# Patient Record
Sex: MALE | Race: WHITE | Employment: FULL TIME | URBAN - METROPOLITAN AREA
[De-identification: names, ages, dates, MRNs, and addresses within clinical notes are randomized per-mention and may not be internally consistent; named-entity substitution may affect disease eponyms.]

---

## 2018-04-24 ENCOUNTER — TELEPHONE (OUTPATIENT)
Dept: ADMINISTRATIVE | Age: 48
End: 2018-04-24

## 2018-05-06 ENCOUNTER — HOSPITAL ENCOUNTER (EMERGENCY)
Age: 48
Discharge: HOME | End: 2018-05-06
Payer: COMMERCIAL

## 2018-05-06 VITALS
WEIGHT: 175 LBS | HEART RATE: 63 BPM | HEIGHT: 73 IN | SYSTOLIC BLOOD PRESSURE: 103 MMHG | DIASTOLIC BLOOD PRESSURE: 61 MMHG | BODY MASS INDEX: 23.19 KG/M2 | OXYGEN SATURATION: 95 % | RESPIRATION RATE: 16 BRPM | TEMPERATURE: 99 F

## 2018-05-06 DIAGNOSIS — H61.23 BILATERAL IMPACTED CERUMEN: Primary | ICD-10-CM

## 2018-05-06 PROCEDURE — 99282 EMERGENCY DEPT VISIT SF MDM: CPT

## 2018-05-06 PROCEDURE — 69210 REMOVE IMPACTED EAR WAX UNI: CPT

## 2018-05-25 ENCOUNTER — HOSPITAL ENCOUNTER (OUTPATIENT)
Dept: ULTRASOUND IMAGING | Age: 48
Discharge: HOME | End: 2018-05-27
Payer: COMMERCIAL

## 2018-05-25 DIAGNOSIS — R10.10 PAIN OF UPPER ABDOMEN: ICD-10-CM

## 2018-05-25 DIAGNOSIS — R63.4 WEIGHT LOSS: ICD-10-CM

## 2018-05-25 PROCEDURE — 76705 ECHO EXAM OF ABDOMEN: CPT

## 2018-05-31 ENCOUNTER — OFFICE VISIT (OUTPATIENT)
Dept: FAMILY MEDICINE CLINIC | Age: 48
End: 2018-05-31
Payer: COMMERCIAL

## 2018-05-31 VITALS
RESPIRATION RATE: 18 BRPM | DIASTOLIC BLOOD PRESSURE: 61 MMHG | HEART RATE: 52 BPM | OXYGEN SATURATION: 96 % | HEIGHT: 73 IN | BODY MASS INDEX: 21.47 KG/M2 | SYSTOLIC BLOOD PRESSURE: 90 MMHG | WEIGHT: 162 LBS

## 2018-05-31 DIAGNOSIS — E78.5 HYPERLIPIDEMIA, UNSPECIFIED HYPERLIPIDEMIA TYPE: ICD-10-CM

## 2018-05-31 DIAGNOSIS — F41.9 ANXIETY: ICD-10-CM

## 2018-05-31 DIAGNOSIS — F33.9 EPISODE OF RECURRENT MAJOR DEPRESSIVE DISORDER, UNSPECIFIED DEPRESSION EPISODE SEVERITY (HCC): ICD-10-CM

## 2018-05-31 DIAGNOSIS — R56.9 SEIZURES (HCC): ICD-10-CM

## 2018-05-31 DIAGNOSIS — I10 ESSENTIAL HYPERTENSION: Primary | ICD-10-CM

## 2018-05-31 PROCEDURE — G8427 DOCREV CUR MEDS BY ELIG CLIN: HCPCS | Performed by: FAMILY MEDICINE

## 2018-05-31 PROCEDURE — G8598 ASA/ANTIPLAT THER USED: HCPCS | Performed by: FAMILY MEDICINE

## 2018-05-31 PROCEDURE — 99214 OFFICE O/P EST MOD 30 MIN: CPT | Performed by: FAMILY MEDICINE

## 2018-05-31 PROCEDURE — G8420 CALC BMI NORM PARAMETERS: HCPCS | Performed by: FAMILY MEDICINE

## 2018-05-31 PROCEDURE — 4004F PT TOBACCO SCREEN RCVD TLK: CPT | Performed by: FAMILY MEDICINE

## 2018-05-31 RX ORDER — FAMOTIDINE 20 MG/1
TABLET, FILM COATED ORAL
Qty: 60 TABLET | Refills: 3 | Status: SHIPPED | OUTPATIENT
Start: 2018-05-31 | End: 2019-01-14 | Stop reason: SDUPTHER

## 2018-05-31 RX ORDER — ALLOPURINOL 100 MG/1
TABLET ORAL
Qty: 30 TABLET | Refills: 5 | Status: SHIPPED | OUTPATIENT
Start: 2018-05-31 | End: 2019-03-28

## 2018-05-31 RX ORDER — ISOSORBIDE MONONITRATE 30 MG/1
TABLET, EXTENDED RELEASE ORAL
Qty: 30 TABLET | Refills: 5 | Status: SHIPPED | OUTPATIENT
Start: 2018-05-31 | End: 2019-03-13 | Stop reason: SDUPTHER

## 2018-05-31 RX ORDER — CLOPIDOGREL BISULFATE 75 MG/1
TABLET ORAL
Qty: 30 TABLET | Refills: 5 | Status: SHIPPED | OUTPATIENT
Start: 2018-05-31 | End: 2019-04-18 | Stop reason: SDUPTHER

## 2018-05-31 RX ORDER — PANTOPRAZOLE SODIUM 20 MG/1
20 TABLET, DELAYED RELEASE ORAL DAILY
Qty: 30 TABLET | Refills: 3 | Status: SHIPPED | OUTPATIENT
Start: 2018-05-31 | End: 2019-01-14 | Stop reason: SDUPTHER

## 2018-05-31 RX ORDER — ATORVASTATIN CALCIUM 40 MG/1
40 TABLET, FILM COATED ORAL DAILY
Qty: 90 TABLET | Refills: 1 | Status: SHIPPED | OUTPATIENT
Start: 2018-05-31 | End: 2019-04-30 | Stop reason: CLARIF

## 2018-05-31 RX ORDER — ESCITALOPRAM OXALATE 20 MG/1
40 TABLET ORAL DAILY
Qty: 60 TABLET | Refills: 0 | Status: SHIPPED | OUTPATIENT
Start: 2018-05-31 | End: 2018-07-31 | Stop reason: SDUPTHER

## 2018-05-31 RX ORDER — LEVETIRACETAM 500 MG/1
1000 TABLET ORAL 2 TIMES DAILY
Qty: 180 TABLET | Refills: 1 | Status: SHIPPED | OUTPATIENT
Start: 2018-05-31 | End: 2018-10-22 | Stop reason: SDUPTHER

## 2018-05-31 RX ORDER — TRAZODONE HYDROCHLORIDE 100 MG/1
TABLET ORAL
Qty: 30 TABLET | Refills: 2 | Status: SHIPPED | OUTPATIENT
Start: 2018-05-31 | End: 2018-09-24 | Stop reason: SDUPTHER

## 2018-05-31 ASSESSMENT — PATIENT HEALTH QUESTIONNAIRE - PHQ9
SUM OF ALL RESPONSES TO PHQ QUESTIONS 1-9: 0
2. FEELING DOWN, DEPRESSED OR HOPELESS: 0
SUM OF ALL RESPONSES TO PHQ9 QUESTIONS 1 & 2: 0
1. LITTLE INTEREST OR PLEASURE IN DOING THINGS: 0

## 2018-07-08 ENCOUNTER — APPOINTMENT (OUTPATIENT)
Dept: CT IMAGING | Age: 48
End: 2018-07-08
Payer: COMMERCIAL

## 2018-07-08 ENCOUNTER — HOSPITAL ENCOUNTER (EMERGENCY)
Age: 48
Discharge: HOME | End: 2018-07-08
Attending: EMERGENCY MEDICINE
Payer: COMMERCIAL

## 2018-07-08 ENCOUNTER — APPOINTMENT (OUTPATIENT)
Dept: GENERAL RADIOLOGY | Age: 48
End: 2018-07-08
Payer: COMMERCIAL

## 2018-07-08 VITALS
HEART RATE: 51 BPM | SYSTOLIC BLOOD PRESSURE: 107 MMHG | HEIGHT: 73 IN | RESPIRATION RATE: 16 BRPM | OXYGEN SATURATION: 97 % | WEIGHT: 168 LBS | BODY MASS INDEX: 22.26 KG/M2 | DIASTOLIC BLOOD PRESSURE: 73 MMHG | TEMPERATURE: 98 F

## 2018-07-08 DIAGNOSIS — R00.1 BRADYCARDIA: ICD-10-CM

## 2018-07-08 DIAGNOSIS — R55 SYNCOPE AND COLLAPSE: Primary | ICD-10-CM

## 2018-07-08 LAB
ALBUMIN SERPL-MCNC: 3.7 G/DL (ref 3.5–5.2)
ALP BLD-CCNC: 44 U/L (ref 40–129)
ALT SERPL-CCNC: 18 U/L (ref 0–40)
ANION GAP SERPL CALCULATED.3IONS-SCNC: 10 MMOL/L (ref 7–16)
AST SERPL-CCNC: 19 U/L (ref 0–39)
BASOPHILS ABSOLUTE: 0.11 E9/L (ref 0–0.2)
BASOPHILS RELATIVE PERCENT: 1.8 % (ref 0–2)
BILIRUB SERPL-MCNC: 0.7 MG/DL (ref 0–1.2)
BILIRUBIN URINE: NEGATIVE
BLOOD, URINE: NEGATIVE
BUN BLDV-MCNC: 16 MG/DL (ref 6–20)
CALCIUM SERPL-MCNC: 8.6 MG/DL (ref 8.6–10.2)
CHLORIDE BLD-SCNC: 103 MMOL/L (ref 98–107)
CLARITY: CLEAR
CO2: 26 MMOL/L (ref 22–29)
COLOR: YELLOW
CREAT SERPL-MCNC: 0.9 MG/DL (ref 0.7–1.2)
EKG ATRIAL RATE: 52 BPM
EKG P AXIS: 82 DEGREES
EKG P-R INTERVAL: 158 MS
EKG Q-T INTERVAL: 420 MS
EKG QRS DURATION: 84 MS
EKG QTC CALCULATION (BAZETT): 390 MS
EKG R AXIS: 50 DEGREES
EKG T AXIS: 50 DEGREES
EKG VENTRICULAR RATE: 52 BPM
EOSINOPHILS ABSOLUTE: 0.38 E9/L (ref 0.05–0.5)
EOSINOPHILS RELATIVE PERCENT: 6.2 % (ref 0–6)
GFR AFRICAN AMERICAN: >60
GFR NON-AFRICAN AMERICAN: >60 ML/MIN/1.73
GLUCOSE BLD-MCNC: 99 MG/DL (ref 74–109)
GLUCOSE URINE: NEGATIVE MG/DL
HCT VFR BLD CALC: 40.7 % (ref 37–54)
HEMOGLOBIN: 13.7 G/DL (ref 12.5–16.5)
IMMATURE GRANULOCYTES #: 0.03 E9/L
IMMATURE GRANULOCYTES %: 0.5 % (ref 0–5)
KETONES, URINE: NEGATIVE MG/DL
LACTIC ACID: 1.5 MMOL/L (ref 0.5–2.2)
LEUKOCYTE ESTERASE, URINE: NEGATIVE
LYMPHOCYTES ABSOLUTE: 1.35 E9/L (ref 1.5–4)
LYMPHOCYTES RELATIVE PERCENT: 22 % (ref 20–42)
MCH RBC QN AUTO: 34.6 PG (ref 26–35)
MCHC RBC AUTO-ENTMCNC: 33.7 % (ref 32–34.5)
MCV RBC AUTO: 102.8 FL (ref 80–99.9)
MONOCYTES ABSOLUTE: 0.3 E9/L (ref 0.1–0.95)
MONOCYTES RELATIVE PERCENT: 4.9 % (ref 2–12)
NEUTROPHILS ABSOLUTE: 3.98 E9/L (ref 1.8–7.3)
NEUTROPHILS RELATIVE PERCENT: 64.6 % (ref 43–80)
NITRITE, URINE: NEGATIVE
PDW BLD-RTO: 15.1 FL (ref 11.5–15)
PH UA: 5 (ref 5–9)
PLATELET # BLD: 496 E9/L (ref 130–450)
PMV BLD AUTO: 9.5 FL (ref 7–12)
POTASSIUM SERPL-SCNC: 4.8 MMOL/L (ref 3.5–5)
PROTEIN UA: NEGATIVE MG/DL
RBC # BLD: 3.96 E12/L (ref 3.8–5.8)
SODIUM BLD-SCNC: 139 MMOL/L (ref 132–146)
SPECIFIC GRAVITY UA: >=1.03 (ref 1–1.03)
TOTAL PROTEIN: 5.8 G/DL (ref 6.4–8.3)
TROPONIN: <0.01 NG/ML (ref 0–0.03)
TROPONIN: <0.01 NG/ML (ref 0–0.03)
UROBILINOGEN, URINE: 0.2 E.U./DL
WBC # BLD: 6.2 E9/L (ref 4.5–11.5)

## 2018-07-08 PROCEDURE — 83605 ASSAY OF LACTIC ACID: CPT

## 2018-07-08 PROCEDURE — 81003 URINALYSIS AUTO W/O SCOPE: CPT

## 2018-07-08 PROCEDURE — 99285 EMERGENCY DEPT VISIT HI MDM: CPT

## 2018-07-08 PROCEDURE — 70450 CT HEAD/BRAIN W/O DYE: CPT

## 2018-07-08 PROCEDURE — 2580000003 HC RX 258: Performed by: EMERGENCY MEDICINE

## 2018-07-08 PROCEDURE — 93005 ELECTROCARDIOGRAM TRACING: CPT | Performed by: EMERGENCY MEDICINE

## 2018-07-08 PROCEDURE — 80053 COMPREHEN METABOLIC PANEL: CPT

## 2018-07-08 PROCEDURE — 85025 COMPLETE CBC W/AUTO DIFF WBC: CPT

## 2018-07-08 PROCEDURE — 71045 X-RAY EXAM CHEST 1 VIEW: CPT

## 2018-07-08 PROCEDURE — 84484 ASSAY OF TROPONIN QUANT: CPT

## 2018-07-08 RX ORDER — 0.9 % SODIUM CHLORIDE 0.9 %
1000 INTRAVENOUS SOLUTION INTRAVENOUS ONCE
Status: COMPLETED | OUTPATIENT
Start: 2018-07-08 | End: 2018-07-08

## 2018-07-08 RX ADMIN — SODIUM CHLORIDE 1000 ML: 9 INJECTION, SOLUTION INTRAVENOUS at 11:14

## 2018-07-08 NOTE — ED PROVIDER NOTES
- 450 E9/L    MPV 9.5 7.0 - 12.0 fL    Neutrophils % 64.6 43.0 - 80.0 %    Immature Granulocytes % 0.5 0.0 - 5.0 %    Lymphocytes % 22.0 20.0 - 42.0 %    Monocytes % 4.9 2.0 - 12.0 %    Eosinophils % 6.2 (H) 0.0 - 6.0 %    Basophils % 1.8 0.0 - 2.0 %    Neutrophils # 3.98 1.80 - 7.30 E9/L    Immature Granulocytes # 0.03 E9/L    Lymphocytes # 1.35 (L) 1.50 - 4.00 E9/L    Monocytes # 0.30 0.10 - 0.95 E9/L    Eosinophils # 0.38 0.05 - 0.50 E9/L    Basophils # 0.11 0.00 - 0.20 E9/L   Troponin   Result Value Ref Range    Troponin <0.01 0.00 - 0.03 ng/mL   Lactic Acid, Plasma   Result Value Ref Range    Lactic Acid 1.5 0.5 - 2.2 mmol/L   Urinalysis   Result Value Ref Range    Color, UA Yellow Straw/Yellow    Clarity, UA Clear Clear    Glucose, Ur Negative Negative mg/dL    Bilirubin Urine Negative Negative    Ketones, Urine Negative Negative mg/dL    Specific Gravity, UA >=1.030 1.005 - 1.030    Blood, Urine Negative Negative    pH, UA 5.0 5.0 - 9.0    Protein, UA Negative Negative mg/dL    Urobilinogen, Urine 0.2 <2.0 E.U./dL    Nitrite, Urine Negative Negative    Leukocyte Esterase, Urine Negative Negative   Comprehensive Metabolic Panel   Result Value Ref Range    Sodium 139 132 - 146 mmol/L    Potassium 4.8 3.5 - 5.0 mmol/L    Chloride 103 98 - 107 mmol/L    CO2 26 22 - 29 mmol/L    Anion Gap 10 7 - 16 mmol/L    Glucose 99 74 - 109 mg/dL    BUN 16 6 - 20 mg/dL    CREATININE 0.9 0.7 - 1.2 mg/dL    GFR Non-African American >60 >=60 mL/min/1.73    GFR African American >60     Calcium 8.6 8.6 - 10.2 mg/dL    Total Protein 5.8 (L) 6.4 - 8.3 g/dL    Alb 3.7 3.5 - 5.2 g/dL    Total Bilirubin 0.7 0.0 - 1.2 mg/dL    Alkaline Phosphatase 44 40 - 129 U/L    ALT 18 0 - 40 U/L    AST 19 0 - 39 U/L   Troponin   Result Value Ref Range    Troponin <0.01 0.00 - 0.03 ng/mL   EKG 12 Lead   Result Value Ref Range    Ventricular Rate 52 BPM    Atrial Rate 52 BPM    P-R Interval 158 ms    QRS Duration 84 ms    Q-T Interval 420 ms    QTc Calculation ([place]) 390 ms    P Axis 82 degrees    R Axis 50 degrees    T Axis 50 degrees       RADIOLOGY:  Interpreted by Radiologist.  XR CHEST PORTABLE   Final Result   Tortuous ectatic aorta                     CT Head WO Contrast   Final Result   Unremarkable scan of the head. CTA CHEST W CONTRAST    (Results Pending)         EKG: This EKG is signed and interpreted by the EP. Time: 10:41  Rate: 52  Rhythm: Sinus bradycardia  Interpretation: non-specific EKG  Comparison: None      ------------------------- NURSING NOTES AND VITALS REVIEWED ---------------------------   The nursing notes within the ED encounter and vital signs as below have been reviewed by myself. /75   Pulse (!) 48   Temp 98 °F (36.7 °C) (Oral)   Resp 16   Ht 6' 1\" (1.854 m)   Wt 168 lb (76.2 kg)   SpO2 100%   BMI 22.16 kg/m²   Oxygen Saturation Interpretation: Normal    The patients available past medical records and past encounters were reviewed. ------------------------------ ED COURSE/MEDICAL DECISION MAKING----------------------  Medications   0.9 % sodium chloride bolus (0 mLs Intravenous Stopped 7/8/18 1156)       Medical Decision Making:    Labs and imaging obtained and reviewed. This patient's ED course included: a personal history and physicial examination and re-evaluation prior to disposition    This patient has remained hemodynamically stable during their ED course. Re-Evaluations:           Re-evaluation Advised pt that he should be admitted, but he is unsure a this time. He is thinking it over. 12:52 PM    Pt says that he is going to the [employer] tomorrow and cannot stay. Consultations:             Time: 12:30 PM.   Spoke with Dr. Kamala Lal for Dr. Baron Husain (Medicine). Discussed case. She will admit this patient. Counseling:as stated above the patient was strongly advised to be admitted at least for 23 hour observation and monitoring.   He states he cannot stay as he is leaving on

## 2018-07-31 ENCOUNTER — OFFICE VISIT (OUTPATIENT)
Dept: FAMILY MEDICINE CLINIC | Age: 48
End: 2018-07-31
Payer: COMMERCIAL

## 2018-07-31 VITALS
SYSTOLIC BLOOD PRESSURE: 98 MMHG | WEIGHT: 168 LBS | OXYGEN SATURATION: 98 % | DIASTOLIC BLOOD PRESSURE: 62 MMHG | BODY MASS INDEX: 22.26 KG/M2 | RESPIRATION RATE: 16 BRPM | HEIGHT: 73 IN | HEART RATE: 58 BPM

## 2018-07-31 DIAGNOSIS — F33.9 EPISODE OF RECURRENT MAJOR DEPRESSIVE DISORDER, UNSPECIFIED DEPRESSION EPISODE SEVERITY (HCC): ICD-10-CM

## 2018-07-31 DIAGNOSIS — I10 ESSENTIAL HYPERTENSION: Primary | ICD-10-CM

## 2018-07-31 DIAGNOSIS — F41.9 ANXIETY: ICD-10-CM

## 2018-07-31 DIAGNOSIS — I25.10 CORONARY ARTERY DISEASE INVOLVING NATIVE CORONARY ARTERY OF NATIVE HEART WITHOUT ANGINA PECTORIS: ICD-10-CM

## 2018-07-31 PROCEDURE — 99214 OFFICE O/P EST MOD 30 MIN: CPT | Performed by: FAMILY MEDICINE

## 2018-07-31 PROCEDURE — G8598 ASA/ANTIPLAT THER USED: HCPCS | Performed by: FAMILY MEDICINE

## 2018-07-31 PROCEDURE — G8420 CALC BMI NORM PARAMETERS: HCPCS | Performed by: FAMILY MEDICINE

## 2018-07-31 PROCEDURE — G8427 DOCREV CUR MEDS BY ELIG CLIN: HCPCS | Performed by: FAMILY MEDICINE

## 2018-07-31 PROCEDURE — 4004F PT TOBACCO SCREEN RCVD TLK: CPT | Performed by: FAMILY MEDICINE

## 2018-07-31 RX ORDER — ESCITALOPRAM OXALATE 20 MG/1
20 TABLET ORAL DAILY
Qty: 30 TABLET | Refills: 5 | Status: SHIPPED | OUTPATIENT
Start: 2018-07-31 | End: 2019-02-25 | Stop reason: SDUPTHER

## 2018-07-31 ASSESSMENT — PATIENT HEALTH QUESTIONNAIRE - PHQ9
1. LITTLE INTEREST OR PLEASURE IN DOING THINGS: 0
SUM OF ALL RESPONSES TO PHQ QUESTIONS 1-9: 0
SUM OF ALL RESPONSES TO PHQ9 QUESTIONS 1 & 2: 0
2. FEELING DOWN, DEPRESSED OR HOPELESS: 0

## 2018-08-01 ENCOUNTER — HOSPITAL ENCOUNTER (EMERGENCY)
Age: 48
Discharge: HOME | End: 2018-08-01
Attending: EMERGENCY MEDICINE
Payer: COMMERCIAL

## 2018-08-01 VITALS
BODY MASS INDEX: 22.26 KG/M2 | OXYGEN SATURATION: 98 % | DIASTOLIC BLOOD PRESSURE: 78 MMHG | WEIGHT: 168 LBS | SYSTOLIC BLOOD PRESSURE: 115 MMHG | RESPIRATION RATE: 14 BRPM | HEIGHT: 73 IN | TEMPERATURE: 97.9 F | HEART RATE: 52 BPM

## 2018-08-01 DIAGNOSIS — E86.0 DEHYDRATION: Primary | ICD-10-CM

## 2018-08-01 LAB
ANION GAP SERPL CALCULATED.3IONS-SCNC: 10 MMOL/L (ref 7–16)
BACTERIA: NORMAL /HPF
BASOPHILS ABSOLUTE: 0.16 E9/L (ref 0–0.2)
BASOPHILS RELATIVE PERCENT: 2 % (ref 0–2)
BILIRUBIN URINE: NEGATIVE
BLOOD, URINE: NEGATIVE
BUN BLDV-MCNC: 14 MG/DL (ref 6–20)
CALCIUM SERPL-MCNC: 8.5 MG/DL (ref 8.6–10.2)
CHLORIDE BLD-SCNC: 106 MMOL/L (ref 98–107)
CLARITY: CLEAR
CO2: 24 MMOL/L (ref 22–29)
COLOR: YELLOW
CREAT SERPL-MCNC: 0.7 MG/DL (ref 0.7–1.2)
EKG ATRIAL RATE: 49 BPM
EKG P AXIS: 65 DEGREES
EKG P-R INTERVAL: 166 MS
EKG Q-T INTERVAL: 446 MS
EKG QRS DURATION: 84 MS
EKG QTC CALCULATION (BAZETT): 402 MS
EKG R AXIS: 24 DEGREES
EKG T AXIS: 41 DEGREES
EKG VENTRICULAR RATE: 49 BPM
EOSINOPHILS ABSOLUTE: 0.45 E9/L (ref 0.05–0.5)
EOSINOPHILS RELATIVE PERCENT: 5.7 % (ref 0–6)
GFR AFRICAN AMERICAN: >60
GFR NON-AFRICAN AMERICAN: >60 ML/MIN/1.73
GLUCOSE BLD-MCNC: 104 MG/DL (ref 74–109)
GLUCOSE URINE: NEGATIVE MG/DL
HCT VFR BLD CALC: 43 % (ref 37–54)
HEMOGLOBIN: 14.6 G/DL (ref 12.5–16.5)
IMMATURE GRANULOCYTES #: 0.07 E9/L
IMMATURE GRANULOCYTES %: 0.9 % (ref 0–5)
KETONES, URINE: NEGATIVE MG/DL
LEUKOCYTE ESTERASE, URINE: NEGATIVE
LYMPHOCYTES ABSOLUTE: 2.06 E9/L (ref 1.5–4)
LYMPHOCYTES RELATIVE PERCENT: 26.2 % (ref 20–42)
MCH RBC QN AUTO: 35.2 PG (ref 26–35)
MCHC RBC AUTO-ENTMCNC: 34 % (ref 32–34.5)
MCV RBC AUTO: 103.6 FL (ref 80–99.9)
MONOCYTES ABSOLUTE: 0.5 E9/L (ref 0.1–0.95)
MONOCYTES RELATIVE PERCENT: 6.4 % (ref 2–12)
NEUTROPHILS ABSOLUTE: 4.61 E9/L (ref 1.8–7.3)
NEUTROPHILS RELATIVE PERCENT: 58.8 % (ref 43–80)
NITRITE, URINE: NEGATIVE
PDW BLD-RTO: 13.6 FL (ref 11.5–15)
PH UA: 6 (ref 5–9)
PLATELET # BLD: 579 E9/L (ref 130–450)
PMV BLD AUTO: 9.6 FL (ref 7–12)
POTASSIUM SERPL-SCNC: 4.5 MMOL/L (ref 3.5–5)
PROTEIN UA: NEGATIVE MG/DL
RBC # BLD: 4.15 E12/L (ref 3.8–5.8)
RBC UA: NORMAL /HPF (ref 0–2)
SODIUM BLD-SCNC: 140 MMOL/L (ref 132–146)
SPECIFIC GRAVITY UA: 1.01 (ref 1–1.03)
UROBILINOGEN, URINE: 0.2 E.U./DL
WBC # BLD: 7.9 E9/L (ref 4.5–11.5)
WBC UA: NORMAL /HPF (ref 0–5)

## 2018-08-01 PROCEDURE — 81001 URINALYSIS AUTO W/SCOPE: CPT

## 2018-08-01 PROCEDURE — 99284 EMERGENCY DEPT VISIT MOD MDM: CPT

## 2018-08-01 PROCEDURE — 85025 COMPLETE CBC W/AUTO DIFF WBC: CPT

## 2018-08-01 PROCEDURE — 2580000003 HC RX 258: Performed by: STUDENT IN AN ORGANIZED HEALTH CARE EDUCATION/TRAINING PROGRAM

## 2018-08-01 PROCEDURE — 96360 HYDRATION IV INFUSION INIT: CPT

## 2018-08-01 PROCEDURE — 80048 BASIC METABOLIC PNL TOTAL CA: CPT

## 2018-08-01 RX ORDER — 0.9 % SODIUM CHLORIDE 0.9 %
1000 INTRAVENOUS SOLUTION INTRAVENOUS ONCE
Status: COMPLETED | OUTPATIENT
Start: 2018-08-01 | End: 2018-08-01

## 2018-08-01 RX ADMIN — SODIUM CHLORIDE 1000 ML: 9 INJECTION, SOLUTION INTRAVENOUS at 12:00

## 2018-08-01 ASSESSMENT — ENCOUNTER SYMPTOMS
VOMITING: 0
SHORTNESS OF BREATH: 0
COUGH: 0
DIARRHEA: 0
BACK PAIN: 0
EYE DISCHARGE: 0
NAUSEA: 0
VISUAL CHANGE: 1
EYE PAIN: 0
SINUS PRESSURE: 0
SORE THROAT: 0
WHEEZING: 0
ABDOMINAL PAIN: 0
EYE REDNESS: 0

## 2018-08-01 NOTE — ED PROVIDER NOTES
male whom is in no distress. Physical exam reveals heart regular, lungs are clear. Abdomen soft nontender. Bowel sounds present 4 quadrants. No focal neurologic deficit. Recent chart was reviewed from his last visit. My plan: Symptomatic and supportive care. Closed, labs. Electronically signed by Yuni Brown DO on 8/1/18 at 11:58 AM        [TG]   1302 Patient states he is feeling much better at this time. [AL]      ED Course User Index  [AL] Haroldo Hathaway DO  [TG] Yuni Brown DO       ED Course as of Aug 01 1316   Wed Aug 01, 2018   1158 ATTENDING PROVIDER ATTESTATION:     I have personally performed and/or participated in the history, exam, medical decision making, and procedures and agree with all pertinent clinical information unless otherwise noted. I have also reviewed and agree with the past medical, family and social history unless otherwise noted. I have discussed this patient in detail with the resident and provided the instruction and education regarding the evidence-based evaluation and treatment of dizziness  History: Patient with a brief period of lightheadedness as well as driving. He states he's has these symptoms when he doesn't eat or drink very well. His been under a lot of stress at work. He denies a seizure or loss of consciousness. He has similar episode about a month ago secondary to what he describes as dehydration and malnutrition. My findings: Jarad Richards is a 53 y.o. male whom is in no distress. Physical exam reveals heart regular, lungs are clear. Abdomen soft nontender. Bowel sounds present 4 quadrants. No focal neurologic deficit. Recent chart was reviewed from his last visit. My plan: Symptomatic and supportive care. Closed, labs. Electronically signed by Yuni Brown DO on 8/1/18 at 11:58 AM        [TG]   1302 Patient states he is feeling much better at this time.   [AL]      ED Course User Index  [AL] Haroldo Hathaway DO  [TG] Yuni Brown DO 4.5 - 11.5 E9/L    RBC 4.15 3.80 - 5.80 E12/L    Hemoglobin 14.6 12.5 - 16.5 g/dL    Hematocrit 43.0 37.0 - 54.0 %    .6 (H) 80.0 - 99.9 fL    MCH 35.2 (H) 26.0 - 35.0 pg    MCHC 34.0 32.0 - 34.5 %    RDW 13.6 11.5 - 15.0 fL    Platelets 048 (H) 183 - 450 E9/L    MPV 9.6 7.0 - 12.0 fL    Neutrophils % 58.8 43.0 - 80.0 %    Immature Granulocytes % 0.9 0.0 - 5.0 %    Lymphocytes % 26.2 20.0 - 42.0 %    Monocytes % 6.4 2.0 - 12.0 %    Eosinophils % 5.7 0.0 - 6.0 %    Basophils % 2.0 0.0 - 2.0 %    Neutrophils # 4.61 1.80 - 7.30 E9/L    Immature Granulocytes # 0.07 E9/L    Lymphocytes # 2.06 1.50 - 4.00 E9/L    Monocytes # 0.50 0.10 - 0.95 E9/L    Eosinophils # 0.45 0.05 - 0.50 E9/L    Basophils # 0.16 0.00 - 0.20 E9/L   Urinalysis with Microscopic   Result Value Ref Range    Color, UA Yellow Straw/Yellow    Clarity, UA Clear Clear    Glucose, Ur Negative Negative mg/dL    Bilirubin Urine Negative Negative    Ketones, Urine Negative Negative mg/dL    Specific Gravity, UA 1.015 1.005 - 1.030    Blood, Urine Negative Negative    pH, UA 6.0 5.0 - 9.0    Protein, UA Negative Negative mg/dL    Urobilinogen, Urine 0.2 <2.0 E.U./dL    Nitrite, Urine Negative Negative    Leukocyte Esterase, Urine Negative Negative    WBC, UA NONE 0 - 5 /HPF    RBC, UA NONE 0 - 2 /HPF    Bacteria, UA NONE /HPF   EKG 12 Lead   Result Value Ref Range    Ventricular Rate 49 BPM    Atrial Rate 49 BPM    P-R Interval 166 ms    QRS Duration 84 ms    Q-T Interval 446 ms    QTc Calculation (Bazett) 402 ms    P Axis 65 degrees    R Axis 24 degrees    T Axis 41 degrees       Radiology:  No orders to display       ------------------------- NURSING NOTES AND VITALS REVIEWED ---------------------------  Date / Time Roomed:  8/1/2018 10:42 AM  ED Bed Assignment:  04/04    The nursing notes within the ED encounter and vital signs as below have been reviewed.    /78   Pulse 52   Temp 97.9 °F (36.6 °C) (Oral)   Resp 14   Ht 6' 1\" (1.854 m)   Wt

## 2018-08-03 NOTE — PROGRESS NOTES
Steffany Cuevas MD   clopidogrel (PLAVIX) 75 MG tablet TAKE 1 TABLET BY MOUTH DAILY 5/31/18  Yes Milady Valle MD   famotidine (PEPCID) 20 MG tablet TAKE 1 TABLET BY MOUTH TWICE DAILY 5/31/18  Yes Milady Valle MD   isosorbide mononitrate (IMDUR) 30 MG extended release tablet TAKE 1 TABLET BY MOUTH DAILY 5/31/18  Yes Milady Valle MD   levETIRAcetam (KEPPRA) 500 MG tablet Take 2 tablets by mouth 2 times daily 5/31/18  Yes Milady Valle MD   pantoprazole (PROTONIX) 20 MG tablet Take 1 tablet by mouth daily 5/31/18  Yes Milady Valle MD   traZODone (DESYREL) 100 MG tablet TAKE 1 TABLET BY MOUTH EVERY NIGHT 5/31/18  Yes Milady Valle MD   DELZICOL 400 MG CPDR delayed release capsule TAKE 1 CAPSULE BY MOUTH THREE TIMES DAILY 5/29/18  Yes Milady Valle MD   ondansetron (ZOFRAN ODT) 8 MG disintegrating tablet Take 1 tablet by mouth every 8 hours as needed for Nausea or Vomiting 1/4/18  Yes Warren Paniagua MD   hydroxyurea (HYDREA) 500 MG chemo capsule Take 1 capsule by mouth 2 times daily  Patient taking differently: Take 500 mg by mouth 2 times daily Every other day 9/14/16  Yes Milady Valle MD       Current Outpatient Prescriptions:     metoprolol tartrate (LOPRESSOR) 25 MG tablet, TAKE 1 TABLET BY MOUTH once daily, Disp: 60 tablet, Rfl: 5    escitalopram (LEXAPRO) 20 MG tablet, Take 1 tablet by mouth daily, Disp: 30 tablet, Rfl: 5    allopurinol (ZYLOPRIM) 100 MG tablet, TAKE 1 TABLET BY MOUTH EVERY DAY, Disp: 30 tablet, Rfl: 5    atorvastatin (LIPITOR) 40 MG tablet, Take 1 tablet by mouth daily, Disp: 90 tablet, Rfl: 1    clopidogrel (PLAVIX) 75 MG tablet, TAKE 1 TABLET BY MOUTH DAILY, Disp: 30 tablet, Rfl: 5    famotidine (PEPCID) 20 MG tablet, TAKE 1 TABLET BY MOUTH TWICE DAILY, Disp: 60 tablet, Rfl: 3    isosorbide mononitrate (IMDUR) 30 MG extended release tablet, TAKE 1 TABLET BY MOUTH DAILY, Disp: 30 tablet, Rfl: 5   levETIRAcetam (KEPPRA) 500 MG tablet, Take 2 tablets by mouth 2 times daily, Disp: 180 tablet, Rfl: 1    pantoprazole (PROTONIX) 20 MG tablet, Take 1 tablet by mouth daily, Disp: 30 tablet, Rfl: 3    traZODone (DESYREL) 100 MG tablet, TAKE 1 TABLET BY MOUTH EVERY NIGHT, Disp: 30 tablet, Rfl: 2    DELZICOL 400 MG CPDR delayed release capsule, TAKE 1 CAPSULE BY MOUTH THREE TIMES DAILY, Disp: 90 capsule, Rfl: 2    ondansetron (ZOFRAN ODT) 8 MG disintegrating tablet, Take 1 tablet by mouth every 8 hours as needed for Nausea or Vomiting, Disp: 10 tablet, Rfl: 1    hydroxyurea (HYDREA) 500 MG chemo capsule, Take 1 capsule by mouth 2 times daily (Patient taking differently: Take 500 mg by mouth 2 times daily Every other day), Disp: 60 capsule, Rfl: 1    FHX:   Family History   Problem Relation Age of Onset    Diabetes Brother     Hearing Loss Maternal Uncle     Diabetes Brother     Hearing Loss Maternal Uncle      PHX:  reports that he has been smoking Cigarettes. He has a 10.00 pack-year smoking history. He uses smokeless tobacco. He reports that he drinks alcohol. He reports that he uses drugs, including Marijuana. Review Of Systems:    Constitutional: No fever, chills   HEENT: no sore throat, no running nose. Respiratory: no pleuritic chest pain, negative except for negative for shortness of breath   Cardiology: no palpitation, no leg swelling. Gastroenterology: no constipation or diarrhea. No blood in stool. Poor appetite   Genitourinary: No dysuria, no frequency. Musculoskeletal: negative for  joint pain, no myalgia   Neurology: no focal weakness in extremities, no slurred speech.    Endocrinology: no temperature intolerance, no polyphagia   Hematology: no bruising, no lymphadenopathy     Physical Exam:  Vitals:    07/31/18 1032   BP: 98/62   Pulse: 58   Resp: 16   SpO2: 98%   Weight: 168 lb (76.2 kg)   Height: 6' 1\" (1.854 m)     General:  Patient alert and oriented x 3, NAD, pleasant  HEENT: native heart without angina pectoris  -     [facility] Cardiology- Harjeet Garcia MD      Counseled regarding above diagnosis, including possible risks and complications,  especially if left uncontrolled. I have instructed Yolanda Lorenzana to complete a self tracking handout on Blood Pressures  and instructed  to bring it  at next appointment. Advised patient to call with any new medication issues, and read all Rx info from pharmacy to assure aware of all possible risks and side effects of medication before taking. Refills as needed    Yolanda Lorenzana received counseling on the  healthy behaviors like diet and exercise. Discussed any signs and symptoms that would warrant immediate ED evaluation. Yolanda Lorenzana was instructed to call if any new symptoms develop prior to next visit. I have reviewed my findings and recommendations with Dov Sevilla.     Maciel Ellsworth M.D

## 2018-08-12 ENCOUNTER — HOSPITAL ENCOUNTER (EMERGENCY)
Age: 48
Discharge: HOME | End: 2018-08-12
Attending: EMERGENCY MEDICINE
Payer: COMMERCIAL

## 2018-08-12 VITALS
HEART RATE: 78 BPM | SYSTOLIC BLOOD PRESSURE: 101 MMHG | DIASTOLIC BLOOD PRESSURE: 63 MMHG | WEIGHT: 168 LBS | RESPIRATION RATE: 16 BRPM | HEIGHT: 73 IN | OXYGEN SATURATION: 97 % | TEMPERATURE: 98 F | BODY MASS INDEX: 22.26 KG/M2

## 2018-08-12 DIAGNOSIS — E86.0 DEHYDRATION: Primary | ICD-10-CM

## 2018-08-12 LAB
ANION GAP SERPL CALCULATED.3IONS-SCNC: 10 MMOL/L (ref 7–16)
BASOPHILS ABSOLUTE: 0.15 E9/L (ref 0–0.2)
BASOPHILS RELATIVE PERCENT: 1.6 % (ref 0–2)
BUN BLDV-MCNC: 21 MG/DL (ref 6–20)
CALCIUM SERPL-MCNC: 8.6 MG/DL (ref 8.6–10.2)
CHLORIDE BLD-SCNC: 104 MMOL/L (ref 98–107)
CHP ED QC CHECK: YES
CO2: 24 MMOL/L (ref 22–29)
CREAT SERPL-MCNC: 0.7 MG/DL (ref 0.7–1.2)
EKG ATRIAL RATE: 57 BPM
EKG P AXIS: 50 DEGREES
EKG P-R INTERVAL: 162 MS
EKG Q-T INTERVAL: 426 MS
EKG QRS DURATION: 92 MS
EKG QTC CALCULATION (BAZETT): 414 MS
EKG R AXIS: 26 DEGREES
EKG T AXIS: 46 DEGREES
EKG VENTRICULAR RATE: 57 BPM
EOSINOPHILS ABSOLUTE: 0.5 E9/L (ref 0.05–0.5)
EOSINOPHILS RELATIVE PERCENT: 5.5 % (ref 0–6)
GFR AFRICAN AMERICAN: >60
GFR NON-AFRICAN AMERICAN: >60 ML/MIN/1.73
GLUCOSE BLD-MCNC: 107 MG/DL
GLUCOSE BLD-MCNC: 111 MG/DL (ref 74–109)
HCT VFR BLD CALC: 43.7 % (ref 37–54)
HEMOGLOBIN: 14.7 G/DL (ref 12.5–16.5)
IMMATURE GRANULOCYTES #: 0.08 E9/L
IMMATURE GRANULOCYTES %: 0.9 % (ref 0–5)
LYMPHOCYTES ABSOLUTE: 2.16 E9/L (ref 1.5–4)
LYMPHOCYTES RELATIVE PERCENT: 23.7 % (ref 20–42)
MCH RBC QN AUTO: 34.4 PG (ref 26–35)
MCHC RBC AUTO-ENTMCNC: 33.6 % (ref 32–34.5)
MCV RBC AUTO: 102.3 FL (ref 80–99.9)
METER GLUCOSE: 107 MG/DL (ref 70–110)
MONOCYTES ABSOLUTE: 0.45 E9/L (ref 0.1–0.95)
MONOCYTES RELATIVE PERCENT: 4.9 % (ref 2–12)
NEUTROPHILS ABSOLUTE: 5.79 E9/L (ref 1.8–7.3)
NEUTROPHILS RELATIVE PERCENT: 63.4 % (ref 43–80)
PDW BLD-RTO: 13.6 FL (ref 11.5–15)
PLATELET # BLD: 633 E9/L (ref 130–450)
PMV BLD AUTO: 9 FL (ref 7–12)
POTASSIUM SERPL-SCNC: 4.1 MMOL/L (ref 3.5–5)
RBC # BLD: 4.27 E12/L (ref 3.8–5.8)
SODIUM BLD-SCNC: 138 MMOL/L (ref 132–146)
TROPONIN: <0.01 NG/ML (ref 0–0.03)
WBC # BLD: 9.1 E9/L (ref 4.5–11.5)

## 2018-08-12 PROCEDURE — 6360000002 HC RX W HCPCS: Performed by: STUDENT IN AN ORGANIZED HEALTH CARE EDUCATION/TRAINING PROGRAM

## 2018-08-12 PROCEDURE — 80048 BASIC METABOLIC PNL TOTAL CA: CPT

## 2018-08-12 PROCEDURE — 84484 ASSAY OF TROPONIN QUANT: CPT

## 2018-08-12 PROCEDURE — 2580000003 HC RX 258: Performed by: STUDENT IN AN ORGANIZED HEALTH CARE EDUCATION/TRAINING PROGRAM

## 2018-08-12 PROCEDURE — 93005 ELECTROCARDIOGRAM TRACING: CPT | Performed by: STUDENT IN AN ORGANIZED HEALTH CARE EDUCATION/TRAINING PROGRAM

## 2018-08-12 PROCEDURE — 96374 THER/PROPH/DIAG INJ IV PUSH: CPT

## 2018-08-12 PROCEDURE — 82962 GLUCOSE BLOOD TEST: CPT

## 2018-08-12 PROCEDURE — 96361 HYDRATE IV INFUSION ADD-ON: CPT

## 2018-08-12 PROCEDURE — 85025 COMPLETE CBC W/AUTO DIFF WBC: CPT

## 2018-08-12 PROCEDURE — 99284 EMERGENCY DEPT VISIT MOD MDM: CPT

## 2018-08-12 RX ORDER — 0.9 % SODIUM CHLORIDE 0.9 %
1000 INTRAVENOUS SOLUTION INTRAVENOUS ONCE
Status: COMPLETED | OUTPATIENT
Start: 2018-08-12 | End: 2018-08-12

## 2018-08-12 RX ORDER — ONDANSETRON 2 MG/ML
4 INJECTION INTRAMUSCULAR; INTRAVENOUS ONCE
Status: COMPLETED | OUTPATIENT
Start: 2018-08-12 | End: 2018-08-12

## 2018-08-12 RX ADMIN — SODIUM CHLORIDE 1000 ML: 9 INJECTION, SOLUTION INTRAVENOUS at 12:43

## 2018-08-12 RX ADMIN — SODIUM CHLORIDE 1000 ML: 9 INJECTION, SOLUTION INTRAVENOUS at 11:52

## 2018-08-12 RX ADMIN — ONDANSETRON HYDROCHLORIDE 4 MG: 2 INJECTION, SOLUTION INTRAMUSCULAR; INTRAVENOUS at 11:51

## 2018-08-12 ASSESSMENT — ENCOUNTER SYMPTOMS
DIARRHEA: 0
RHINORRHEA: 0
HEMATOCHEZIA: 0
SHORTNESS OF BREATH: 0
NAUSEA: 1
VISUAL CHANGE: 1
CONSTIPATION: 0
TROUBLE SWALLOWING: 0
EYE PAIN: 0
SINUS PRESSURE: 0
PHOTOPHOBIA: 0
VOMITING: 0
ABDOMINAL PAIN: 0
BLOOD IN STOOL: 0
SORE THROAT: 0

## 2018-08-12 NOTE — ED NOTES
Patient states that he could not sleep last night despite taking his trazodone. Patient was at work today and just couldn't do his job. 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      Chely Malcolm RN  08/12/18 1129

## 2018-08-12 NOTE — ED PROVIDER NOTES
Is a 71-year-old male presents to the emergency department via EMS for evaluation of \"not feeling well\". Patient states that this morning he woke up after only 12 hours of sleep and felt \"off\". Patient was unable to describe this \"off\" feeling further. He states that he was able to work all their became lightheaded and had a brief moment of blurred vision. Both have since resolved. Admits to multiple nausea that has been going on since this morning. Patient states that he has a history of MI in 2009 has an ongoing issue with bradycardia that is being managed by his cardiologist. Denies any chest pain, shortness of breath, fever, chills, headache. The history is provided by the patient. Fatigue   Severity:  Moderate  Onset quality:  Gradual  Duration:  6 hours  Timing:  Constant  Progression:  Unchanged  Chronicity:  New  Context: not change in medication, not dehydration and not increased activity    Relieved by:  Nothing  Worsened by:  Nothing  Ineffective treatments:  None tried  Associated symptoms: nausea, near-syncope and vision change    Associated symptoms: no abdominal pain, no chest pain, no diarrhea, no difficulty walking, no dizziness, no dysphagia, no dysuria, no numbness in extremities, no falls, no fever, no frequency, no headaches, no hematochezia, no lethargy, no loss of consciousness, no melena, no myalgias, no seizures, no shortness of breath, no stroke symptoms, no syncope, no urgency and no vomiting    Nausea:     Severity:  Mild    Onset quality:  Gradual    Duration:  6 hours    Timing:  Intermittent    Progression:  Improving  Risk factors: coronary artery disease and heart disease    Risk factors: no diabetes and no new medications        Review of Systems   Constitutional: Positive for fatigue. Negative for chills, diaphoresis and fever. HENT: Negative for congestion, postnasal drip, rhinorrhea, sinus pressure, sore throat and trouble swallowing.     Eyes: Positive for visual -------------------------------------------------  Labs:  Results for orders placed or performed during the hospital encounter of 08/12/18   CBC Auto Differential   Result Value Ref Range    WBC 9.1 4.5 - 11.5 E9/L    RBC 4.27 3.80 - 5.80 E12/L    Hemoglobin 14.7 12.5 - 16.5 g/dL    Hematocrit 43.7 37.0 - 54.0 %    .3 (H) 80.0 - 99.9 fL    MCH 34.4 26.0 - 35.0 pg    MCHC 33.6 32.0 - 34.5 %    RDW 13.6 11.5 - 15.0 fL    Platelets 287 (H) 180 - 450 E9/L    MPV 9.0 7.0 - 12.0 fL    Neutrophils % 63.4 43.0 - 80.0 %    Immature Granulocytes % 0.9 0.0 - 5.0 %    Lymphocytes % 23.7 20.0 - 42.0 %    Monocytes % 4.9 2.0 - 12.0 %    Eosinophils % 5.5 0.0 - 6.0 %    Basophils % 1.6 0.0 - 2.0 %    Neutrophils # 5.79 1.80 - 7.30 E9/L    Immature Granulocytes # 0.08 E9/L    Lymphocytes # 2.16 1.50 - 4.00 E9/L    Monocytes # 0.45 0.10 - 0.95 E9/L    Eosinophils # 0.50 0.05 - 0.50 E9/L    Basophils # 0.15 0.00 - 0.20 Y9/V   Basic Metabolic Panel   Result Value Ref Range    Sodium 138 132 - 146 mmol/L    Potassium 4.1 3.5 - 5.0 mmol/L    Chloride 104 98 - 107 mmol/L    CO2 24 22 - 29 mmol/L    Anion Gap 10 7 - 16 mmol/L    Glucose 111 (H) 74 - 109 mg/dL    BUN 21 (H) 6 - 20 mg/dL    CREATININE 0.7 0.7 - 1.2 mg/dL    GFR Non-African American >60 >=60 mL/min/1.73    GFR African American >60     Calcium 8.6 8.6 - 10.2 mg/dL   Troponin   Result Value Ref Range    Troponin <0.01 0.00 - 0.03 ng/mL   POCT Glucose   Result Value Ref Range    Glucose 107 mg/dL    QC OK?  yes    POCT Glucose   Result Value Ref Range    Meter Glucose 107 70 - 110 mg/dL   EKG 12 Lead   Result Value Ref Range    Ventricular Rate 57 BPM    Atrial Rate 57 BPM    P-R Interval 162 ms    QRS Duration 92 ms    Q-T Interval 426 ms    QTc Calculation ([place]azett) 414 ms    P Axis 50 degrees    R Axis 26 degrees    T Axis 46 degrees       Radiology:  No orders to display       ------------------------- NURSING NOTES AND VITALS REVIEWED ---------------------------  Date

## 2018-08-12 NOTE — ED NOTES
ATTENDING PROVIDER ATTESTATION:     Liban Gonzalez presented to the emergency department for evaluation of Other (\"not feeling well\" only slept 2 hours last night)   and was initially evaluated by the Medical Resident. See Original ED Note for H&P and ED course above. I have reviewed and discussed the case, including pertinent history (medical, surgical, family and social) and exam findings with the Medical Resident assigned to Liban Gonzalez. I have personally performed and/or participated in the history, exam, medical decision making, and procedures and agree with all pertinent clinical information and any additional changes or corrections are noted below in my assessment and plan. I have discussed this patient in detail with the resident, and provided the instruction and education,       I have reviewed my findings and recommendations with the assigned Medical Resident, Liban Gonzalez and members of family present at the time of disposition. My Assessment/Plan: The following summarizes my clinical findings and independent assessment:     History:  Liban Gonzalez is a 53 y.o. male presenting to the ED for dehydration, beginning prior to arrival.  The complaint has been persistent, moderate in severity, and worsened by nothing. Patient reports he was not feeling well today. He reports that when he woke up he didn't feel right. He only slept for a few hours. He says he just felt off and not well. He felt like he was slightly nauseated and felt somewhat lightheaded. He denies chest pain or shortness of breath. No syncope. No abdominal pain or back pain or leg swelling.             Review of Systems:   Pertinent positives and negatives are stated within HPI, all other systems reviewed and are negative.    --------------------------------------------- PAST HISTORY ---------------------------------------------  Past Medical History:  has a past medical history of Atheroembolism of left lower extremity ([address] Atheroembolism of left lower extremity ([address] Atrial flutter ([address] Coronary atherosclerosis due to calcified coronary lesion(414.4); Discoloration of skin of toe; ED (erectile dysfunction); Hyperlipidemia; Hypertension; IBS (irritable bowel syndrome); Irregular heart beat; Ischemic toe; MI, old; Seizures ([address] Tinea; Tobacco dependence; and Tobacco use. Past Surgical History:  has a past surgical history that includes Cardiac surgery; Coronary angioplasty (2009); Upper gastrointestinal endoscopy (8/2014); Colonoscopy (8/2014); Coronary angioplasty with stent; and Upper gastrointestinal endoscopy. Social History:  reports that he has been smoking Cigarettes. He has a 5.00 pack-year smoking history. He uses smokeless tobacco. He reports that he drinks alcohol. He reports that he uses drugs, including Marijuana. Family History: family history includes Diabetes in his brother and brother; Hearing Loss in his maternal uncle and maternal uncle. The patients home medications have been reviewed. Allergies: Acyclovir and related and Doxycycline  --------------------------------------------------------------------------------------------------------------  Nursing notes and vital signs reviewed             On My Exam:    Constitutional/General: Alert and oriented x3  Head: Normocephalic and atraumatic  Eyes: PERRL, EOMI, conjunctiva normal, sclera non icteric  Mouth: Oropharynx clear, handling secretions, no trismus, no asymmetry of the posterior oropharynx or uvular edema  Neck: Supple, full ROM, no stridor, no meningeal signs  Respiratory: Lungs clear to auscultation bilaterally, no wheezes, rales, or rhonchi. Not in respiratory distress  Cardiovascular:  Regular rate. Regular rhythm. No murmurs, no aortic murmurs, no gallops, or rubs. 2+ distal pulses. Equal extremity pulses. Chest: No chest wall tenderness  GI:  Abdomen Soft, Non tender, Non distended. +BS.    No rebound,

## 2018-08-14 ENCOUNTER — TELEPHONE (OUTPATIENT)
Dept: CARDIOLOGY CLINIC | Age: 48
End: 2018-08-14

## 2018-08-31 PROBLEM — E86.0 DEHYDRATION: Status: RESOLVED | Noted: 2018-08-01 | Resolved: 2018-08-31

## 2018-09-11 ENCOUNTER — APPOINTMENT (OUTPATIENT)
Dept: GENERAL RADIOLOGY | Age: 48
End: 2018-09-11
Payer: COMMERCIAL

## 2018-09-11 ENCOUNTER — HOSPITAL ENCOUNTER (EMERGENCY)
Age: 48
Discharge: HOME | End: 2018-09-11
Attending: EMERGENCY MEDICINE
Payer: COMMERCIAL

## 2018-09-11 VITALS
HEIGHT: 73 IN | WEIGHT: 185 LBS | BODY MASS INDEX: 24.52 KG/M2 | TEMPERATURE: 97.6 F | SYSTOLIC BLOOD PRESSURE: 92 MMHG | RESPIRATION RATE: 14 BRPM | DIASTOLIC BLOOD PRESSURE: 65 MMHG | HEART RATE: 54 BPM | OXYGEN SATURATION: 99 %

## 2018-09-11 DIAGNOSIS — R11.0 NAUSEA: ICD-10-CM

## 2018-09-11 DIAGNOSIS — R42 LIGHTHEADEDNESS: Primary | ICD-10-CM

## 2018-09-11 LAB
ALBUMIN SERPL-MCNC: 4.5 G/DL (ref 3.5–5.2)
ALP BLD-CCNC: 67 U/L (ref 40–129)
ALT SERPL-CCNC: 22 U/L (ref 0–40)
ANION GAP SERPL CALCULATED.3IONS-SCNC: 12 MMOL/L (ref 7–16)
AST SERPL-CCNC: 22 U/L (ref 0–39)
BASOPHILS ABSOLUTE: 0.19 E9/L (ref 0–0.2)
BASOPHILS RELATIVE PERCENT: 2 % (ref 0–2)
BILIRUB SERPL-MCNC: 1.1 MG/DL (ref 0–1.2)
BUN BLDV-MCNC: 16 MG/DL (ref 6–20)
CALCIUM SERPL-MCNC: 9.6 MG/DL (ref 8.6–10.2)
CHLORIDE BLD-SCNC: 99 MMOL/L (ref 98–107)
CO2: 25 MMOL/L (ref 22–29)
CREAT SERPL-MCNC: 1.1 MG/DL (ref 0.7–1.2)
EKG ATRIAL RATE: 52 BPM
EKG P AXIS: 70 DEGREES
EKG P-R INTERVAL: 160 MS
EKG Q-T INTERVAL: 418 MS
EKG QRS DURATION: 84 MS
EKG QTC CALCULATION (BAZETT): 388 MS
EKG R AXIS: 62 DEGREES
EKG T AXIS: 42 DEGREES
EKG VENTRICULAR RATE: 52 BPM
EOSINOPHILS ABSOLUTE: 0.39 E9/L (ref 0.05–0.5)
EOSINOPHILS RELATIVE PERCENT: 4.1 % (ref 0–6)
GFR AFRICAN AMERICAN: >60
GFR NON-AFRICAN AMERICAN: >60 ML/MIN/1.73
GLUCOSE BLD-MCNC: 91 MG/DL (ref 74–109)
HCT VFR BLD CALC: 50.1 % (ref 37–54)
HEMOGLOBIN: 17.1 G/DL (ref 12.5–16.5)
IMMATURE GRANULOCYTES #: 0.08 E9/L
IMMATURE GRANULOCYTES %: 0.8 % (ref 0–5)
LACTIC ACID: 0.9 MMOL/L (ref 0.5–2.2)
LYMPHOCYTES ABSOLUTE: 2.7 E9/L (ref 1.5–4)
LYMPHOCYTES RELATIVE PERCENT: 28.5 % (ref 20–42)
MCH RBC QN AUTO: 34.2 PG (ref 26–35)
MCHC RBC AUTO-ENTMCNC: 34.1 % (ref 32–34.5)
MCV RBC AUTO: 100.2 FL (ref 80–99.9)
MONOCYTES ABSOLUTE: 0.69 E9/L (ref 0.1–0.95)
MONOCYTES RELATIVE PERCENT: 7.3 % (ref 2–12)
NEUTROPHILS ABSOLUTE: 5.44 E9/L (ref 1.8–7.3)
NEUTROPHILS RELATIVE PERCENT: 57.3 % (ref 43–80)
PDW BLD-RTO: 14 FL (ref 11.5–15)
PLATELET # BLD: 648 E9/L (ref 130–450)
PMV BLD AUTO: 9.1 FL (ref 7–12)
POTASSIUM SERPL-SCNC: 4.6 MMOL/L (ref 3.5–5)
RBC # BLD: 5 E12/L (ref 3.8–5.8)
SODIUM BLD-SCNC: 136 MMOL/L (ref 132–146)
TOTAL PROTEIN: 7.2 G/DL (ref 6.4–8.3)
TROPONIN: <0.01 NG/ML (ref 0–0.03)
WBC # BLD: 9.5 E9/L (ref 4.5–11.5)

## 2018-09-11 PROCEDURE — 96375 TX/PRO/DX INJ NEW DRUG ADDON: CPT

## 2018-09-11 PROCEDURE — 80053 COMPREHEN METABOLIC PANEL: CPT

## 2018-09-11 PROCEDURE — 36415 COLL VENOUS BLD VENIPUNCTURE: CPT

## 2018-09-11 PROCEDURE — 6360000002 HC RX W HCPCS: Performed by: EMERGENCY MEDICINE

## 2018-09-11 PROCEDURE — 99284 EMERGENCY DEPT VISIT MOD MDM: CPT

## 2018-09-11 PROCEDURE — 85025 COMPLETE CBC W/AUTO DIFF WBC: CPT

## 2018-09-11 PROCEDURE — 71045 X-RAY EXAM CHEST 1 VIEW: CPT

## 2018-09-11 PROCEDURE — 96365 THER/PROPH/DIAG IV INF INIT: CPT

## 2018-09-11 PROCEDURE — 84484 ASSAY OF TROPONIN QUANT: CPT

## 2018-09-11 PROCEDURE — 83605 ASSAY OF LACTIC ACID: CPT

## 2018-09-11 PROCEDURE — 2580000003 HC RX 258: Performed by: EMERGENCY MEDICINE

## 2018-09-11 RX ORDER — ONDANSETRON 2 MG/ML
4 INJECTION INTRAMUSCULAR; INTRAVENOUS ONCE
Status: COMPLETED | OUTPATIENT
Start: 2018-09-11 | End: 2018-09-11

## 2018-09-11 RX ORDER — SODIUM CHLORIDE 0.9 % (FLUSH) 0.9 %
10 SYRINGE (ML) INJECTION PRN
Status: DISCONTINUED | OUTPATIENT
Start: 2018-09-11 | End: 2018-09-11 | Stop reason: HOSPADM

## 2018-09-11 RX ORDER — 0.9 % SODIUM CHLORIDE 0.9 %
1000 INTRAVENOUS SOLUTION INTRAVENOUS ONCE
Status: COMPLETED | OUTPATIENT
Start: 2018-09-11 | End: 2018-09-11

## 2018-09-11 RX ORDER — ONDANSETRON 4 MG/1
4 TABLET, ORALLY DISINTEGRATING ORAL EVERY 8 HOURS PRN
Qty: 24 TABLET | Refills: 0 | Status: SHIPPED | OUTPATIENT
Start: 2018-09-11 | End: 2019-01-25

## 2018-09-11 RX ORDER — LEVETIRACETAM 5 MG/ML
500 INJECTION INTRAVASCULAR ONCE
Status: COMPLETED | OUTPATIENT
Start: 2018-09-11 | End: 2018-09-11

## 2018-09-11 RX ADMIN — LEVETIRACETAM 500 MG: 5 INJECTION INTRAVENOUS at 13:59

## 2018-09-11 RX ADMIN — ONDANSETRON 4 MG: 2 INJECTION INTRAMUSCULAR; INTRAVENOUS at 13:59

## 2018-09-11 RX ADMIN — SODIUM CHLORIDE 1000 ML: 9 INJECTION, SOLUTION INTRAVENOUS at 13:57

## 2018-09-11 ASSESSMENT — ENCOUNTER SYMPTOMS
WHEEZING: 0
SORE THROAT: 0
RHINORRHEA: 0
DIARRHEA: 0
NAUSEA: 0
SINUS PRESSURE: 0
BACK PAIN: 0
VOMITING: 0
EYE PAIN: 0
EYE DISCHARGE: 0
SINUS PAIN: 0
COUGH: 0
FACIAL SWELLING: 0
SHORTNESS OF BREATH: 0
ABDOMINAL PAIN: 0
EYE REDNESS: 0

## 2018-09-11 NOTE — ED PROVIDER NOTES
Patient is a 50-year-old male presenting to ED with complaint of dizziness. Patient states that starting last night. Feel lightheaded fatigued having muscle cramps patient denies any chest pain diarrhea does complain of nausea. Patient denies any driving. Patient states that he was at a cook at yesterday and ate meat cooked over charcoal that was lit with gasoline. Patient states the food did not have any abnormal taste denies anyone else being sick for the party. Patient states he has a history of seizures has been on Keppra denies missing any dosages. Patient states in the past his Keppra level has been low. Patient states he may be dehydrated today as he vomited last night. He has not treated himself with any medications. Knisley pain currently. Review of Systems   Constitutional: Positive for fatigue. Negative for chills and fever. HENT: Negative for congestion, dental problem, ear pain, facial swelling, hearing loss, mouth sores, rhinorrhea, sinus pain, sinus pressure and sore throat. Eyes: Negative for pain, discharge and redness. Respiratory: Negative for cough, shortness of breath and wheezing. Cardiovascular: Negative for chest pain. Gastrointestinal: Negative for abdominal pain, diarrhea, nausea and vomiting. Genitourinary: Negative for dysuria and frequency. Musculoskeletal: Negative for arthralgias and back pain. Skin: Negative for rash and wound. Neurological: Positive for weakness. Negative for headaches. Hematological: Negative for adenopathy. All other systems reviewed and are negative. Physical Exam   Constitutional: He is oriented to person, place, and time. He appears well-developed and well-nourished. No distress. HENT:   Head: Normocephalic and atraumatic. Eyes: Conjunctivae are normal.   Neck: Normal range of motion. Neck supple. Cardiovascular: Normal rate, regular rhythm and normal heart sounds. No murmur heard.   Pulmonary/Chest: Effort smoking history. He uses smokeless tobacco. He reports that he drinks alcohol. He reports that he uses drugs, including Marijuana. Family History: family history includes Diabetes in his brother and brother; Hearing Loss in his maternal uncle and maternal uncle. The patients home medications have been reviewed.     Allergies: Acyclovir and related and Doxycycline    -------------------------------------------------- RESULTS -------------------------------------------------  Labs:  Results for orders placed or performed during the hospital encounter of 09/11/18   CBC Auto Differential   Result Value Ref Range    WBC 9.5 4.5 - 11.5 E9/L    RBC 5.00 3.80 - 5.80 E12/L    Hemoglobin 17.1 (H) 12.5 - 16.5 g/dL    Hematocrit 50.1 37.0 - 54.0 %    .2 (H) 80.0 - 99.9 fL    MCH 34.2 26.0 - 35.0 pg    MCHC 34.1 32.0 - 34.5 %    RDW 14.0 11.5 - 15.0 fL    Platelets 844 (H) 796 - 450 E9/L    MPV 9.1 7.0 - 12.0 fL    Neutrophils % 57.3 43.0 - 80.0 %    Immature Granulocytes % 0.8 0.0 - 5.0 %    Lymphocytes % 28.5 20.0 - 42.0 %    Monocytes % 7.3 2.0 - 12.0 %    Eosinophils % 4.1 0.0 - 6.0 %    Basophils % 2.0 0.0 - 2.0 %    Neutrophils # 5.44 1.80 - 7.30 E9/L    Immature Granulocytes # 0.08 E9/L    Lymphocytes # 2.70 1.50 - 4.00 E9/L    Monocytes # 0.69 0.10 - 0.95 E9/L    Eosinophils # 0.39 0.05 - 0.50 E9/L    Basophils # 0.19 0.00 - 0.20 E9/L   Comprehensive Metabolic Panel   Result Value Ref Range    Sodium 136 132 - 146 mmol/L    Potassium 4.6 3.5 - 5.0 mmol/L    Chloride 99 98 - 107 mmol/L    CO2 25 22 - 29 mmol/L    Anion Gap 12 7 - 16 mmol/L    Glucose 91 74 - 109 mg/dL    BUN 16 6 - 20 mg/dL    CREATININE 1.1 0.7 - 1.2 mg/dL    GFR Non-African American >60 >=60 mL/min/1.73    GFR African American >60     Calcium 9.6 8.6 - 10.2 mg/dL    Total Protein 7.2 6.4 - 8.3 g/dL    Alb 4.5 3.5 - 5.2 g/dL    Total Bilirubin 1.1 0.0 - 1.2 mg/dL    Alkaline Phosphatase 67 40 - 129 U/L    ALT 22 0 - 40 U/L    AST 22 0 - 39 U/L   Troponin   Result Value Ref Range    Troponin <0.01 0.00 - 0.03 ng/mL   Lactic Acid, Plasma   Result Value Ref Range    Lactic Acid 0.9 0.5 - 2.2 mmol/L   EKG 12 Lead   Result Value Ref Range    Ventricular Rate 52 BPM    Atrial Rate 52 BPM    P-R Interval 160 ms    QRS Duration 84 ms    Q-T Interval 418 ms    QTc Calculation (Bazett) 388 ms    P Axis 70 degrees    R Axis 62 degrees    T Axis 42 degrees       Radiology:  XR CHEST PORTABLE   Final Result   No airspace opacities or pleural effusion.                ------------------------- NURSING NOTES AND VITALS REVIEWED ---------------------------  Date / Time Roomed:  9/11/2018  1:18 PM  ED Bed Assignment:  03/03    The nursing notes within the ED encounter and vital signs as below have been reviewed. BP 92/65   Pulse 54   Temp 97.6 °F (36.4 °C) (Oral)   Resp 14   Ht 6' 1\" (1.854 m)   Wt 185 lb (83.9 kg)   SpO2 99%   BMI 24.41 kg/m²   Oxygen Saturation Interpretation: Normal      ------------------------------------------ PROGRESS NOTES ------------------------------------------  12:01 AM  I have spoken with the patient and discussed todays results, in addition to providing specific details for the plan of care and counseling regarding the diagnosis and prognosis. Their questions are answered at this time and they are agreeable with the plan. I discussed at length with them reasons for immediate return here for re evaluation. They will followup with their primary care physician by calling their office tomorrow. --------------------------------- ADDITIONAL PROVIDER NOTES ---------------------------------  At this time the patient is without objective evidence of an acute process requiring hospitalization or inpatient management. They have remained hemodynamically stable throughout their entire ED visit and are stable for discharge with outpatient follow-up.      The plan has been discussed in detail and they are aware of the specific

## 2018-09-18 ENCOUNTER — OFFICE VISIT (OUTPATIENT)
Dept: CARDIOLOGY CLINIC | Age: 48
End: 2018-09-18
Payer: COMMERCIAL

## 2018-09-18 VITALS
WEIGHT: 167.4 LBS | HEIGHT: 73 IN | HEART RATE: 50 BPM | DIASTOLIC BLOOD PRESSURE: 64 MMHG | RESPIRATION RATE: 18 BRPM | BODY MASS INDEX: 22.19 KG/M2 | SYSTOLIC BLOOD PRESSURE: 100 MMHG

## 2018-09-18 DIAGNOSIS — I25.2 MYOCARDIAL INFARCTION, OLD: ICD-10-CM

## 2018-09-18 DIAGNOSIS — E78.00 PURE HYPERCHOLESTEROLEMIA: ICD-10-CM

## 2018-09-18 DIAGNOSIS — R00.1 BRADYCARDIA: ICD-10-CM

## 2018-09-18 DIAGNOSIS — I25.84 CORONARY ATHEROSCLEROSIS DUE TO CALCIFIED CORONARY LESION: ICD-10-CM

## 2018-09-18 DIAGNOSIS — Z95.5 STENTED CORONARY ARTERY: ICD-10-CM

## 2018-09-18 DIAGNOSIS — R07.2 PRECORDIAL PAIN: Primary | ICD-10-CM

## 2018-09-18 DIAGNOSIS — I25.10 CORONARY ATHEROSCLEROSIS DUE TO CALCIFIED CORONARY LESION: ICD-10-CM

## 2018-09-18 DIAGNOSIS — I10 ESSENTIAL HYPERTENSION: ICD-10-CM

## 2018-09-18 PROCEDURE — 93000 ELECTROCARDIOGRAM COMPLETE: CPT | Performed by: INTERNAL MEDICINE

## 2018-09-18 PROCEDURE — G8420 CALC BMI NORM PARAMETERS: HCPCS | Performed by: INTERNAL MEDICINE

## 2018-09-18 PROCEDURE — G8427 DOCREV CUR MEDS BY ELIG CLIN: HCPCS | Performed by: INTERNAL MEDICINE

## 2018-09-18 PROCEDURE — 99244 OFF/OP CNSLTJ NEW/EST MOD 40: CPT | Performed by: INTERNAL MEDICINE

## 2018-09-18 NOTE — PROGRESS NOTES
Precordial pain    2. Myocardial infarction, old    3. Coronary atherosclerosis due to calcified coronary lesion    4. Essential hypertension    5. Pure hypercholesterolemia    6. Stented coronary artery    7. Bradycardia        HPI:  Patient is seen in initial evaluation for difficulties with chest pain as well as bradycardia, lightheadedness, dizziness. Patient relates recent episode of significant shortness of breath with exertion. At that time he had some tingling in his upper chest and noted that this radiated into his left arm. He has not had any further episodes of this nature. Also he notes he is bothered by lightheadedness and dizziness which he attributes to low pulse rate. He notes his pulse rate is usually runs 49-50 bpm.        ROS:  General: No unusual weight gain, no change in exercise tolerance  Skin: No rash or itching  EENT: No vision changes or nosebleeds  Cardiovascular: No orthopnea or paroxysmal nocturnal dyspnea  Respiratory: No cough or hemoptysis  Gastrointestinal: No hematemesis or recent changes in bowel habits  Genitourinary: No hematuria, urgency or frequency  Musculoskeletal: No muscular weakness or joint swelling   Neurologic / Psychiatric: No incoordination or convulsions  Allergic / Immunologic/ Lymphatic / Endocrine: No anemia or bleeding tendency    Social History     Social History    Marital status:      Spouse name: N/A    Number of children: N/A    Years of education: N/A     Occupational History    Not on file.      Social History Main Topics    Smoking status: Current Every Day Smoker     Packs/day: 0.25     Years: 20.00     Types: Cigarettes    Smokeless tobacco: Current User      Comment: trying to quit    Alcohol use 0.0 oz/week      Comment: Rare    Drug use: Yes     Types: Marijuana      Comment: rarely    Sexual activity: Yes     Partners: Female     Other Topics Concern    Not on file     Social History Narrative    No narrative on file       Family History   Problem Relation Age of Onset    Diabetes Brother     Hearing Loss Maternal Uncle     Diabetes Brother     Hearing Loss Maternal Uncle        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    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    Atrial flutter (HCC)     Coronary atherosclerosis due to calcified coronary lesion(414.4)     Discoloration of skin of toe 9/11/2014    ED (erectile dysfunction)     Hyperlipidemia     Hypertension     IBS (irritable bowel syndrome)     Irregular heart beat     Ischemic toe 9/11/2014    MI, old     Seizures ([address]     Tinea     Tobacco dependence 9/16/2014    Tobacco use        PHYSICAL EXAM:  CONSTITUTIONAL:  Well developed, well nourished    Vitals:    09/18/18 1401 09/18/18 1409   BP: 100/64 100/64   Pulse: (!) 48 50   Resp: 18    Weight: 167 lb 6.4 oz (75.9 kg)    Height: 6' 1\" (1.854 m)      HEAD & FACE: Normocephalic. Symmetric. EYES: No xanthelasma. Conjunctivae not injected. EARS, NOSE, MOUTH & THROAT: Good dentition. No oral pallor or cyanosis. NECK: No JVD at 30 degrees. No thyromegaly. RESPIRATORY: Clear to auscultation and percussion in all fields. No use of accessory muscle or intercostal retractions. CARDIOVASCULAR: Regular rate and rhythm. No lifts or thrills on palpitation. Auscultation with normal S1-S2 in intensity and splitting. No carotid bruits. Abdominal aorta not enlarged. Femoral arteries without bruits. Pedal pulses 2+. No edema. ABDOMEN: Soft without hepatic or splenic enlargement. No tenderness. MUSCULOSKELETAL: No kyphosis or scoliosis of the back. Good muscle strength and tone. No muscle atrophy. Normal gait and ability to undergo exercise stress testing. EXTREMITIES: No clubbing or cyanosis. SKIN: No Xanthomas or ulcerations. NEUROLOGIC: Oriented to time, place and person. Normal mood and affect.     LYMPHATIC:  No palpable neck

## 2018-09-24 ENCOUNTER — TELEPHONE (OUTPATIENT)
Dept: CARDIOLOGY CLINIC | Age: 48
End: 2018-09-24

## 2018-09-24 ENCOUNTER — HOSPITAL ENCOUNTER (OUTPATIENT)
Dept: CARDIOLOGY | Age: 48
Discharge: HOME | End: 2018-09-24
Payer: COMMERCIAL

## 2018-09-24 VITALS
HEIGHT: 73 IN | HEART RATE: 80 BPM | WEIGHT: 167 LBS | DIASTOLIC BLOOD PRESSURE: 60 MMHG | SYSTOLIC BLOOD PRESSURE: 102 MMHG | BODY MASS INDEX: 22.13 KG/M2

## 2018-09-24 DIAGNOSIS — R07.2 PRECORDIAL PAIN: Primary | ICD-10-CM

## 2018-09-24 PROCEDURE — 93017 CV STRESS TEST TRACING ONLY: CPT

## 2018-09-24 PROCEDURE — 6360000002 HC RX W HCPCS: Performed by: INTERNAL MEDICINE

## 2018-09-24 PROCEDURE — 2580000003 HC RX 258: Performed by: INTERNAL MEDICINE

## 2018-09-24 PROCEDURE — A9500 TC99M SESTAMIBI: HCPCS | Performed by: INTERNAL MEDICINE

## 2018-09-24 PROCEDURE — 3430000000 HC RX DIAGNOSTIC RADIOPHARMACEUTICAL: Performed by: INTERNAL MEDICINE

## 2018-09-24 PROCEDURE — 78452 HT MUSCLE IMAGE SPECT MULT: CPT

## 2018-09-24 RX ORDER — TRAZODONE HYDROCHLORIDE 100 MG/1
TABLET ORAL
Qty: 30 TABLET | Refills: 2 | Status: SHIPPED | OUTPATIENT
Start: 2018-09-24 | End: 2018-10-09 | Stop reason: ALTCHOICE

## 2018-09-24 RX ORDER — SODIUM CHLORIDE 0.9 % (FLUSH) 0.9 %
10 SYRINGE (ML) INJECTION PRN
Status: DISCONTINUED | OUTPATIENT
Start: 2018-09-24 | End: 2018-09-25 | Stop reason: HOSPADM

## 2018-09-24 RX ADMIN — Medication 10 ML: at 11:06

## 2018-09-24 RX ADMIN — Medication 10 MILLICURIE: at 08:10

## 2018-09-24 RX ADMIN — Medication 10 ML: at 11:07

## 2018-09-24 RX ADMIN — Medication 10 ML: at 08:10

## 2018-09-24 RX ADMIN — REGADENOSON 0.4 MG: 0.08 INJECTION, SOLUTION INTRAVENOUS at 11:06

## 2018-09-24 RX ADMIN — Medication 30.9 MILLICURIE: at 11:06

## 2018-09-24 NOTE — TELEPHONE ENCOUNTER
Patient notified and instructed on testing results and follow-up if nor feeling better. He stated understanding.

## 2018-09-24 NOTE — TELEPHONE ENCOUNTER
----- Message from Elijio Jeans, MD sent at 9/24/2018  1:35 PM EDT -----  Please let patient know stress test looked OK. However if still not feeling well needs to follow up.

## 2018-09-24 NOTE — PROCEDURES
patient motion or abnormal extracardiac radioactivity. A moderate defect was present in the apex wall(s) that was  small sized by quantification. The resting images show no change. Gated SPECT left ventricular ejection fraction was calculated to be 47%, with hypokinesis of the apex wall. Impression:   1. ECG during the infusion did not change. 2. The myocardial perfusion imaging was abnormal.    3. Overall left ventricular systolic function was abnormal with regional wall motion abnormalities. 4. Low risk general pharmacologic nuclear stress test.    Thank you for sending your patient to this [facility].      Electronically signed by Gabriela Farley MD on 9/24/18 at 1:13 PM

## 2018-10-09 ENCOUNTER — HOSPITAL ENCOUNTER (EMERGENCY)
Age: 48
Discharge: HOME | End: 2018-10-09
Attending: EMERGENCY MEDICINE
Payer: COMMERCIAL

## 2018-10-09 VITALS
WEIGHT: 166 LBS | HEIGHT: 73 IN | DIASTOLIC BLOOD PRESSURE: 61 MMHG | TEMPERATURE: 97.9 F | HEART RATE: 51 BPM | OXYGEN SATURATION: 98 % | BODY MASS INDEX: 22 KG/M2 | SYSTOLIC BLOOD PRESSURE: 98 MMHG | RESPIRATION RATE: 14 BRPM

## 2018-10-09 DIAGNOSIS — D75.839 THROMBOCYTOSIS: ICD-10-CM

## 2018-10-09 DIAGNOSIS — R55 SYNCOPE AND COLLAPSE: Primary | ICD-10-CM

## 2018-10-09 LAB
ANION GAP SERPL CALCULATED.3IONS-SCNC: 7 MMOL/L (ref 7–16)
BASOPHILS ABSOLUTE: 0.13 E9/L (ref 0–0.2)
BASOPHILS RELATIVE PERCENT: 1.7 % (ref 0–2)
BUN BLDV-MCNC: 20 MG/DL (ref 6–20)
CALCIUM SERPL-MCNC: 7.7 MG/DL (ref 8.6–10.2)
CHLORIDE BLD-SCNC: 110 MMOL/L (ref 98–107)
CO2: 22 MMOL/L (ref 22–29)
CREAT SERPL-MCNC: 0.7 MG/DL (ref 0.7–1.2)
EKG ATRIAL RATE: 51 BPM
EKG P AXIS: 80 DEGREES
EKG P-R INTERVAL: 162 MS
EKG Q-T INTERVAL: 424 MS
EKG QRS DURATION: 86 MS
EKG QTC CALCULATION (BAZETT): 390 MS
EKG R AXIS: 30 DEGREES
EKG T AXIS: 56 DEGREES
EKG VENTRICULAR RATE: 51 BPM
EOSINOPHILS ABSOLUTE: 0.2 E9/L (ref 0.05–0.5)
EOSINOPHILS RELATIVE PERCENT: 2.6 % (ref 0–6)
GFR AFRICAN AMERICAN: >60
GFR NON-AFRICAN AMERICAN: >60 ML/MIN/1.73
GLUCOSE BLD-MCNC: 87 MG/DL (ref 74–109)
HCT VFR BLD CALC: 44.5 % (ref 37–54)
HEMOGLOBIN: 15.2 G/DL (ref 12.5–16.5)
IMMATURE GRANULOCYTES #: 0.05 E9/L
IMMATURE GRANULOCYTES %: 0.6 % (ref 0–5)
LYMPHOCYTES ABSOLUTE: 1.94 E9/L (ref 1.5–4)
LYMPHOCYTES RELATIVE PERCENT: 25.1 % (ref 20–42)
MCH RBC QN AUTO: 34.2 PG (ref 26–35)
MCHC RBC AUTO-ENTMCNC: 34.2 % (ref 32–34.5)
MCV RBC AUTO: 100.2 FL (ref 80–99.9)
MONOCYTES ABSOLUTE: 0.48 E9/L (ref 0.1–0.95)
MONOCYTES RELATIVE PERCENT: 6.2 % (ref 2–12)
NEUTROPHILS ABSOLUTE: 4.93 E9/L (ref 1.8–7.3)
NEUTROPHILS RELATIVE PERCENT: 63.8 % (ref 43–80)
PDW BLD-RTO: 14.5 FL (ref 11.5–15)
PLATELET # BLD: 488 E9/L (ref 130–450)
PMV BLD AUTO: 8.9 FL (ref 7–12)
POTASSIUM REFLEX MAGNESIUM: 3.9 MMOL/L (ref 3.5–5)
RBC # BLD: 4.44 E12/L (ref 3.8–5.8)
SODIUM BLD-SCNC: 139 MMOL/L (ref 132–146)
TROPONIN: <0.01 NG/ML (ref 0–0.03)
WBC # BLD: 7.7 E9/L (ref 4.5–11.5)

## 2018-10-09 PROCEDURE — 80048 BASIC METABOLIC PNL TOTAL CA: CPT

## 2018-10-09 PROCEDURE — 84484 ASSAY OF TROPONIN QUANT: CPT

## 2018-10-09 PROCEDURE — 96374 THER/PROPH/DIAG INJ IV PUSH: CPT

## 2018-10-09 PROCEDURE — 93005 ELECTROCARDIOGRAM TRACING: CPT | Performed by: STUDENT IN AN ORGANIZED HEALTH CARE EDUCATION/TRAINING PROGRAM

## 2018-10-09 PROCEDURE — 85025 COMPLETE CBC W/AUTO DIFF WBC: CPT

## 2018-10-09 PROCEDURE — 6360000002 HC RX W HCPCS: Performed by: STUDENT IN AN ORGANIZED HEALTH CARE EDUCATION/TRAINING PROGRAM

## 2018-10-09 PROCEDURE — 99284 EMERGENCY DEPT VISIT MOD MDM: CPT

## 2018-10-09 PROCEDURE — 2580000003 HC RX 258: Performed by: STUDENT IN AN ORGANIZED HEALTH CARE EDUCATION/TRAINING PROGRAM

## 2018-10-09 RX ORDER — 0.9 % SODIUM CHLORIDE 0.9 %
1000 INTRAVENOUS SOLUTION INTRAVENOUS ONCE
Status: COMPLETED | OUTPATIENT
Start: 2018-10-09 | End: 2018-10-09

## 2018-10-09 RX ORDER — TRAZODONE HYDROCHLORIDE 100 MG/1
100 TABLET ORAL NIGHTLY
Qty: 21 TABLET | Refills: 0 | Status: SHIPPED | OUTPATIENT
Start: 2018-10-09 | End: 2018-10-30 | Stop reason: SDUPTHER

## 2018-10-09 RX ORDER — LORAZEPAM 2 MG/ML
1 INJECTION INTRAMUSCULAR ONCE
Status: COMPLETED | OUTPATIENT
Start: 2018-10-09 | End: 2018-10-09

## 2018-10-09 RX ADMIN — LORAZEPAM 1 MG: 2 INJECTION INTRAMUSCULAR; INTRAVENOUS at 12:18

## 2018-10-09 RX ADMIN — SODIUM CHLORIDE 1000 ML: 9 INJECTION, SOLUTION INTRAVENOUS at 12:18

## 2018-10-09 ASSESSMENT — ENCOUNTER SYMPTOMS
EYE REDNESS: 0
SINUS PRESSURE: 0
VISUAL CHANGE: 0
RHINORRHEA: 0
NAUSEA: 0
ABDOMINAL PAIN: 0
DIFFICULTY BREATHING: 0
SORE THROAT: 0
CONSTIPATION: 0
ABDOMINAL DISTENTION: 0
BACK PAIN: 0
VOMITING: 0
EYE PAIN: 0
DIARRHEA: 0
SHORTNESS OF BREATH: 0
SINUS PAIN: 0
COLOR CHANGE: 0
CHEST TIGHTNESS: 0
COUGH: 0

## 2018-10-09 NOTE — ED PROVIDER NOTES
Fariba Land is a 53year old male with a history of epilepsy presents following a syncopal episode that occurred at 11am after standing up from chair. His wife found him on the ground on their front porch. He remembers getting ready to stand up and then waking up on the floor. He was able to ambulate following the event. He has a history of thrombocytosis and is being followed by medical oncology. He has had one episode of this 3 months ago that occurred after standing. He was brought to this hospital at that time. Head CT was not remarkable. He has a stress test following the event which was also negative. He reports a 30 pound weight loss over the past 3 months. The history is provided by the patient. Loss of Consciousness   Episode history:  Single  Most recent episode: Today  Duration:  2 minutes  Timing:  Rare  Progression:  Resolved  Chronicity:  Recurrent  Context: standing up    Witnessed: yes    Relieved by:  Nothing  Worsened by:  Nothing  Ineffective treatments:  None tried  Associated symptoms: no chest pain, no confusion, no difficulty breathing, no dizziness, no fever, no headaches, no nausea, no palpitations, no recent fall, no recent surgery, no seizures, no shortness of breath, no visual change, no vomiting and no weakness    Risk factors: coronary artery disease and seizures        Review of Systems   Constitutional: Negative for chills, fatigue, fever and unexpected weight change. HENT: Negative for congestion, rhinorrhea, sinus pain, sinus pressure and sore throat. Eyes: Negative for pain, redness and visual disturbance. Respiratory: Negative for cough, chest tightness and shortness of breath. Cardiovascular: Positive for syncope. Negative for chest pain, palpitations and leg swelling. Gastrointestinal: Negative for abdominal distention, abdominal pain, constipation, diarrhea, nausea and vomiting. Endocrine: Negative for polydipsia and polyuria.    Genitourinary: Negative for difficulty urinating, dysuria and flank pain. Musculoskeletal: Negative for arthralgias, back pain and myalgias. Skin: Negative for color change. Neurological: Negative for dizziness, seizures, syncope, weakness and headaches. Psychiatric/Behavioral: Negative for agitation, behavioral problems, confusion and decreased concentration. Physical Exam   Constitutional: He is oriented to person, place, and time. He appears well-developed and well-nourished. No distress. HENT:   Head: Normocephalic and atraumatic. Right Ear: External ear normal.   Left Ear: External ear normal.   Mouth/Throat: Oropharynx is clear and moist.   Eyes: Conjunctivae and EOM are normal. Right eye exhibits no discharge. Left eye exhibits no discharge. Neck: Normal range of motion. Neck supple. No tracheal deviation present. Cardiovascular: Normal rate, regular rhythm and normal heart sounds. No murmur heard. Pulmonary/Chest: Effort normal and breath sounds normal. No stridor. No respiratory distress. He has no wheezes. Abdominal: There is no tenderness. There is no rebound and no guarding. Musculoskeletal: Normal range of motion. He exhibits no edema, tenderness or deformity. Lymphadenopathy:     He has no cervical adenopathy. Neurological: He is alert and oriented to person, place, and time. No cranial nerve deficit. He exhibits normal muscle tone. Skin: Skin is warm and dry. No rash noted. He is not diaphoretic. No erythema. No pallor. Psychiatric: He has a normal mood and affect. His behavior is normal.   Nursing note and vitals reviewed. Procedures    MDM  Number of Diagnoses or Management Options  Syncope and collapse: Thrombocytosis [facility]):   Diagnosis management comments: Patient is resting comfortably, non-toxic, not in acute distress. This episode was similar to previous episode of syncope. Patient just had normal head CT 3 months ago due to syncope.  He has a recent negative stress

## 2018-10-22 RX ORDER — LEVETIRACETAM 500 MG/1
1000 TABLET ORAL 2 TIMES DAILY
Qty: 180 TABLET | Refills: 1 | Status: SHIPPED | OUTPATIENT
Start: 2018-10-22 | End: 2018-10-23 | Stop reason: SDUPTHER

## 2018-10-23 ENCOUNTER — TELEPHONE (OUTPATIENT)
Dept: FAMILY MEDICINE CLINIC | Age: 48
End: 2018-10-23

## 2018-10-23 RX ORDER — LEVETIRACETAM 500 MG/1
1500 TABLET ORAL 2 TIMES DAILY
Qty: 270 TABLET | Refills: 1 | Status: SHIPPED | OUTPATIENT
Start: 2018-10-23 | End: 2019-01-28 | Stop reason: SDUPTHER

## 2018-10-23 NOTE — TELEPHONE ENCOUNTER
Maryjane Sy was called in yesterday for 500 mg 2 pills 2 times daily, but pt stats he takes 500 mg 3 pills in the am and 3 pills in the pm. Please advise.

## 2018-10-30 ENCOUNTER — OFFICE VISIT (OUTPATIENT)
Dept: FAMILY MEDICINE CLINIC | Age: 48
End: 2018-10-30
Payer: COMMERCIAL

## 2018-10-30 VITALS
RESPIRATION RATE: 18 BRPM | HEIGHT: 73 IN | WEIGHT: 169 LBS | HEART RATE: 64 BPM | DIASTOLIC BLOOD PRESSURE: 58 MMHG | SYSTOLIC BLOOD PRESSURE: 100 MMHG | BODY MASS INDEX: 22.4 KG/M2 | OXYGEN SATURATION: 96 %

## 2018-10-30 DIAGNOSIS — E78.5 HYPERLIPIDEMIA, UNSPECIFIED HYPERLIPIDEMIA TYPE: ICD-10-CM

## 2018-10-30 DIAGNOSIS — I10 HYPERTENSION, UNSPECIFIED TYPE: Primary | ICD-10-CM

## 2018-10-30 DIAGNOSIS — D47.3 ESSENTIAL THROMBOCYTHEMIA (HCC): ICD-10-CM

## 2018-10-30 DIAGNOSIS — G47.00 INSOMNIA, UNSPECIFIED TYPE: ICD-10-CM

## 2018-10-30 DIAGNOSIS — Z23 NEED FOR INFLUENZA VACCINATION: ICD-10-CM

## 2018-10-30 DIAGNOSIS — F41.9 ANXIETY: ICD-10-CM

## 2018-10-30 PROCEDURE — 4004F PT TOBACCO SCREEN RCVD TLK: CPT | Performed by: FAMILY MEDICINE

## 2018-10-30 PROCEDURE — G8598 ASA/ANTIPLAT THER USED: HCPCS | Performed by: FAMILY MEDICINE

## 2018-10-30 PROCEDURE — G8482 FLU IMMUNIZE ORDER/ADMIN: HCPCS | Performed by: FAMILY MEDICINE

## 2018-10-30 PROCEDURE — 99214 OFFICE O/P EST MOD 30 MIN: CPT | Performed by: FAMILY MEDICINE

## 2018-10-30 PROCEDURE — G8420 CALC BMI NORM PARAMETERS: HCPCS | Performed by: FAMILY MEDICINE

## 2018-10-30 PROCEDURE — 90686 IIV4 VACC NO PRSV 0.5 ML IM: CPT | Performed by: FAMILY MEDICINE

## 2018-10-30 PROCEDURE — 90471 IMMUNIZATION ADMIN: CPT | Performed by: FAMILY MEDICINE

## 2018-10-30 PROCEDURE — G8427 DOCREV CUR MEDS BY ELIG CLIN: HCPCS | Performed by: FAMILY MEDICINE

## 2018-10-30 RX ORDER — LORAZEPAM 0.5 MG/1
0.5 TABLET ORAL EVERY 6 HOURS PRN
Qty: 28 TABLET | Refills: 0 | Status: SHIPPED | OUTPATIENT
Start: 2018-10-30 | End: 2018-11-06

## 2018-10-30 RX ORDER — LORAZEPAM 0.5 MG/1
0.5 TABLET ORAL 2 TIMES DAILY PRN
COMMUNITY
End: 2018-10-30 | Stop reason: SDUPTHER

## 2018-10-30 RX ORDER — TRAZODONE HYDROCHLORIDE 100 MG/1
100 TABLET ORAL NIGHTLY
Qty: 30 TABLET | Refills: 3 | Status: SHIPPED | OUTPATIENT
Start: 2018-10-30 | End: 2019-04-30 | Stop reason: SDUPTHER

## 2018-10-30 NOTE — PROGRESS NOTES
Vaccine Information Sheet, \"Influenza - Inactivated\"  given to Shy Kramer, or parent/legal guardian of  Shy Kramer and verbalized understanding. Patient responses:    Have you ever had a reaction to a flu vaccine? No  Are you able to eat eggs without adverse effects? Yes  Do you have any current illness? No  Have you ever had Guillian [place] Syndrome? No    Flu vaccine given per order. Please see immunization tab.
DO Bharat   metoprolol tartrate (LOPRESSOR) 25 MG tablet TAKE 1 TABLET BY MOUTH once daily 7/31/18  Yes Nanine Boast, MD   escitalopram (LEXAPRO) 20 MG tablet Take 1 tablet by mouth daily 7/31/18  Yes Nanine Boast, MD   allopurinol (ZYLOPRIM) 100 MG tablet TAKE 1 TABLET BY MOUTH EVERY DAY 5/31/18  Yes Nanine Boast, MD   atorvastatin (LIPITOR) 40 MG tablet Take 1 tablet by mouth daily 5/31/18  Yes Nanine Boast, MD   clopidogrel (PLAVIX) 75 MG tablet TAKE 1 TABLET BY MOUTH DAILY 5/31/18  Yes Nanine Boast, MD   famotidine (PEPCID) 20 MG tablet TAKE 1 TABLET BY MOUTH TWICE DAILY 5/31/18  Yes Nanine Boast, MD   isosorbide mononitrate (IMDUR) 30 MG extended release tablet TAKE 1 TABLET BY MOUTH DAILY 5/31/18  Yes Nanine Boast, MD   pantoprazole (PROTONIX) 20 MG tablet Take 1 tablet by mouth daily 5/31/18  Yes Nanine Boast, MD   DELZICOL 400 MG CPDR delayed release capsule TAKE 1 CAPSULE BY MOUTH THREE TIMES DAILY 5/29/18  Yes Nanine Boast, MD   hydroxyurea (HYDREA) 500 MG chemo capsule Take 1 capsule by mouth 2 times daily  Patient taking differently: Take 500 mg by mouth 2 times daily Every other day 9/14/16  Yes Nanine Boast, MD       Current Outpatient Prescriptions:     LORazepam (ATIVAN) 0.5 MG tablet, Take 0.5 mg by mouth 2 times daily as needed for Anxiety. ., Disp: , Rfl:     levETIRAcetam (KEPPRA) 500 MG tablet, Take 3 tablets by mouth 2 times daily, Disp: 270 tablet, Rfl: 1    traZODone (DESYREL) 100 MG tablet, Take 1 tablet by mouth nightly, Disp: 21 tablet, Rfl: 0    ondansetron (ZOFRAN ODT) 4 MG disintegrating tablet, Take 1 tablet by mouth every 8 hours as needed for Nausea or Vomiting, Disp: 24 tablet, Rfl: 0    metoprolol tartrate (LOPRESSOR) 25 MG tablet, TAKE 1 TABLET BY MOUTH once daily, Disp: 60 tablet, Rfl: 5    escitalopram (LEXAPRO) 20 MG tablet, Take 1 tablet by mouth daily, Disp:

## 2018-11-03 ENCOUNTER — HOSPITAL ENCOUNTER (EMERGENCY)
Age: 48
Discharge: HOME | End: 2018-11-03
Attending: EMERGENCY MEDICINE
Payer: COMMERCIAL

## 2018-11-03 VITALS
HEIGHT: 73 IN | TEMPERATURE: 98.4 F | DIASTOLIC BLOOD PRESSURE: 71 MMHG | HEART RATE: 56 BPM | WEIGHT: 168 LBS | OXYGEN SATURATION: 99 % | BODY MASS INDEX: 22.26 KG/M2 | SYSTOLIC BLOOD PRESSURE: 115 MMHG | RESPIRATION RATE: 16 BRPM

## 2018-11-03 DIAGNOSIS — S33.5XXA SPRAIN OF LOW BACK, INITIAL ENCOUNTER: ICD-10-CM

## 2018-11-03 DIAGNOSIS — F41.1 ANXIETY STATE: Primary | ICD-10-CM

## 2018-11-03 PROCEDURE — 99282 EMERGENCY DEPT VISIT SF MDM: CPT

## 2018-11-03 RX ORDER — CYCLOBENZAPRINE HCL 10 MG
10 TABLET ORAL ONCE
Status: CANCELLED | OUTPATIENT
Start: 2018-11-03

## 2018-11-03 RX ORDER — ACETAMINOPHEN 325 MG/1
650 TABLET ORAL ONCE
Status: CANCELLED | OUTPATIENT
Start: 2018-11-03

## 2018-11-03 RX ORDER — CYCLOBENZAPRINE HCL 10 MG
10 TABLET ORAL 2 TIMES DAILY PRN
Qty: 10 TABLET | Refills: 0 | Status: SHIPPED | OUTPATIENT
Start: 2018-11-03 | End: 2018-11-08

## 2018-11-03 RX ORDER — CYCLOBENZAPRINE HCL 10 MG
10 TABLET ORAL 2 TIMES DAILY PRN
Qty: 10 TABLET | Refills: 0 | Status: SHIPPED | OUTPATIENT
Start: 2018-11-03 | End: 2018-11-03

## 2018-11-03 RX ORDER — CYCLOBENZAPRINE HCL 10 MG
10 TABLET ORAL 2 TIMES DAILY PRN
Qty: 14 TABLET | Refills: 0 | Status: SHIPPED | OUTPATIENT
Start: 2018-11-03 | End: 2018-11-03

## 2018-11-03 ASSESSMENT — ENCOUNTER SYMPTOMS
DIARRHEA: 0
ABDOMINAL PAIN: 0
WHEEZING: 0
BACK PAIN: 1
NAUSEA: 0
SHORTNESS OF BREATH: 0
CONSTIPATION: 0
VOMITING: 0
COUGH: 0
SORE THROAT: 0

## 2018-11-08 ENCOUNTER — APPOINTMENT (OUTPATIENT)
Dept: GENERAL RADIOLOGY | Age: 48
End: 2018-11-08
Payer: COMMERCIAL

## 2018-11-08 ENCOUNTER — HOSPITAL ENCOUNTER (EMERGENCY)
Age: 48
Discharge: HOME | End: 2018-11-08
Attending: EMERGENCY MEDICINE
Payer: COMMERCIAL

## 2018-11-08 VITALS
WEIGHT: 178 LBS | HEART RATE: 80 BPM | OXYGEN SATURATION: 96 % | TEMPERATURE: 98 F | DIASTOLIC BLOOD PRESSURE: 75 MMHG | SYSTOLIC BLOOD PRESSURE: 132 MMHG | BODY MASS INDEX: 23.59 KG/M2 | HEIGHT: 73 IN | RESPIRATION RATE: 16 BRPM

## 2018-11-08 DIAGNOSIS — S20.212A CONTUSION OF LEFT CHEST WALL, INITIAL ENCOUNTER: Primary | ICD-10-CM

## 2018-11-08 LAB
EKG ATRIAL RATE: 69 BPM
EKG P AXIS: 78 DEGREES
EKG P-R INTERVAL: 150 MS
EKG Q-T INTERVAL: 422 MS
EKG QRS DURATION: 90 MS
EKG QTC CALCULATION (BAZETT): 452 MS
EKG R AXIS: 30 DEGREES
EKG T AXIS: 52 DEGREES
EKG VENTRICULAR RATE: 69 BPM
TROPONIN: <0.01 NG/ML (ref 0–0.03)

## 2018-11-08 PROCEDURE — 84484 ASSAY OF TROPONIN QUANT: CPT

## 2018-11-08 PROCEDURE — 71046 X-RAY EXAM CHEST 2 VIEWS: CPT

## 2018-11-08 PROCEDURE — 99285 EMERGENCY DEPT VISIT HI MDM: CPT

## 2018-11-08 RX ORDER — HYDROCODONE BITARTRATE AND ACETAMINOPHEN 5; 325 MG/1; MG/1
1 TABLET ORAL EVERY 6 HOURS PRN
Qty: 12 TABLET | Refills: 0 | Status: SHIPPED | OUTPATIENT
Start: 2018-11-08 | End: 2018-11-11

## 2018-11-08 ASSESSMENT — HEART SCORE: ECG: 0

## 2018-11-08 ASSESSMENT — PAIN SCALES - GENERAL: PAINLEVEL_OUTOF10: 5

## 2018-11-08 ASSESSMENT — PAIN DESCRIPTION - PAIN TYPE: TYPE: ACUTE PAIN

## 2018-11-08 ASSESSMENT — PAIN DESCRIPTION - LOCATION: LOCATION: CHEST

## 2018-11-08 NOTE — ED PROVIDER NOTES
deficits  Psych: Normal Affect    ------------------------------ ED COURSE/MEDICAL DECISION MAKING----------------------  Medications - No data to display    ED COURSE:      Medical Decision Making:   Differential Diagnoses:  Costochondritis, GERD, pneumothorax, pneumonia, rib fracture, rib contusion, MI, to name a few. Patient's presentation is suspicious for chest wall contusion and this is felt to be the etiology of his pain given the history provided. Pt's HEART Score = 3 points, Low Risk Score    EKG #1:  Interpreted by emergency department physician unless otherwise noted. Time:  01:09    Rate:  70  Rhythm: Sinus. Interpretation: normal EKG, normal sinus rhythm. No ischemic changes compared to 11/08/18  '  Counseling: The emergency provider has spoken with the patient and discussed todays results, in addition to providing specific details for the plan of care and counseling regarding the diagnosis and prognosis. Questions are answered at this time and they are agreeable with the plan. Controlled Substances Monitoring:     RX Monitoring 11/8/2018   Attestation The Prescription Monitoring Report for this patient was reviewed today. Documentation Possible medication side effects, risk of tolerance/dependence & alternative treatments discussed. ;No signs of potential drug abuse or diversion identified.       --------------------------------- IMPRESSION AND DISPOSITION ---------------------------------    IMPRESSION  1. Contusion of left chest wall, initial encounter        DISPOSITION  Disposition: Discharge to home  Patient condition is stable      NOTE: This report was transcribed using voice recognition software.  Every effort was made to ensure accuracy; however, inadvertent computerized transcription errors may be present        Bobo Gaxiola MD  11/08/18 0157       Bobo Gaxiola MD  11/08/18 [address], [place]  11/08/18 7180

## 2018-12-28 ENCOUNTER — HOSPITAL ENCOUNTER (EMERGENCY)
Age: 48
Discharge: HOME | End: 2018-12-28
Attending: EMERGENCY MEDICINE
Payer: COMMERCIAL

## 2018-12-28 ENCOUNTER — APPOINTMENT (OUTPATIENT)
Dept: GENERAL RADIOLOGY | Age: 48
End: 2018-12-28
Payer: COMMERCIAL

## 2018-12-28 ENCOUNTER — APPOINTMENT (OUTPATIENT)
Dept: CT IMAGING | Age: 48
End: 2018-12-28
Payer: COMMERCIAL

## 2018-12-28 VITALS
RESPIRATION RATE: 16 BRPM | DIASTOLIC BLOOD PRESSURE: 80 MMHG | HEART RATE: 85 BPM | BODY MASS INDEX: 23.59 KG/M2 | OXYGEN SATURATION: 98 % | HEIGHT: 73 IN | WEIGHT: 178 LBS | SYSTOLIC BLOOD PRESSURE: 130 MMHG | TEMPERATURE: 98 F

## 2018-12-28 DIAGNOSIS — S16.1XXA ACUTE STRAIN OF NECK MUSCLE, INITIAL ENCOUNTER: ICD-10-CM

## 2018-12-28 DIAGNOSIS — R55 SYNCOPE AND COLLAPSE: Primary | ICD-10-CM

## 2018-12-28 LAB
ANION GAP SERPL CALCULATED.3IONS-SCNC: 10 MMOL/L (ref 7–16)
BASOPHILS ABSOLUTE: 0.09 E9/L (ref 0–0.2)
BASOPHILS RELATIVE PERCENT: 1.4 % (ref 0–2)
BUN BLDV-MCNC: 20 MG/DL (ref 6–20)
CALCIUM SERPL-MCNC: 9.9 MG/DL (ref 8.6–10.2)
CHLORIDE BLD-SCNC: 105 MMOL/L (ref 98–107)
CO2: 26 MMOL/L (ref 22–29)
CREAT SERPL-MCNC: 0.8 MG/DL (ref 0.7–1.2)
EKG ATRIAL RATE: 61 BPM
EKG P AXIS: 65 DEGREES
EKG P-R INTERVAL: 154 MS
EKG Q-T INTERVAL: 390 MS
EKG QRS DURATION: 88 MS
EKG QTC CALCULATION (BAZETT): 392 MS
EKG R AXIS: 58 DEGREES
EKG T AXIS: 66 DEGREES
EKG VENTRICULAR RATE: 61 BPM
EOSINOPHILS ABSOLUTE: 0.21 E9/L (ref 0.05–0.5)
EOSINOPHILS RELATIVE PERCENT: 3.3 % (ref 0–6)
GFR AFRICAN AMERICAN: >60
GFR NON-AFRICAN AMERICAN: >60 ML/MIN/1.73
GLUCOSE BLD-MCNC: 102 MG/DL (ref 74–99)
HCT VFR BLD CALC: 45 % (ref 37–54)
HEMOGLOBIN: 15.5 G/DL (ref 12.5–16.5)
IMMATURE GRANULOCYTES #: 0.02 E9/L
IMMATURE GRANULOCYTES %: 0.3 % (ref 0–5)
LYMPHOCYTES ABSOLUTE: 1.66 E9/L (ref 1.5–4)
LYMPHOCYTES RELATIVE PERCENT: 25.7 % (ref 20–42)
MCH RBC QN AUTO: 36 PG (ref 26–35)
MCHC RBC AUTO-ENTMCNC: 34.4 % (ref 32–34.5)
MCV RBC AUTO: 104.7 FL (ref 80–99.9)
MONOCYTES ABSOLUTE: 0.35 E9/L (ref 0.1–0.95)
MONOCYTES RELATIVE PERCENT: 5.4 % (ref 2–12)
NEUTROPHILS ABSOLUTE: 4.13 E9/L (ref 1.8–7.3)
NEUTROPHILS RELATIVE PERCENT: 63.9 % (ref 43–80)
PDW BLD-RTO: 16.6 FL (ref 11.5–15)
PLATELET # BLD: 391 E9/L (ref 130–450)
PMV BLD AUTO: 9.3 FL (ref 7–12)
POTASSIUM SERPL-SCNC: 4.9 MMOL/L (ref 3.5–5)
RBC # BLD: 4.3 E12/L (ref 3.8–5.8)
SODIUM BLD-SCNC: 141 MMOL/L (ref 132–146)
TROPONIN: <0.01 NG/ML (ref 0–0.03)
WBC # BLD: 6.5 E9/L (ref 4.5–11.5)

## 2018-12-28 PROCEDURE — 72125 CT NECK SPINE W/O DYE: CPT

## 2018-12-28 PROCEDURE — 71045 X-RAY EXAM CHEST 1 VIEW: CPT

## 2018-12-28 PROCEDURE — 85025 COMPLETE CBC W/AUTO DIFF WBC: CPT

## 2018-12-28 PROCEDURE — 93005 ELECTROCARDIOGRAM TRACING: CPT | Performed by: STUDENT IN AN ORGANIZED HEALTH CARE EDUCATION/TRAINING PROGRAM

## 2018-12-28 PROCEDURE — 70450 CT HEAD/BRAIN W/O DYE: CPT

## 2018-12-28 PROCEDURE — 6370000000 HC RX 637 (ALT 250 FOR IP): Performed by: STUDENT IN AN ORGANIZED HEALTH CARE EDUCATION/TRAINING PROGRAM

## 2018-12-28 PROCEDURE — 80048 BASIC METABOLIC PNL TOTAL CA: CPT

## 2018-12-28 PROCEDURE — 84484 ASSAY OF TROPONIN QUANT: CPT

## 2018-12-28 PROCEDURE — 6360000002 HC RX W HCPCS: Performed by: EMERGENCY MEDICINE

## 2018-12-28 PROCEDURE — 99285 EMERGENCY DEPT VISIT HI MDM: CPT

## 2018-12-28 PROCEDURE — 2580000003 HC RX 258: Performed by: STUDENT IN AN ORGANIZED HEALTH CARE EDUCATION/TRAINING PROGRAM

## 2018-12-28 PROCEDURE — 96374 THER/PROPH/DIAG INJ IV PUSH: CPT

## 2018-12-28 RX ORDER — IBUPROFEN 800 MG/1
800 TABLET ORAL EVERY 8 HOURS PRN
Qty: 30 TABLET | Refills: 0 | Status: SHIPPED | OUTPATIENT
Start: 2018-12-28 | End: 2019-03-28

## 2018-12-28 RX ORDER — CYCLOBENZAPRINE HCL 10 MG
10 TABLET ORAL 3 TIMES DAILY PRN
Qty: 10 TABLET | Refills: 0 | Status: SHIPPED | OUTPATIENT
Start: 2018-12-28 | End: 2019-01-07

## 2018-12-28 RX ORDER — ONDANSETRON 2 MG/ML
4 INJECTION INTRAMUSCULAR; INTRAVENOUS ONCE
Status: COMPLETED | OUTPATIENT
Start: 2018-12-28 | End: 2018-12-28

## 2018-12-28 RX ORDER — IBUPROFEN 600 MG/1
600 TABLET ORAL ONCE
Status: COMPLETED | OUTPATIENT
Start: 2018-12-28 | End: 2018-12-28

## 2018-12-28 RX ORDER — 0.9 % SODIUM CHLORIDE 0.9 %
1000 INTRAVENOUS SOLUTION INTRAVENOUS ONCE
Status: COMPLETED | OUTPATIENT
Start: 2018-12-28 | End: 2018-12-28

## 2018-12-28 RX ADMIN — IBUPROFEN 600 MG: 600 TABLET ORAL at 13:04

## 2018-12-28 RX ADMIN — ONDANSETRON 4 MG: 2 INJECTION INTRAMUSCULAR; INTRAVENOUS at 12:50

## 2018-12-28 RX ADMIN — SODIUM CHLORIDE 1000 ML: 9 INJECTION, SOLUTION INTRAVENOUS at 12:50

## 2018-12-28 ASSESSMENT — ENCOUNTER SYMPTOMS
SHORTNESS OF BREATH: 0
WHEEZING: 0
VOICE CHANGE: 0
TROUBLE SWALLOWING: 0
COUGH: 0
VOMITING: 0
ABDOMINAL PAIN: 0
DIFFICULTY BREATHING: 0
PHOTOPHOBIA: 0
VISUAL CHANGE: 0
NAUSEA: 1
SORE THROAT: 0
CHEST TIGHTNESS: 0
RECTAL BLEEDING: 0
FACIAL SWELLING: 0

## 2018-12-28 ASSESSMENT — PAIN DESCRIPTION - LOCATION: LOCATION: NECK

## 2018-12-28 ASSESSMENT — PAIN DESCRIPTION - PROGRESSION: CLINICAL_PROGRESSION: NOT CHANGED

## 2018-12-28 ASSESSMENT — PAIN DESCRIPTION - ONSET: ONSET: ON-GOING

## 2018-12-28 ASSESSMENT — PAIN SCALES - GENERAL
PAINLEVEL_OUTOF10: 9
PAINLEVEL_OUTOF10: 6

## 2018-12-28 ASSESSMENT — PAIN DESCRIPTION - DESCRIPTORS: DESCRIPTORS: SHARP

## 2018-12-28 ASSESSMENT — PAIN DESCRIPTION - FREQUENCY: FREQUENCY: CONTINUOUS

## 2018-12-28 ASSESSMENT — PAIN DESCRIPTION - ORIENTATION: ORIENTATION: RIGHT

## 2018-12-28 ASSESSMENT — PAIN DESCRIPTION - PAIN TYPE: TYPE: ACUTE PAIN

## 2019-01-01 ENCOUNTER — HOSPITAL ENCOUNTER (EMERGENCY)
Age: 49
Discharge: HOME | End: 2019-01-01
Payer: COMMERCIAL

## 2019-01-01 VITALS
RESPIRATION RATE: 14 BRPM | BODY MASS INDEX: 23.86 KG/M2 | SYSTOLIC BLOOD PRESSURE: 101 MMHG | TEMPERATURE: 97.5 F | DIASTOLIC BLOOD PRESSURE: 61 MMHG | HEART RATE: 70 BPM | HEIGHT: 73 IN | WEIGHT: 180 LBS | OXYGEN SATURATION: 98 %

## 2019-01-01 DIAGNOSIS — J02.0 STREP PHARYNGITIS: Primary | ICD-10-CM

## 2019-01-01 LAB — STREP GRP A PCR: POSITIVE

## 2019-01-01 PROCEDURE — 6370000000 HC RX 637 (ALT 250 FOR IP): Performed by: NURSE PRACTITIONER

## 2019-01-01 PROCEDURE — 87880 STREP A ASSAY W/OPTIC: CPT

## 2019-01-01 PROCEDURE — 99282 EMERGENCY DEPT VISIT SF MDM: CPT

## 2019-01-01 RX ORDER — IBUPROFEN 800 MG/1
800 TABLET ORAL EVERY 6 HOURS PRN
Qty: 16 TABLET | Refills: 0 | Status: SHIPPED | OUTPATIENT
Start: 2019-01-01 | End: 2019-03-28

## 2019-01-01 RX ORDER — IBUPROFEN 800 MG/1
800 TABLET ORAL ONCE
Status: COMPLETED | OUTPATIENT
Start: 2019-01-01 | End: 2019-01-01

## 2019-01-01 RX ORDER — AMOXICILLIN 500 MG/1
500 CAPSULE ORAL 3 TIMES DAILY
Qty: 30 CAPSULE | Refills: 0 | Status: SHIPPED | OUTPATIENT
Start: 2019-01-01 | End: 2019-01-11

## 2019-01-01 RX ADMIN — IBUPROFEN 800 MG: 800 TABLET, FILM COATED ORAL at 13:17

## 2019-01-01 ASSESSMENT — PAIN DESCRIPTION - PAIN TYPE: TYPE: ACUTE PAIN

## 2019-01-01 ASSESSMENT — PAIN DESCRIPTION - LOCATION: LOCATION: THROAT

## 2019-01-01 ASSESSMENT — PAIN SCALES - GENERAL
PAINLEVEL_OUTOF10: 9
PAINLEVEL_OUTOF10: 5

## 2019-01-12 ENCOUNTER — APPOINTMENT (OUTPATIENT)
Dept: CT IMAGING | Age: 49
End: 2019-01-12
Payer: COMMERCIAL

## 2019-01-12 ENCOUNTER — HOSPITAL ENCOUNTER (EMERGENCY)
Age: 49
Discharge: HOME | End: 2019-01-12
Attending: EMERGENCY MEDICINE
Payer: COMMERCIAL

## 2019-01-12 ENCOUNTER — APPOINTMENT (OUTPATIENT)
Dept: GENERAL RADIOLOGY | Age: 49
End: 2019-01-12
Payer: COMMERCIAL

## 2019-01-12 VITALS
TEMPERATURE: 97.5 F | DIASTOLIC BLOOD PRESSURE: 71 MMHG | BODY MASS INDEX: 23.86 KG/M2 | RESPIRATION RATE: 16 BRPM | WEIGHT: 180 LBS | HEIGHT: 73 IN | HEART RATE: 57 BPM | OXYGEN SATURATION: 99 % | SYSTOLIC BLOOD PRESSURE: 125 MMHG
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
## 2019-01-12 DIAGNOSIS — R55 SYNCOPE AND COLLAPSE: Primary | ICD-10-CM

## 2019-01-12 DIAGNOSIS — M25.512 LEFT SHOULDER PAIN, UNSPECIFIED CHRONICITY: ICD-10-CM

## 2019-01-12 DIAGNOSIS — I95.1 ORTHOSTATIC SYNCOPE: ICD-10-CM

## 2019-01-12 LAB
ALBUMIN SERPL-MCNC: 4.3 G/DL (ref 3.5–5.2)
ALP BLD-CCNC: 62 U/L (ref 40–129)
ALT SERPL-CCNC: 20 U/L (ref 0–40)
AMPHETAMINE SCREEN, URINE: NOT DETECTED
ANION GAP SERPL CALCULATED.3IONS-SCNC: 11 MMOL/L (ref 7–16)
AST SERPL-CCNC: 20 U/L (ref 0–39)
BACTERIA: ABNORMAL /HPF
BARBITURATE SCREEN URINE: NOT DETECTED
BASOPHILS ABSOLUTE: 0.07 E9/L (ref 0–0.2)
BASOPHILS RELATIVE PERCENT: 1.3 % (ref 0–2)
BENZODIAZEPINE SCREEN, URINE: NOT DETECTED
BILIRUB SERPL-MCNC: 0.8 MG/DL (ref 0–1.2)
BILIRUBIN URINE: NEGATIVE
BLOOD, URINE: NEGATIVE
BUN BLDV-MCNC: 15 MG/DL (ref 6–20)
CALCIUM SERPL-MCNC: 9.2 MG/DL (ref 8.6–10.2)
CANNABINOID SCREEN URINE: POSITIVE
CHLORIDE BLD-SCNC: 103 MMOL/L (ref 98–107)
CHP ED QC CHECK: NORMAL
CLARITY: CLEAR
CO2: 23 MMOL/L (ref 22–29)
COCAINE METABOLITE SCREEN URINE: NOT DETECTED
COLOR: YELLOW
CREAT SERPL-MCNC: 0.8 MG/DL (ref 0.7–1.2)
EKG ATRIAL RATE: 52 BPM
EKG P AXIS: 57 DEGREES
EKG P-R INTERVAL: 152 MS
EKG Q-T INTERVAL: 432 MS
EKG QRS DURATION: 92 MS
EKG QTC CALCULATION (BAZETT): 401 MS
EKG R AXIS: 48 DEGREES
EKG T AXIS: 61 DEGREES
EKG VENTRICULAR RATE: 52 BPM
EOSINOPHILS ABSOLUTE: 0.08 E9/L (ref 0.05–0.5)
EOSINOPHILS RELATIVE PERCENT: 1.5 % (ref 0–6)
GFR AFRICAN AMERICAN: >60
GFR NON-AFRICAN AMERICAN: >60 ML/MIN/1.73
GLUCOSE BLD-MCNC: 91 MG/DL (ref 74–99)
GLUCOSE BLD-MCNC: 92 MG/DL
GLUCOSE URINE: NEGATIVE MG/DL
HCT VFR BLD CALC: 41.7 % (ref 37–54)
HEMOGLOBIN: 14.3 G/DL (ref 12.5–16.5)
IMMATURE GRANULOCYTES #: 0.03 E9/L
IMMATURE GRANULOCYTES %: 0.5 % (ref 0–5)
KETONES, URINE: 15 MG/DL
LEUKOCYTE ESTERASE, URINE: NEGATIVE
LYMPHOCYTES ABSOLUTE: 1.7 E9/L (ref 1.5–4)
LYMPHOCYTES RELATIVE PERCENT: 30.9 % (ref 20–42)
MCH RBC QN AUTO: 36.1 PG (ref 26–35)
MCHC RBC AUTO-ENTMCNC: 34.3 % (ref 32–34.5)
MCV RBC AUTO: 105.3 FL (ref 80–99.9)
METER GLUCOSE: 92 MG/DL (ref 74–99)
METHADONE SCREEN, URINE: NOT DETECTED
MONOCYTES ABSOLUTE: 0.33 E9/L (ref 0.1–0.95)
MONOCYTES RELATIVE PERCENT: 6 % (ref 2–12)
NEUTROPHILS ABSOLUTE: 3.3 E9/L (ref 1.8–7.3)
NEUTROPHILS RELATIVE PERCENT: 59.8 % (ref 43–80)
NITRITE, URINE: NEGATIVE
OPIATE SCREEN URINE: NOT DETECTED
PDW BLD-RTO: 15.9 FL (ref 11.5–15)
PH UA: 7.5 (ref 5–9)
PHENCYCLIDINE SCREEN URINE: NOT DETECTED
PLATELET # BLD: 454 E9/L (ref 130–450)
PMV BLD AUTO: 8.9 FL (ref 7–12)
POTASSIUM SERPL-SCNC: 4.2 MMOL/L (ref 3.5–5)
PROPOXYPHENE SCREEN: NOT DETECTED
PROTEIN UA: NEGATIVE MG/DL
RBC # BLD: 3.96 E12/L (ref 3.8–5.8)
RBC UA: ABNORMAL /HPF (ref 0–2)
SODIUM BLD-SCNC: 137 MMOL/L (ref 132–146)
SPECIFIC GRAVITY UA: 1.01 (ref 1–1.03)
TOTAL PROTEIN: 6.7 G/DL (ref 6.4–8.3)
TROPONIN: <0.01 NG/ML (ref 0–0.03)
UROBILINOGEN, URINE: 0.2 E.U./DL
WBC # BLD: 5.5 E9/L (ref 4.5–11.5)
WBC UA: ABNORMAL /HPF (ref 0–5)

## 2019-01-12 PROCEDURE — 71046 X-RAY EXAM CHEST 2 VIEWS: CPT

## 2019-01-12 PROCEDURE — 73030 X-RAY EXAM OF SHOULDER: CPT

## 2019-01-12 PROCEDURE — 80053 COMPREHEN METABOLIC PANEL: CPT

## 2019-01-12 PROCEDURE — 2580000003 HC RX 258: Performed by: EMERGENCY MEDICINE

## 2019-01-12 PROCEDURE — 72125 CT NECK SPINE W/O DYE: CPT

## 2019-01-12 PROCEDURE — 70450 CT HEAD/BRAIN W/O DYE: CPT

## 2019-01-12 PROCEDURE — 84484 ASSAY OF TROPONIN QUANT: CPT

## 2019-01-12 PROCEDURE — 80307 DRUG TEST PRSMV CHEM ANLYZR: CPT

## 2019-01-12 PROCEDURE — 85025 COMPLETE CBC W/AUTO DIFF WBC: CPT

## 2019-01-12 PROCEDURE — 82962 GLUCOSE BLOOD TEST: CPT

## 2019-01-12 PROCEDURE — G0480 DRUG TEST DEF 1-7 CLASSES: HCPCS

## 2019-01-12 PROCEDURE — 81001 URINALYSIS AUTO W/SCOPE: CPT

## 2019-01-12 PROCEDURE — 99285 EMERGENCY DEPT VISIT HI MDM: CPT

## 2019-01-12 PROCEDURE — 93005 ELECTROCARDIOGRAM TRACING: CPT | Performed by: EMERGENCY MEDICINE

## 2019-01-12 RX ORDER — 0.9 % SODIUM CHLORIDE 0.9 %
1000 INTRAVENOUS SOLUTION INTRAVENOUS ONCE
Status: COMPLETED | OUTPATIENT
Start: 2019-01-12 | End: 2019-01-12

## 2019-01-12 RX ADMIN — SODIUM CHLORIDE 1000 ML: 9 INJECTION, SOLUTION INTRAVENOUS at 16:30

## 2019-01-12 ASSESSMENT — ENCOUNTER SYMPTOMS
DIARRHEA: 0
EYE PAIN: 0
EYE DISCHARGE: 0
WHEEZING: 0
COUGH: 0
BLOOD IN STOOL: 0
SINUS PRESSURE: 0
EYE REDNESS: 0
BACK PAIN: 0
VOMITING: 0
SHORTNESS OF BREATH: 0
ABDOMINAL PAIN: 0
RHINORRHEA: 0
SORE THROAT: 0
NAUSEA: 0

## 2019-01-12 ASSESSMENT — PAIN DESCRIPTION - PAIN TYPE: TYPE: ACUTE PAIN

## 2019-01-12 ASSESSMENT — PAIN SCALES - GENERAL: PAINLEVEL_OUTOF10: 7

## 2019-01-12 ASSESSMENT — PAIN DESCRIPTION - ORIENTATION: ORIENTATION: LEFT

## 2019-01-12 ASSESSMENT — PAIN DESCRIPTION - FREQUENCY: FREQUENCY: CONTINUOUS

## 2019-01-12 ASSESSMENT — PAIN DESCRIPTION - DESCRIPTORS: DESCRIPTORS: ACHING;HEADACHE

## 2019-01-12 ASSESSMENT — PAIN DESCRIPTION - LOCATION: LOCATION: SHOULDER;NECK

## 2019-01-14 RX ORDER — PANTOPRAZOLE SODIUM 20 MG/1
20 TABLET, DELAYED RELEASE ORAL DAILY
Qty: 30 TABLET | Refills: 0 | Status: SHIPPED | OUTPATIENT
Start: 2019-01-14 | End: 2019-02-08 | Stop reason: SDUPTHER

## 2019-01-14 RX ORDER — FAMOTIDINE 20 MG/1
TABLET, FILM COATED ORAL
Qty: 60 TABLET | Refills: 0 | Status: SHIPPED | OUTPATIENT
Start: 2019-01-14 | End: 2019-03-28 | Stop reason: ALTCHOICE

## 2019-01-15 ENCOUNTER — OFFICE VISIT (OUTPATIENT)
Dept: FAMILY MEDICINE CLINIC | Age: 49
End: 2019-01-15
Payer: COMMERCIAL

## 2019-01-15 VITALS
RESPIRATION RATE: 16 BRPM | DIASTOLIC BLOOD PRESSURE: 65 MMHG | WEIGHT: 162 LBS | HEART RATE: 71 BPM | SYSTOLIC BLOOD PRESSURE: 98 MMHG | BODY MASS INDEX: 21.47 KG/M2 | HEIGHT: 73 IN

## 2019-01-15 DIAGNOSIS — R55 VASOVAGAL SYNCOPE: ICD-10-CM

## 2019-01-15 DIAGNOSIS — G40.909 SEIZURE DISORDER (HCC): Primary | ICD-10-CM

## 2019-01-15 DIAGNOSIS — I25.10 CORONARY ATHEROSCLEROSIS DUE TO CALCIFIED CORONARY LESION: ICD-10-CM

## 2019-01-15 DIAGNOSIS — E44.1 MILD PROTEIN-CALORIE MALNUTRITION (HCC): ICD-10-CM

## 2019-01-15 DIAGNOSIS — I25.84 CORONARY ATHEROSCLEROSIS DUE TO CALCIFIED CORONARY LESION: ICD-10-CM

## 2019-01-15 PROCEDURE — 4004F PT TOBACCO SCREEN RCVD TLK: CPT | Performed by: FAMILY MEDICINE

## 2019-01-15 PROCEDURE — G8598 ASA/ANTIPLAT THER USED: HCPCS | Performed by: FAMILY MEDICINE

## 2019-01-15 PROCEDURE — G8427 DOCREV CUR MEDS BY ELIG CLIN: HCPCS | Performed by: FAMILY MEDICINE

## 2019-01-15 PROCEDURE — G8482 FLU IMMUNIZE ORDER/ADMIN: HCPCS | Performed by: FAMILY MEDICINE

## 2019-01-15 PROCEDURE — G8420 CALC BMI NORM PARAMETERS: HCPCS | Performed by: FAMILY MEDICINE

## 2019-01-15 PROCEDURE — 99213 OFFICE O/P EST LOW 20 MIN: CPT | Performed by: FAMILY MEDICINE

## 2019-01-15 RX ORDER — LACTOSE-REDUCED FOOD
LIQUID (ML) ORAL
Qty: 60 CAN | Refills: 1 | Status: SHIPPED | OUTPATIENT
Start: 2019-01-15 | End: 2019-04-25 | Stop reason: SDUPTHER

## 2019-01-15 ASSESSMENT — ENCOUNTER SYMPTOMS
SORE THROAT: 0
NAUSEA: 0
EYE DISCHARGE: 0
BACK PAIN: 0
VOMITING: 0
SHORTNESS OF BREATH: 0
ABDOMINAL PAIN: 0
COUGH: 0
SINUS PRESSURE: 0
EYE REDNESS: 0
DIARRHEA: 0
WHEEZING: 0
EYE PAIN: 0

## 2019-01-18 LAB — CANNABINOIDS CONF, URINE: >500 NG/ML

## 2019-01-25 ENCOUNTER — HOSPITAL ENCOUNTER (EMERGENCY)
Age: 49
Discharge: HOME | End: 2019-01-25
Attending: EMERGENCY MEDICINE
Payer: COMMERCIAL

## 2019-01-25 ENCOUNTER — APPOINTMENT (OUTPATIENT)
Dept: GENERAL RADIOLOGY | Age: 49
End: 2019-01-25
Payer: COMMERCIAL

## 2019-01-25 VITALS
RESPIRATION RATE: 16 BRPM | BODY MASS INDEX: 21.47 KG/M2 | SYSTOLIC BLOOD PRESSURE: 119 MMHG | DIASTOLIC BLOOD PRESSURE: 69 MMHG | TEMPERATURE: 96.3 F | WEIGHT: 162 LBS | HEART RATE: 81 BPM | OXYGEN SATURATION: 97 % | HEIGHT: 73 IN

## 2019-01-25 DIAGNOSIS — K52.9 GASTROENTERITIS: Primary | ICD-10-CM

## 2019-01-25 LAB
ALBUMIN SERPL-MCNC: 4.4 G/DL (ref 3.5–5.2)
ALP BLD-CCNC: 65 U/L (ref 40–129)
ALT SERPL-CCNC: 20 U/L (ref 0–40)
ANION GAP SERPL CALCULATED.3IONS-SCNC: 12 MMOL/L (ref 7–16)
AST SERPL-CCNC: 20 U/L (ref 0–39)
BASOPHILS ABSOLUTE: 0.08 E9/L (ref 0–0.2)
BASOPHILS RELATIVE PERCENT: 1.5 % (ref 0–2)
BILIRUB SERPL-MCNC: 1 MG/DL (ref 0–1.2)
BUN BLDV-MCNC: 14 MG/DL (ref 6–20)
CALCIUM SERPL-MCNC: 9.7 MG/DL (ref 8.6–10.2)
CHLORIDE BLD-SCNC: 102 MMOL/L (ref 98–107)
CO2: 25 MMOL/L (ref 22–29)
CREAT SERPL-MCNC: 0.9 MG/DL (ref 0.7–1.2)
EKG ATRIAL RATE: 56 BPM
EKG P AXIS: 58 DEGREES
EKG P-R INTERVAL: 150 MS
EKG Q-T INTERVAL: 402 MS
EKG QRS DURATION: 88 MS
EKG QTC CALCULATION (BAZETT): 387 MS
EKG R AXIS: 36 DEGREES
EKG T AXIS: 60 DEGREES
EKG VENTRICULAR RATE: 56 BPM
EOSINOPHILS ABSOLUTE: 0.26 E9/L (ref 0.05–0.5)
EOSINOPHILS RELATIVE PERCENT: 4.8 % (ref 0–6)
GFR AFRICAN AMERICAN: >60
GFR NON-AFRICAN AMERICAN: >60 ML/MIN/1.73
GLUCOSE BLD-MCNC: 108 MG/DL (ref 74–99)
HCT VFR BLD CALC: 44.3 % (ref 37–54)
HEMOGLOBIN: 15.3 G/DL (ref 12.5–16.5)
IMMATURE GRANULOCYTES #: 0.02 E9/L
IMMATURE GRANULOCYTES %: 0.4 % (ref 0–5)
INFLUENZA A BY PCR: NOT DETECTED
INFLUENZA B BY PCR: NOT DETECTED
LACTIC ACID: 1.4 MMOL/L (ref 0.5–2.2)
LIPASE: 23 U/L (ref 13–60)
LYMPHOCYTES ABSOLUTE: 1.87 E9/L (ref 1.5–4)
LYMPHOCYTES RELATIVE PERCENT: 34.6 % (ref 20–42)
MCH RBC QN AUTO: 36.7 PG (ref 26–35)
MCHC RBC AUTO-ENTMCNC: 34.5 % (ref 32–34.5)
MCV RBC AUTO: 106.2 FL (ref 80–99.9)
MONOCYTES ABSOLUTE: 0.43 E9/L (ref 0.1–0.95)
MONOCYTES RELATIVE PERCENT: 8 % (ref 2–12)
NEUTROPHILS ABSOLUTE: 2.74 E9/L (ref 1.8–7.3)
NEUTROPHILS RELATIVE PERCENT: 50.7 % (ref 43–80)
PDW BLD-RTO: 15.7 FL (ref 11.5–15)
PLATELET # BLD: 389 E9/L (ref 130–450)
PMV BLD AUTO: 9 FL (ref 7–12)
POTASSIUM REFLEX MAGNESIUM: 4.3 MMOL/L (ref 3.5–5)
RBC # BLD: 4.17 E12/L (ref 3.8–5.8)
SODIUM BLD-SCNC: 139 MMOL/L (ref 132–146)
TOTAL PROTEIN: 7.2 G/DL (ref 6.4–8.3)
TROPONIN: <0.01 NG/ML (ref 0–0.03)
WBC # BLD: 5.4 E9/L (ref 4.5–11.5)

## 2019-01-25 PROCEDURE — 36415 COLL VENOUS BLD VENIPUNCTURE: CPT

## 2019-01-25 PROCEDURE — 83605 ASSAY OF LACTIC ACID: CPT

## 2019-01-25 PROCEDURE — 96372 THER/PROPH/DIAG INJ SC/IM: CPT

## 2019-01-25 PROCEDURE — 85025 COMPLETE CBC W/AUTO DIFF WBC: CPT

## 2019-01-25 PROCEDURE — 6360000002 HC RX W HCPCS: Performed by: STUDENT IN AN ORGANIZED HEALTH CARE EDUCATION/TRAINING PROGRAM

## 2019-01-25 PROCEDURE — 87502 INFLUENZA DNA AMP PROBE: CPT

## 2019-01-25 PROCEDURE — 80053 COMPREHEN METABOLIC PANEL: CPT

## 2019-01-25 PROCEDURE — 71046 X-RAY EXAM CHEST 2 VIEWS: CPT

## 2019-01-25 PROCEDURE — 83690 ASSAY OF LIPASE: CPT

## 2019-01-25 PROCEDURE — 99284 EMERGENCY DEPT VISIT MOD MDM: CPT

## 2019-01-25 PROCEDURE — 84484 ASSAY OF TROPONIN QUANT: CPT

## 2019-01-25 PROCEDURE — 96374 THER/PROPH/DIAG INJ IV PUSH: CPT

## 2019-01-25 PROCEDURE — 96375 TX/PRO/DX INJ NEW DRUG ADDON: CPT

## 2019-01-25 PROCEDURE — 2580000003 HC RX 258: Performed by: STUDENT IN AN ORGANIZED HEALTH CARE EDUCATION/TRAINING PROGRAM

## 2019-01-25 RX ORDER — KETOROLAC TROMETHAMINE 30 MG/ML
15 INJECTION, SOLUTION INTRAMUSCULAR; INTRAVENOUS ONCE
Status: COMPLETED | OUTPATIENT
Start: 2019-01-25 | End: 2019-01-25

## 2019-01-25 RX ORDER — SODIUM CHLORIDE 9 MG/ML
2000 INJECTION, SOLUTION INTRAVENOUS ONCE
Status: COMPLETED | OUTPATIENT
Start: 2019-01-25 | End: 2019-01-25

## 2019-01-25 RX ORDER — ONDANSETRON 4 MG/1
4 TABLET, FILM COATED ORAL 3 TIMES DAILY PRN
Qty: 30 TABLET | Refills: 0 | Status: SHIPPED | OUTPATIENT
Start: 2019-01-25 | End: 2019-03-28 | Stop reason: ALTCHOICE

## 2019-01-25 RX ORDER — PROMETHAZINE HYDROCHLORIDE 25 MG/ML
12.5 INJECTION, SOLUTION INTRAMUSCULAR; INTRAVENOUS ONCE
Status: COMPLETED | OUTPATIENT
Start: 2019-01-25 | End: 2019-01-25

## 2019-01-25 RX ORDER — DIPHENHYDRAMINE HYDROCHLORIDE 50 MG/ML
25 INJECTION INTRAMUSCULAR; INTRAVENOUS ONCE
Status: COMPLETED | OUTPATIENT
Start: 2019-01-25 | End: 2019-01-25

## 2019-01-25 RX ORDER — DICYCLOMINE HYDROCHLORIDE 10 MG/1
10 CAPSULE ORAL 4 TIMES DAILY
Qty: 40 CAPSULE | Refills: 0 | Status: SHIPPED | OUTPATIENT
Start: 2019-01-25 | End: 2019-03-28

## 2019-01-25 RX ADMIN — DIPHENHYDRAMINE HYDROCHLORIDE 25 MG: 50 INJECTION, SOLUTION INTRAMUSCULAR; INTRAVENOUS at 12:24

## 2019-01-25 RX ADMIN — PROCHLORPERAZINE EDISYLATE 10 MG: 5 INJECTION INTRAMUSCULAR; INTRAVENOUS at 12:24

## 2019-01-25 RX ADMIN — PROMETHAZINE HYDROCHLORIDE 12.5 MG: 25 INJECTION INTRAMUSCULAR; INTRAVENOUS at 11:38

## 2019-01-25 RX ADMIN — SODIUM CHLORIDE 2000 ML: 9 INJECTION, SOLUTION INTRAVENOUS at 11:38

## 2019-01-25 RX ADMIN — KETOROLAC TROMETHAMINE 15 MG: 30 INJECTION, SOLUTION INTRAMUSCULAR; INTRAVENOUS at 12:24

## 2019-01-25 ASSESSMENT — ENCOUNTER SYMPTOMS
APNEA: 0
ABDOMINAL PAIN: 1
WHEEZING: 0
ANAL BLEEDING: 0
COUGH: 0
CHEST TIGHTNESS: 0
CONSTIPATION: 0
DIARRHEA: 1
CHOKING: 0
VOMITING: 1
ABDOMINAL DISTENTION: 0
COLOR CHANGE: 0
NAUSEA: 1
SHORTNESS OF BREATH: 0

## 2019-01-25 ASSESSMENT — PAIN SCALES - GENERAL
PAINLEVEL_OUTOF10: 6
PAINLEVEL_OUTOF10: 6

## 2019-02-08 RX ORDER — PANTOPRAZOLE SODIUM 20 MG/1
TABLET, DELAYED RELEASE ORAL
Qty: 30 TABLET | Refills: 0 | Status: SHIPPED | OUTPATIENT
Start: 2019-02-08 | End: 2019-03-12 | Stop reason: SDUPTHER

## 2019-02-25 DIAGNOSIS — F41.9 ANXIETY: ICD-10-CM

## 2019-02-25 DIAGNOSIS — F33.9 EPISODE OF RECURRENT MAJOR DEPRESSIVE DISORDER, UNSPECIFIED DEPRESSION EPISODE SEVERITY (HCC): ICD-10-CM

## 2019-02-25 RX ORDER — MESALAMINE 400 MG/1
CAPSULE, DELAYED RELEASE ORAL
Qty: 90 CAPSULE | Refills: 0 | Status: SHIPPED | OUTPATIENT
Start: 2019-02-25 | End: 2019-08-09

## 2019-02-25 RX ORDER — ESCITALOPRAM OXALATE 20 MG/1
20 TABLET ORAL DAILY
Qty: 30 TABLET | Refills: 0 | Status: SHIPPED | OUTPATIENT
Start: 2019-02-25 | End: 2019-08-09

## 2019-03-12 DIAGNOSIS — I10 ESSENTIAL HYPERTENSION: ICD-10-CM

## 2019-03-13 RX ORDER — PANTOPRAZOLE SODIUM 20 MG/1
TABLET, DELAYED RELEASE ORAL
Qty: 30 TABLET | Refills: 3 | Status: SHIPPED | OUTPATIENT
Start: 2019-03-13 | End: 2019-08-09

## 2019-03-13 RX ORDER — ISOSORBIDE MONONITRATE 30 MG/1
TABLET, EXTENDED RELEASE ORAL
Qty: 30 TABLET | Refills: 5 | Status: SHIPPED | OUTPATIENT
Start: 2019-03-13 | End: 2019-09-22 | Stop reason: SDUPTHER

## 2019-03-22 DIAGNOSIS — G40.909 SEIZURE DISORDER (HCC): Primary | ICD-10-CM

## 2019-03-22 RX ORDER — LEVETIRACETAM 500 MG/1
TABLET ORAL
Qty: 270 TABLET | Refills: 0 | Status: SHIPPED | OUTPATIENT
Start: 2019-03-22 | End: 2019-05-08 | Stop reason: SDUPTHER

## 2019-03-28 ENCOUNTER — OFFICE VISIT (OUTPATIENT)
Dept: FAMILY MEDICINE CLINIC | Age: 49
End: 2019-03-28
Payer: COMMERCIAL

## 2019-03-28 VITALS
WEIGHT: 161 LBS | BODY MASS INDEX: 21.34 KG/M2 | OXYGEN SATURATION: 98 % | HEART RATE: 57 BPM | DIASTOLIC BLOOD PRESSURE: 60 MMHG | HEIGHT: 73 IN | SYSTOLIC BLOOD PRESSURE: 100 MMHG | RESPIRATION RATE: 18 BRPM

## 2019-03-28 DIAGNOSIS — F33.9 EPISODE OF RECURRENT MAJOR DEPRESSIVE DISORDER, UNSPECIFIED DEPRESSION EPISODE SEVERITY (HCC): ICD-10-CM

## 2019-03-28 DIAGNOSIS — F41.9 ANXIETY: ICD-10-CM

## 2019-03-28 DIAGNOSIS — R63.4 WEIGHT LOSS: Primary | ICD-10-CM

## 2019-03-28 PROCEDURE — G8598 ASA/ANTIPLAT THER USED: HCPCS | Performed by: FAMILY MEDICINE

## 2019-03-28 PROCEDURE — 99213 OFFICE O/P EST LOW 20 MIN: CPT | Performed by: FAMILY MEDICINE

## 2019-03-28 PROCEDURE — 4004F PT TOBACCO SCREEN RCVD TLK: CPT | Performed by: FAMILY MEDICINE

## 2019-03-28 PROCEDURE — G8420 CALC BMI NORM PARAMETERS: HCPCS | Performed by: FAMILY MEDICINE

## 2019-03-28 PROCEDURE — G8482 FLU IMMUNIZE ORDER/ADMIN: HCPCS | Performed by: FAMILY MEDICINE

## 2019-03-28 PROCEDURE — G8427 DOCREV CUR MEDS BY ELIG CLIN: HCPCS | Performed by: FAMILY MEDICINE

## 2019-03-28 ASSESSMENT — PATIENT HEALTH QUESTIONNAIRE - PHQ9
SUM OF ALL RESPONSES TO PHQ9 QUESTIONS 1 & 2: 2
SUM OF ALL RESPONSES TO PHQ QUESTIONS 1-9: 2
SUM OF ALL RESPONSES TO PHQ QUESTIONS 1-9: 2
2. FEELING DOWN, DEPRESSED OR HOPELESS: 2
1. LITTLE INTEREST OR PLEASURE IN DOING THINGS: 0

## 2019-03-29 ENCOUNTER — HOSPITAL ENCOUNTER (EMERGENCY)
Age: 49
Discharge: HOME | End: 2019-03-29
Attending: EMERGENCY MEDICINE
Payer: COMMERCIAL

## 2019-03-29 ENCOUNTER — APPOINTMENT (OUTPATIENT)
Dept: GENERAL RADIOLOGY | Age: 49
End: 2019-03-29
Payer: COMMERCIAL

## 2019-03-29 VITALS
HEIGHT: 73 IN | SYSTOLIC BLOOD PRESSURE: 115 MMHG | RESPIRATION RATE: 20 BRPM | HEART RATE: 73 BPM | BODY MASS INDEX: 21.6 KG/M2 | OXYGEN SATURATION: 99 % | WEIGHT: 163 LBS | TEMPERATURE: 97.9 F | DIASTOLIC BLOOD PRESSURE: 72 MMHG

## 2019-03-29 DIAGNOSIS — B34.9 VIRAL SYNDROME: Primary | ICD-10-CM

## 2019-03-29 LAB
INFLUENZA A BY PCR: NOT DETECTED
INFLUENZA B BY PCR: NOT DETECTED
STREP GRP A PCR: NEGATIVE

## 2019-03-29 PROCEDURE — 87502 INFLUENZA DNA AMP PROBE: CPT

## 2019-03-29 PROCEDURE — 71045 X-RAY EXAM CHEST 1 VIEW: CPT

## 2019-03-29 PROCEDURE — 96372 THER/PROPH/DIAG INJ SC/IM: CPT

## 2019-03-29 PROCEDURE — 99284 EMERGENCY DEPT VISIT MOD MDM: CPT

## 2019-03-29 PROCEDURE — 87880 STREP A ASSAY W/OPTIC: CPT

## 2019-03-29 PROCEDURE — 6360000002 HC RX W HCPCS: Performed by: EMERGENCY MEDICINE

## 2019-03-29 PROCEDURE — 6370000000 HC RX 637 (ALT 250 FOR IP): Performed by: EMERGENCY MEDICINE

## 2019-03-29 RX ORDER — ESCITALOPRAM OXALATE 20 MG/1
40 TABLET ORAL DAILY
Qty: 60 TABLET | Refills: 3 | Status: SHIPPED | OUTPATIENT
Start: 2019-03-29 | End: 2019-04-30 | Stop reason: CLARIF

## 2019-03-29 RX ORDER — BROMPHENIRAMINE MALEATE, PSEUDOEPHEDRINE HYDROCHLORIDE, AND DEXTROMETHORPHAN HYDROBROMIDE 2; 30; 10 MG/5ML; MG/5ML; MG/5ML
2.5 SYRUP ORAL 4 TIMES DAILY PRN
Qty: 40 ML | Refills: 0 | Status: SHIPPED | OUTPATIENT
Start: 2019-03-29 | End: 2019-04-02

## 2019-03-29 RX ORDER — KETOROLAC TROMETHAMINE 30 MG/ML
30 INJECTION, SOLUTION INTRAMUSCULAR; INTRAVENOUS ONCE
Status: COMPLETED | OUTPATIENT
Start: 2019-03-29 | End: 2019-03-29

## 2019-03-29 RX ORDER — ONDANSETRON 4 MG/1
8 TABLET, ORALLY DISINTEGRATING ORAL ONCE
Status: COMPLETED | OUTPATIENT
Start: 2019-03-29 | End: 2019-03-29

## 2019-03-29 RX ORDER — AZITHROMYCIN 500 MG/1
500 TABLET, FILM COATED ORAL DAILY
Qty: 3 TABLET | Refills: 0 | Status: SHIPPED | OUTPATIENT
Start: 2019-03-29 | End: 2019-04-01

## 2019-03-29 RX ORDER — IBUPROFEN 800 MG/1
800 TABLET ORAL EVERY 8 HOURS PRN
Qty: 21 TABLET | Refills: 0 | Status: SHIPPED | OUTPATIENT
Start: 2019-03-29 | End: 2019-08-09

## 2019-03-29 RX ADMIN — ONDANSETRON 8 MG: 4 TABLET, ORALLY DISINTEGRATING ORAL at 10:36

## 2019-03-29 RX ADMIN — KETOROLAC TROMETHAMINE 30 MG: 30 INJECTION INTRAMUSCULAR; INTRAVENOUS at 10:36

## 2019-03-29 ASSESSMENT — ENCOUNTER SYMPTOMS
SHORTNESS OF BREATH: 0
EYE REDNESS: 0
SINUS PRESSURE: 0
BACK PAIN: 0
DIARRHEA: 0
ABDOMINAL PAIN: 0
COUGH: 1
WHEEZING: 0
SORE THROAT: 1
VOMITING: 0
EYE DISCHARGE: 0
NAUSEA: 0
EYE PAIN: 0

## 2019-03-29 ASSESSMENT — PAIN SCALES - GENERAL: PAINLEVEL_OUTOF10: 5

## 2019-03-29 NOTE — LETTER
[facility] Emergency Department  [address]  [facility] [address]  Phone: [phone]               March 29, 2019    Patient: Glenn Her   YOB: 1970   Date of Visit: 3/29/2019       To Whom It May Concern:    Della Leblanc was seen and treated in our emergency department on 3/29/2019. He may return to work on 4/1/19.       Sincerely,       Dionne Watts DO         Signature:__________________________________

## 2019-03-29 NOTE — ED NOTES
Called xray to check on status of results. Sherin Peralta stated it is being worked on right now.      Jai Oquendo RN  03/29/19 7321

## 2019-03-29 NOTE — ED NOTES
Discharge instructions given, medications and follow up instructions reviewed. Patient verbalized understanding, no other noted or stated problems at this time. Patient will follow up with physicians as directed.         Ricardo Loving RN  03/29/19 [id number]

## 2019-03-29 NOTE — ED PROVIDER NOTES
50-year-old male history of A. Fib, epilepsy, irritable bowel syndrome, hyperlipidemia coronary artery disease presents emergency Department with fevers subjectively at home one episode of emesis cough muscle aches for the last 4 days. He states no chest pain, no palpitations he states no abdominal pain dysuria or problems urinating. He states he does have mild shortness of breath. Patient states multiple members of his household have been sick in the last couple days. Neck pain and a mild headache. The history is provided by the patient. URI   Presenting symptoms: congestion, cough, fatigue, fever and sore throat    Presenting symptoms: no ear pain    Congestion:     Location:  Nasal and chest    Interferes with sleep: no      Interferes with eating/drinking: no    Cough:     Cough characteristics:  Non-productive    Sputum characteristics:  Nondescript    Severity:  Mild    Onset quality:  Gradual    Duration:  4 days    Timing:  Intermittent    Progression:  Waxing and waning    Chronicity:  New  Fatigue:     Severity:  Mild    Duration:  4 days    Timing:  Intermittent    Progression:  Waxing and waning  Fever:     Temp source:  Subjective  Sore throat:     Severity:  Mild    Onset quality:  Gradual    Duration:  3 days    Timing:  Intermittent    Progression:  Waxing and waning  Severity:  Mild  Duration:  4 days  Timing:  Intermittent  Progression:  Waxing and waning  Chronicity:  New  Relieved by:  Nothing  Worsened by:  Nothing  Ineffective treatments:  None tried  Associated symptoms: headaches and neck pain    Associated symptoms: no arthralgias and no wheezing    Headaches:     Severity:  Mild    Onset quality:  Gradual    Duration:  2 days    Timing:  Intermittent    Progression:  Waxing and waning    Chronicity:  New  Risk factors: sick contacts        Review of Systems   Constitutional: Positive for fatigue and fever. Negative for chills. HENT: Positive for congestion and sore throat. Negative for ear pain and sinus pressure. Eyes: Negative for pain, discharge and redness. Respiratory: Positive for cough. Negative for shortness of breath and wheezing. Cardiovascular: Negative for chest pain. Gastrointestinal: Negative for abdominal pain, diarrhea, nausea and vomiting. Genitourinary: Negative for dysuria and frequency. Musculoskeletal: Positive for neck pain. Negative for arthralgias and back pain. Skin: Negative for rash and wound. Neurological: Positive for headaches. Negative for weakness. Hematological: Negative for adenopathy. All other systems reviewed and are negative. Physical Exam   Constitutional: He is oriented to person, place, and time. He appears well-developed and well-nourished. He appears distressed. HENT:   Head: Normocephalic and atraumatic. Right Ear: Tympanic membrane normal.   Left Ear: Tympanic membrane normal.   Nose: Mucosal edema present. Mouth/Throat: Oropharynx is clear and moist and mucous membranes are normal.   Eyes: Pupils are equal, round, and reactive to light. EOM are normal.   Neck: Normal range of motion and full passive range of motion without pain. Neck supple. No spinous process tenderness and no muscular tenderness present. No neck rigidity. Cardiovascular: Normal rate and regular rhythm. Pulmonary/Chest: Effort normal and breath sounds normal.   Abdominal: Normal appearance and bowel sounds are normal. There is no tenderness. Neurological: He is alert and oriented to person, place, and time. Procedures    MDM    ED Course as of Mar 29 1207   Fri Mar 29, 2019   1012 Patient seen and examined with patient having a reassuring signs and reassuring physical exam who evaluated the patient for flu and strep throat patient be given Toradol and Zofran for symptoms this is likely influenza or upper respiratory infection.     [CF]   1147 Patient states no improvement vitals are main reassuring    [CF]   1206 Given patient's PCR Not Detected Not Detected    Influenza B by PCR Not Detected Not Detected   Strep Screen Group A Throat   Result Value Ref Range    Strep Grp A PCR Negative Negative       Radiology:  XR CHEST PORTABLE   Final Result   1. Medial right lung base airspace opacification/disease slightly more   prominent than previous study, findings could reflect a developing   infiltrate/pneumonia and/or scarring/atelectasis, clinical correlation   recommended. ------------------------- NURSING NOTES AND VITALS REVIEWED ---------------------------  Date / Time Roomed:  3/29/2019  9:22 AM  ED Bed Assignment:  24/24    The nursing notes within the ED encounter and vital signs as below have been reviewed. /72   Pulse 73   Temp 97.9 °F (36.6 °C) (Oral)   Resp 20   Ht 6' 1\" (1.854 m)   Wt 163 lb (73.9 kg)   SpO2 99%   BMI 21.51 kg/m²   Oxygen Saturation Interpretation: Normal      ------------------------------------------ PROGRESS NOTES ------------------------------------------  I have spoken with the patient and discussed todays results, in addition to providing specific details for the plan of care and counseling regarding the diagnosis and prognosis. Their questions are answered at this time and they are agreeable with the plan. I discussed at length with them reasons for immediate return here for re evaluation. They will followup with their primary care physician by calling their office tomorrow. --------------------------------- ADDITIONAL PROVIDER NOTES ---------------------------------  At this time the patient is without objective evidence of an acute process requiring hospitalization or inpatient management. They have remained hemodynamically stable throughout their entire ED visit and are stable for discharge with outpatient follow-up. The plan has been discussed in detail and they are aware of the specific conditions for emergent return, as well as the importance of follow-up.

## 2019-04-03 NOTE — PROGRESS NOTES
SUBJECTIVE:        Patient ID: Zeeshan Ellis is a 53 y.o. male who presents for   Chief Complaint   Patient presents with    Hypertension     BP is stable. Pt complains of weight loss. States that he has not been eating nutritious food as one of his family members is in charge of meal preparation and is not doing a good job. She has an episode of syncope in January which was felt to be vasovagal in nature. He was referred to  and was prescribed with Boost for his protein calorie malnutrition. He has been taking boost. His weight has been stable since January.       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    Atheroembolism of left lower extremity (HCC) 9/11/2014    Atrial flutter (HCC)     Coronary atherosclerosis due to calcified coronary lesion(414.4)     Discoloration of skin of toe 9/11/2014    ED (erectile dysfunction)     Epilepsy ([address]     Hyperlipidemia     Hypertension     IBS (irritable bowel syndrome)     Irregular heart beat     Ischemic toe 9/11/2014    MI, old     Seizures ([address]     Tinea     Tobacco dependence 9/16/2014    Tobacco use        Patient Active Problem List   Diagnosis    Tobacco dependence    Hypertension    Hyperlipidemia    Atrial flutter ([address]    Coronary atherosclerosis due to calcified coronary lesion    ED (erectile dysfunction)    Myocardial infarction, old    IBS (irritable bowel syndrome)    Seizure disorder (HCC)    Essential thrombocythemia ([address]    Myeloproliferative disease ([address]    Non compliance w medication regimen    Stented coronary artery    Precordial pain    Bradycardia       Past Surgical History:   Procedure Laterality Date    CARDIAC SURGERY      COLONOSCOPY  8/2014    CORONARY ANGIOPLASTY  2009    stent placement    CORONARY ANGIOPLASTY WITH STENT PLACEMENT      UPPER GASTROINTESTINAL ENDOSCOPY  8/2014    UPPER GASTROINTESTINAL ENDOSCOPY Family History   Problem Relation Age of Onset    Diabetes Brother     Hearing Loss Maternal Uncle     Diabetes Brother     Hearing Loss Maternal Uncle     Other Mother         COPD    Other Father         cirrhosis of liver       Social History     Socioeconomic History    Marital status:      Spouse name: None    Number of children: None    Years of education: None    Highest education level: None   Occupational History    None   Social Needs    Financial resource strain: None    Food insecurity:     Worry: None     Inability: None    Transportation needs:     Medical: None     Non-medical: None   Tobacco Use    Smoking status: Former Smoker     Packs/day: 0.50     Years: 20.00     Pack years: 10.00     Types: Cigarettes     Last attempt to quit: 3/25/2019     Years since quittin.0    Smokeless tobacco: Current User     Types: Chew   Substance and Sexual Activity    Alcohol use: No     Alcohol/week: 0.0 oz     Comment: quit 12 months ago    Drug use: Yes     Types: Marijuana     Comment: 5x past year    Sexual activity: Yes     Partners: Female   Lifestyle    Physical activity:     Days per week: None     Minutes per session: None    Stress: None   Relationships    Social connections:     Talks on phone: None     Gets together: None     Attends Yazidism service: None     Active member of club or organization: None     Attends meetings of clubs or organizations: None     Relationship status: None    Intimate partner violence:     Fear of current or ex partner: None     Emotionally abused: None     Physically abused: None     Forced sexual activity: None   Other Topics Concern    None   Social History Narrative    None       Allergies   Allergen Reactions    Acyclovir And Related Other (See Comments)    Doxycycline Itching       Current Outpatient Medications on File Prior to Visit   Medication Sig Dispense Refill    levETIRAcetam (KEPPRA) 500 MG tablet TAKE 3 TABLETS BY External ears normal  Chest - clear to auscultation, no wheezes, rales or rhonchi, symmetric air entry  Heart - RRR, S1, S2, negative murmurs  Abdomen - soft, nontender, nondistended, no masses or organomegaly  Neurological - alert, oriented, normal speech, no focal findings or movement disorder noted  Extremities - peripheral pulses normal, no pedal edema, no clubbing or cyanosis      ASSESSMENT / PLAN :     Jose Alberto Mayberry was seen today for hypertension. Diagnoses and all orders for this visit:    Weight loss  -     CBC with Differential; Future  -     Comprehensive Metabolic Panel; Future  -     Hemoglobin A1C; Future  -     Lipid Panel; Future  -     Psa screening; Future  -     TSH without Reflex; Future  -     Vitamin D 25 Hydroxy; Future  -     Sedimentation Rate; Future  -     C-Reactive Protein; Future  -     HIV Screen; Future  -     Hepatitis C Antibody; Future    Advised to get involved in grocery shopping and meal preparation if possible. He states that he is already thinking of this and will try his best.      Counseled regarding above diagnosis, including possible risks and complications,  especially if left uncontrolled. Counseled regarding the possible side effects, risks, benefits and alternatives to treatment; patient and/or guardian verbalizes understanding, agrees, feels comfortable with and wishes to proceed with above treatment plan. Advised patient to call with any new medication issues, and read all Rx info from pharmacy to assure aware of all possible risks and side effects of medication before taking. Reviewed age and gender appropriate health screening exams and vaccinations. Advised patient regarding importance of keeping up with recommended health maintenance and to schedule as soon as possible if overdue, as this is important in assessing for undiagnosed pathology, especially cancer, as well as protecting against potentially harmful/life threatening disease.       Refills as

## 2019-04-05 ENCOUNTER — APPOINTMENT (OUTPATIENT)
Dept: GENERAL RADIOLOGY | Age: 49
End: 2019-04-05
Payer: COMMERCIAL

## 2019-04-05 ENCOUNTER — HOSPITAL ENCOUNTER (EMERGENCY)
Age: 49
Discharge: HOME | End: 2019-04-05
Attending: EMERGENCY MEDICINE
Payer: COMMERCIAL

## 2019-04-05 VITALS
HEART RATE: 54 BPM | BODY MASS INDEX: 21.87 KG/M2 | TEMPERATURE: 97.7 F | OXYGEN SATURATION: 98 % | HEIGHT: 73 IN | RESPIRATION RATE: 14 BRPM | SYSTOLIC BLOOD PRESSURE: 102 MMHG | WEIGHT: 165 LBS | DIASTOLIC BLOOD PRESSURE: 66 MMHG

## 2019-04-05 DIAGNOSIS — M54.2 CHRONIC NECK PAIN: Primary | ICD-10-CM

## 2019-04-05 DIAGNOSIS — G89.29 CHRONIC NECK PAIN: Primary | ICD-10-CM

## 2019-04-05 DIAGNOSIS — F41.1 ANXIETY STATE: ICD-10-CM

## 2019-04-05 DIAGNOSIS — R91.8 ABNORMALITY OF LUNG ON CXR: ICD-10-CM

## 2019-04-05 LAB
ANION GAP SERPL CALCULATED.3IONS-SCNC: 9 MMOL/L (ref 7–16)
BASOPHILS ABSOLUTE: 0.15 E9/L (ref 0–0.2)
BASOPHILS RELATIVE PERCENT: 1.6 % (ref 0–2)
BUN BLDV-MCNC: 17 MG/DL (ref 6–20)
CALCIUM SERPL-MCNC: 9.2 MG/DL (ref 8.6–10.2)
CHLORIDE BLD-SCNC: 104 MMOL/L (ref 98–107)
CO2: 26 MMOL/L (ref 22–29)
CREAT SERPL-MCNC: 0.8 MG/DL (ref 0.7–1.2)
EOSINOPHILS ABSOLUTE: 0.3 E9/L (ref 0.05–0.5)
EOSINOPHILS RELATIVE PERCENT: 3.1 % (ref 0–6)
GFR AFRICAN AMERICAN: >60
GFR NON-AFRICAN AMERICAN: >60 ML/MIN/1.73
GLUCOSE BLD-MCNC: 90 MG/DL (ref 74–99)
HCT VFR BLD CALC: 43.4 % (ref 37–54)
HEMOGLOBIN: 15 G/DL (ref 12.5–16.5)
IMMATURE GRANULOCYTES #: 0.11 E9/L
IMMATURE GRANULOCYTES %: 1.1 % (ref 0–5)
LYMPHOCYTES ABSOLUTE: 2.25 E9/L (ref 1.5–4)
LYMPHOCYTES RELATIVE PERCENT: 23.3 % (ref 20–42)
MCH RBC QN AUTO: 37.4 PG (ref 26–35)
MCHC RBC AUTO-ENTMCNC: 34.6 % (ref 32–34.5)
MCV RBC AUTO: 108.2 FL (ref 80–99.9)
MONOCYTES ABSOLUTE: 0.55 E9/L (ref 0.1–0.95)
MONOCYTES RELATIVE PERCENT: 5.7 % (ref 2–12)
NEUTROPHILS ABSOLUTE: 6.28 E9/L (ref 1.8–7.3)
NEUTROPHILS RELATIVE PERCENT: 65.2 % (ref 43–80)
PDW BLD-RTO: 12.9 FL (ref 11.5–15)
PLATELET # BLD: 605 E9/L (ref 130–450)
PMV BLD AUTO: 9.1 FL (ref 7–12)
POTASSIUM SERPL-SCNC: 4.5 MMOL/L (ref 3.5–5)
RBC # BLD: 4.01 E12/L (ref 3.8–5.8)
SODIUM BLD-SCNC: 139 MMOL/L (ref 132–146)
TROPONIN: <0.01 NG/ML (ref 0–0.03)
WBC # BLD: 9.6 E9/L (ref 4.5–11.5)

## 2019-04-05 PROCEDURE — 71045 X-RAY EXAM CHEST 1 VIEW: CPT

## 2019-04-05 PROCEDURE — 93005 ELECTROCARDIOGRAM TRACING: CPT | Performed by: STUDENT IN AN ORGANIZED HEALTH CARE EDUCATION/TRAINING PROGRAM

## 2019-04-05 PROCEDURE — 6370000000 HC RX 637 (ALT 250 FOR IP): Performed by: STUDENT IN AN ORGANIZED HEALTH CARE EDUCATION/TRAINING PROGRAM

## 2019-04-05 PROCEDURE — 80048 BASIC METABOLIC PNL TOTAL CA: CPT

## 2019-04-05 PROCEDURE — 85025 COMPLETE CBC W/AUTO DIFF WBC: CPT

## 2019-04-05 PROCEDURE — 99284 EMERGENCY DEPT VISIT MOD MDM: CPT

## 2019-04-05 PROCEDURE — 84484 ASSAY OF TROPONIN QUANT: CPT

## 2019-04-05 RX ORDER — ASPIRIN 81 MG/1
324 TABLET, CHEWABLE ORAL ONCE
Status: COMPLETED | OUTPATIENT
Start: 2019-04-05 | End: 2019-04-05

## 2019-04-05 RX ADMIN — ASPIRIN 81 MG 324 MG: 81 TABLET ORAL at 18:44

## 2019-04-05 ASSESSMENT — ENCOUNTER SYMPTOMS
NAUSEA: 0
BACK PAIN: 0
CONSTIPATION: 0
COLOR CHANGE: 0
VOMITING: 0
WHEEZING: 0
COUGH: 0
STRIDOR: 0
ABDOMINAL PAIN: 0
TROUBLE SWALLOWING: 0
VOICE CHANGE: 0
DIARRHEA: 0
SHORTNESS OF BREATH: 0
SORE THROAT: 0

## 2019-04-05 ASSESSMENT — PAIN DESCRIPTION - DESCRIPTORS: DESCRIPTORS: ACHING

## 2019-04-05 ASSESSMENT — PAIN DESCRIPTION - LOCATION: LOCATION: NECK

## 2019-04-05 ASSESSMENT — PAIN SCALES - GENERAL: PAINLEVEL_OUTOF10: 4

## 2019-04-05 ASSESSMENT — PAIN DESCRIPTION - ORIENTATION: ORIENTATION: POSTERIOR

## 2019-04-05 ASSESSMENT — PAIN DESCRIPTION - FREQUENCY: FREQUENCY: INTERMITTENT

## 2019-04-05 ASSESSMENT — PAIN DESCRIPTION - PAIN TYPE: TYPE: CHRONIC PAIN

## 2019-04-05 NOTE — ED PROVIDER NOTES
person, place, and time. He appears well-developed and well-nourished. Non-toxic appearance. He does not have a sickly appearance. He does not appear ill. No distress. HENT:   Head: Normocephalic and atraumatic. Right Ear: External ear normal.   Left Ear: External ear normal.   Nose: Nose normal.   Mouth/Throat: Oropharynx is clear and moist and mucous membranes are normal. No trismus in the jaw. No oropharyngeal exudate, posterior oropharyngeal edema, posterior oropharyngeal erythema or tonsillar abscesses. Eyes: Pupils are equal, round, and reactive to light. Conjunctivae and EOM are normal. Right eye exhibits no discharge. Left eye exhibits no discharge. No scleral icterus. Neck: Trachea normal, normal range of motion, full passive range of motion without pain and phonation normal. Neck supple. No JVD present. No tracheal tenderness present. No tracheal deviation present. No thyroid mass and no thyromegaly present. Cardiovascular: Normal rate, regular rhythm, S1 normal, S2 normal, normal heart sounds and intact distal pulses. Exam reveals no gallop, no S3, no S4, no distant heart sounds and no friction rub. No murmur heard. Pulses:       Radial pulses are 2+ on the right side, and 2+ on the left side. Pulmonary/Chest: Effort normal and breath sounds normal. No accessory muscle usage or stridor. No tachypnea. No respiratory distress. He has no decreased breath sounds. He has no wheezes. He has no rhonchi. He has no rales. He exhibits no tenderness. Abdominal: Soft. Bowel sounds are normal. He exhibits no distension and no mass. There is no tenderness. There is no rigidity, no rebound and no guarding. Musculoskeletal: Normal range of motion. He exhibits no edema, tenderness or deformity. Lymphadenopathy:     He has no cervical adenopathy. Neurological: He is alert and oriented to person, place, and time. GCS eye subscore is 4. GCS verbal subscore is 5. GCS motor subscore is 6.    Skin: Skin is warm and dry. Capillary refill takes less than 2 seconds. No rash noted. He is not diaphoretic. No erythema. No pallor. Psychiatric: He has a normal mood and affect. His speech is normal and behavior is normal. Judgment and thought content normal. Cognition and memory are normal.   Nursing note and vitals reviewed. Procedures    MDM  Patient presents to the ED for transient episode of palpitations and lightheadedness. Differential diagnoses included but not limited to arrhythmia/dysrhythmia, panic attack, ACS. Workup in the ED revealed no acute findings. The was a hyperdensity on the CXR. Patient notified of this. Patient had chronically elevated values on the CBC secondary to an essential thrombocytosis. Patient was given chewable aspirin. Patient was asymptomatic upon my examination in the emergency department. Patient continues to be non-toxic on re-evaluation. Findings were discussed with the patient and reasons to immediately return to the ED were articulated to them. They will follow-up with their PMD.       EKG: This EKG is signed and interpreted by me. Rate: 51  Rhythm: Sinus  Interpretation: Sinus bradycardia, nonspecific T-wave change in lead V1  Comparison: stable as compared to patient's most recent EKG           --------------------------------------------- PAST HISTORY ---------------------------------------------  Past Medical History:  has a past medical history of Atheroembolism of left lower extremity ([address] Atheroembolism of left lower extremity ([address] Atrial flutter ([address] Coronary atherosclerosis due to calcified coronary lesion(414.4), Discoloration of skin of toe, ED (erectile dysfunction), Epilepsy ([address] Hyperlipidemia, Hypertension, IBS (irritable bowel syndrome), Irregular heart beat, Ischemic toe, MI, old, Seizures ([address] Tinea, Tobacco dependence, and Tobacco use.     Past Surgical History:  has a past surgical history that includes Cardiac surgery; Coronary angioplasty (2009); Upper gastrointestinal endoscopy (8/2014); Colonoscopy (8/2014); Coronary angioplasty with stent; and Upper gastrointestinal endoscopy. Social History:  reports that he quit smoking 11 days ago. His smoking use included cigarettes. He has a 10.00 pack-year smoking history. His smokeless tobacco use includes chew. He reports that he has current or past drug history. Drug: Marijuana. He reports that he does not drink alcohol. Family History: family history includes Diabetes in his brother and brother; Hearing Loss in his maternal uncle and maternal uncle; Other in his father and mother. The patients home medications have been reviewed.     Allergies: Acyclovir and related and Doxycycline    -------------------------------------------------- RESULTS -------------------------------------------------  Labs:  Results for orders placed or performed during the hospital encounter of 04/05/19   CBC Auto Differential   Result Value Ref Range    WBC 9.6 4.5 - 11.5 E9/L    RBC 4.01 3.80 - 5.80 E12/L    Hemoglobin 15.0 12.5 - 16.5 g/dL    Hematocrit 43.4 37.0 - 54.0 %    .2 (H) 80.0 - 99.9 fL    MCH 37.4 (H) 26.0 - 35.0 pg    MCHC 34.6 (H) 32.0 - 34.5 %    RDW 12.9 11.5 - 15.0 fL    Platelets 788 (H) 082 - 450 E9/L    MPV 9.1 7.0 - 12.0 fL    Neutrophils % 65.2 43.0 - 80.0 %    Immature Granulocytes % 1.1 0.0 - 5.0 %    Lymphocytes % 23.3 20.0 - 42.0 %    Monocytes % 5.7 2.0 - 12.0 %    Eosinophils % 3.1 0.0 - 6.0 %    Basophils % 1.6 0.0 - 2.0 %    Neutrophils # 6.28 1.80 - 7.30 E9/L    Immature Granulocytes # 0.11 E9/L    Lymphocytes # 2.25 1.50 - 4.00 E9/L    Monocytes # 0.55 0.10 - 0.95 E9/L    Eosinophils # 0.30 0.05 - 0.50 E9/L    Basophils # 0.15 0.00 - 0.20 Q4/D   Basic Metabolic Panel   Result Value Ref Range    Sodium 139 132 - 146 mmol/L    Potassium 4.5 3.5 - 5.0 mmol/L    Chloride 104 98 - 107 mmol/L    CO2 26 22 - 29 mmol/L    Anion Gap 9 7 - 16 mmol/L    Glucose 90 74 - 99 mg/dL    BUN 17 6 are aware of the specific conditions for emergent return, as well as the importance of follow-up. Current Discharge Medication List          Diagnosis:  1. Chronic neck pain    2. Anxiety state    3. Abnormality of lung on CXR        Disposition:  Patient's disposition: Discharge to home  Patient's condition is stable.        Darlene East DO  Resident  04/05/19 2013

## 2019-04-05 NOTE — LETTER
[facility] Emergency Department  [address]  [facility] [address]  Phone: [phone]             April 5, 2019    Patient: Claudine Vallejo   YOB: 1970   Date of Visit: 4/5/2019       To Whom It May Concern:    Negin Eid was seen and treated in our emergency department on 4/5/2019. He may return to work on 4/5/2019.       Sincerely,             Signature:__________________________________

## 2019-04-08 LAB
EKG ATRIAL RATE: 51 BPM
EKG P AXIS: 84 DEGREES
EKG P-R INTERVAL: 160 MS
EKG Q-T INTERVAL: 442 MS
EKG QRS DURATION: 86 MS
EKG QTC CALCULATION (BAZETT): 407 MS
EKG R AXIS: 50 DEGREES
EKG T AXIS: 60 DEGREES
EKG VENTRICULAR RATE: 51 BPM

## 2019-04-19 RX ORDER — CLOPIDOGREL BISULFATE 75 MG/1
TABLET ORAL
Qty: 30 TABLET | Refills: 0 | Status: SHIPPED | OUTPATIENT
Start: 2019-04-19 | End: 2019-05-20 | Stop reason: SDUPTHER

## 2019-04-25 DIAGNOSIS — E44.1 MILD PROTEIN-CALORIE MALNUTRITION (HCC): ICD-10-CM

## 2019-04-25 RX ORDER — LACTOSE-REDUCED FOOD
LIQUID (ML) ORAL
Qty: 60 CAN | Refills: 1 | Status: SHIPPED
Start: 2019-04-25 | End: 2021-07-28 | Stop reason: ALTCHOICE

## 2019-04-30 ENCOUNTER — OFFICE VISIT (OUTPATIENT)
Dept: FAMILY MEDICINE CLINIC | Age: 49
End: 2019-04-30
Payer: COMMERCIAL

## 2019-04-30 VITALS
OXYGEN SATURATION: 97 % | HEART RATE: 64 BPM | TEMPERATURE: 97.7 F | WEIGHT: 153 LBS | RESPIRATION RATE: 12 BRPM | DIASTOLIC BLOOD PRESSURE: 65 MMHG | HEIGHT: 73 IN | SYSTOLIC BLOOD PRESSURE: 113 MMHG | BODY MASS INDEX: 20.28 KG/M2

## 2019-04-30 DIAGNOSIS — G47.00 INSOMNIA, UNSPECIFIED TYPE: ICD-10-CM

## 2019-04-30 DIAGNOSIS — R63.4 WEIGHT LOSS: Primary | ICD-10-CM

## 2019-04-30 PROCEDURE — 99213 OFFICE O/P EST LOW 20 MIN: CPT | Performed by: FAMILY MEDICINE

## 2019-04-30 PROCEDURE — G8420 CALC BMI NORM PARAMETERS: HCPCS | Performed by: FAMILY MEDICINE

## 2019-04-30 PROCEDURE — 4004F PT TOBACCO SCREEN RCVD TLK: CPT | Performed by: FAMILY MEDICINE

## 2019-04-30 PROCEDURE — G8598 ASA/ANTIPLAT THER USED: HCPCS | Performed by: FAMILY MEDICINE

## 2019-04-30 PROCEDURE — G8427 DOCREV CUR MEDS BY ELIG CLIN: HCPCS | Performed by: FAMILY MEDICINE

## 2019-04-30 RX ORDER — ALLOPURINOL 100 MG/1
TABLET ORAL
Refills: 2 | COMMUNITY
Start: 2019-04-19 | End: 2020-03-16

## 2019-04-30 RX ORDER — TRAZODONE HYDROCHLORIDE 150 MG/1
150 TABLET ORAL NIGHTLY
Qty: 30 TABLET | Refills: 3 | Status: SHIPPED | OUTPATIENT
Start: 2019-04-30 | End: 2019-08-25 | Stop reason: SDUPTHER

## 2019-05-06 NOTE — PROGRESS NOTES
SUBJECTIVE:        Patient ID: Martha Michael is a 53 y.o. male who presents for   Chief Complaint   Patient presents with    1 Month Follow-Up     would like to discuss sleeping medication patient  stated he hasnt slept in 48 hours     F/u of weight loss. changed his eating habits/schedule since last visit. He is still losing weight. Denies other symptoms. Has not completed the labs ordered at last visit. Complains of insomnia.       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    Atheroembolism of left lower extremity (HCC) 9/11/2014    Atrial flutter (HCC)     Coronary atherosclerosis due to calcified coronary lesion(414.4)     Discoloration of skin of toe 9/11/2014    ED (erectile dysfunction)     Epilepsy ([address]     Hyperlipidemia     Hypertension     IBS (irritable bowel syndrome)     Irregular heart beat     Ischemic toe 9/11/2014    MI, old     Seizures ([address]     Tinea     Tobacco dependence 9/16/2014    Tobacco use        Patient Active Problem List   Diagnosis    Tobacco dependence    Hypertension    Hyperlipidemia    Atrial flutter ([address]    Coronary atherosclerosis due to calcified coronary lesion    ED (erectile dysfunction)    Myocardial infarction, old    IBS (irritable bowel syndrome)    Seizure disorder (HCC)    Essential thrombocythemia ([address]    Myeloproliferative disease ([address]    Non compliance w medication regimen    Stented coronary artery    Precordial pain    Bradycardia       Past Surgical History:   Procedure Laterality Date    CARDIAC SURGERY      COLONOSCOPY  8/2014    CORONARY ANGIOPLASTY  2009    stent placement    CORONARY ANGIOPLASTY WITH STENT PLACEMENT      UPPER GASTROINTESTINAL ENDOSCOPY  8/2014    UPPER GASTROINTESTINAL ENDOSCOPY         Family History   Problem Relation Age of Onset    Diabetes Brother     Hearing Loss Maternal Uncle     Diabetes Brother     Hearing Loss Maternal Avelino Hutton Other Mother         COPD    Other Father         cirrhosis of liver       Social History     Socioeconomic History    Marital status:      Spouse name: None    Number of children: None    Years of education: None    Highest education level: None   Occupational History    None   Social Needs    Financial resource strain: None    Food insecurity:     Worry: None     Inability: None    Transportation needs:     Medical: None     Non-medical: None   Tobacco Use    Smoking status: Former Smoker     Packs/day: 0.50     Years: 20.00     Pack years: 10.00     Types: Cigarettes     Last attempt to quit: 3/25/2019     Years since quittin.1    Smokeless tobacco: Current User     Types: Chew   Substance and Sexual Activity    Alcohol use: No     Alcohol/week: 0.0 oz     Comment: quit 12 months ago    Drug use: Yes     Types: Marijuana     Comment: 5x past year    Sexual activity: Yes     Partners: Female   Lifestyle    Physical activity:     Days per week: None     Minutes per session: None    Stress: None   Relationships    Social connections:     Talks on phone: None     Gets together: None     Attends Synagogue service: None     Active member of club or organization: None     Attends meetings of clubs or organizations: None     Relationship status: None    Intimate partner violence:     Fear of current or ex partner: None     Emotionally abused: None     Physically abused: None     Forced sexual activity: None   Other Topics Concern    None   Social History Narrative    None       Allergies   Allergen Reactions    Acyclovir And Related Other (See Comments)    Doxycycline Itching       Current Outpatient Medications on File Prior to Visit   Medication Sig Dispense Refill    Nutritional Supplements (BOOST) LIQD Take 1 can twice daily with meals 60 Can 1    clopidogrel (PLAVIX) 75 MG tablet TAKE 1 TABLET BY MOUTH DAILY 30 tablet 0    levETIRAcetam (KEPPRA) 500 MG tablet TAKE 3 or rhonchi, symmetric air entry  Heart - RRR, S1, S2, negative murmurs  Abdomen - soft, nontender, nondistended, no masses or organomegaly  Neurological - alert, oriented, normal speech, no focal findings or movement disorder noted  Extremities - peripheral pulses normal, no pedal edema, no clubbing or cyanosis      ASSESSMENT / PLAN :     Karis Keys was seen today for 1 month follow-up. Diagnoses and all orders for this visit:    Weight loss  -     CT CHEST WO CONTRAST; Future  -     CT ABDOMEN PELVIS WO CONTRAST Additional Contrast? None; Future    Insomnia, unspecified type  -     traZODone (DESYREL) 150 MG tablet; Take 1 tablet by mouth nightly      Complete labs :  -     CBC with Differential; Future  -     Comprehensive Metabolic Panel; Future  -     Hemoglobin A1C; Future  -     Lipid Panel; Future  -     Psa screening; Future  -     TSH without Reflex; Future  -     Vitamin D 25 Hydroxy; Future  -     Sedimentation Rate; Future  -     C-Reactive Protein; Future  -     HIV Screen; Future  -     Hepatitis C Antibody; Future      Counseled regarding above diagnosis, including possible risks and complications,  especially if left uncontrolled. Counseled regarding the possible side effects, risks, benefits and alternatives to treatment; patient and/or guardian verbalizes understanding, agrees, feels comfortable with and wishes to proceed with above treatment plan. Advised patient to call with any new medication issues, and read all Rx info from pharmacy to assure aware of all possible risks and side effects of medication before taking. Reviewed age and gender appropriate health screening exams and vaccinations. Advised patient regarding importance of keeping up with recommended health maintenance and to schedule as soon as possible if overdue, as this is important in assessing for undiagnosed pathology, especially cancer, as well as protecting against potentially harmful/life threatening disease. Refills as needed    Discussed any signs and symptoms that would warrant immediate ED evaluation    Elizabeth Trivedi was instructed to call if any new symptoms develop prior to next visit.          F/U as instructed    Pat Riley M.D

## 2019-05-08 DIAGNOSIS — G40.909 SEIZURE DISORDER (HCC): ICD-10-CM

## 2019-05-08 RX ORDER — LEVETIRACETAM 500 MG/1
TABLET ORAL
Qty: 270 TABLET | Refills: 0 | Status: SHIPPED | OUTPATIENT
Start: 2019-05-08 | End: 2019-06-23 | Stop reason: SDUPTHER

## 2019-05-10 ENCOUNTER — APPOINTMENT (OUTPATIENT)
Dept: CT IMAGING | Age: 49
End: 2019-05-10
Payer: COMMERCIAL

## 2019-05-10 ENCOUNTER — HOSPITAL ENCOUNTER (EMERGENCY)
Age: 49
Discharge: HOME | End: 2019-05-10
Attending: EMERGENCY MEDICINE
Payer: COMMERCIAL

## 2019-05-10 VITALS
HEART RATE: 50 BPM | SYSTOLIC BLOOD PRESSURE: 140 MMHG | DIASTOLIC BLOOD PRESSURE: 71 MMHG | OXYGEN SATURATION: 100 % | HEIGHT: 73 IN | WEIGHT: 163 LBS | RESPIRATION RATE: 16 BRPM | BODY MASS INDEX: 21.6 KG/M2 | TEMPERATURE: 98.1 F

## 2019-05-10 DIAGNOSIS — R10.10 PAIN OF UPPER ABDOMEN: ICD-10-CM

## 2019-05-10 DIAGNOSIS — R11.2 NAUSEA AND VOMITING, INTRACTABILITY OF VOMITING NOT SPECIFIED, UNSPECIFIED VOMITING TYPE: Primary | ICD-10-CM

## 2019-05-10 LAB
ALBUMIN SERPL-MCNC: 4.5 G/DL (ref 3.5–5.2)
ALP BLD-CCNC: 57 U/L (ref 40–129)
ALT SERPL-CCNC: 15 U/L (ref 0–40)
ANION GAP SERPL CALCULATED.3IONS-SCNC: 11 MMOL/L (ref 7–16)
APTT: 30.7 SEC (ref 24.5–35.1)
AST SERPL-CCNC: 18 U/L (ref 0–39)
BASOPHILS ABSOLUTE: 0.1 E9/L (ref 0–0.2)
BASOPHILS RELATIVE PERCENT: 2 % (ref 0–2)
BILIRUB SERPL-MCNC: 0.8 MG/DL (ref 0–1.2)
BILIRUBIN URINE: NEGATIVE
BLOOD, URINE: NEGATIVE
BUN BLDV-MCNC: 17 MG/DL (ref 6–20)
CALCIUM SERPL-MCNC: 9.6 MG/DL (ref 8.6–10.2)
CHLORIDE BLD-SCNC: 103 MMOL/L (ref 98–107)
CLARITY: CLEAR
CO2: 25 MMOL/L (ref 22–29)
COLOR: YELLOW
CREAT SERPL-MCNC: 0.8 MG/DL (ref 0.7–1.2)
EOSINOPHILS ABSOLUTE: 0.27 E9/L (ref 0.05–0.5)
EOSINOPHILS RELATIVE PERCENT: 5.3 % (ref 0–6)
GFR AFRICAN AMERICAN: >60
GFR NON-AFRICAN AMERICAN: >60 ML/MIN/1.73
GLUCOSE BLD-MCNC: 97 MG/DL (ref 74–99)
GLUCOSE URINE: NEGATIVE MG/DL
HCT VFR BLD CALC: 45.3 % (ref 37–54)
HEMOGLOBIN: 15.3 G/DL (ref 12.5–16.5)
IMMATURE GRANULOCYTES #: 0.03 E9/L
IMMATURE GRANULOCYTES %: 0.6 % (ref 0–5)
INR BLD: 1.1
KETONES, URINE: NEGATIVE MG/DL
LEUKOCYTE ESTERASE, URINE: NEGATIVE
LIPASE: 22 U/L (ref 13–60)
LYMPHOCYTES ABSOLUTE: 1.5 E9/L (ref 1.5–4)
LYMPHOCYTES RELATIVE PERCENT: 29.7 % (ref 20–42)
MCH RBC QN AUTO: 36.8 PG (ref 26–35)
MCHC RBC AUTO-ENTMCNC: 33.8 % (ref 32–34.5)
MCV RBC AUTO: 108.9 FL (ref 80–99.9)
MONOCYTES ABSOLUTE: 0.39 E9/L (ref 0.1–0.95)
MONOCYTES RELATIVE PERCENT: 7.7 % (ref 2–12)
NEUTROPHILS ABSOLUTE: 2.76 E9/L (ref 1.8–7.3)
NEUTROPHILS RELATIVE PERCENT: 54.7 % (ref 43–80)
NITRITE, URINE: NEGATIVE
PDW BLD-RTO: 15.2 FL (ref 11.5–15)
PH UA: 8 (ref 5–9)
PLATELET # BLD: 412 E9/L (ref 130–450)
PMV BLD AUTO: 9.2 FL (ref 7–12)
POTASSIUM REFLEX MAGNESIUM: 4.3 MMOL/L (ref 3.5–5)
PROTEIN UA: NEGATIVE MG/DL
PROTHROMBIN TIME: 12.3 SEC (ref 9.3–12.4)
RBC # BLD: 4.16 E12/L (ref 3.8–5.8)
SODIUM BLD-SCNC: 139 MMOL/L (ref 132–146)
SPECIFIC GRAVITY UA: 1.01 (ref 1–1.03)
TOTAL PROTEIN: 6.9 G/DL (ref 6.4–8.3)
UROBILINOGEN, URINE: 0.2 E.U./DL
WBC # BLD: 5.1 E9/L (ref 4.5–11.5)

## 2019-05-10 PROCEDURE — 74177 CT ABD & PELVIS W/CONTRAST: CPT

## 2019-05-10 PROCEDURE — 85610 PROTHROMBIN TIME: CPT

## 2019-05-10 PROCEDURE — 2580000003 HC RX 258: Performed by: EMERGENCY MEDICINE

## 2019-05-10 PROCEDURE — 96374 THER/PROPH/DIAG INJ IV PUSH: CPT

## 2019-05-10 PROCEDURE — 6360000004 HC RX CONTRAST MEDICATION: Performed by: RADIOLOGY

## 2019-05-10 PROCEDURE — 99284 EMERGENCY DEPT VISIT MOD MDM: CPT

## 2019-05-10 PROCEDURE — 85025 COMPLETE CBC W/AUTO DIFF WBC: CPT

## 2019-05-10 PROCEDURE — 6360000002 HC RX W HCPCS: Performed by: EMERGENCY MEDICINE

## 2019-05-10 PROCEDURE — 80053 COMPREHEN METABOLIC PANEL: CPT

## 2019-05-10 PROCEDURE — 2580000003 HC RX 258

## 2019-05-10 PROCEDURE — 85730 THROMBOPLASTIN TIME PARTIAL: CPT

## 2019-05-10 PROCEDURE — 96375 TX/PRO/DX INJ NEW DRUG ADDON: CPT

## 2019-05-10 PROCEDURE — 81003 URINALYSIS AUTO W/O SCOPE: CPT

## 2019-05-10 PROCEDURE — 83690 ASSAY OF LIPASE: CPT

## 2019-05-10 RX ORDER — ONDANSETRON 4 MG/1
4 TABLET, ORALLY DISINTEGRATING ORAL 3 TIMES DAILY PRN
Qty: 21 TABLET | Refills: 0 | Status: SHIPPED | OUTPATIENT
Start: 2019-05-10 | End: 2020-03-16

## 2019-05-10 RX ORDER — FENTANYL CITRATE 50 UG/ML
50 INJECTION, SOLUTION INTRAMUSCULAR; INTRAVENOUS ONCE
Status: COMPLETED | OUTPATIENT
Start: 2019-05-10 | End: 2019-05-10

## 2019-05-10 RX ORDER — SUCRALFATE 1 G/1
1 TABLET ORAL 4 TIMES DAILY
Qty: 120 TABLET | Refills: 3 | Status: SHIPPED | OUTPATIENT
Start: 2019-05-10 | End: 2020-03-16

## 2019-05-10 RX ORDER — SODIUM CHLORIDE 0.9 % (FLUSH) 0.9 %
SYRINGE (ML) INJECTION
Status: COMPLETED
Start: 2019-05-10 | End: 2019-05-10

## 2019-05-10 RX ORDER — ONDANSETRON 2 MG/ML
4 INJECTION INTRAMUSCULAR; INTRAVENOUS ONCE
Status: COMPLETED | OUTPATIENT
Start: 2019-05-10 | End: 2019-05-10

## 2019-05-10 RX ORDER — METOCLOPRAMIDE HYDROCHLORIDE 5 MG/ML
10 INJECTION INTRAMUSCULAR; INTRAVENOUS ONCE
Status: COMPLETED | OUTPATIENT
Start: 2019-05-10 | End: 2019-05-10

## 2019-05-10 RX ORDER — 0.9 % SODIUM CHLORIDE 0.9 %
1000 INTRAVENOUS SOLUTION INTRAVENOUS ONCE
Status: COMPLETED | OUTPATIENT
Start: 2019-05-10 | End: 2019-05-10

## 2019-05-10 RX ADMIN — IOPAMIDOL 110 ML: 755 INJECTION, SOLUTION INTRAVENOUS at 13:04

## 2019-05-10 RX ADMIN — SODIUM CHLORIDE 1000 ML: 9 INJECTION, SOLUTION INTRAVENOUS at 11:46

## 2019-05-10 RX ADMIN — METOCLOPRAMIDE 10 MG: 5 INJECTION, SOLUTION INTRAMUSCULAR; INTRAVENOUS at 12:20

## 2019-05-10 RX ADMIN — Medication 10 ML: at 12:34

## 2019-05-10 RX ADMIN — FENTANYL CITRATE 50 MCG: 50 INJECTION, SOLUTION INTRAMUSCULAR; INTRAVENOUS at 11:46

## 2019-05-10 RX ADMIN — ONDANSETRON 4 MG: 2 INJECTION INTRAMUSCULAR; INTRAVENOUS at 11:46

## 2019-05-10 RX ADMIN — HYDROMORPHONE HYDROCHLORIDE 1 MG: 1 INJECTION, SOLUTION INTRAMUSCULAR; INTRAVENOUS; SUBCUTANEOUS at 12:32

## 2019-05-10 ASSESSMENT — PAIN SCALES - GENERAL
PAINLEVEL_OUTOF10: 7
PAINLEVEL_OUTOF10: 10
PAINLEVEL_OUTOF10: 7
PAINLEVEL_OUTOF10: 7

## 2019-05-10 ASSESSMENT — PAIN DESCRIPTION - PAIN TYPE
TYPE: ACUTE PAIN
TYPE: ACUTE PAIN

## 2019-05-10 ASSESSMENT — PAIN DESCRIPTION - FREQUENCY: FREQUENCY: CONTINUOUS

## 2019-05-10 ASSESSMENT — PAIN DESCRIPTION - ORIENTATION: ORIENTATION: RIGHT;LOWER

## 2019-05-10 ASSESSMENT — PAIN DESCRIPTION - LOCATION
LOCATION: ABDOMEN
LOCATION: ABDOMEN

## 2019-05-10 ASSESSMENT — PAIN DESCRIPTION - DESCRIPTORS: DESCRIPTORS: STABBING;SHARP;SHOOTING

## 2019-05-10 NOTE — ED PROVIDER NOTES
history. His smokeless tobacco use includes chew. He reports that he has current or past drug history. Drug: Marijuana. He reports that he does not drink alcohol. Family History: family history includes Diabetes in his brother and brother; Hearing Loss in his maternal uncle and maternal uncle; Other in his father and mother. The patients home medications have been reviewed. Allergies: Acyclovir and related and Doxycycline      ---------------------------------------------------PHYSICAL EXAM--------------------------------------    Constitutional/General: Alert and oriented x3, in mild distress  Head: Normocephalic and atraumatic  Eyes: PERRL, EOMI, conjunctiva normal, sclera non icteric  Mouth: Oropharynx clear, handling secretions, no asymmetry of the posterior oropharynx or uvular edema  Neck: Supple, full ROM, no meningeal signs  Respiratory: Lungs clear to auscultation bilaterally, no wheezes, rales, or rhonchi. Not in respiratory distress  Cardiovascular:  Regular rate. Regular rhythm. No murmurs, gallops, or rubs. 2+ distal pulses  GI:  Abdomen Soft, Diffuse tenderness, Non distended. +BS. No rebound, guarding, or rigidity. No pulsatile masses. Musculoskeletal: Moves all extremities x 4. Warm and well perfused  Integument: skin warm and dry. No rashes. Neurologic: GCS 15, no focal deficits  Psychiatric: Normal Affect      -------------------------------------------------- RESULTS -------------------------------------------------  I have personally reviewed all laboratory and imaging results for this patient. Results are listed below.      LABS:  Results for orders placed or performed during the hospital encounter of 05/10/19   CBC Auto Differential   Result Value Ref Range    WBC 5.1 4.5 - 11.5 E9/L    RBC 4.16 3.80 - 5.80 E12/L    Hemoglobin 15.3 12.5 - 16.5 g/dL    Hematocrit 45.3 37.0 - 54.0 %    .9 (H) 80.0 - 99.9 fL    MCH 36.8 (H) 26.0 - 35.0 pg    MCHC 33.8 32.0 - 34.5 %    RDW 15. 2 (H) 11.5 - 15.0 fL    Platelets 907 032 - 200 E9/L    MPV 9.2 7.0 - 12.0 fL    Neutrophils % 54.7 43.0 - 80.0 %    Immature Granulocytes % 0.6 0.0 - 5.0 %    Lymphocytes % 29.7 20.0 - 42.0 %    Monocytes % 7.7 2.0 - 12.0 %    Eosinophils % 5.3 0.0 - 6.0 %    Basophils % 2.0 0.0 - 2.0 %    Neutrophils # 2.76 1.80 - 7.30 E9/L    Immature Granulocytes # 0.03 E9/L    Lymphocytes # 1.50 1.50 - 4.00 E9/L    Monocytes # 0.39 0.10 - 0.95 E9/L    Eosinophils # 0.27 0.05 - 0.50 E9/L    Basophils # 0.10 0.00 - 0.20 E9/L   Comprehensive Metabolic Panel w/ Reflex to MG   Result Value Ref Range    Sodium 139 132 - 146 mmol/L    Potassium reflex Magnesium 4.3 3.5 - 5.0 mmol/L    Chloride 103 98 - 107 mmol/L    CO2 25 22 - 29 mmol/L    Anion Gap 11 7 - 16 mmol/L    Glucose 97 74 - 99 mg/dL    BUN 17 6 - 20 mg/dL    CREATININE 0.8 0.7 - 1.2 mg/dL    GFR Non-African American >60 >=60 mL/min/1.73    GFR African American >60     Calcium 9.6 8.6 - 10.2 mg/dL    Total Protein 6.9 6.4 - 8.3 g/dL    Alb 4.5 3.5 - 5.2 g/dL    Total Bilirubin 0.8 0.0 - 1.2 mg/dL    Alkaline Phosphatase 57 40 - 129 U/L    ALT 15 0 - 40 U/L    AST 18 0 - 39 U/L   Protime-INR   Result Value Ref Range    Protime 12.3 9.3 - 12.4 sec    INR 1.1    APTT   Result Value Ref Range    aPTT 30.7 24.5 - 35.1 sec   Lipase   Result Value Ref Range    Lipase 22 13 - 60 U/L   Urinalysis, reflex to microscopic   Result Value Ref Range    Color, UA Yellow Straw/Yellow    Clarity, UA Clear Clear    Glucose, Ur Negative Negative mg/dL    Bilirubin Urine Negative Negative    Ketones, Urine Negative Negative mg/dL    Specific Gravity, UA 1.015 1.005 - 1.030    Blood, Urine Negative Negative    pH, UA 8.0 5.0 - 9.0    Protein, UA Negative Negative mg/dL    Urobilinogen, Urine 0.2 <2.0 E.U./dL    Nitrite, Urine Negative Negative    Leukocyte Esterase, Urine Negative Negative       RADIOLOGY:  Interpreted by Radiologist.  CT ABDOMEN PELVIS W IV CONTRAST Additional Contrast? None Final Result   Enhancement of the gastric wall suspicious for gastritis. This can be   confirmed with endoscopy.                           ------------------------- NURSING NOTES AND VITALS REVIEWED ---------------------------   The nursing notes within the ED encounter and vital signs as below have been reviewed by myself. BP (!) 140/71   Pulse 50   Temp 98.1 °F (36.7 °C) (Oral)   Resp 16   Ht 6' 1\" (1.854 m)   Wt 163 lb (73.9 kg)   SpO2 100%   BMI 21.51 kg/m²   Oxygen Saturation Interpretation: Normal    The patients available past medical records and past encounters were reviewed. ------------------------------ ED COURSE/MEDICAL DECISION MAKING----------------------  Medications   0.9 % sodium chloride bolus (0 mLs Intravenous Stopped 5/10/19 1222)   ondansetron (ZOFRAN) injection 4 mg (4 mg Intravenous Given 5/10/19 1146)   fentaNYL (SUBLIMAZE) injection 50 mcg (50 mcg Intravenous Given 5/10/19 1146)   metoclopramide (REGLAN) injection 10 mg (10 mg Intravenous Given 5/10/19 1220)   HYDROmorphone (DILAUDID) injection 1 mg (1 mg Intravenous Given 5/10/19 1232)   sodium chloride flush 0.9 % injection (10 mLs  Given 5/10/19 1234)   iopamidol (ISOVUE-370) 76 % injection 110 mL (110 mLs Intravenous Given 5/10/19 1304)            Medical Decision Making:    Pt presents for abdominal pain and vomiting. Pt evaluated. IV fluids and medication given while in the ED. Imaging and labs obtained and reviewed. Results discussed with pt and wife     This patient's ED course included: a personal history and physicial examination and re-evaluation prior to disposition    This patient has remained hemodynamically stable during their ED course. abd benign, w/u noted, gastritis, add carafate, sx improved, dc with close pcp fu    Re-Evaluations:           Re-evaluation. Patients symptoms are improving      Counseling:    The emergency provider has spoken with the patient and wife and discussed todays results, in

## 2019-05-12 ENCOUNTER — HOSPITAL ENCOUNTER (EMERGENCY)
Age: 49
Discharge: HOME | End: 2019-05-12
Attending: EMERGENCY MEDICINE
Payer: COMMERCIAL

## 2019-05-12 VITALS
BODY MASS INDEX: 21.6 KG/M2 | DIASTOLIC BLOOD PRESSURE: 63 MMHG | HEIGHT: 73 IN | WEIGHT: 163 LBS | HEART RATE: 60 BPM | OXYGEN SATURATION: 98 % | TEMPERATURE: 97.9 F | RESPIRATION RATE: 14 BRPM | SYSTOLIC BLOOD PRESSURE: 98 MMHG

## 2019-05-12 DIAGNOSIS — R10.13 ABDOMINAL PAIN, EPIGASTRIC: Primary | ICD-10-CM

## 2019-05-12 DIAGNOSIS — R11.2 NON-INTRACTABLE VOMITING WITH NAUSEA, UNSPECIFIED VOMITING TYPE: ICD-10-CM

## 2019-05-12 LAB
ALBUMIN SERPL-MCNC: 4.1 G/DL (ref 3.5–5.2)
ALP BLD-CCNC: 53 U/L (ref 40–129)
ALT SERPL-CCNC: 15 U/L (ref 0–40)
ANION GAP SERPL CALCULATED.3IONS-SCNC: 12 MMOL/L (ref 7–16)
AST SERPL-CCNC: 22 U/L (ref 0–39)
BACTERIA: NORMAL /HPF
BASOPHILS ABSOLUTE: 0.15 E9/L (ref 0–0.2)
BASOPHILS RELATIVE PERCENT: 2.7 % (ref 0–2)
BILIRUB SERPL-MCNC: 0.6 MG/DL (ref 0–1.2)
BILIRUBIN URINE: NEGATIVE
BLOOD, URINE: NEGATIVE
BUN BLDV-MCNC: 13 MG/DL (ref 6–20)
CALCIUM SERPL-MCNC: 9.1 MG/DL (ref 8.6–10.2)
CHLORIDE BLD-SCNC: 103 MMOL/L (ref 98–107)
CLARITY: CLEAR
CO2: 23 MMOL/L (ref 22–29)
COLOR: YELLOW
CREAT SERPL-MCNC: 0.6 MG/DL (ref 0.7–1.2)
EOSINOPHILS ABSOLUTE: 0.25 E9/L (ref 0.05–0.5)
EOSINOPHILS RELATIVE PERCENT: 4.5 % (ref 0–6)
GFR AFRICAN AMERICAN: >60
GFR NON-AFRICAN AMERICAN: >60 ML/MIN/1.73
GLUCOSE BLD-MCNC: 107 MG/DL (ref 74–99)
GLUCOSE URINE: NEGATIVE MG/DL
HCT VFR BLD CALC: 44.2 % (ref 37–54)
HEMOGLOBIN: 15.3 G/DL (ref 12.5–16.5)
IMMATURE GRANULOCYTES #: 0.02 E9/L
IMMATURE GRANULOCYTES %: 0.4 % (ref 0–5)
KETONES, URINE: NEGATIVE MG/DL
LACTIC ACID: 1.7 MMOL/L (ref 0.5–2.2)
LEUKOCYTE ESTERASE, URINE: NEGATIVE
LIPASE: 19 U/L (ref 13–60)
LYMPHOCYTES ABSOLUTE: 1.37 E9/L (ref 1.5–4)
LYMPHOCYTES RELATIVE PERCENT: 24.4 % (ref 20–42)
MCH RBC QN AUTO: 37.5 PG (ref 26–35)
MCHC RBC AUTO-ENTMCNC: 34.6 % (ref 32–34.5)
MCV RBC AUTO: 108.3 FL (ref 80–99.9)
MONOCYTES ABSOLUTE: 0.41 E9/L (ref 0.1–0.95)
MONOCYTES RELATIVE PERCENT: 7.3 % (ref 2–12)
NEUTROPHILS ABSOLUTE: 3.41 E9/L (ref 1.8–7.3)
NEUTROPHILS RELATIVE PERCENT: 60.7 % (ref 43–80)
NITRITE, URINE: NEGATIVE
PDW BLD-RTO: 15.2 FL (ref 11.5–15)
PH UA: 8.5 (ref 5–9)
PLATELET # BLD: 433 E9/L (ref 130–450)
PMV BLD AUTO: 9.1 FL (ref 7–12)
POTASSIUM SERPL-SCNC: 4.4 MMOL/L (ref 3.5–5)
PROTEIN UA: NEGATIVE MG/DL
RBC # BLD: 4.08 E12/L (ref 3.8–5.8)
RBC UA: NORMAL /HPF (ref 0–2)
SODIUM BLD-SCNC: 138 MMOL/L (ref 132–146)
SPECIFIC GRAVITY UA: 1.01 (ref 1–1.03)
TOTAL PROTEIN: 6.5 G/DL (ref 6.4–8.3)
UROBILINOGEN, URINE: 1 E.U./DL
WBC # BLD: 5.6 E9/L (ref 4.5–11.5)
WBC UA: NORMAL /HPF (ref 0–5)

## 2019-05-12 PROCEDURE — 85025 COMPLETE CBC W/AUTO DIFF WBC: CPT

## 2019-05-12 PROCEDURE — 83690 ASSAY OF LIPASE: CPT

## 2019-05-12 PROCEDURE — 81001 URINALYSIS AUTO W/SCOPE: CPT

## 2019-05-12 PROCEDURE — 99284 EMERGENCY DEPT VISIT MOD MDM: CPT

## 2019-05-12 PROCEDURE — 6360000002 HC RX W HCPCS: Performed by: NURSE PRACTITIONER

## 2019-05-12 PROCEDURE — 2580000003 HC RX 258: Performed by: NURSE PRACTITIONER

## 2019-05-12 PROCEDURE — 83605 ASSAY OF LACTIC ACID: CPT

## 2019-05-12 PROCEDURE — 36415 COLL VENOUS BLD VENIPUNCTURE: CPT

## 2019-05-12 PROCEDURE — 2500000003 HC RX 250 WO HCPCS: Performed by: NURSE PRACTITIONER

## 2019-05-12 PROCEDURE — 96375 TX/PRO/DX INJ NEW DRUG ADDON: CPT

## 2019-05-12 PROCEDURE — 96374 THER/PROPH/DIAG INJ IV PUSH: CPT

## 2019-05-12 PROCEDURE — 80053 COMPREHEN METABOLIC PANEL: CPT

## 2019-05-12 RX ORDER — SODIUM CHLORIDE 0.9 % (FLUSH) 0.9 %
10 SYRINGE (ML) INJECTION PRN
Status: DISCONTINUED | OUTPATIENT
Start: 2019-05-12 | End: 2019-05-12 | Stop reason: HOSPADM

## 2019-05-12 RX ORDER — 0.9 % SODIUM CHLORIDE 0.9 %
1000 INTRAVENOUS SOLUTION INTRAVENOUS ONCE
Status: COMPLETED | OUTPATIENT
Start: 2019-05-12 | End: 2019-05-12

## 2019-05-12 RX ORDER — METOCLOPRAMIDE HYDROCHLORIDE 5 MG/ML
10 INJECTION INTRAMUSCULAR; INTRAVENOUS ONCE
Status: COMPLETED | OUTPATIENT
Start: 2019-05-12 | End: 2019-05-12

## 2019-05-12 RX ORDER — FAMOTIDINE 20 MG/1
20 TABLET, FILM COATED ORAL 2 TIMES DAILY
Qty: 14 TABLET | Refills: 0 | Status: SHIPPED | OUTPATIENT
Start: 2019-05-12 | End: 2019-08-09

## 2019-05-12 RX ADMIN — SODIUM CHLORIDE 1000 ML: 9 INJECTION, SOLUTION INTRAVENOUS at 13:05

## 2019-05-12 RX ADMIN — FAMOTIDINE 20 MG: 10 INJECTION, SOLUTION INTRAVENOUS at 13:05

## 2019-05-12 RX ADMIN — METOCLOPRAMIDE 10 MG: 5 INJECTION, SOLUTION INTRAMUSCULAR; INTRAVENOUS at 13:05

## 2019-05-12 ASSESSMENT — PAIN DESCRIPTION - PAIN TYPE: TYPE: ACUTE PAIN

## 2019-05-12 ASSESSMENT — PAIN SCALES - GENERAL: PAINLEVEL_OUTOF10: 8

## 2019-05-12 ASSESSMENT — PAIN DESCRIPTION - DESCRIPTORS: DESCRIPTORS: SQUEEZING

## 2019-05-12 ASSESSMENT — PAIN DESCRIPTION - PROGRESSION: CLINICAL_PROGRESSION: GRADUALLY WORSENING

## 2019-05-12 ASSESSMENT — PAIN DESCRIPTION - ONSET: ONSET: GRADUAL

## 2019-05-12 ASSESSMENT — PAIN DESCRIPTION - LOCATION: LOCATION: ABDOMEN

## 2019-05-12 ASSESSMENT — PAIN DESCRIPTION - ORIENTATION: ORIENTATION: LOWER;MID

## 2019-05-12 ASSESSMENT — PAIN DESCRIPTION - FREQUENCY: FREQUENCY: CONTINUOUS

## 2019-05-12 NOTE — ED PROVIDER NOTES
ED Attending  CC: Lois     Department of Emergency Medicine   ED  Provider Note  Admit Date/RoomTime: 5/12/2019 10:52 AM  ED Room: 21/21  Chief Complaint     Abdominal Pain (lower abdomen, was seen here on friday for same, started on carafate states its not helping ) and Emesis    History of Present Illness   Source of history provided by:  patient. History/Exam Limitations: none. Gricelda Oakley is a 53 y.o. old male who has a past medical history of:   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    Atheroembolism of left lower extremity (HCC) 9/11/2014    Atrial flutter ([address]     Coronary atherosclerosis due to calcified coronary lesion(414.4)     Discoloration of skin of toe 9/11/2014    ED (erectile dysfunction)     Epilepsy ([address]     Hyperlipidemia     Hypertension     IBS (irritable bowel syndrome)     Irregular heart beat     Ischemic toe 9/11/2014    MI, old     Seizures ([address]     Tinea     Tobacco dependence 9/16/2014    Tobacco use     presents to the emergency department by private vehicle, for complaints of still present colicky, cramping pain in the epigastrium without radiation which began 3 day(s) prior to arrival.  The pt was seen here 2 days ago and after labs and CT of the abd, he was diagnosed with gastritis. Since going home, pt reports he has been eating well including things such as french fries and burgers and sub sandwiches. He states the pain came back this morning. Since onset the symptoms have been intermittent. The pain is associated with nausea. The pain is aggravated by fatty foods and spicy foods and relieved by nothing. There has been NO back pain, chills, cloudy urine, constipation, diarrhea, dysuria, headache, hematuria, sweating, urinary frequency, urinary urgency or vomiting. [facility] ROS   Pertinent positives and negatives are stated within HPI, all other systems reviewed and are negative.     Past laboratory and radiology results have been personally reviewed by myself)  Labs:  Results for orders placed or performed during the hospital encounter of 05/12/19   CBC auto differential   Result Value Ref Range    WBC 5.6 4.5 - 11.5 E9/L    RBC 4.08 3.80 - 5.80 E12/L    Hemoglobin 15.3 12.5 - 16.5 g/dL    Hematocrit 44.2 37.0 - 54.0 %    .3 (H) 80.0 - 99.9 fL    MCH 37.5 (H) 26.0 - 35.0 pg    MCHC 34.6 (H) 32.0 - 34.5 %    RDW 15.2 (H) 11.5 - 15.0 fL    Platelets 534 776 - 084 E9/L    MPV 9.1 7.0 - 12.0 fL    Neutrophils % 60.7 43.0 - 80.0 %    Immature Granulocytes % 0.4 0.0 - 5.0 %    Lymphocytes % 24.4 20.0 - 42.0 %    Monocytes % 7.3 2.0 - 12.0 %    Eosinophils % 4.5 0.0 - 6.0 %    Basophils % 2.7 (H) 0.0 - 2.0 %    Neutrophils # 3.41 1.80 - 7.30 E9/L    Immature Granulocytes # 0.02 E9/L    Lymphocytes # 1.37 (L) 1.50 - 4.00 E9/L    Monocytes # 0.41 0.10 - 0.95 E9/L    Eosinophils # 0.25 0.05 - 0.50 E9/L    Basophils # 0.15 0.00 - 0.20 E9/L   Comprehensive metabolic panel   Result Value Ref Range    Sodium 138 132 - 146 mmol/L    Potassium 4.4 3.5 - 5.0 mmol/L    Chloride 103 98 - 107 mmol/L    CO2 23 22 - 29 mmol/L    Anion Gap 12 7 - 16 mmol/L    Glucose 107 (H) 74 - 99 mg/dL    BUN 13 6 - 20 mg/dL    CREATININE 0.6 (L) 0.7 - 1.2 mg/dL    GFR Non-African American >60 >=60 mL/min/1.73    GFR African American >60     Calcium 9.1 8.6 - 10.2 mg/dL    Total Protein 6.5 6.4 - 8.3 g/dL    Alb 4.1 3.5 - 5.2 g/dL    Total Bilirubin 0.6 0.0 - 1.2 mg/dL    Alkaline Phosphatase 53 40 - 129 U/L    ALT 15 0 - 40 U/L    AST 22 0 - 39 U/L   Lipase   Result Value Ref Range    Lipase 19 13 - 60 U/L   Lactic acid, plasma   Result Value Ref Range    Lactic Acid 1.7 0.5 - 2.2 mmol/L   Urinalysis with Microscopic   Result Value Ref Range    Color, UA Yellow Straw/Yellow    Clarity, UA Clear Clear    Glucose, Ur Negative Negative mg/dL    Bilirubin Urine Negative Negative    Ketones, Urine Negative Negative mg/dL    Specific Gravity, UA 1.010 1.005 - 1.030    Blood, Urine Negative Negative    pH, UA 8.5 5.0 - 9.0    Protein, UA Negative Negative mg/dL    Urobilinogen, Urine 1.0 <2.0 E.U./dL    Nitrite, Urine Negative Negative    Leukocyte Esterase, Urine Negative Negative     Imaging: All Radiology results interpreted by Radiologist unless otherwise noted. No orders to display       ED Course / Medical Decision Making     Medications   sodium chloride flush 0.9 % injection 10 mL (has no administration in time range)   0.9 % sodium chloride bolus (1,000 mLs Intravenous New Bag 5/12/19 1305)   famotidine (PEPCID) injection 20 mg (20 mg Intravenous Given 5/12/19 1305)   metoclopramide (REGLAN) injection 10 mg (10 mg Intravenous Given 5/12/19 1305)        Re-Evaluations:  5/12/19      Time: 1320  Patients symptoms are improving. He advised his nurse that he is feeling much better and wants to be discharged. Dr. Moriah Barrientos in to see pt. Consultations:             None    Procedures:   none    MDM:  Pt seen 2 days ago for the same symptoms as today (Epigastric pain and vomiting). Had full workup including normal CT at that time. He was dx with gastritis. Labs repeated today, and he was given IVF and Pepcid and Reglan with complete relief of symptoms. Labs WNL. He will be discharged in stable condition with stable vitals, and will follow up with his PCP. He was advised to continue the Carafate and Zofran as ordered 2 days ago. He was given an Rx for pepcid to try. Counseling:   I have spoken with the spouse and patient and discussed todays results, in addition to providing specific details for the plan of care and counseling regarding the diagnosis and prognosis and are agreeable with the plan. Assessment      1. Abdominal pain, epigastric    2.  Non-intractable vomiting with nausea, unspecified vomiting type      This patient's ED course included: a personal history and physicial examination, re-evaluation prior to disposition and IV medications  This patient has remained hemodynamically stable during their ED course. Plan   Discharge to home. Patient condition is good. New Medications     New Prescriptions    FAMOTIDINE (PEPCID) 20 MG TABLET    Take 1 tablet by mouth 2 times daily for 7 days     Electronically signed by TOMMY Busby CNP   DD: 5/12/19  **This report was transcribed using voice recognition software. Every effort was made to ensure accuracy; however, inadvertent computerized transcription errors may be present.   END OF PROVIDER NOTE     TOMMY Busby CNP  05/12/19 [id number]

## 2019-05-12 NOTE — ED NOTES
Patient's wife advised me that patient states he feels better and has to work at 2pm and wants to leave. Notified Sina Beltran and Migue OROZCO.      Giovana Tsai, CELENA  53/72/50 [id number]

## 2019-05-20 DIAGNOSIS — I25.10 CORONARY ATHEROSCLEROSIS DUE TO CALCIFIED CORONARY LESION: Primary | ICD-10-CM

## 2019-05-20 DIAGNOSIS — I25.84 CORONARY ATHEROSCLEROSIS DUE TO CALCIFIED CORONARY LESION: Primary | ICD-10-CM

## 2019-05-20 RX ORDER — CLOPIDOGREL BISULFATE 75 MG/1
TABLET ORAL
Qty: 30 TABLET | Refills: 0 | Status: SHIPPED | OUTPATIENT
Start: 2019-05-20 | End: 2019-07-11 | Stop reason: SDUPTHER

## 2019-06-03 ENCOUNTER — HOSPITAL ENCOUNTER (OUTPATIENT)
Age: 49
Discharge: HOME | End: 2019-06-03
Payer: COMMERCIAL

## 2019-06-03 LAB
ALBUMIN SERPL-MCNC: 4.6 G/DL (ref 3.5–5.2)
ALP BLD-CCNC: 56 U/L (ref 40–129)
ALT SERPL-CCNC: 16 U/L (ref 0–40)
AMYLASE: 40 U/L (ref 20–100)
ANION GAP SERPL CALCULATED.3IONS-SCNC: 10 MMOL/L (ref 7–16)
AST SERPL-CCNC: 20 U/L (ref 0–39)
BASOPHILS ABSOLUTE: 0.15 E9/L (ref 0–0.2)
BASOPHILS RELATIVE PERCENT: 1.8 % (ref 0–2)
BILIRUB SERPL-MCNC: 0.7 MG/DL (ref 0–1.2)
BUN BLDV-MCNC: 20 MG/DL (ref 6–20)
CALCIUM SERPL-MCNC: 9.3 MG/DL (ref 8.6–10.2)
CHLORIDE BLD-SCNC: 103 MMOL/L (ref 98–107)
CO2: 28 MMOL/L (ref 22–29)
CREAT SERPL-MCNC: 0.9 MG/DL (ref 0.7–1.2)
EOSINOPHILS ABSOLUTE: 0.46 E9/L (ref 0.05–0.5)
EOSINOPHILS RELATIVE PERCENT: 5.6 % (ref 0–6)
GFR AFRICAN AMERICAN: >60
GFR NON-AFRICAN AMERICAN: >60 ML/MIN/1.73
GLUCOSE BLD-MCNC: 96 MG/DL (ref 74–99)
HCT VFR BLD CALC: 45.8 % (ref 37–54)
HEMOGLOBIN: 15.2 G/DL (ref 12.5–16.5)
IMMATURE GRANULOCYTES #: 0.09 E9/L
IMMATURE GRANULOCYTES %: 1.1 % (ref 0–5)
LIPASE: 25 U/L (ref 13–60)
LYMPHOCYTES ABSOLUTE: 2.1 E9/L (ref 1.5–4)
LYMPHOCYTES RELATIVE PERCENT: 25.4 % (ref 20–42)
MCH RBC QN AUTO: 36.3 PG (ref 26–35)
MCHC RBC AUTO-ENTMCNC: 33.2 % (ref 32–34.5)
MCV RBC AUTO: 109.3 FL (ref 80–99.9)
MONOCYTES ABSOLUTE: 0.5 E9/L (ref 0.1–0.95)
MONOCYTES RELATIVE PERCENT: 6.1 % (ref 2–12)
NEUTROPHILS ABSOLUTE: 4.96 E9/L (ref 1.8–7.3)
NEUTROPHILS RELATIVE PERCENT: 60 % (ref 43–80)
PDW BLD-RTO: 14.1 FL (ref 11.5–15)
PLATELET # BLD: 690 E9/L (ref 130–450)
PMV BLD AUTO: 9.4 FL (ref 7–12)
POTASSIUM SERPL-SCNC: 4.1 MMOL/L (ref 3.5–5)
RBC # BLD: 4.19 E12/L (ref 3.8–5.8)
SODIUM BLD-SCNC: 141 MMOL/L (ref 132–146)
TOTAL PROTEIN: 6.9 G/DL (ref 6.4–8.3)
WBC # BLD: 8.3 E9/L (ref 4.5–11.5)

## 2019-06-03 PROCEDURE — 85025 COMPLETE CBC W/AUTO DIFF WBC: CPT

## 2019-06-03 PROCEDURE — 86803 HEPATITIS C AB TEST: CPT

## 2019-06-03 PROCEDURE — 83690 ASSAY OF LIPASE: CPT

## 2019-06-03 PROCEDURE — 80053 COMPREHEN METABOLIC PANEL: CPT

## 2019-06-03 PROCEDURE — 86704 HEP B CORE ANTIBODY TOTAL: CPT

## 2019-06-03 PROCEDURE — 87340 HEPATITIS B SURFACE AG IA: CPT

## 2019-06-03 PROCEDURE — 36415 COLL VENOUS BLD VENIPUNCTURE: CPT

## 2019-06-03 PROCEDURE — 82784 ASSAY IGA/IGD/IGG/IGM EACH: CPT

## 2019-06-03 PROCEDURE — 86706 HEP B SURFACE ANTIBODY: CPT

## 2019-06-03 PROCEDURE — 83516 IMMUNOASSAY NONANTIBODY: CPT

## 2019-06-03 PROCEDURE — 82150 ASSAY OF AMYLASE: CPT

## 2019-06-04 ENCOUNTER — HOSPITAL ENCOUNTER (EMERGENCY)
Age: 49
Discharge: HOME | End: 2019-06-04
Attending: EMERGENCY MEDICINE
Payer: COMMERCIAL

## 2019-06-04 VITALS
RESPIRATION RATE: 18 BRPM | WEIGHT: 148 LBS | OXYGEN SATURATION: 95 % | HEART RATE: 58 BPM | HEIGHT: 73 IN | TEMPERATURE: 97.3 F | BODY MASS INDEX: 19.61 KG/M2 | DIASTOLIC BLOOD PRESSURE: 63 MMHG | SYSTOLIC BLOOD PRESSURE: 109 MMHG

## 2019-06-04 DIAGNOSIS — S39.012A STRAIN OF LUMBAR REGION, INITIAL ENCOUNTER: Primary | ICD-10-CM

## 2019-06-04 DIAGNOSIS — Z72.0 TOBACCO USE: ICD-10-CM

## 2019-06-04 LAB
HBV SURFACE AB TITR SER: NORMAL {TITER}
HEPATITIS B SURFACE ANTIGEN INTERPRETATION: NORMAL
HEPATITIS C ANTIBODY INTERPRETATION: NORMAL
IGA: 288 MG/DL (ref 70–400)

## 2019-06-04 PROCEDURE — 96372 THER/PROPH/DIAG INJ SC/IM: CPT

## 2019-06-04 PROCEDURE — 99283 EMERGENCY DEPT VISIT LOW MDM: CPT

## 2019-06-04 PROCEDURE — 6360000002 HC RX W HCPCS: Performed by: EMERGENCY MEDICINE

## 2019-06-04 RX ORDER — DEXAMETHASONE SODIUM PHOSPHATE 10 MG/ML
10 INJECTION INTRAMUSCULAR; INTRAVENOUS ONCE
Status: COMPLETED | OUTPATIENT
Start: 2019-06-04 | End: 2019-06-04

## 2019-06-04 RX ORDER — KETOROLAC TROMETHAMINE 30 MG/ML
30 INJECTION, SOLUTION INTRAMUSCULAR; INTRAVENOUS ONCE
Status: COMPLETED | OUTPATIENT
Start: 2019-06-04 | End: 2019-06-04

## 2019-06-04 RX ORDER — ORPHENADRINE CITRATE 30 MG/ML
60 INJECTION INTRAMUSCULAR; INTRAVENOUS ONCE
Status: COMPLETED | OUTPATIENT
Start: 2019-06-04 | End: 2019-06-04

## 2019-06-04 RX ORDER — DICYCLOMINE HCL 20 MG
20 TABLET ORAL 2 TIMES DAILY
COMMUNITY
End: 2020-07-03

## 2019-06-04 RX ADMIN — KETOROLAC TROMETHAMINE 30 MG: 30 INJECTION, SOLUTION INTRAMUSCULAR; INTRAVENOUS at 02:23

## 2019-06-04 RX ADMIN — ORPHENADRINE CITRATE 60 MG: 30 INJECTION INTRAMUSCULAR; INTRAVENOUS at 02:23

## 2019-06-04 RX ADMIN — DEXAMETHASONE SODIUM PHOSPHATE 10 MG: 10 INJECTION INTRAMUSCULAR; INTRAVENOUS at 02:23

## 2019-06-04 ASSESSMENT — ENCOUNTER SYMPTOMS
COLOR CHANGE: 0
DIARRHEA: 0
NAUSEA: 0
VOMITING: 0
SHORTNESS OF BREATH: 0
CHEST TIGHTNESS: 0
SINUS PAIN: 0
BACK PAIN: 1
SINUS PRESSURE: 0
PHOTOPHOBIA: 0
ABDOMINAL PAIN: 0
EYE REDNESS: 0

## 2019-06-04 ASSESSMENT — PAIN SCALES - GENERAL
PAINLEVEL_OUTOF10: 7
PAINLEVEL_OUTOF10: 7

## 2019-06-04 ASSESSMENT — PAIN DESCRIPTION - LOCATION: LOCATION: BACK

## 2019-06-04 ASSESSMENT — PAIN DESCRIPTION - FREQUENCY: FREQUENCY: INTERMITTENT

## 2019-06-04 ASSESSMENT — PAIN DESCRIPTION - PAIN TYPE: TYPE: ACUTE PAIN

## 2019-06-04 ASSESSMENT — PAIN DESCRIPTION - ORIENTATION: ORIENTATION: LOWER

## 2019-06-04 ASSESSMENT — PAIN DESCRIPTION - DESCRIPTORS: DESCRIPTORS: PRESSURE

## 2019-06-04 NOTE — ED TRIAGE NOTES
Pt states he has a history of chronic abd pain and is scheduled for a scope on Friday. Tonight he c/o pain to lower back since Friday, denies injury, states pain is usually worse after he has worked all day.   Denies injury to back

## 2019-06-04 NOTE — ED PROVIDER NOTES
Patient is a 43-year-old male, past medical history of tobacco use, who presents for back pain. The patient reports that this past weekend is working long hours at work, making pizzas began to develop a lower back \"ache\" it is in his mid lower back and nonradiating. Movement seems to make it worse. Nothing seems to make it better. She had taken some ibuprofen without relief. He denies associated traumas or falls, spinal injections, fevers or chills, paresthesias, bowel or bladder incontinence, inability to ambulate. The history is provided by the patient. Review of Systems   Constitutional: Negative for chills, diaphoresis, fever and unexpected weight change. HENT: Negative for congestion, ear pain, sinus pressure, sinus pain and tinnitus. Eyes: Negative for photophobia and redness. Respiratory: Negative for chest tightness and shortness of breath. Cardiovascular: Negative for chest pain, palpitations and leg swelling. Gastrointestinal: Negative for abdominal pain, diarrhea, nausea and vomiting. Genitourinary: Negative for decreased urine volume, difficulty urinating, flank pain, frequency and hematuria. Musculoskeletal: Positive for back pain. Negative for arthralgias, gait problem, myalgias, neck pain and neck stiffness. Skin: Negative for color change, rash and wound. Allergic/Immunologic: Negative for immunocompromised state. Neurological: Negative for dizziness, syncope, speech difficulty, weakness, light-headedness, numbness and headaches. Hematological: Negative for adenopathy. Does not bruise/bleed easily. Physical Exam   Constitutional: He is oriented to person, place, and time. He appears well-developed and well-nourished. No distress. HENT:   Head: Normocephalic and atraumatic. Mouth/Throat: Oropharynx is clear and moist. No oropharyngeal exudate. Eyes: Pupils are equal, round, and reactive to light.  Conjunctivae and EOM are normal. Right eye exhibits no discharge. Left eye exhibits no discharge. No scleral icterus. Neck: Normal range of motion. Neck supple. Cardiovascular: Normal rate, regular rhythm, normal heart sounds and intact distal pulses. Exam reveals no gallop and no friction rub. No murmur heard. Pulmonary/Chest: Effort normal and breath sounds normal. No respiratory distress. He has no wheezes. He has no rales. Abdominal: Soft. Bowel sounds are normal. He exhibits no distension. There is no tenderness. There is no rebound and no guarding. Musculoskeletal: He exhibits no edema or deformity. Neurological: He is alert and oriented to person, place, and time. He has normal strength and normal reflexes. He displays no atrophy and normal reflexes. No cranial nerve deficit or sensory deficit. He exhibits normal muscle tone. Skin: Skin is warm and dry. Capillary refill takes less than 2 seconds. No rash noted. He is not diaphoretic. No erythema. No pallor. Nursing note and vitals reviewed. Procedures    MDM    ED Course as of Jun 04 0711   Tue Jun 04, 2019   Darby Schwartz ATTENDING PROVIDER ATTESTATION:     Melisa Olivas presented to the emergency department for evaluation of [unfilled] and was initially evaluated by the Medical Resident. See Original ED Note for H&P and ED course above. I have reviewed and discussed the case, including pertinent history (medical, surgical, family and social) and exam findings with the Medical Resident assigned to Melisa Edisy. I have personally performed and/or participated in the history, exam, medical decision making, and procedures and agree with all pertinent clinical information. I have reviewed my findings and recommendations with the assigned Medical Resident, Melisa Olivas and members of family present at the time of disposition.     My findings/plan: [unfilled]  [unfilled]  Efrain Ohara MD        [DD]      ED Course User Index  [DD] Masha Mcallister MD      Patient presented with mild lower back pain that was not associated with any neurological deficits or sinus spinal cord impingement. He had no risk factors of IV drug use recent spinal injections and he is afebrile. He admitted the use working a lot the previous 3 days and believes he may have exacerbated his pain at that time. There is no traumas or falls or injuries warranting imaging. Patient is treated symptomatically with anti-inflammatories and muscle relaxants to good effect. The patient will be discharged with continued care. Follow-up with his primary care physician. ED Course as of Jun 04 0712   Tue Jun 04, 2019   Darby Schwartz ATTENDING PROVIDER ATTESTATION:     Claudine Vallejo presented to the emergency department for evaluation of [unfilled] and was initially evaluated by the Medical Resident. See Original ED Note for H&P and ED course above. I have reviewed and discussed the case, including pertinent history (medical, surgical, family and social) and exam findings with the Medical Resident assigned to Claudine Vallejo. I have personally performed and/or participated in the history, exam, medical decision making, and procedures and agree with all pertinent clinical information. I have reviewed my findings and recommendations with the assigned Medical Resident, Claudine Vallejo and members of family present at the time of disposition.     My findings/plan: [unfilled]  [unfilled]  Reinaldo Muller MD        [DD]      ED Course User Index  [DD] Fortino Morgan MD       --------------------------------------------- PAST HISTORY ---------------------------------------------  Past Medical History:  has a past medical history of Atheroembolism of left lower extremity ([address] Atheroembolism of left lower extremity ([address] Atrial flutter ([address] Coronary atherosclerosis due to calcified coronary lesion(414.4), Discoloration of skin of toe, ED (erectile dysfunction), Epilepsy ([address] Hyperlipidemia, Hypertension, IBS (irritable bowel syndrome), Irregular heart beat, Ischemic toe, MI, old, Seizures ([address] Tinea, Tobacco dependence, and Tobacco use. Past Surgical History:  has a past surgical history that includes Cardiac surgery; Coronary angioplasty (2009); Upper gastrointestinal endoscopy (8/2014); Colonoscopy (8/2014); Coronary angioplasty with stent; and Upper gastrointestinal endoscopy. Social History:  reports that he has been smoking cigarettes. He has a 20.00 pack-year smoking history. His smokeless tobacco use includes chew. He reports that he has current or past drug history. Drug: Marijuana. He reports that he does not drink alcohol. Family History: family history includes Diabetes in his brother and brother; Hearing Loss in his maternal uncle and maternal uncle; Other in his father and mother. The patients home medications have been reviewed. Allergies: Acyclovir and related and Doxycycline    -------------------------------------------------- RESULTS -------------------------------------------------  Labs:  No results found for this visit on 06/04/19. Radiology:  No orders to display       ------------------------- NURSING NOTES AND VITALS REVIEWED ---------------------------  Date / Time Roomed:  6/4/2019  1:28 AM  ED Bed Assignment:  28/28    The nursing notes within the ED encounter and vital signs as below have been reviewed. /63   Pulse 58   Temp 97.3 °F (36.3 °C) (Oral)   Resp 18   Ht 6' 1\" (1.854 m)   Wt 148 lb (67.1 kg)   SpO2 95%   BMI 19.53 kg/m²   Oxygen Saturation Interpretation: Normal      ------------------------------------------ PROGRESS NOTES ------------------------------------------  7:12 AM  I have spoken with the patient and discussed todays results, in addition to providing specific details for the plan of care and counseling regarding the diagnosis and prognosis. Their questions are answered at this time and they are agreeable with the plan.  I discussed at length with them reasons for immediate return here for re evaluation. They will followup with their primary care physician by calling their office tomorrow. --------------------------------- ADDITIONAL PROVIDER NOTES ---------------------------------  At this time the patient is without objective evidence of an acute process requiring hospitalization or inpatient management. They have remained hemodynamically stable throughout their entire ED visit and are stable for discharge with outpatient follow-up. The plan has been discussed in detail and they are aware of the specific conditions for emergent return, as well as the importance of follow-up. Discharge Medication List as of 6/4/2019  3:16 AM          Diagnosis:  1. Strain of lumbar region, initial encounter    2. Tobacco use        Disposition:  Patient's disposition: Discharge to home  Patient's condition is stable.          Zahra Hou DO  Resident  06/04/19 2126

## 2019-06-04 NOTE — ED NOTES
Pt states he has chronic abd pain and is scheduled to have a scope done on Friday, tonight he comes to ER for c/o pain to lower back since last week, states his pain usually occurs after he has been on feet all day working.  Denies recent injury to back, denies pain radiation     Dayton Lilly  06/04/19 0148

## 2019-06-06 LAB
HEPATITIS B CORE TOTAL ANTIBODY: NONREACTIVE
TISSUE TRANSGLUTAMINASE ANTIBODY: 1 U/ML (ref 0–5)
TISSUE TRANSGLUTAMINASE IGA: 0 U/ML (ref 0–3)

## 2019-06-10 ENCOUNTER — OFFICE VISIT (OUTPATIENT)
Dept: FAMILY MEDICINE CLINIC | Age: 49
End: 2019-06-10
Payer: COMMERCIAL

## 2019-06-10 VITALS
RESPIRATION RATE: 18 BRPM | DIASTOLIC BLOOD PRESSURE: 60 MMHG | OXYGEN SATURATION: 98 % | WEIGHT: 150 LBS | SYSTOLIC BLOOD PRESSURE: 100 MMHG | HEIGHT: 73 IN | HEART RATE: 74 BPM | BODY MASS INDEX: 19.88 KG/M2

## 2019-06-10 DIAGNOSIS — M54.50 CHRONIC LOW BACK PAIN WITHOUT SCIATICA, UNSPECIFIED BACK PAIN LATERALITY: Primary | ICD-10-CM

## 2019-06-10 DIAGNOSIS — G89.29 CHRONIC LOW BACK PAIN WITHOUT SCIATICA, UNSPECIFIED BACK PAIN LATERALITY: Primary | ICD-10-CM

## 2019-06-10 DIAGNOSIS — R63.4 WEIGHT LOSS: ICD-10-CM

## 2019-06-10 PROCEDURE — G8427 DOCREV CUR MEDS BY ELIG CLIN: HCPCS | Performed by: FAMILY MEDICINE

## 2019-06-10 PROCEDURE — 99213 OFFICE O/P EST LOW 20 MIN: CPT | Performed by: FAMILY MEDICINE

## 2019-06-10 PROCEDURE — G8598 ASA/ANTIPLAT THER USED: HCPCS | Performed by: FAMILY MEDICINE

## 2019-06-10 PROCEDURE — G8420 CALC BMI NORM PARAMETERS: HCPCS | Performed by: FAMILY MEDICINE

## 2019-06-10 PROCEDURE — 4004F PT TOBACCO SCREEN RCVD TLK: CPT | Performed by: FAMILY MEDICINE

## 2019-06-10 NOTE — PROGRESS NOTES
SUBJECTIVE:        Patient ID: Martha Michael is a 53 y.o. male who presents for   Chief Complaint   Patient presents with    Chest Congestion    Constipation     did have Upper GI on Friday     F/u of weight loss. Is starting to gain weight. His eating habit has improved. Recent CT abd/pelvis revealed findings suggestive of gastritis. EGD showed gastritis. On PPI and carafate. Is scheduled to have colonoscopy on Thursday. Is on plavix  Per cardiology. Is following with hematologist for leukocytosis and thrombocytosis. Is on Hydrea  Is complaining of lower back pain x chronic but increasing in severity lately,no radiation.     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    Atheroembolism of left lower extremity (HCC) 9/11/2014    Atrial flutter (HCC)     Coronary atherosclerosis due to calcified coronary lesion(414.4)     Discoloration of skin of toe 9/11/2014    ED (erectile dysfunction)     Epilepsy ([address]     Hyperlipidemia     Hypertension     IBS (irritable bowel syndrome)     Irregular heart beat     Ischemic toe 9/11/2014    MI, old     Seizures ([address]     Tinea     Tobacco dependence 9/16/2014    Tobacco use        Patient Active Problem List   Diagnosis    Tobacco dependence    Hypertension    Hyperlipidemia    Atrial flutter ([address]    Coronary atherosclerosis due to calcified coronary lesion    ED (erectile dysfunction)    Myocardial infarction, old    IBS (irritable bowel syndrome)    Seizure disorder (HCC)    Essential thrombocythemia ([address]    Myeloproliferative disease ([address]    Non compliance w medication regimen    Stented coronary artery    Precordial pain    Bradycardia       Past Surgical History:   Procedure Laterality Date    CARDIAC SURGERY      COLONOSCOPY  8/2014    CORONARY ANGIOPLASTY  2009    stent placement    CORONARY ANGIOPLASTY WITH STENT PLACEMENT      UPPER GASTROINTESTINAL ENDOSCOPY 8/2014    UPPER GASTROINTESTINAL ENDOSCOPY         Family History   Problem Relation Age of Onset    Diabetes Brother     Hearing Loss Maternal Uncle     Diabetes Brother     Hearing Loss Maternal Uncle     Other Mother         COPD    Other Father         cirrhosis of liver       Social History     Socioeconomic History    Marital status:      Spouse name: Not on file    Number of children: Not on file    Years of education: Not on file    Highest education level: Not on file   Occupational History    Not on file   Social Needs    Financial resource strain: Not on file    Food insecurity:     Worry: Not on file     Inability: Not on file    Transportation needs:     Medical: Not on file     Non-medical: Not on file   Tobacco Use    Smoking status: Current Every Day Smoker     Packs/day: 1.00     Years: 20.00     Pack years: 20.00     Types: Cigarettes    Smokeless tobacco: Current User     Types: Chew   Substance and Sexual Activity    Alcohol use: No     Alcohol/week: 0.0 oz     Comment: quit 12 months ago    Drug use: Yes     Types: Marijuana     Comment: 5x past year    Sexual activity: Yes     Partners: Female   Lifestyle    Physical activity:     Days per week: Not on file     Minutes per session: Not on file    Stress: Not on file   Relationships    Social connections:     Talks on phone: Not on file     Gets together: Not on file     Attends [employer] service: Not on file     Active member of club or organization: Not on file     Attends meetings of clubs or organizations: Not on file     Relationship status: Not on file    Intimate partner violence:     Fear of current or ex partner: Not on file     Emotionally abused: Not on file     Physically abused: Not on file     Forced sexual activity: Not on file   Other Topics Concern    Not on file   Social History Narrative    Not on file       Allergies   Allergen Reactions    Acyclovir And Related Other (See Comments)    Doxycycline Itching       Current Outpatient Medications on File Prior to Visit   Medication Sig Dispense Refill    dicyclomine (BENTYL) 20 MG tablet Take 20 mg by mouth 2 times daily      clopidogrel (PLAVIX) 75 MG tablet TAKE 1 TABLET BY MOUTH EVERY DAY 30 tablet 0    famotidine (PEPCID) 20 MG tablet Take 1 tablet by mouth 2 times daily for 7 days 14 tablet 0    sucralfate (CARAFATE) 1 GM tablet Take 1 tablet by mouth 4 times daily 120 tablet 3    ondansetron (ZOFRAN-ODT) 4 MG disintegrating tablet Take 1 tablet by mouth 3 times daily as needed for Nausea or Vomiting 21 tablet 0    levETIRAcetam (KEPPRA) 500 MG tablet TAKE 3 TABLETS BY MOUTH TWICE DAILY 270 tablet 0    allopurinol (ZYLOPRIM) 100 MG tablet TK 1 T PO D  2    traZODone (DESYREL) 150 MG tablet Take 1 tablet by mouth nightly 30 tablet 3    Nutritional Supplements (BOOST) LIQD Take 1 can twice daily with meals 60 Can 1    ibuprofen (IBU) 800 MG tablet Take 1 tablet by mouth every 8 hours as needed for Pain 21 tablet 0    pantoprazole (PROTONIX) 20 MG tablet TAKE 1 TABLET BY MOUTH EVERY DAY 30 tablet 3    isosorbide mononitrate (IMDUR) 30 MG extended release tablet TAKE 1 TABLET BY MOUTH DAILY 30 tablet 5    escitalopram (LEXAPRO) 20 MG tablet TAKE 1 TABLET BY MOUTH DAILY 30 tablet 0    DELZICOL 400 MG CPDR delayed release capsule TAKE ONE CAPSULE BY MOUTH THREE TIMES DAILY 90 capsule 0    atorvastatin (LIPITOR) 40 MG tablet TAKE 1 TABLET BY MOUTH DAILY 90 tablet 1    metoprolol tartrate (LOPRESSOR) 25 MG tablet TAKE 1 TABLET BY MOUTH once daily 60 tablet 5    hydroxyurea (HYDREA) 500 MG chemo capsule Take 1 capsule by mouth 2 times daily (Patient taking differently: Take 500 mg by mouth 2 times daily Every other day) 60 capsule 1     No current facility-administered medications on file prior to visit.         Past medical, surgical, family and social history were reviewed and updated if stated    OBJECTIVE:     VS: /60   Pulse 74 Resp 18   Ht 6' 1\" (1.854 m)   Wt 150 lb (68 kg)   SpO2 98%   BMI 19.79 kg/m²   Wt Readings from Last 3 Encounters:   06/10/19 150 lb (68 kg)   06/04/19 148 lb (67.1 kg)   05/12/19 163 lb (73.9 kg)     Temp Readings from Last 3 Encounters:   06/04/19 97.3 °F (36.3 °C) (Oral)   05/12/19 97.9 °F (36.6 °C) (Oral)   05/10/19 98.1 °F (36.7 °C) (Oral)     BP Readings from Last 3 Encounters:   06/10/19 100/60   06/04/19 109/63   05/12/19 98/63        General assesment: Alert, Awake, Oriented times 3, no distress  Skin: Warm and dry  Head: Normocephalic. No masses, lesions or tenderness noted  Eyes: Conjunctivae appear normal.   Ears: External ears normal  Chest - clear to auscultation, no wheezes, rales or rhonchi, symmetric air entry  Heart - RRR, S1, S2, negative murmurs  Abdomen - soft, nontender, nondistended, no masses or organomegaly  Neurological - alert, oriented, normal speech, no focal findings or movement disorder noted  Back: + tenderness in b/l lower lumbar paraspinal musculature  Extremities - peripheral pulses normal, no pedal edema, no clubbing or cyanosis      ASSESSMENT / PLAN :     [facility] was seen today for chest congestion and constipation. Diagnoses and all orders for this visit:    Chronic low back pain without sciatica, unspecified back pain laterality  -     Electrophoresis Protein, Serum without Reflex to Immunofixation; Future  -     PROTEIN ELECTRO FRACT/QUANT UR; Future  -     Plan to do imaging    Weight loss  -     Electrophoresis Protein, Serum without Reflex to Immunofixation; Future  -     PROTEIN ELECTRO FRACT/QUANT UR; Future  -     F/u after colonoscopy and CT chest            Will check with his cardiologist re: discontinuing Plavix    Counseled regarding above diagnosis, including possible risks and complications,  especially if left uncontrolled.     Counseled regarding the possible side effects, risks, benefits and alternatives to treatment; patient and/or guardian verbalizes understanding, agrees, feels comfortable with and wishes to proceed with above treatment plan. Advised patient to call with any new medication issues, and read all Rx info from pharmacy to assure aware of all possible risks and side effects of medication before taking. Reviewed age and gender appropriate health screening exams and vaccinations. Advised patient regarding importance of keeping up with recommended health maintenance and to schedule as soon as possible if overdue, as this is important in assessing for undiagnosed pathology, especially cancer, as well as protecting against potentially harmful/life threatening disease. Refills as needed    Discussed any signs and symptoms that would warrant immediate ED evaluation    Jon Bach was instructed to call if any new symptoms develop prior to next visit.          F/U as instructed    Janet Case M.D

## 2019-06-18 ENCOUNTER — TELEPHONE (OUTPATIENT)
Dept: ADMINISTRATIVE | Age: 49
End: 2019-06-18

## 2019-06-23 DIAGNOSIS — G40.909 SEIZURE DISORDER (HCC): ICD-10-CM

## 2019-06-24 RX ORDER — LEVETIRACETAM 500 MG/1
TABLET ORAL
Qty: 270 TABLET | Refills: 0 | Status: SHIPPED | OUTPATIENT
Start: 2019-06-24 | End: 2019-08-08 | Stop reason: SDUPTHER

## 2019-06-28 DIAGNOSIS — I10 ESSENTIAL HYPERTENSION: ICD-10-CM

## 2019-07-11 DIAGNOSIS — I25.10 CORONARY ATHEROSCLEROSIS DUE TO CALCIFIED CORONARY LESION: ICD-10-CM

## 2019-07-11 DIAGNOSIS — I25.84 CORONARY ATHEROSCLEROSIS DUE TO CALCIFIED CORONARY LESION: ICD-10-CM

## 2019-07-12 RX ORDER — CLOPIDOGREL BISULFATE 75 MG/1
TABLET ORAL
Qty: 30 TABLET | Refills: 5 | Status: SHIPPED | OUTPATIENT
Start: 2019-07-12 | End: 2020-01-07

## 2019-07-27 DIAGNOSIS — F33.9 EPISODE OF RECURRENT MAJOR DEPRESSIVE DISORDER, UNSPECIFIED DEPRESSION EPISODE SEVERITY (HCC): ICD-10-CM

## 2019-07-27 DIAGNOSIS — I10 ESSENTIAL HYPERTENSION: ICD-10-CM

## 2019-07-27 DIAGNOSIS — F41.9 ANXIETY: ICD-10-CM

## 2019-07-29 RX ORDER — ESCITALOPRAM OXALATE 20 MG/1
40 TABLET ORAL DAILY
Qty: 60 TABLET | Refills: 0 | Status: SHIPPED | OUTPATIENT
Start: 2019-07-29 | End: 2019-08-25 | Stop reason: SDUPTHER

## 2019-08-08 DIAGNOSIS — G40.909 SEIZURE DISORDER (HCC): ICD-10-CM

## 2019-08-08 RX ORDER — LEVETIRACETAM 500 MG/1
TABLET ORAL
Qty: 180 TABLET | Refills: 0 | Status: SHIPPED | OUTPATIENT
Start: 2019-08-08 | End: 2019-09-05 | Stop reason: SDUPTHER

## 2019-08-09 ENCOUNTER — APPOINTMENT (OUTPATIENT)
Dept: GENERAL RADIOLOGY | Age: 49
End: 2019-08-09
Payer: COMMERCIAL

## 2019-08-09 ENCOUNTER — HOSPITAL ENCOUNTER (EMERGENCY)
Age: 49
Discharge: HOME | End: 2019-08-09
Attending: EMERGENCY MEDICINE
Payer: COMMERCIAL

## 2019-08-09 VITALS
BODY MASS INDEX: 20.54 KG/M2 | HEART RATE: 54 BPM | WEIGHT: 155 LBS | RESPIRATION RATE: 16 BRPM | DIASTOLIC BLOOD PRESSURE: 62 MMHG | SYSTOLIC BLOOD PRESSURE: 104 MMHG | TEMPERATURE: 97.5 F | HEIGHT: 73 IN | OXYGEN SATURATION: 99 %

## 2019-08-09 DIAGNOSIS — R55 SYNCOPE AND COLLAPSE: Primary | ICD-10-CM

## 2019-08-09 LAB
ALBUMIN SERPL-MCNC: 4.5 G/DL (ref 3.5–5.2)
ALP BLD-CCNC: 53 U/L (ref 40–129)
ALT SERPL-CCNC: 19 U/L (ref 0–40)
ANION GAP SERPL CALCULATED.3IONS-SCNC: 12 MMOL/L (ref 7–16)
ANISOCYTOSIS: ABNORMAL
AST SERPL-CCNC: 19 U/L (ref 0–39)
BASOPHILS ABSOLUTE: 0.13 E9/L (ref 0–0.2)
BASOPHILS RELATIVE PERCENT: 2 % (ref 0–2)
BILIRUB SERPL-MCNC: 0.7 MG/DL (ref 0–1.2)
BILIRUBIN URINE: NEGATIVE
BLOOD, URINE: NEGATIVE
BUN BLDV-MCNC: 16 MG/DL (ref 6–20)
BURR CELLS: ABNORMAL
CALCIUM SERPL-MCNC: 9.7 MG/DL (ref 8.6–10.2)
CHLORIDE BLD-SCNC: 104 MMOL/L (ref 98–107)
CHP ED QC CHECK: YES
CLARITY: CLEAR
CO2: 28 MMOL/L (ref 22–29)
COLOR: ABNORMAL
CREAT SERPL-MCNC: 0.9 MG/DL (ref 0.7–1.2)
EKG ATRIAL RATE: 43 BPM
EKG P AXIS: 57 DEGREES
EKG P-R INTERVAL: 170 MS
EKG Q-T INTERVAL: 460 MS
EKG QRS DURATION: 90 MS
EKG QTC CALCULATION (BAZETT): 388 MS
EKG R AXIS: 34 DEGREES
EKG T AXIS: 50 DEGREES
EKG VENTRICULAR RATE: 43 BPM
EOSINOPHILS ABSOLUTE: 0.32 E9/L (ref 0.05–0.5)
EOSINOPHILS RELATIVE PERCENT: 4.9 % (ref 0–6)
GFR AFRICAN AMERICAN: >60
GFR NON-AFRICAN AMERICAN: >60 ML/MIN/1.73
GLUCOSE BLD-MCNC: 89 MG/DL
GLUCOSE BLD-MCNC: 95 MG/DL (ref 74–99)
GLUCOSE URINE: NEGATIVE MG/DL
HCT VFR BLD CALC: 45.2 % (ref 37–54)
HEMOGLOBIN: 15.3 G/DL (ref 12.5–16.5)
IMMATURE GRANULOCYTES #: 0.03 E9/L
IMMATURE GRANULOCYTES %: 0.5 % (ref 0–5)
INR BLD: 1.1
KETONES, URINE: NEGATIVE MG/DL
LACTIC ACID: 1.7 MMOL/L (ref 0.5–2.2)
LEUKOCYTE ESTERASE, URINE: NEGATIVE
LYMPHOCYTES ABSOLUTE: 1.66 E9/L (ref 1.5–4)
LYMPHOCYTES RELATIVE PERCENT: 25.3 % (ref 20–42)
MCH RBC QN AUTO: 37.7 PG (ref 26–35)
MCHC RBC AUTO-ENTMCNC: 33.8 % (ref 32–34.5)
MCV RBC AUTO: 111.3 FL (ref 80–99.9)
METER GLUCOSE: 89 MG/DL (ref 74–99)
MONOCYTES ABSOLUTE: 0.45 E9/L (ref 0.1–0.95)
MONOCYTES RELATIVE PERCENT: 6.9 % (ref 2–12)
NEUTROPHILS ABSOLUTE: 3.96 E9/L (ref 1.8–7.3)
NEUTROPHILS RELATIVE PERCENT: 60.4 % (ref 43–80)
NITRITE, URINE: NEGATIVE
OVALOCYTES: ABNORMAL
PDW BLD-RTO: 16.3 FL (ref 11.5–15)
PH UA: 7 (ref 5–9)
PLATELET # BLD: 421 E9/L (ref 130–450)
PMV BLD AUTO: 9.2 FL (ref 7–12)
POIKILOCYTES: ABNORMAL
POTASSIUM REFLEX MAGNESIUM: 4.1 MMOL/L (ref 3.5–5)
PROTEIN UA: NEGATIVE MG/DL
PROTHROMBIN TIME: 12.4 SEC (ref 9.3–12.4)
RBC # BLD: 4.06 E12/L (ref 3.8–5.8)
SODIUM BLD-SCNC: 144 MMOL/L (ref 132–146)
SPECIFIC GRAVITY UA: 1.02 (ref 1–1.03)
TOTAL PROTEIN: 7.1 G/DL (ref 6.4–8.3)
TROPONIN: <0.01 NG/ML (ref 0–0.03)
UROBILINOGEN, URINE: 0.2 E.U./DL
WBC # BLD: 6.6 E9/L (ref 4.5–11.5)

## 2019-08-09 PROCEDURE — 85025 COMPLETE CBC W/AUTO DIFF WBC: CPT

## 2019-08-09 PROCEDURE — 2580000003 HC RX 258: Performed by: EMERGENCY MEDICINE

## 2019-08-09 PROCEDURE — 93005 ELECTROCARDIOGRAM TRACING: CPT | Performed by: STUDENT IN AN ORGANIZED HEALTH CARE EDUCATION/TRAINING PROGRAM

## 2019-08-09 PROCEDURE — 81003 URINALYSIS AUTO W/O SCOPE: CPT

## 2019-08-09 PROCEDURE — 71045 X-RAY EXAM CHEST 1 VIEW: CPT

## 2019-08-09 PROCEDURE — 85610 PROTHROMBIN TIME: CPT

## 2019-08-09 PROCEDURE — 83605 ASSAY OF LACTIC ACID: CPT

## 2019-08-09 PROCEDURE — 82962 GLUCOSE BLOOD TEST: CPT

## 2019-08-09 PROCEDURE — 80053 COMPREHEN METABOLIC PANEL: CPT

## 2019-08-09 PROCEDURE — 99284 EMERGENCY DEPT VISIT MOD MDM: CPT

## 2019-08-09 PROCEDURE — 84484 ASSAY OF TROPONIN QUANT: CPT

## 2019-08-09 RX ORDER — PANTOPRAZOLE SODIUM 40 MG/1
1 TABLET, DELAYED RELEASE ORAL DAILY
Refills: 3 | COMMUNITY
Start: 2019-08-04 | End: 2021-04-29 | Stop reason: CLARIF

## 2019-08-09 RX ORDER — RANITIDINE 150 MG/1
1 TABLET ORAL NIGHTLY
Refills: 2 | COMMUNITY
Start: 2019-08-06 | End: 2020-07-06

## 2019-08-09 RX ORDER — 0.9 % SODIUM CHLORIDE 0.9 %
1000 INTRAVENOUS SOLUTION INTRAVENOUS ONCE
Status: COMPLETED | OUTPATIENT
Start: 2019-08-09 | End: 2019-08-09

## 2019-08-09 RX ADMIN — SODIUM CHLORIDE 1000 ML: 9 INJECTION, SOLUTION INTRAVENOUS at 13:29

## 2019-08-09 ASSESSMENT — ENCOUNTER SYMPTOMS
SORE THROAT: 0
VOMITING: 0
CHEST TIGHTNESS: 0
EYE REDNESS: 0
SHORTNESS OF BREATH: 0
BACK PAIN: 0
RHINORRHEA: 0
WHEEZING: 0
EYE PAIN: 0
APNEA: 0
PHOTOPHOBIA: 0
ABDOMINAL PAIN: 0
COUGH: 0
TROUBLE SWALLOWING: 0
NAUSEA: 0
CONSTIPATION: 0
DIARRHEA: 0

## 2019-08-09 NOTE — ED PROVIDER NOTES
[facility] ENCOUNTER      Pt Name: Tracee Lynn  MRN: [id number]  [place] 1970  Date of evaluation: 8/9/2019      CHIEF COMPLAINT       Chief Complaint   Patient presents with    Fatigue        HPI   Tracee Lynn is a 51 y.o. male with a history of MI, coronary artery disease, hypertension, hyperlipidemia, seizure, history of MI, who presents the emergency department after a syncopal episode. He states that he was sitting in a chair about to get up when he suddenly passed out. This episode was witnessed by his wife. He remained unconscious for about 20 to 30 seconds. He then woke up and was quickly back to his normal baseline. He states that he felt lightheaded just prior to passing out. The wife reports that there was no shaking of the arms or legs. There is no postictal.  He has been taking his antiepileptic medication as prescribed. He states that for the last couple of days has been working in the warm weather and not drinking as much water as normal.  He denies any chest pain, headache, vision changes, diaphoresis, nausea, vomiting, abdominal pain. No black or bloody stools. Denies any pain or hitting his head with the fall. Denies any neck pain. Currently is on metoprolol 25 mg daily. Last year he had to reduce his dose of metoprolol due to bradycardia. Is not on any blood thinners. Review of Systems   Constitutional: Negative for activity change, chills, diaphoresis, fatigue and fever. HENT: Negative for rhinorrhea, sore throat and trouble swallowing. Eyes: Negative for photophobia, pain and redness. Respiratory: Negative for apnea, cough, chest tightness, shortness of breath and wheezing. Cardiovascular: Negative for chest pain, palpitations and leg swelling. Gastrointestinal: Negative for abdominal pain, constipation, diarrhea, nausea and vomiting. Endocrine: Negative for polyuria.

## 2019-08-12 ENCOUNTER — OFFICE VISIT (OUTPATIENT)
Dept: FAMILY MEDICINE CLINIC | Age: 49
End: 2019-08-12
Payer: COMMERCIAL

## 2019-08-12 VITALS
WEIGHT: 148 LBS | HEART RATE: 66 BPM | DIASTOLIC BLOOD PRESSURE: 54 MMHG | RESPIRATION RATE: 18 BRPM | SYSTOLIC BLOOD PRESSURE: 100 MMHG | OXYGEN SATURATION: 97 % | HEIGHT: 73 IN | BODY MASS INDEX: 19.61 KG/M2

## 2019-08-12 DIAGNOSIS — Z72.0 TOBACCO USE: ICD-10-CM

## 2019-08-12 DIAGNOSIS — M54.50 CHRONIC LOW BACK PAIN WITHOUT SCIATICA, UNSPECIFIED BACK PAIN LATERALITY: ICD-10-CM

## 2019-08-12 DIAGNOSIS — R63.4 WEIGHT LOSS: ICD-10-CM

## 2019-08-12 DIAGNOSIS — I10 ESSENTIAL HYPERTENSION: Primary | ICD-10-CM

## 2019-08-12 DIAGNOSIS — G89.29 CHRONIC LOW BACK PAIN WITHOUT SCIATICA, UNSPECIFIED BACK PAIN LATERALITY: ICD-10-CM

## 2019-08-12 PROBLEM — J43.8 OTHER EMPHYSEMA (HCC): Status: ACTIVE | Noted: 2019-08-12

## 2019-08-12 PROCEDURE — G8420 CALC BMI NORM PARAMETERS: HCPCS | Performed by: FAMILY MEDICINE

## 2019-08-12 PROCEDURE — 4004F PT TOBACCO SCREEN RCVD TLK: CPT | Performed by: FAMILY MEDICINE

## 2019-08-12 PROCEDURE — G8598 ASA/ANTIPLAT THER USED: HCPCS | Performed by: FAMILY MEDICINE

## 2019-08-12 PROCEDURE — G8427 DOCREV CUR MEDS BY ELIG CLIN: HCPCS | Performed by: FAMILY MEDICINE

## 2019-08-12 PROCEDURE — 99213 OFFICE O/P EST LOW 20 MIN: CPT | Performed by: FAMILY MEDICINE

## 2019-08-12 NOTE — PROGRESS NOTES
SUBJECTIVE:        Patient ID: Willian Hatch is a 51 y.o. male who presents for   Chief Complaint   Patient presents with   [address] from [facility]     ED 8/9     BP is running low. Is on metoprolol and Imdur. Is taking  Decreased dose of metoprolol . Feeling lightheaded and dizzy. Hx of MI. Weight loss: weight has been fluctuating. Had EGD and colonoscopy recently. Reports unavailable a this time. Is following with hematologist for leukocytosis and thrombocytosis. Is on Hydrea. Reviewed recent reports and discussed with pt. Continues to have lower back pain. Has not completed protein electrophoresis that was ordered at last visit. Is trying to quit smoking.       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    Atheroembolism of left lower extremity (HCC) 9/11/2014    Atrial flutter (HCC)     Coronary atherosclerosis due to calcified coronary lesion(414.4)     Discoloration of skin of toe 9/11/2014    ED (erectile dysfunction)     Epilepsy ([address]     Hyperlipidemia     Hypertension     IBS (irritable bowel syndrome)     Irregular heart beat     Ischemic toe 9/11/2014    MI, old     Seizures ([address]     Tinea     Tobacco dependence 9/16/2014    Tobacco use        Patient Active Problem List   Diagnosis    Tobacco dependence    Essential hypertension    Hyperlipidemia    Atrial flutter ([address]    Coronary atherosclerosis due to calcified coronary lesion    ED (erectile dysfunction)    Myocardial infarction, old    IBS (irritable bowel syndrome)    Seizure disorder (HCC)    Essential thrombocythemia ([address]    Myeloproliferative disease ([address]    Non compliance w medication regimen    Stented coronary artery    Precordial pain    Bradycardia    Other emphysema ([address]       Past Surgical History:   Procedure Laterality Date    CARDIAC SURGERY      COLONOSCOPY  8/2014    CORONARY ANGIOPLASTY  2009    stent placement    Medication Sig Dispense Refill    ranitidine (ZANTAC) 150 MG tablet Take 1 tablet by mouth nightly  2    pantoprazole (PROTONIX) 40 MG tablet Take 1 tablet by mouth daily  3    levETIRAcetam (KEPPRA) 500 MG tablet TAKE 3 TABLETS BY MOUTH TWICE DAILY 180 tablet 0    escitalopram (LEXAPRO) 20 MG tablet TAKE 2 TABLETS BY MOUTH DAILY 60 tablet 0    clopidogrel (PLAVIX) 75 MG tablet TAKE 1 TABLET BY MOUTH EVERY DAY 30 tablet 5    dicyclomine (BENTYL) 20 MG tablet Take 20 mg by mouth 2 times daily      sucralfate (CARAFATE) 1 GM tablet Take 1 tablet by mouth 4 times daily (Patient taking differently: Take 1 g by mouth 2 times daily ) 120 tablet 3    ondansetron (ZOFRAN-ODT) 4 MG disintegrating tablet Take 1 tablet by mouth 3 times daily as needed for Nausea or Vomiting 21 tablet 0    allopurinol (ZYLOPRIM) 100 MG tablet TK 1 T PO D  2    traZODone (DESYREL) 150 MG tablet Take 1 tablet by mouth nightly 30 tablet 3    Nutritional Supplements (BOOST) LIQD Take 1 can twice daily with meals 60 Can 1    isosorbide mononitrate (IMDUR) 30 MG extended release tablet TAKE 1 TABLET BY MOUTH DAILY 30 tablet 5    atorvastatin (LIPITOR) 40 MG tablet TAKE 1 TABLET BY MOUTH DAILY (Patient taking differently: Take 40 mg by mouth nightly ) 90 tablet 1    hydroxyurea (HYDREA) 500 MG chemo capsule Take 1 capsule by mouth 2 times daily 60 capsule 1     No current facility-administered medications on file prior to visit.         Past medical, surgical, family and social history were reviewed and updated if stated    OBJECTIVE:     VS: BP (!) 100/54   Pulse 66   Resp 18   Ht 6' 1\" (1.854 m)   Wt 148 lb (67.1 kg)   SpO2 97%   BMI 19.53 kg/m²   Wt Readings from Last 3 Encounters:   08/12/19 148 lb (67.1 kg)   08/09/19 155 lb (70.3 kg)   06/10/19 150 lb (68 kg)     Temp Readings from Last 3 Encounters:   08/09/19 97.5 °F (36.4 °C) (Oral)   06/04/19 97.3 °F (36.3 °C) (Oral)   05/12/19 97.9 °F (36.6 °C) (Oral)     BP Readings immediate ED evaluation    Familia Yuan was instructed to call if any new symptoms develop prior to next visit.          F/U as instructed    Mckenzie Maria M.D

## 2019-08-25 DIAGNOSIS — F33.9 EPISODE OF RECURRENT MAJOR DEPRESSIVE DISORDER, UNSPECIFIED DEPRESSION EPISODE SEVERITY (HCC): ICD-10-CM

## 2019-08-25 DIAGNOSIS — G47.00 INSOMNIA, UNSPECIFIED TYPE: ICD-10-CM

## 2019-08-25 DIAGNOSIS — F41.9 ANXIETY: ICD-10-CM

## 2019-08-25 DIAGNOSIS — I10 ESSENTIAL HYPERTENSION: ICD-10-CM

## 2019-08-26 RX ORDER — ESCITALOPRAM OXALATE 20 MG/1
40 TABLET ORAL DAILY
Qty: 60 TABLET | Refills: 5 | Status: SHIPPED
Start: 2019-08-26 | End: 2020-03-02 | Stop reason: SDUPTHER

## 2019-08-26 RX ORDER — ATORVASTATIN CALCIUM 40 MG/1
40 TABLET, FILM COATED ORAL DAILY
Qty: 90 TABLET | Refills: 1 | Status: SHIPPED
Start: 2019-08-26 | End: 2020-07-06

## 2019-08-26 RX ORDER — TRAZODONE HYDROCHLORIDE 150 MG/1
TABLET ORAL
Qty: 30 TABLET | Refills: 5 | Status: SHIPPED | OUTPATIENT
Start: 2019-08-26 | End: 2019-11-11

## 2019-08-29 ENCOUNTER — HOSPITAL ENCOUNTER (OUTPATIENT)
Dept: PULMONOLOGY | Age: 49
Discharge: HOME | End: 2019-08-29
Payer: COMMERCIAL

## 2019-08-29 ENCOUNTER — HOSPITAL ENCOUNTER (OUTPATIENT)
Dept: ULTRASOUND IMAGING | Age: 49
Discharge: HOME | End: 2019-08-31
Payer: COMMERCIAL

## 2019-08-29 DIAGNOSIS — R10.10 INTERMITTENT UPPER ABDOMINAL PAIN: ICD-10-CM

## 2019-08-29 DIAGNOSIS — Z72.0 TOBACCO USE: ICD-10-CM

## 2019-08-29 PROCEDURE — 94060 EVALUATION OF WHEEZING: CPT

## 2019-08-29 PROCEDURE — 76705 ECHO EXAM OF ABDOMEN: CPT

## 2019-08-31 NOTE — PROCEDURES
[id number] [address]                               PULMONARY FUNCTION    PATIENT NAME: Sanna Casiano                  :        1970  MED REC NO:   [id number]                            ROOM:  ACCOUNT NO:   [de-identified]                           ADMIT DATE: 2019  PROVIDER:     Julee Mccoy MD    SPIROMETRY    DATE OF PROCEDURE:  2019    Patient of Dr. Richards Section. INTERPRETATION:  Pre and postbronchodilators was performed. FVC is 4.68  liters, 83% predicted and FEV1 of 2.98 liters, 57% predicted. There is  a significant improvement with bronchodilators. Lung volumes show mild  increase of ERV. IMPRESSION:  Moderate response to bronchodilators with improvement to  normal giving a diagnosis of asthma.         Robyn Olivo MD    D: 2019 10:47:32       T: 2019 12:58:18     [email or web address]  Job#: [id number]     Doc#: [id number]    CC:

## 2019-09-05 DIAGNOSIS — G40.909 SEIZURE DISORDER (HCC): ICD-10-CM

## 2019-09-05 RX ORDER — LEVETIRACETAM 500 MG/1
TABLET ORAL
Qty: 180 TABLET | Refills: 0 | Status: SHIPPED | OUTPATIENT
Start: 2019-09-05 | End: 2019-10-02 | Stop reason: SDUPTHER

## 2019-09-20 ENCOUNTER — HOSPITAL ENCOUNTER (OUTPATIENT)
Age: 49
Discharge: HOME | End: 2019-09-22
Payer: COMMERCIAL

## 2019-09-20 ENCOUNTER — HOSPITAL ENCOUNTER (OUTPATIENT)
Dept: GENERAL RADIOLOGY | Age: 49
Discharge: HOME | End: 2019-09-22
Payer: COMMERCIAL

## 2019-09-20 DIAGNOSIS — D72.828 ACQUIRED NEUTROPHILIA: ICD-10-CM

## 2019-09-20 PROCEDURE — 71046 X-RAY EXAM CHEST 2 VIEWS: CPT

## 2019-09-22 DIAGNOSIS — I10 ESSENTIAL HYPERTENSION: ICD-10-CM

## 2019-09-23 RX ORDER — ISOSORBIDE MONONITRATE 30 MG/1
TABLET, EXTENDED RELEASE ORAL
Qty: 30 TABLET | Refills: 0 | Status: SHIPPED | OUTPATIENT
Start: 2019-09-23 | End: 2019-10-27 | Stop reason: SDUPTHER

## 2019-09-29 ENCOUNTER — HOSPITAL ENCOUNTER (OUTPATIENT)
Dept: CT IMAGING | Age: 49
Discharge: HOME | End: 2019-10-01
Payer: COMMERCIAL

## 2019-09-29 DIAGNOSIS — R63.4 WEIGHT LOSS: ICD-10-CM

## 2019-09-29 PROCEDURE — 74177 CT ABD & PELVIS W/CONTRAST: CPT

## 2019-09-29 PROCEDURE — 6360000004 HC RX CONTRAST MEDICATION: Performed by: RADIOLOGY

## 2019-09-29 RX ADMIN — IOPAMIDOL 110 ML: 755 INJECTION, SOLUTION INTRAVENOUS at 11:19

## 2019-09-29 RX ADMIN — IOHEXOL 50 ML: 240 INJECTION, SOLUTION INTRATHECAL; INTRAVASCULAR; INTRAVENOUS; ORAL at 11:19

## 2019-10-02 DIAGNOSIS — G40.909 SEIZURE DISORDER (HCC): ICD-10-CM

## 2019-10-02 RX ORDER — LEVETIRACETAM 500 MG/1
TABLET ORAL
Qty: 180 TABLET | Refills: 0 | Status: SHIPPED | OUTPATIENT
Start: 2019-10-02 | End: 2019-10-30 | Stop reason: SDUPTHER

## 2019-10-27 DIAGNOSIS — I10 ESSENTIAL HYPERTENSION: ICD-10-CM

## 2019-10-28 RX ORDER — ISOSORBIDE MONONITRATE 30 MG/1
TABLET, EXTENDED RELEASE ORAL
Qty: 30 TABLET | Refills: 0 | Status: SHIPPED | OUTPATIENT
Start: 2019-10-28 | End: 2019-11-24 | Stop reason: SDUPTHER

## 2019-10-29 DIAGNOSIS — G47.00 INSOMNIA, UNSPECIFIED TYPE: ICD-10-CM

## 2019-10-29 RX ORDER — TRAZODONE HYDROCHLORIDE 100 MG/1
TABLET ORAL
Qty: 30 TABLET | Refills: 0 | Status: SHIPPED | OUTPATIENT
Start: 2019-10-29 | End: 2019-11-11

## 2019-10-29 RX ORDER — FAMOTIDINE 20 MG/1
TABLET, FILM COATED ORAL
Qty: 60 TABLET | Refills: 0 | Status: SHIPPED
Start: 2019-10-29 | End: 2020-03-02 | Stop reason: SDUPTHER

## 2019-10-30 DIAGNOSIS — G40.909 SEIZURE DISORDER (HCC): ICD-10-CM

## 2019-10-30 RX ORDER — LEVETIRACETAM 500 MG/1
TABLET ORAL
Qty: 180 TABLET | Refills: 0 | Status: SHIPPED | OUTPATIENT
Start: 2019-10-30 | End: 2019-11-26 | Stop reason: SDUPTHER

## 2019-11-05 ENCOUNTER — HOSPITAL ENCOUNTER (OUTPATIENT)
Dept: NUCLEAR MEDICINE | Age: 49
Discharge: HOME | End: 2019-11-05
Payer: COMMERCIAL

## 2019-11-05 VITALS — WEIGHT: 175 LBS | BODY MASS INDEX: 23.09 KG/M2

## 2019-11-05 DIAGNOSIS — R10.84 ABDOMINAL PAIN, GENERALIZED: ICD-10-CM

## 2019-11-05 PROCEDURE — 6360000004 HC RX CONTRAST MEDICATION: Performed by: RADIOLOGY

## 2019-11-05 PROCEDURE — 78227 HEPATOBIL SYST IMAGE W/DRUG: CPT

## 2019-11-05 PROCEDURE — 3430000000 HC RX DIAGNOSTIC RADIOPHARMACEUTICAL: Performed by: RADIOLOGY

## 2019-11-05 PROCEDURE — 2580000003 HC RX 258: Performed by: RADIOLOGY

## 2019-11-05 PROCEDURE — A9537 TC99M MEBROFENIN: HCPCS | Performed by: RADIOLOGY

## 2019-11-05 RX ADMIN — Medication 6 MILLICURIE: at 07:35

## 2019-11-05 RX ADMIN — SODIUM CHLORIDE 1.59 MCG: 9 INJECTION, SOLUTION INTRAVENOUS at 08:36

## 2019-11-08 ENCOUNTER — HOSPITAL ENCOUNTER (OUTPATIENT)
Dept: NUCLEAR MEDICINE | Age: 49
Discharge: HOME | End: 2019-11-08
Payer: COMMERCIAL

## 2019-11-08 DIAGNOSIS — R11.2 NAUSEA AND VOMITING, INTRACTABILITY OF VOMITING NOT SPECIFIED, UNSPECIFIED VOMITING TYPE: ICD-10-CM

## 2019-11-08 DIAGNOSIS — R10.84 ABDOMINAL PAIN, GENERALIZED: ICD-10-CM

## 2019-11-08 PROCEDURE — 3430000000 HC RX DIAGNOSTIC RADIOPHARMACEUTICAL: Performed by: RADIOLOGY

## 2019-11-08 PROCEDURE — A9541 TC99M SULFUR COLLOID: HCPCS | Performed by: RADIOLOGY

## 2019-11-08 PROCEDURE — 78264 GASTRIC EMPTYING IMG STUDY: CPT

## 2019-11-08 RX ADMIN — Medication 1 MILLICURIE: at 07:45

## 2019-11-11 ENCOUNTER — OFFICE VISIT (OUTPATIENT)
Dept: FAMILY MEDICINE CLINIC | Age: 49
End: 2019-11-11
Payer: COMMERCIAL

## 2019-11-11 VITALS
SYSTOLIC BLOOD PRESSURE: 117 MMHG | BODY MASS INDEX: 19.14 KG/M2 | DIASTOLIC BLOOD PRESSURE: 68 MMHG | WEIGHT: 144.4 LBS | HEIGHT: 73 IN | HEART RATE: 55 BPM

## 2019-11-11 DIAGNOSIS — Z23 NEED FOR PNEUMOCOCCAL VACCINATION: ICD-10-CM

## 2019-11-11 DIAGNOSIS — Z23 NEED FOR INFLUENZA VACCINATION: ICD-10-CM

## 2019-11-11 DIAGNOSIS — R35.0 URINARY FREQUENCY: ICD-10-CM

## 2019-11-11 DIAGNOSIS — R63.4 WEIGHT LOSS: Primary | ICD-10-CM

## 2019-11-11 PROCEDURE — 90686 IIV4 VACC NO PRSV 0.5 ML IM: CPT | Performed by: FAMILY MEDICINE

## 2019-11-11 PROCEDURE — G8427 DOCREV CUR MEDS BY ELIG CLIN: HCPCS | Performed by: FAMILY MEDICINE

## 2019-11-11 PROCEDURE — 99214 OFFICE O/P EST MOD 30 MIN: CPT | Performed by: FAMILY MEDICINE

## 2019-11-11 PROCEDURE — G8598 ASA/ANTIPLAT THER USED: HCPCS | Performed by: FAMILY MEDICINE

## 2019-11-11 PROCEDURE — G8420 CALC BMI NORM PARAMETERS: HCPCS | Performed by: FAMILY MEDICINE

## 2019-11-11 PROCEDURE — G8482 FLU IMMUNIZE ORDER/ADMIN: HCPCS | Performed by: FAMILY MEDICINE

## 2019-11-11 PROCEDURE — 90472 IMMUNIZATION ADMIN EACH ADD: CPT | Performed by: FAMILY MEDICINE

## 2019-11-11 PROCEDURE — 90471 IMMUNIZATION ADMIN: CPT | Performed by: FAMILY MEDICINE

## 2019-11-11 PROCEDURE — 90732 PPSV23 VACC 2 YRS+ SUBQ/IM: CPT | Performed by: FAMILY MEDICINE

## 2019-11-11 PROCEDURE — 4004F PT TOBACCO SCREEN RCVD TLK: CPT | Performed by: FAMILY MEDICINE

## 2019-11-11 RX ORDER — MIRTAZAPINE 15 MG/1
15 TABLET, FILM COATED ORAL NIGHTLY
Qty: 30 TABLET | Refills: 0 | Status: SHIPPED | OUTPATIENT
Start: 2019-11-11 | End: 2019-12-03 | Stop reason: SDUPTHER

## 2019-11-24 DIAGNOSIS — I10 ESSENTIAL HYPERTENSION: ICD-10-CM

## 2019-11-25 RX ORDER — ISOSORBIDE MONONITRATE 30 MG/1
TABLET, EXTENDED RELEASE ORAL
Qty: 30 TABLET | Refills: 0 | Status: SHIPPED | OUTPATIENT
Start: 2019-11-25 | End: 2019-12-23

## 2019-11-26 DIAGNOSIS — G40.909 SEIZURE DISORDER (HCC): ICD-10-CM

## 2019-11-26 RX ORDER — LEVETIRACETAM 500 MG/1
TABLET ORAL
Qty: 180 TABLET | Refills: 0 | Status: SHIPPED | OUTPATIENT
Start: 2019-11-26 | End: 2019-12-23

## 2019-12-03 ENCOUNTER — OFFICE VISIT (OUTPATIENT)
Dept: FAMILY MEDICINE CLINIC | Age: 49
End: 2019-12-03
Payer: COMMERCIAL

## 2019-12-03 VITALS
SYSTOLIC BLOOD PRESSURE: 108 MMHG | BODY MASS INDEX: 19.35 KG/M2 | WEIGHT: 146 LBS | HEART RATE: 55 BPM | HEIGHT: 73 IN | OXYGEN SATURATION: 97 % | RESPIRATION RATE: 18 BRPM | DIASTOLIC BLOOD PRESSURE: 64 MMHG

## 2019-12-03 DIAGNOSIS — R63.4 WEIGHT LOSS: Primary | ICD-10-CM

## 2019-12-03 PROCEDURE — G8598 ASA/ANTIPLAT THER USED: HCPCS | Performed by: FAMILY MEDICINE

## 2019-12-03 PROCEDURE — G8420 CALC BMI NORM PARAMETERS: HCPCS | Performed by: FAMILY MEDICINE

## 2019-12-03 PROCEDURE — 4004F PT TOBACCO SCREEN RCVD TLK: CPT | Performed by: FAMILY MEDICINE

## 2019-12-03 PROCEDURE — G8427 DOCREV CUR MEDS BY ELIG CLIN: HCPCS | Performed by: FAMILY MEDICINE

## 2019-12-03 PROCEDURE — 99213 OFFICE O/P EST LOW 20 MIN: CPT | Performed by: FAMILY MEDICINE

## 2019-12-03 PROCEDURE — G8482 FLU IMMUNIZE ORDER/ADMIN: HCPCS | Performed by: FAMILY MEDICINE

## 2019-12-03 RX ORDER — MIRTAZAPINE 30 MG/1
30 TABLET, FILM COATED ORAL NIGHTLY
Qty: 30 TABLET | Refills: 5 | Status: SHIPPED
Start: 2019-12-03 | End: 2020-06-08

## 2019-12-11 RX ORDER — MIRTAZAPINE 15 MG/1
TABLET, FILM COATED ORAL
Qty: 30 TABLET | Refills: 0 | Status: SHIPPED | OUTPATIENT
Start: 2019-12-11 | End: 2020-01-17

## 2019-12-23 DIAGNOSIS — I10 ESSENTIAL HYPERTENSION: ICD-10-CM

## 2019-12-23 DIAGNOSIS — G40.909 SEIZURE DISORDER (HCC): ICD-10-CM

## 2019-12-23 RX ORDER — ISOSORBIDE MONONITRATE 30 MG/1
TABLET, EXTENDED RELEASE ORAL
Qty: 30 TABLET | Refills: 0 | Status: SHIPPED | OUTPATIENT
Start: 2019-12-23 | End: 2020-01-27

## 2019-12-23 RX ORDER — LEVETIRACETAM 500 MG/1
TABLET ORAL
Qty: 180 TABLET | Refills: 0 | Status: SHIPPED | OUTPATIENT
Start: 2019-12-23 | End: 2020-01-27

## 2019-12-27 ENCOUNTER — APPOINTMENT (OUTPATIENT)
Dept: GENERAL RADIOLOGY | Age: 49
End: 2019-12-27
Payer: COMMERCIAL

## 2019-12-27 ENCOUNTER — HOSPITAL ENCOUNTER (EMERGENCY)
Age: 49
Discharge: HOME | End: 2019-12-27
Attending: EMERGENCY MEDICINE
Payer: COMMERCIAL

## 2019-12-27 VITALS
TEMPERATURE: 98.2 F | RESPIRATION RATE: 14 BRPM | WEIGHT: 156 LBS | BODY MASS INDEX: 20.67 KG/M2 | DIASTOLIC BLOOD PRESSURE: 68 MMHG | SYSTOLIC BLOOD PRESSURE: 109 MMHG | HEIGHT: 73 IN | HEART RATE: 56 BPM | OXYGEN SATURATION: 97 %

## 2019-12-27 DIAGNOSIS — E86.0 DEHYDRATION: ICD-10-CM

## 2019-12-27 DIAGNOSIS — R42 LIGHTHEADEDNESS: Primary | ICD-10-CM

## 2019-12-27 DIAGNOSIS — R42 ORTHOSTATIC LIGHTHEADEDNESS: ICD-10-CM

## 2019-12-27 LAB
ALBUMIN SERPL-MCNC: 4.4 G/DL (ref 3.5–5.2)
ALP BLD-CCNC: 51 U/L (ref 40–129)
ALT SERPL-CCNC: 31 U/L (ref 0–40)
ANION GAP SERPL CALCULATED.3IONS-SCNC: 11 MMOL/L (ref 7–16)
ANISOCYTOSIS: ABNORMAL
AST SERPL-CCNC: 26 U/L (ref 0–39)
BASOPHILS ABSOLUTE: 0.1 E9/L (ref 0–0.2)
BASOPHILS RELATIVE PERCENT: 1.2 % (ref 0–2)
BILIRUB SERPL-MCNC: 0.6 MG/DL (ref 0–1.2)
BUN BLDV-MCNC: 21 MG/DL (ref 6–20)
CALCIUM SERPL-MCNC: 9.1 MG/DL (ref 8.6–10.2)
CHLORIDE BLD-SCNC: 103 MMOL/L (ref 98–107)
CO2: 25 MMOL/L (ref 22–29)
CREAT SERPL-MCNC: 0.7 MG/DL (ref 0.7–1.2)
EOSINOPHILS ABSOLUTE: 0.37 E9/L (ref 0.05–0.5)
EOSINOPHILS RELATIVE PERCENT: 4.4 % (ref 0–6)
GFR AFRICAN AMERICAN: >60
GFR NON-AFRICAN AMERICAN: >60 ML/MIN/1.73
GLUCOSE BLD-MCNC: 88 MG/DL (ref 74–99)
HCT VFR BLD CALC: 43 % (ref 37–54)
HEMOGLOBIN: 14.9 G/DL (ref 12.5–16.5)
IMMATURE GRANULOCYTES #: 0.07 E9/L
IMMATURE GRANULOCYTES %: 0.8 % (ref 0–5)
LYMPHOCYTES ABSOLUTE: 2.26 E9/L (ref 1.5–4)
LYMPHOCYTES RELATIVE PERCENT: 26.6 % (ref 20–42)
MCH RBC QN AUTO: 40.8 PG (ref 26–35)
MCHC RBC AUTO-ENTMCNC: 34.7 % (ref 32–34.5)
MCV RBC AUTO: 117.8 FL (ref 80–99.9)
MONOCYTES ABSOLUTE: 0.58 E9/L (ref 0.1–0.95)
MONOCYTES RELATIVE PERCENT: 6.8 % (ref 2–12)
NEUTROPHILS ABSOLUTE: 5.12 E9/L (ref 1.8–7.3)
NEUTROPHILS RELATIVE PERCENT: 60.2 % (ref 43–80)
OVALOCYTES: ABNORMAL
PDW BLD-RTO: 12.5 FL (ref 11.5–15)
PLATELET # BLD: 501 E9/L (ref 130–450)
PMV BLD AUTO: 9.2 FL (ref 7–12)
POIKILOCYTES: ABNORMAL
POTASSIUM SERPL-SCNC: 4.4 MMOL/L (ref 3.5–5)
RBC # BLD: 3.65 E12/L (ref 3.8–5.8)
SODIUM BLD-SCNC: 139 MMOL/L (ref 132–146)
TARGET CELLS: ABNORMAL
TOTAL PROTEIN: 6.7 G/DL (ref 6.4–8.3)
TROPONIN: <0.01 NG/ML (ref 0–0.03)
WBC # BLD: 8.5 E9/L (ref 4.5–11.5)

## 2019-12-27 PROCEDURE — 80053 COMPREHEN METABOLIC PANEL: CPT

## 2019-12-27 PROCEDURE — 99284 EMERGENCY DEPT VISIT MOD MDM: CPT

## 2019-12-27 PROCEDURE — 84484 ASSAY OF TROPONIN QUANT: CPT

## 2019-12-27 PROCEDURE — 85025 COMPLETE CBC W/AUTO DIFF WBC: CPT

## 2019-12-27 PROCEDURE — 96360 HYDRATION IV INFUSION INIT: CPT

## 2019-12-27 PROCEDURE — 96361 HYDRATE IV INFUSION ADD-ON: CPT

## 2019-12-27 PROCEDURE — 93005 ELECTROCARDIOGRAM TRACING: CPT | Performed by: STUDENT IN AN ORGANIZED HEALTH CARE EDUCATION/TRAINING PROGRAM

## 2019-12-27 PROCEDURE — 71045 X-RAY EXAM CHEST 1 VIEW: CPT

## 2019-12-27 PROCEDURE — 2580000003 HC RX 258: Performed by: STUDENT IN AN ORGANIZED HEALTH CARE EDUCATION/TRAINING PROGRAM

## 2019-12-27 RX ORDER — 0.9 % SODIUM CHLORIDE 0.9 %
1000 INTRAVENOUS SOLUTION INTRAVENOUS ONCE
Status: COMPLETED | OUTPATIENT
Start: 2019-12-27 | End: 2019-12-27

## 2019-12-27 RX ADMIN — SODIUM CHLORIDE 1000 ML: 9 INJECTION, SOLUTION INTRAVENOUS at 17:03

## 2019-12-27 ASSESSMENT — ENCOUNTER SYMPTOMS
VOMITING: 0
SHORTNESS OF BREATH: 0
WHEEZING: 0
DIARRHEA: 0
NAUSEA: 0
COLOR CHANGE: 0
ABDOMINAL PAIN: 0
CONSTIPATION: 0
COUGH: 0

## 2019-12-29 LAB
EKG ATRIAL RATE: 57 BPM
EKG P AXIS: 77 DEGREES
EKG P-R INTERVAL: 156 MS
EKG Q-T INTERVAL: 426 MS
EKG QRS DURATION: 82 MS
EKG QTC CALCULATION (BAZETT): 414 MS
EKG R AXIS: 40 DEGREES
EKG T AXIS: 57 DEGREES
EKG VENTRICULAR RATE: 57 BPM

## 2019-12-29 PROCEDURE — 93010 ELECTROCARDIOGRAM REPORT: CPT | Performed by: INTERNAL MEDICINE

## 2020-01-07 RX ORDER — CLOPIDOGREL BISULFATE 75 MG/1
TABLET ORAL
Qty: 30 TABLET | Refills: 5 | Status: SHIPPED
Start: 2020-01-07 | End: 2020-03-02 | Stop reason: SDUPTHER

## 2020-01-17 RX ORDER — MIRTAZAPINE 15 MG/1
TABLET, FILM COATED ORAL
Qty: 30 TABLET | Refills: 0 | Status: SHIPPED
Start: 2020-01-17 | End: 2020-03-16

## 2020-01-27 RX ORDER — ISOSORBIDE MONONITRATE 30 MG/1
TABLET, EXTENDED RELEASE ORAL
Qty: 30 TABLET | Refills: 0 | Status: SHIPPED
Start: 2020-01-27 | End: 2020-02-24

## 2020-01-27 RX ORDER — LEVETIRACETAM 500 MG/1
TABLET ORAL
Qty: 180 TABLET | Refills: 0 | Status: SHIPPED
Start: 2020-01-27 | End: 2020-02-24

## 2020-02-24 RX ORDER — LEVETIRACETAM 500 MG/1
TABLET ORAL
Qty: 180 TABLET | Refills: 0 | Status: SHIPPED
Start: 2020-02-24 | End: 2020-03-09 | Stop reason: SDUPTHER

## 2020-02-24 RX ORDER — ISOSORBIDE MONONITRATE 30 MG/1
TABLET, EXTENDED RELEASE ORAL
Qty: 30 TABLET | Refills: 0 | Status: SHIPPED
Start: 2020-02-24 | End: 2020-06-16 | Stop reason: SDUPTHER

## 2020-03-02 RX ORDER — FAMOTIDINE 20 MG/1
TABLET, FILM COATED ORAL
Qty: 60 TABLET | Refills: 5 | Status: SHIPPED
Start: 2020-03-02 | End: 2021-04-29 | Stop reason: CLARIF

## 2020-03-02 RX ORDER — ESCITALOPRAM OXALATE 20 MG/1
40 TABLET ORAL DAILY
Qty: 60 TABLET | Refills: 5 | Status: SHIPPED
Start: 2020-03-02 | End: 2020-09-28

## 2020-03-02 RX ORDER — CLOPIDOGREL BISULFATE 75 MG/1
75 TABLET ORAL DAILY
Qty: 30 TABLET | Refills: 5 | Status: SHIPPED
Start: 2020-03-02 | End: 2020-08-24

## 2020-03-02 RX ORDER — ATORVASTATIN CALCIUM 40 MG/1
40 TABLET, FILM COATED ORAL NIGHTLY
Qty: 30 TABLET | Refills: 5 | Status: SHIPPED
Start: 2020-03-02 | End: 2020-08-24

## 2020-03-09 RX ORDER — LEVETIRACETAM 500 MG/1
TABLET ORAL
Qty: 180 TABLET | Refills: 1 | Status: SHIPPED
Start: 2020-03-09 | End: 2020-05-08

## 2020-03-16 ENCOUNTER — HOSPITAL ENCOUNTER (OUTPATIENT)
Age: 50
Discharge: HOME | End: 2020-03-18
Payer: COMMERCIAL

## 2020-03-16 ENCOUNTER — OFFICE VISIT (OUTPATIENT)
Dept: FAMILY MEDICINE CLINIC | Age: 50
End: 2020-03-16
Payer: COMMERCIAL

## 2020-03-16 VITALS
WEIGHT: 163 LBS | DIASTOLIC BLOOD PRESSURE: 56 MMHG | BODY MASS INDEX: 21.6 KG/M2 | HEIGHT: 73 IN | RESPIRATION RATE: 18 BRPM | SYSTOLIC BLOOD PRESSURE: 100 MMHG | HEART RATE: 58 BPM | OXYGEN SATURATION: 95 %

## 2020-03-16 LAB
BASOPHILS ABSOLUTE: 0.15 E9/L (ref 0–0.2)
BASOPHILS RELATIVE PERCENT: 2.1 % (ref 0–2)
BURR CELLS: ABNORMAL
C-REACTIVE PROTEIN: <0.1 MG/DL (ref 0–0.4)
CHOLESTEROL, TOTAL: 131 MG/DL (ref 0–199)
EOSINOPHILS ABSOLUTE: 0.36 E9/L (ref 0.05–0.5)
EOSINOPHILS RELATIVE PERCENT: 5 % (ref 0–6)
HCT VFR BLD CALC: 51.2 % (ref 37–54)
HDLC SERPL-MCNC: 28 MG/DL
HEMOGLOBIN: 16.6 G/DL (ref 12.5–16.5)
IMMATURE GRANULOCYTES #: 0.07 E9/L
IMMATURE GRANULOCYTES %: 1 % (ref 0–5)
LDL CHOLESTEROL CALCULATED: 87 MG/DL (ref 0–99)
LYMPHOCYTES ABSOLUTE: 2.17 E9/L (ref 1.5–4)
LYMPHOCYTES RELATIVE PERCENT: 30.3 % (ref 20–42)
MCH RBC QN AUTO: 37.4 PG (ref 26–35)
MCHC RBC AUTO-ENTMCNC: 32.4 % (ref 32–34.5)
MCV RBC AUTO: 115.3 FL (ref 80–99.9)
MONOCYTES ABSOLUTE: 0.53 E9/L (ref 0.1–0.95)
MONOCYTES RELATIVE PERCENT: 7.4 % (ref 2–12)
NEUTROPHILS ABSOLUTE: 3.88 E9/L (ref 1.8–7.3)
NEUTROPHILS RELATIVE PERCENT: 54.2 % (ref 43–80)
PDW BLD-RTO: 12.9 FL (ref 11.5–15)
PLATELET # BLD: 579 E9/L (ref 130–450)
PMV BLD AUTO: 9 FL (ref 7–12)
POIKILOCYTES: ABNORMAL
POLYCHROMASIA: ABNORMAL
PROSTATE SPECIFIC ANTIGEN: 1.31 NG/ML (ref 0–4)
RBC # BLD: 4.44 E12/L (ref 3.8–5.8)
TRIGL SERPL-MCNC: 81 MG/DL (ref 0–149)
TSH SERPL DL<=0.05 MIU/L-ACNC: 1.68 UIU/ML (ref 0.27–4.2)
VITAMIN D 25-HYDROXY: 39 NG/ML (ref 30–100)
VLDLC SERPL CALC-MCNC: 16 MG/DL
WBC # BLD: 7.2 E9/L (ref 4.5–11.5)

## 2020-03-16 PROCEDURE — 85025 COMPLETE CBC W/AUTO DIFF WBC: CPT

## 2020-03-16 PROCEDURE — 36415 COLL VENOUS BLD VENIPUNCTURE: CPT | Performed by: FAMILY MEDICINE

## 2020-03-16 PROCEDURE — 86703 HIV-1/HIV-2 1 RESULT ANTBDY: CPT

## 2020-03-16 PROCEDURE — 80061 LIPID PANEL: CPT

## 2020-03-16 PROCEDURE — 99213 OFFICE O/P EST LOW 20 MIN: CPT | Performed by: FAMILY MEDICINE

## 2020-03-16 PROCEDURE — G8420 CALC BMI NORM PARAMETERS: HCPCS | Performed by: FAMILY MEDICINE

## 2020-03-16 PROCEDURE — G8482 FLU IMMUNIZE ORDER/ADMIN: HCPCS | Performed by: FAMILY MEDICINE

## 2020-03-16 PROCEDURE — 82306 VITAMIN D 25 HYDROXY: CPT

## 2020-03-16 PROCEDURE — 86140 C-REACTIVE PROTEIN: CPT

## 2020-03-16 PROCEDURE — 83036 HEMOGLOBIN GLYCOSYLATED A1C: CPT

## 2020-03-16 PROCEDURE — 85651 RBC SED RATE NONAUTOMATED: CPT

## 2020-03-16 PROCEDURE — 4004F PT TOBACCO SCREEN RCVD TLK: CPT | Performed by: FAMILY MEDICINE

## 2020-03-16 PROCEDURE — G0103 PSA SCREENING: HCPCS

## 2020-03-16 PROCEDURE — 84443 ASSAY THYROID STIM HORMONE: CPT

## 2020-03-16 PROCEDURE — G8427 DOCREV CUR MEDS BY ELIG CLIN: HCPCS | Performed by: FAMILY MEDICINE

## 2020-03-16 ASSESSMENT — PATIENT HEALTH QUESTIONNAIRE - PHQ9
SUM OF ALL RESPONSES TO PHQ9 QUESTIONS 1 & 2: 0
SUM OF ALL RESPONSES TO PHQ QUESTIONS 1-9: 0
2. FEELING DOWN, DEPRESSED OR HOPELESS: 0
SUM OF ALL RESPONSES TO PHQ QUESTIONS 1-9: 0
1. LITTLE INTEREST OR PLEASURE IN DOING THINGS: 0

## 2020-03-16 NOTE — PROGRESS NOTES
SUBJECTIVE:        Patient ID: Yolanda Rivera is a 51 y.o. male who presents for   Chief Complaint   Patient presents with    Hypertension     BP is running at a lower side but patient is asymptomatic. Denies any lightheadedness, dizziness. Weight loss: patient has been gaining weight now. Is following with hematology for Essential Thrombocythemia. Is trying to quit smoking.       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    Atheroembolism of left lower extremity (HCC) 9/11/2014    Atrial flutter (HCC)     Coronary atherosclerosis due to calcified coronary lesion(414.4)     Discoloration of skin of toe 9/11/2014    ED (erectile dysfunction)     Epilepsy ([address]     Hyperlipidemia     Hypertension     IBS (irritable bowel syndrome)     Irregular heart beat     Ischemic toe 9/11/2014    MI, old     Seizures ([address]     Tinea     Tobacco dependence 9/16/2014    Tobacco use        Patient Active Problem List   Diagnosis    Tobacco dependence    Essential hypertension    Hyperlipidemia    Atrial flutter ([address]    Coronary atherosclerosis due to calcified coronary lesion    ED (erectile dysfunction)    Myocardial infarction, old    IBS (irritable bowel syndrome)    Seizure disorder (HCC)    Essential thrombocythemia ([address]    Myeloproliferative disease ([address]    Non compliance w medication regimen    Stented coronary artery    Precordial pain    Bradycardia    Other emphysema ([address]       Past Surgical History:   Procedure Laterality Date    CARDIAC SURGERY      COLONOSCOPY  8/2014    CORONARY ANGIOPLASTY  2009    stent placement    CORONARY ANGIOPLASTY WITH STENT PLACEMENT      UPPER GASTROINTESTINAL ENDOSCOPY  8/2014    UPPER GASTROINTESTINAL ENDOSCOPY         Family History   Problem Relation Age of Onset    Diabetes Brother     Hearing Loss Maternal Uncle     Diabetes Brother     Hearing Loss Maternal Uncle     auscultation, no wheezes, rales or rhonchi, symmetric air entry  Heart - RRR, S1, S2, negative murmurs  Abdomen - soft, nontender, nondistended, no masses or organomegaly  Neurological - alert, oriented, normal speech, no focal findings or movement disorder noted  Extremities - peripheral pulses normal, no pedal edema, no clubbing or cyanosis      ASSESSMENT / PLAN :     Alfreda Cardoza was seen today for hypertension. Diagnoses and all orders for this visit:    Essential hypertension  -  Continue current treatment for now  Monitor BP    Hyperlipidemia, unspecified hyperlipidemia type  -  Continue Statin    Essential thrombocythemia ([facility]  -  Follow with heme/onc      Counseled regarding above diagnosis, including possible risks and complications,  especially if left uncontrolled. Counseled regarding the possible side effects, risks, benefits and alternatives to treatment; patient and/or guardian verbalizes understanding, agrees, feels comfortable with and wishes to proceed with above treatment plan. Advised patient to call with any new medication issues, and read all Rx info from pharmacy to assure aware of all possible risks and side effects of medication before taking. Discussed any signs and symptoms that would warrant immediate ED evaluation    Alfreda Cardoza was instructed to call if any new symptoms develop prior to next visit.          F/U as instructed    Oswaldo Garcia M.D

## 2020-03-17 LAB
HBA1C MFR BLD: 5.3 % (ref 4–5.6)
HIV-1 AND HIV-2 ANTIBODIES: NORMAL
SEDIMENTATION RATE, ERYTHROCYTE: 1 MM/HR (ref 0–15)

## 2020-05-08 RX ORDER — LEVETIRACETAM 500 MG/1
TABLET ORAL
Qty: 180 TABLET | Refills: 1 | Status: SHIPPED
Start: 2020-05-08 | End: 2020-07-06

## 2020-05-27 ENCOUNTER — TELEPHONE (OUTPATIENT)
Dept: FAMILY MEDICINE CLINIC | Age: 50
End: 2020-05-27

## 2020-05-27 RX ORDER — TRAZODONE HYDROCHLORIDE 100 MG/1
TABLET ORAL
Qty: 30 TABLET | Refills: 5 | Status: SHIPPED
Start: 2020-05-27 | End: 2020-11-20

## 2020-05-27 NOTE — TELEPHONE ENCOUNTER
Wife called stating that patient is not able to sleep and he would like to go back on the trazadone.

## 2020-06-08 RX ORDER — MIRTAZAPINE 30 MG/1
TABLET, FILM COATED ORAL
Qty: 30 TABLET | Refills: 5 | Status: SHIPPED
Start: 2020-06-08 | End: 2020-12-07

## 2020-06-16 ENCOUNTER — VIRTUAL VISIT (OUTPATIENT)
Dept: FAMILY MEDICINE CLINIC | Age: 50
End: 2020-06-16
Payer: COMMERCIAL

## 2020-06-16 PROCEDURE — G8427 DOCREV CUR MEDS BY ELIG CLIN: HCPCS | Performed by: FAMILY MEDICINE

## 2020-06-16 PROCEDURE — 99214 OFFICE O/P EST MOD 30 MIN: CPT | Performed by: FAMILY MEDICINE

## 2020-06-16 PROCEDURE — G8420 CALC BMI NORM PARAMETERS: HCPCS | Performed by: FAMILY MEDICINE

## 2020-06-16 PROCEDURE — 4004F PT TOBACCO SCREEN RCVD TLK: CPT | Performed by: FAMILY MEDICINE

## 2020-06-16 RX ORDER — ISOSORBIDE MONONITRATE 30 MG/1
30 TABLET, EXTENDED RELEASE ORAL DAILY
Qty: 30 TABLET | Refills: 3 | Status: SHIPPED
Start: 2020-06-16 | End: 2020-10-12

## 2020-06-17 NOTE — PROGRESS NOTES
SUBJECTIVE:        Patient ID: Brook Lopez is a 53 y.o. male who presents for   Chief Complaint   Patient presents with    Insomnia     BP is  Stable. Denies any lightheadedness, dizziness. Weight loss:improved, patient states that her weight has unchanged. Is following with hematology for Essential Thrombocythemia. Asymptomatic, no bruises, bleeding. Hyperlipidemia:Doing well. Watching diet . No weakness or myalgias. No chest pain or dyspnea. Seizure disorder: no recent episodes. No headaches, weakness, N/T. Insomnia: improved.       Review of Systems - General ROS: negative for - chills, fever, night sweats, weight gain or weight loss  ENT ROS: negative for - hearing change, nasal discharge,  sore throat or visual changes  Endocrine ROS: negative for - hair pattern changes, mood swings, palpitations, polydipsia/polyuria, temperature intolerance or unexpected weight changes  Respiratory ROS: no cough, shortness of breath, or wheezing  Cardiovascular ROS: no chest pain or dyspnea on exertion  Gastrointestinal ROS: no abdominal pain, change in bowel habits  Genito-Urinary ROS: no dysuria, trouble voiding, or hematuria  Neurological ROS: negative for - dizziness, headaches, numbness/tingling or weakness  Dermatological ROS: negative for - pruritus, rash or skin lesion changes      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    Atheroembolism of left lower extremity (HCC) 9/11/2014    Atrial flutter ([address]     Coronary atherosclerosis due to calcified coronary lesion(414.4)     Discoloration of skin of toe 9/11/2014    ED (erectile dysfunction)     Epilepsy ([address]     Hyperlipidemia     Hypertension     IBS (irritable bowel syndrome)     Irregular heart beat     Ischemic toe 9/11/2014    MI, old     Seizures ([address]     Tinea     Tobacco dependence 9/16/2014    Tobacco use        Patient Active Problem List   Diagnosis    Tobacco

## 2020-07-01 ENCOUNTER — TELEPHONE (OUTPATIENT)
Dept: FAMILY MEDICINE CLINIC | Age: 50
End: 2020-07-01

## 2020-07-01 NOTE — TELEPHONE ENCOUNTER
Well, if he is losing weight rapidly again, he needs to be reevaluated.  Please offer him an office appointment

## 2020-07-03 ENCOUNTER — HOSPITAL ENCOUNTER (EMERGENCY)
Age: 50
Discharge: HOME | End: 2020-07-03
Attending: EMERGENCY MEDICINE
Payer: COMMERCIAL

## 2020-07-03 ENCOUNTER — APPOINTMENT (OUTPATIENT)
Dept: CT IMAGING | Age: 50
End: 2020-07-03
Payer: COMMERCIAL

## 2020-07-03 VITALS
HEIGHT: 73 IN | DIASTOLIC BLOOD PRESSURE: 87 MMHG | WEIGHT: 163 LBS | HEART RATE: 62 BPM | BODY MASS INDEX: 21.6 KG/M2 | OXYGEN SATURATION: 100 % | SYSTOLIC BLOOD PRESSURE: 106 MMHG | RESPIRATION RATE: 20 BRPM | TEMPERATURE: 97.7 F

## 2020-07-03 LAB
ALBUMIN SERPL-MCNC: 4.7 G/DL (ref 3.5–5.2)
ALP BLD-CCNC: 55 U/L (ref 40–129)
ALT SERPL-CCNC: 15 U/L (ref 0–40)
AMORPHOUS: NORMAL
ANION GAP SERPL CALCULATED.3IONS-SCNC: 9 MMOL/L (ref 7–16)
AST SERPL-CCNC: 16 U/L (ref 0–39)
BACTERIA: NORMAL /HPF
BASOPHILS ABSOLUTE: 0.1 E9/L (ref 0–0.2)
BASOPHILS RELATIVE PERCENT: 1.6 % (ref 0–2)
BILIRUB SERPL-MCNC: 0.8 MG/DL (ref 0–1.2)
BILIRUBIN URINE: NEGATIVE
BLOOD, URINE: NORMAL
BUN BLDV-MCNC: 27 MG/DL (ref 6–20)
CALCIUM SERPL-MCNC: 10 MG/DL (ref 8.6–10.2)
CHLORIDE BLD-SCNC: 101 MMOL/L (ref 98–107)
CLARITY: CLEAR
CO2: 27 MMOL/L (ref 22–29)
COLOR: YELLOW
CREAT SERPL-MCNC: 0.9 MG/DL (ref 0.7–1.2)
EOSINOPHILS ABSOLUTE: 0.22 E9/L (ref 0.05–0.5)
EOSINOPHILS RELATIVE PERCENT: 3.6 % (ref 0–6)
GFR AFRICAN AMERICAN: >60
GFR NON-AFRICAN AMERICAN: >60 ML/MIN/1.73
GLUCOSE BLD-MCNC: 100 MG/DL (ref 74–99)
GLUCOSE URINE: NEGATIVE MG/DL
HCT VFR BLD CALC: 43 % (ref 37–54)
HEMOGLOBIN: 15.1 G/DL (ref 12.5–16.5)
IMMATURE GRANULOCYTES #: 0.05 E9/L
IMMATURE GRANULOCYTES %: 0.8 % (ref 0–5)
KETONES, URINE: NEGATIVE MG/DL
LACTIC ACID: 1.5 MMOL/L (ref 0.5–2.2)
LEUKOCYTE ESTERASE, URINE: NEGATIVE
LIPASE: 25 U/L (ref 13–60)
LYMPHOCYTES ABSOLUTE: 2.04 E9/L (ref 1.5–4)
LYMPHOCYTES RELATIVE PERCENT: 33.3 % (ref 20–42)
MCH RBC QN AUTO: 39.4 PG (ref 26–35)
MCHC RBC AUTO-ENTMCNC: 35.1 % (ref 32–34.5)
MCV RBC AUTO: 112.3 FL (ref 80–99.9)
MONOCYTES ABSOLUTE: 0.46 E9/L (ref 0.1–0.95)
MONOCYTES RELATIVE PERCENT: 7.5 % (ref 2–12)
NEUTROPHILS ABSOLUTE: 3.26 E9/L (ref 1.8–7.3)
NEUTROPHILS RELATIVE PERCENT: 53.2 % (ref 43–80)
NITRITE, URINE: NEGATIVE
PDW BLD-RTO: 13.6 FL (ref 11.5–15)
PH UA: 6 (ref 5–9)
PLATELET # BLD: 419 E9/L (ref 130–450)
PMV BLD AUTO: 8.9 FL (ref 7–12)
POTASSIUM SERPL-SCNC: 5 MMOL/L (ref 3.5–5)
PROTEIN UA: NEGATIVE MG/DL
RBC # BLD: 3.83 E12/L (ref 3.8–5.8)
RBC UA: NORMAL /HPF (ref 0–2)
SODIUM BLD-SCNC: 137 MMOL/L (ref 132–146)
SPECIFIC GRAVITY UA: 1.02 (ref 1–1.03)
TOTAL PROTEIN: 7.4 G/DL (ref 6.4–8.3)
UROBILINOGEN, URINE: 0.2 E.U./DL
WBC # BLD: 6.1 E9/L (ref 4.5–11.5)
WBC UA: NORMAL /HPF (ref 0–5)

## 2020-07-03 PROCEDURE — 85025 COMPLETE CBC W/AUTO DIFF WBC: CPT

## 2020-07-03 PROCEDURE — 83605 ASSAY OF LACTIC ACID: CPT

## 2020-07-03 PROCEDURE — 99284 EMERGENCY DEPT VISIT MOD MDM: CPT

## 2020-07-03 PROCEDURE — 96374 THER/PROPH/DIAG INJ IV PUSH: CPT

## 2020-07-03 PROCEDURE — 6360000002 HC RX W HCPCS: Performed by: STUDENT IN AN ORGANIZED HEALTH CARE EDUCATION/TRAINING PROGRAM

## 2020-07-03 PROCEDURE — 6370000000 HC RX 637 (ALT 250 FOR IP): Performed by: STUDENT IN AN ORGANIZED HEALTH CARE EDUCATION/TRAINING PROGRAM

## 2020-07-03 PROCEDURE — 80053 COMPREHEN METABOLIC PANEL: CPT

## 2020-07-03 PROCEDURE — 74176 CT ABD & PELVIS W/O CONTRAST: CPT

## 2020-07-03 PROCEDURE — 81001 URINALYSIS AUTO W/SCOPE: CPT

## 2020-07-03 PROCEDURE — 96375 TX/PRO/DX INJ NEW DRUG ADDON: CPT

## 2020-07-03 PROCEDURE — 83690 ASSAY OF LIPASE: CPT

## 2020-07-03 RX ORDER — LORAZEPAM 2 MG/ML
0.5 INJECTION INTRAMUSCULAR ONCE
Status: COMPLETED | OUTPATIENT
Start: 2020-07-03 | End: 2020-07-03

## 2020-07-03 RX ORDER — SODIUM CHLORIDE 0.9 % (FLUSH) 0.9 %
10 SYRINGE (ML) INJECTION ONCE
Status: DISCONTINUED | OUTPATIENT
Start: 2020-07-03 | End: 2020-07-03 | Stop reason: HOSPADM

## 2020-07-03 RX ORDER — METOCLOPRAMIDE HYDROCHLORIDE 5 MG/ML
10 INJECTION INTRAMUSCULAR; INTRAVENOUS ONCE
Status: COMPLETED | OUTPATIENT
Start: 2020-07-03 | End: 2020-07-03

## 2020-07-03 RX ORDER — DICYCLOMINE HYDROCHLORIDE 10 MG/1
10 CAPSULE ORAL 4 TIMES DAILY
Qty: 80 CAPSULE | Refills: 0 | Status: SHIPPED | OUTPATIENT
Start: 2020-07-03 | End: 2021-03-09

## 2020-07-03 RX ORDER — METOCLOPRAMIDE 10 MG/1
10 TABLET ORAL 3 TIMES DAILY PRN
Qty: 16 TABLET | Refills: 1 | Status: SHIPPED | OUTPATIENT
Start: 2020-07-03 | End: 2021-03-09

## 2020-07-03 RX ORDER — FENTANYL CITRATE 50 UG/ML
50 INJECTION, SOLUTION INTRAMUSCULAR; INTRAVENOUS ONCE
Status: COMPLETED | OUTPATIENT
Start: 2020-07-03 | End: 2020-07-03

## 2020-07-03 RX ORDER — ONDANSETRON 2 MG/ML
4 INJECTION INTRAMUSCULAR; INTRAVENOUS ONCE
Status: COMPLETED | OUTPATIENT
Start: 2020-07-03 | End: 2020-07-03

## 2020-07-03 RX ADMIN — LIDOCAINE HYDROCHLORIDE: 20 SOLUTION ORAL; TOPICAL at 19:14

## 2020-07-03 RX ADMIN — FENTANYL CITRATE 50 MCG: 50 INJECTION, SOLUTION INTRAMUSCULAR; INTRAVENOUS at 16:46

## 2020-07-03 RX ADMIN — ONDANSETRON 4 MG: 2 INJECTION INTRAMUSCULAR; INTRAVENOUS at 17:38

## 2020-07-03 RX ADMIN — LORAZEPAM 0.5 MG: 2 INJECTION INTRAMUSCULAR; INTRAVENOUS at 18:18

## 2020-07-03 RX ADMIN — METOCLOPRAMIDE 10 MG: 5 INJECTION, SOLUTION INTRAMUSCULAR; INTRAVENOUS at 18:18

## 2020-07-03 ASSESSMENT — ENCOUNTER SYMPTOMS
NAUSEA: 1
VOMITING: 0
PHOTOPHOBIA: 0
COUGH: 0
BLOOD IN STOOL: 0
ABDOMINAL DISTENTION: 0
ABDOMINAL PAIN: 1
DIARRHEA: 0

## 2020-07-03 ASSESSMENT — PAIN SCALES - GENERAL
PAINLEVEL_OUTOF10: 10
PAINLEVEL_OUTOF10: 8
PAINLEVEL_OUTOF10: 9

## 2020-07-03 ASSESSMENT — PAIN DESCRIPTION - PAIN TYPE: TYPE: ACUTE PAIN

## 2020-07-03 ASSESSMENT — PAIN DESCRIPTION - LOCATION: LOCATION: ABDOMEN

## 2020-07-03 NOTE — ED PROVIDER NOTES
Kelly Guerrier is a 53year old male with PMH of CAD, HTN, HLD, seizure disorder on keppra, emphysema, myeloproliferative disease who presented to ED with abdominal pain that started one hour before arrival to ED. Patient began having nausea and RLQ and right flank pain that started one hour before arrival to ED. Pain is present all the time. Patient has had bowel movement this morning. Patient was having cramping abdominal pain for over one day and then had worsening pain today following bowel movement. Patient denies blood in his stool. Patient has been nauseous without vomiting. Patient has had similar episodes of abdominal pain and states that he is typically given dilaudid which improves his pain. Patient denies back pain. Patient denies alcohol use. The history is provided by the patient. Abdominal Pain   Pain location:  RLQ and R flank  Pain severity:  Moderate  Onset quality:  Gradual  Duration:  1 hour  Timing:  Constant  Progression:  Worsening  Chronicity:  Recurrent  Context: not previous surgeries, not recent illness, not retching, not sick contacts, not suspicious food intake and not trauma    Relieved by:  Nothing  Worsened by:  Nothing  Ineffective treatments:  None tried  Associated symptoms: nausea    Associated symptoms: no chest pain, no chills, no cough, no diarrhea, no dysuria, no fever and no vomiting    Risk factors: no recent hospitalization         Review of Systems   Constitutional: Negative for chills and fever. Eyes: Negative for photophobia and visual disturbance. Respiratory: Negative for cough. Cardiovascular: Negative for chest pain. Gastrointestinal: Positive for abdominal pain and nausea. Negative for abdominal distention, blood in stool, diarrhea and vomiting. Genitourinary: Positive for flank pain. Negative for difficulty urinating and dysuria. Musculoskeletal: Negative for neck pain and neck stiffness. Skin: Negative for pallor and rash. Allergic/Immunologic: Negative for immunocompromised state. Neurological: Negative for headaches. Psychiatric/Behavioral: Negative for confusion. Physical Exam  Vitals signs and nursing note reviewed. Constitutional:       General: He is in acute distress. Appearance: Normal appearance. He is not ill-appearing or diaphoretic. Toxic: mild distress due to pain. HENT:      Head: Normocephalic and atraumatic. Nose: Nose normal.      Mouth/Throat:      Mouth: Mucous membranes are moist.      Pharynx: Oropharynx is clear. Eyes:      General: No scleral icterus. Conjunctiva/sclera: Conjunctivae normal.      Pupils: Pupils are equal, round, and reactive to light. Neck:      Musculoskeletal: Normal range of motion and neck supple. No neck rigidity or muscular tenderness. Cardiovascular:      Rate and Rhythm: Normal rate and regular rhythm. Pulmonary:      Effort: Pulmonary effort is normal.      Breath sounds: Normal breath sounds. Abdominal:      General: Bowel sounds are normal. There is no distension. Palpations: Abdomen is soft. Tenderness: There is abdominal tenderness. There is right CVA tenderness. There is no left CVA tenderness, guarding or rebound. Comments: Tenderness on exam,   Abdomen soft    Musculoskeletal:      Right lower leg: No edema. Left lower leg: No edema. Skin:     General: Skin is warm and dry. Capillary Refill: Capillary refill takes less than 2 seconds. Coloration: Skin is not pale. Findings: No erythema or rash. Neurological:      Mental Status: He is alert and oriented to person, place, and time. Psychiatric:         Mood and Affect: Mood normal.          Procedures     MDM  Number of Diagnoses or Management Options  Abdominal pain, unspecified abdominal location:   Ileus [facility]):   Diagnosis management comments: Dorota Jorgensen is a 67-year-old male who presented ED with abdominal pain.   Patient's lab was reviewed and found to be unremarkable for acute process contributing to patient's symptoms. Patient CT scan was consistent with ileus. Patient given Reglan and GI cocktail with significant improvement of patient's symptoms. Patient was anxious while in ED patient was given requested home dose of ativan. Patient requested Dilaudid while in emergency department, explained to patient that narcotics would worsen symptoms. Patient stated he feels improved and would like to be discharged home. Patient's abdomen soft with very mild tenderness of repeat exam. Patient did not have evidence of appendicitis or kidney stone on CT. Patient given Reglan and bentyl for symptoms, and advised to call his physician on Monday for follow-up. Discussed results with patient and wife along with indications return to ED patient is agreeable to plan. ED Course as of Jul 03 2113 Fri Jul 03, 2020   1803 Patient very anxious at time of re-evaluation, wife at bedside states that he takes Ativan at home but has been out of it for several days due to missing appointments with his psychiatrist     [SS]      ED Course User Index  [SS] Jossie Mar MD       --------------------------------------------- PAST HISTORY ---------------------------------------------  Past Medical History:  has a past medical history of Atheroembolism of left lower extremity ([address] Atheroembolism of left lower extremity ([address] Atrial flutter ([address] Coronary atherosclerosis due to calcified coronary lesion(414.4), Discoloration of skin of toe, ED (erectile dysfunction), Epilepsy ([address] Hyperlipidemia, Hypertension, IBS (irritable bowel syndrome), Irregular heart beat, Ischemic toe, MI, old, Seizures ([address] Tinea, Tobacco dependence, and Tobacco use. Past Surgical History:  has a past surgical history that includes Cardiac surgery; Coronary angioplasty (2009); Upper gastrointestinal endoscopy (8/2014); Colonoscopy (8/2014);  Coronary angioplasty with stent; and Upper gastrointestinal endoscopy. Social History:  reports that he has been smoking cigarettes. He has a 10.00 pack-year smoking history. His smokeless tobacco use includes chew. He reports current drug use. Drug: Marijuana. He reports that he does not drink alcohol. Family History: family history includes Diabetes in his brother and brother; Hearing Loss in his maternal uncle and maternal uncle; Other in his father and mother. The patients home medications have been reviewed.     Allergies: Acyclovir and related and Doxycycline    -------------------------------------------------- RESULTS -------------------------------------------------  Labs:  Results for orders placed or performed during the hospital encounter of 07/03/20   CBC auto differential   Result Value Ref Range    WBC 6.1 4.5 - 11.5 E9/L    RBC 3.83 3.80 - 5.80 E12/L    Hemoglobin 15.1 12.5 - 16.5 g/dL    Hematocrit 43.0 37.0 - 54.0 %    .3 (H) 80.0 - 99.9 fL    MCH 39.4 (H) 26.0 - 35.0 pg    MCHC 35.1 (H) 32.0 - 34.5 %    RDW 13.6 11.5 - 15.0 fL    Platelets 760 707 - 070 E9/L    MPV 8.9 7.0 - 12.0 fL    Neutrophils % 53.2 43.0 - 80.0 %    Immature Granulocytes % 0.8 0.0 - 5.0 %    Lymphocytes % 33.3 20.0 - 42.0 %    Monocytes % 7.5 2.0 - 12.0 %    Eosinophils % 3.6 0.0 - 6.0 %    Basophils % 1.6 0.0 - 2.0 %    Neutrophils Absolute 3.26 1.80 - 7.30 E9/L    Immature Granulocytes # 0.05 E9/L    Lymphocytes Absolute 2.04 1.50 - 4.00 E9/L    Monocytes Absolute 0.46 0.10 - 0.95 E9/L    Eosinophils Absolute 0.22 0.05 - 0.50 E9/L    Basophils Absolute 0.10 0.00 - 0.20 E9/L   Comprehensive Metabolic Panel   Result Value Ref Range    Sodium 137 132 - 146 mmol/L    Potassium 5.0 3.5 - 5.0 mmol/L    Chloride 101 98 - 107 mmol/L    CO2 27 22 - 29 mmol/L    Anion Gap 9 7 - 16 mmol/L    Glucose 100 (H) 74 - 99 mg/dL    BUN 27 (H) 6 - 20 mg/dL    CREATININE 0.9 0.7 - 1.2 mg/dL    GFR Non-African American >60 >=60 mL/min/1.73    GFR African prognosis. Their questions are answered at this time and they are agreeable with the plan. I discussed at length with them reasons for immediate return here for re evaluation. They will followup with their primary care physician by calling their office tomorrow. --------------------------------- ADDITIONAL PROVIDER NOTES ---------------------------------  At this time the patient is without objective evidence of an acute process requiring hospitalization or inpatient management. They have remained hemodynamically stable throughout their entire ED visit and are stable for discharge with outpatient follow-up. The plan has been discussed in detail and they are aware of the specific conditions for emergent return, as well as the importance of follow-up. Discharge Medication List as of 7/3/2020  7:07 PM      START taking these medications    Details   metoclopramide (REGLAN) 10 MG tablet Take 1 tablet by mouth 3 times daily as needed (nausea), Disp-16 tablet, R-1Print      dicyclomine (BENTYL) 10 MG capsule Take 1 capsule by mouth 4 times daily for 20 days, Disp-80 capsule, R-0Print             Diagnosis:  1. Abdominal pain, unspecified abdominal location    2. Ileus ([address]        Disposition:  Patient's disposition: Discharge to home  Patient's condition is stable.             Felicia Abarca MD  Resident  07/03/20 0482

## 2020-07-06 ENCOUNTER — OFFICE VISIT (OUTPATIENT)
Dept: FAMILY MEDICINE CLINIC | Age: 50
End: 2020-07-06
Payer: COMMERCIAL

## 2020-07-06 VITALS
BODY MASS INDEX: 20.28 KG/M2 | TEMPERATURE: 98.4 F | RESPIRATION RATE: 16 BRPM | DIASTOLIC BLOOD PRESSURE: 67 MMHG | HEART RATE: 78 BPM | HEIGHT: 73 IN | SYSTOLIC BLOOD PRESSURE: 104 MMHG | WEIGHT: 153 LBS | OXYGEN SATURATION: 98 %

## 2020-07-06 PROCEDURE — G8420 CALC BMI NORM PARAMETERS: HCPCS | Performed by: FAMILY MEDICINE

## 2020-07-06 PROCEDURE — 4004F PT TOBACCO SCREEN RCVD TLK: CPT | Performed by: FAMILY MEDICINE

## 2020-07-06 PROCEDURE — 99213 OFFICE O/P EST LOW 20 MIN: CPT | Performed by: FAMILY MEDICINE

## 2020-07-06 PROCEDURE — G8427 DOCREV CUR MEDS BY ELIG CLIN: HCPCS | Performed by: FAMILY MEDICINE

## 2020-07-06 RX ORDER — ALLOPURINOL 100 MG/1
1 TABLET ORAL DAILY
COMMUNITY
Start: 2020-06-28 | End: 2021-10-08 | Stop reason: SDUPTHER

## 2020-07-06 RX ORDER — PRAVASTATIN SODIUM 80 MG/1
1 TABLET ORAL DAILY
COMMUNITY
Start: 2014-04-17 | End: 2020-07-06

## 2020-07-06 RX ORDER — LEVETIRACETAM 500 MG/1
TABLET ORAL
Qty: 180 TABLET | Refills: 1 | Status: SHIPPED
Start: 2020-07-06 | End: 2020-09-14

## 2020-07-06 NOTE — PROGRESS NOTES
SUBJECTIVE:        Patient ID: Gregg Navarrete is a 53 y.o. male who presents for   Chief Complaint   Patient presents with   [facility] ED Follow-up     abdominal pain    Weight Loss     Was seen in ER on 7/3 for abdominal pain. CT abdomen /pelvis was unremarkable. Symptoms were felt to be secondary to ileus. Patient has upcoming appointment with GI. Weight loss; chronic history. Symptoms were improving but now is having weight loss again. Denies any fever, chills. Had extensive work up in the past with CT abd/pelvis, CT head, CXR which have been remarkable. He follows with heme/onc for myeloproliferative disorder. He thinks that he does not have appetite as he works in extremely hot environment. He also has hx of depression and anxiety and is on SSRI and Trazodone. Used to be on Remeron which helped with his appetite. Feels that he has been more anxiously lately. Denies any SI/HI.       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    Atheroembolism of left lower extremity (HCC) 9/11/2014    Atrial flutter (HCC)     Coronary atherosclerosis due to calcified coronary lesion(414.4)     Discoloration of skin of toe 9/11/2014    ED (erectile dysfunction)     Epilepsy ([address]     Hyperlipidemia     Hypertension     IBS (irritable bowel syndrome)     Irregular heart beat     Ischemic toe 9/11/2014    MI, old     Seizures ([address]     Tinea     Tobacco dependence 9/16/2014    Tobacco use        Patient Active Problem List   Diagnosis    Tobacco dependence    Essential hypertension    Hyperlipidemia    Atrial flutter ([address]    Coronary atherosclerosis due to calcified coronary lesion    ED (erectile dysfunction)    Myocardial infarction, old    IBS (irritable bowel syndrome)    Seizure disorder (HCC)    Essential thrombocythemia ([address]    Myeloproliferative disease ([address]    Non compliance w medication regimen    Stented coronary artery    Precordial pain    Bradycardia    Other emphysema (HCC)       Past Surgical History:   Procedure Laterality Date    CARDIAC SURGERY      COLONOSCOPY  8/2014    CORONARY ANGIOPLASTY  2009    stent placement    CORONARY ANGIOPLASTY WITH STENT PLACEMENT      UPPER GASTROINTESTINAL ENDOSCOPY  8/2014    UPPER GASTROINTESTINAL ENDOSCOPY         Family History   Problem Relation Age of Onset    Diabetes Brother     Hearing Loss Maternal Uncle     Diabetes Brother     Hearing Loss Maternal Uncle     Other Mother         COPD    Other Father         cirrhosis of liver       Social History     Socioeconomic History    Marital status:      Spouse name: None    Number of children: None    Years of education: None    Highest education level: None   Occupational History    None   Social Needs    Financial resource strain: None    Food insecurity     Worry: None     Inability: None    Transportation needs     Medical: None     Non-medical: None   Tobacco Use    Smoking status: Current Every Day Smoker     Packs/day: 0.50     Years: 20.00     Pack years: 10.00     Types: Cigarettes    Smokeless tobacco: Current User     Types: Chew    Tobacco comment: cutting down   Substance and Sexual Activity    Alcohol use: No     Alcohol/week: 0.0 standard drinks     Comment: quit 12 months ago    Drug use: Yes     Types: Marijuana     Comment: 5x past year    Sexual activity: Yes     Partners: Female   Lifestyle    Physical activity     Days per week: None     Minutes per session: None    Stress: None   Relationships    Social connections     Talks on phone: None     Gets together: None     Attends Worship service: None     Active member of club or organization: None     Attends meetings of clubs or organizations: None     Relationship status: None    Intimate partner violence     Fear of current or ex partner: None     Emotionally abused: None     Physically abused: None     Forced sexual activity: None   Other Topics Concern    None   Social History Narrative    None       Allergies   Allergen Reactions    Acyclovir And Related Other (See Comments)    Doxycycline Itching       Current Outpatient Medications on File Prior to Visit   Medication Sig Dispense Refill    pravastatin (PRAVACHOL) 80 MG tablet Take 1 tablet by mouth daily      metoclopramide (REGLAN) 10 MG tablet Take 1 tablet by mouth 3 times daily as needed (nausea) 16 tablet 1    dicyclomine (BENTYL) 10 MG capsule Take 1 capsule by mouth 4 times daily for 20 days 80 capsule 0    isosorbide mononitrate (IMDUR) 30 MG extended release tablet Take 1 tablet by mouth daily 30 tablet 3    mirtazapine (REMERON) 30 MG tablet take 1 tablet by mouth at bedtime 30 tablet 5    traZODone (DESYREL) 100 MG tablet TAKE 1 TABLET BY MOUTH EVERY NIGHT 30 tablet 5    metoprolol tartrate (LOPRESSOR) 25 MG tablet TAKE 1 TABLET BY MOUTH TWICE DAILY (Patient taking differently: Take 25 mg by mouth daily TAKE 1 TABLET BY MOUTH TWICE DAILY) 60 tablet 5    levETIRAcetam (KEPPRA) 500 MG tablet take 3 tablets by mouth twice a day 180 tablet 1    atorvastatin (LIPITOR) 40 MG tablet Take 1 tablet by mouth nightly 30 tablet 5    clopidogrel (PLAVIX) 75 MG tablet Take 1 tablet by mouth daily 30 tablet 5    escitalopram (LEXAPRO) 20 MG tablet Take 2 tablets by mouth daily 60 tablet 5    famotidine (PEPCID) 20 MG tablet TAKE 1 TABLET BY MOUTH TWICE DAILY 60 tablet 5    atorvastatin (LIPITOR) 40 MG tablet TAKE 1 TABLET BY MOUTH DAILY 90 tablet 1    metoprolol tartrate (LOPRESSOR) 25 MG tablet Take 0.5 tablets by mouth daily 60 tablet 0    ranitidine (ZANTAC) 150 MG tablet Take 1 tablet by mouth nightly  2    pantoprazole (PROTONIX) 40 MG tablet Take 1 tablet by mouth daily  3    Nutritional Supplements (BOOST) LIQD Take 1 can twice daily with meals 60 Can 1    hydroxyurea (HYDREA) 500 MG chemo capsule Take 1 capsule by mouth 2 times daily 60 capsule 1    with GI      Counseled regarding above diagnosis, including possible risks and complications,  especially if left uncontrolled. Counseled regarding the possible side effects, risks, benefits and alternatives to treatment; patient and/or guardian verbalizes understanding, agrees, feels comfortable with and wishes to proceed with above treatment plan. Discussed any signs and symptoms that would warrant immediate ED evaluation    Luzdevonte Irene was instructed to call if any new symptoms develop prior to next visit.          F/U as instructed    Clementine Dudley M.D

## 2020-08-24 RX ORDER — CLOPIDOGREL BISULFATE 75 MG/1
TABLET ORAL
Qty: 30 TABLET | Refills: 5 | Status: SHIPPED
Start: 2020-08-24 | End: 2021-02-23

## 2020-08-24 RX ORDER — ATORVASTATIN CALCIUM 40 MG/1
40 TABLET, FILM COATED ORAL NIGHTLY
Qty: 30 TABLET | Refills: 5 | Status: SHIPPED
Start: 2020-08-24 | End: 2021-02-23

## 2020-09-14 RX ORDER — LEVETIRACETAM 500 MG/1
TABLET ORAL
Qty: 180 TABLET | Refills: 0 | Status: SHIPPED
Start: 2020-09-14 | End: 2020-10-12

## 2020-09-28 RX ORDER — ESCITALOPRAM OXALATE 20 MG/1
TABLET ORAL
Qty: 60 TABLET | Refills: 5 | Status: SHIPPED
Start: 2020-09-28 | End: 2021-03-29

## 2020-10-05 ENCOUNTER — OFFICE VISIT (OUTPATIENT)
Dept: FAMILY MEDICINE CLINIC | Age: 50
End: 2020-10-05
Payer: COMMERCIAL

## 2020-10-05 VITALS
RESPIRATION RATE: 18 BRPM | OXYGEN SATURATION: 97 % | SYSTOLIC BLOOD PRESSURE: 100 MMHG | HEIGHT: 73 IN | DIASTOLIC BLOOD PRESSURE: 60 MMHG | TEMPERATURE: 97.6 F | WEIGHT: 158 LBS | HEART RATE: 66 BPM | BODY MASS INDEX: 20.94 KG/M2

## 2020-10-05 PROCEDURE — 90686 IIV4 VACC NO PRSV 0.5 ML IM: CPT | Performed by: FAMILY MEDICINE

## 2020-10-05 PROCEDURE — 4004F PT TOBACCO SCREEN RCVD TLK: CPT | Performed by: FAMILY MEDICINE

## 2020-10-05 PROCEDURE — G8482 FLU IMMUNIZE ORDER/ADMIN: HCPCS | Performed by: FAMILY MEDICINE

## 2020-10-05 PROCEDURE — G8420 CALC BMI NORM PARAMETERS: HCPCS | Performed by: FAMILY MEDICINE

## 2020-10-05 PROCEDURE — G8427 DOCREV CUR MEDS BY ELIG CLIN: HCPCS | Performed by: FAMILY MEDICINE

## 2020-10-05 PROCEDURE — 99214 OFFICE O/P EST MOD 30 MIN: CPT | Performed by: FAMILY MEDICINE

## 2020-10-05 PROCEDURE — 90471 IMMUNIZATION ADMIN: CPT | Performed by: FAMILY MEDICINE

## 2020-10-09 NOTE — PROGRESS NOTES
SUBJECTIVE:        Patient ID: Sergo Jaffe is a 53 y.o. male who presents for   Chief Complaint   Patient presents with    Hypertension         Weight loss : improving . Had extensive work up in the past with CT abd/pelvis, CT head, CXR which have been remarkable. He follows with heme/onc for myeloproliferative disorder. He thinks that he does not have appetite as he works in extremely hot environment. He also has hx of depression and anxiety and is on SSRI and Trazodone. Used to be on Remeron which helped with his appetite. Feels that he has been more anxiously lately. Denies any SI/HI. Was referred to psych but hasn't been seen yet. BP is controlled. Hyperlipidemia:Doing well. Watching diet . No weakness or myalgias. No chest pain or dyspnea.     Seizure disorder: stable        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    Atheroembolism of left lower extremity (HCC) 9/11/2014    Atrial flutter (HCC)     Coronary atherosclerosis due to calcified coronary lesion(414.4)     Discoloration of skin of toe 9/11/2014    ED (erectile dysfunction)     Epilepsy ([address]     Hyperlipidemia     Hypertension     IBS (irritable bowel syndrome)     Irregular heart beat     Ischemic toe 9/11/2014    MI, old     Seizures ([address]     Tinea     Tobacco dependence 9/16/2014    Tobacco use        Patient Active Problem List   Diagnosis    Tobacco dependence    Essential hypertension    Hyperlipidemia    Atrial flutter ([address]    Coronary atherosclerosis due to calcified coronary lesion    ED (erectile dysfunction)    Myocardial infarction, old    IBS (irritable bowel syndrome)    Seizure disorder (HCC)    Essential thrombocythemia ([address]    Myeloproliferative disease ([address]    Non compliance w medication regimen    Stented coronary artery    Precordial pain    Bradycardia    Other emphysema ([address]       Past Surgical History:   Procedure Laterality Date    CARDIAC SURGERY      COLONOSCOPY  8/2014    CORONARY ANGIOPLASTY  2009    stent placement    CORONARY ANGIOPLASTY WITH STENT PLACEMENT      UPPER GASTROINTESTINAL ENDOSCOPY  8/2014    UPPER GASTROINTESTINAL ENDOSCOPY         Family History   Problem Relation Age of Onset    Diabetes Brother     Hearing Loss Maternal Uncle     Diabetes Brother     Hearing Loss Maternal Uncle     Other Mother         COPD    Other Father         cirrhosis of liver       Social History     Socioeconomic History    Marital status:      Spouse name: None    Number of children: None    Years of education: None    Highest education level: None   Occupational History    None   Social Needs    Financial resource strain: None    Food insecurity     Worry: None     Inability: None    Transportation needs     Medical: None     Non-medical: None   Tobacco Use    Smoking status: Current Every Day Smoker     Packs/day: 0.50     Years: 20.00     Pack years: 10.00     Types: Cigarettes    Smokeless tobacco: Current User     Types: Chew    Tobacco comment: cutting down   Substance and Sexual Activity    Alcohol use: No     Alcohol/week: 0.0 standard drinks     Comment: quit 12 months ago    Drug use: Yes     Types: Marijuana     Comment: 5x past year    Sexual activity: Yes     Partners: Female   Lifestyle    Physical activity     Days per week: None     Minutes per session: None    Stress: None   Relationships    Social connections     Talks on phone: None     Gets together: None     Attends Gnosticism service: None     Active member of club or organization: None     Attends meetings of clubs or organizations: None     Relationship status: None    Intimate partner violence     Fear of current or ex partner: None     Emotionally abused: None     Physically abused: None     Forced sexual activity: None   Other Topics Concern    None   Social History Narrative    None       Allergies   Allergen from Last 3 Encounters:   10/05/20 158 lb (71.7 kg)   07/06/20 153 lb (69.4 kg)   07/03/20 163 lb (73.9 kg)     Temp Readings from Last 3 Encounters:   10/05/20 97.6 °F (36.4 °C) (Temporal)   07/06/20 98.4 °F (36.9 °C) (Temporal)   07/03/20 97.7 °F (36.5 °C) (Tympanic)     BP Readings from Last 3 Encounters:   10/05/20 100/60   07/06/20 104/67   07/03/20 106/87        General assesment: Alert, Awake, Oriented times 3, no distress  Skin: Warm and dry  Head: Normocephalic. No masses, lesions or tenderness noted  Eyes: Conjunctivae appear normal.   Ears: External ears normal  Chest - clear to auscultation, no wheezes, rales or rhonchi, symmetric air entry  Heart - RRR, S1, S2, negative murmurs  Abdomen - soft, mild diffuse tenderness  Neurological - alert, oriented, normal speech, no focal findings or movement disorder noted  Extremities - peripheral pulses normal, no pedal edema, no clubbing or cyanosis  Mental status: congruent, normal thought and judgement    ASSESSMENT / PLAN :     Sahil Gardner was seen today for hypertension. Diagnoses and all orders for this visit:    Essential thrombocythemia ([address]  -     CBC; Future  -     Comprehensive Metabolic Panel; Future  -     Lipid Panel; Future  -     TSH without Reflex; Future    Hyperlipidemia, unspecified hyperlipidemia type  -     Lipid Panel; Future    Seizure disorder (HCC)  No recent episodes    Vitamin D deficiency  -     Vitamin D 25 Hydroxy; Future    Need for influenza vaccination  -     INFLUENZA, QUADV, 3 YRS AND OLDER, IM PF, PREFILL SYR OR SDV, 0.5ML (SONNY Pena)          Counseled regarding above diagnosis, including possible risks and complications,  especially if left uncontrolled. Counseled regarding the possible side effects, risks, benefits and alternatives to treatment; patient and/or guardian verbalizes understanding, agrees, feels comfortable with and wishes to proceed with above treatment plan.       Discussed any signs and symptoms that

## 2020-10-12 RX ORDER — ISOSORBIDE MONONITRATE 30 MG/1
TABLET, EXTENDED RELEASE ORAL
Qty: 30 TABLET | Refills: 3 | Status: SHIPPED
Start: 2020-10-12 | End: 2021-02-08

## 2020-10-12 RX ORDER — LEVETIRACETAM 500 MG/1
TABLET ORAL
Qty: 180 TABLET | Refills: 0 | Status: SHIPPED
Start: 2020-10-12 | End: 2020-11-09

## 2020-11-09 RX ORDER — LEVETIRACETAM 500 MG/1
TABLET ORAL
Qty: 180 TABLET | Refills: 0 | Status: SHIPPED
Start: 2020-11-09 | End: 2020-12-07

## 2020-11-20 RX ORDER — TRAZODONE HYDROCHLORIDE 100 MG/1
TABLET ORAL
Qty: 30 TABLET | Refills: 5 | Status: SHIPPED
Start: 2020-11-20 | End: 2021-05-21

## 2020-12-07 RX ORDER — MIRTAZAPINE 30 MG/1
TABLET, FILM COATED ORAL
Qty: 30 TABLET | Refills: 5 | Status: SHIPPED
Start: 2020-12-07 | End: 2021-03-09

## 2020-12-07 RX ORDER — LEVETIRACETAM 500 MG/1
TABLET ORAL
Qty: 180 TABLET | Refills: 0 | Status: SHIPPED
Start: 2020-12-07 | End: 2021-01-05

## 2021-01-05 DIAGNOSIS — G40.909 SEIZURE DISORDER (HCC): ICD-10-CM

## 2021-01-05 RX ORDER — LEVETIRACETAM 500 MG/1
TABLET ORAL
Qty: 180 TABLET | Refills: 0 | Status: SHIPPED
Start: 2021-01-05 | End: 2021-02-08

## 2021-01-17 ENCOUNTER — HOSPITAL ENCOUNTER (EMERGENCY)
Age: 51
Discharge: HOME | End: 2021-01-17
Attending: EMERGENCY MEDICINE
Payer: COMMERCIAL

## 2021-01-17 ENCOUNTER — APPOINTMENT (OUTPATIENT)
Dept: GENERAL RADIOLOGY | Age: 51
End: 2021-01-17
Payer: COMMERCIAL

## 2021-01-17 VITALS
WEIGHT: 168 LBS | OXYGEN SATURATION: 99 % | HEART RATE: 58 BPM | BODY MASS INDEX: 22.26 KG/M2 | RESPIRATION RATE: 16 BRPM | DIASTOLIC BLOOD PRESSURE: 70 MMHG | TEMPERATURE: 98.5 F | HEIGHT: 73 IN | SYSTOLIC BLOOD PRESSURE: 124 MMHG

## 2021-01-17 DIAGNOSIS — R07.9 CHEST PAIN, UNSPECIFIED TYPE: Primary | ICD-10-CM

## 2021-01-17 LAB
ANION GAP SERPL CALCULATED.3IONS-SCNC: 6 MMOL/L (ref 7–16)
BASOPHILS ABSOLUTE: 0.11 E9/L (ref 0–0.2)
BASOPHILS RELATIVE PERCENT: 1.6 % (ref 0–2)
BUN BLDV-MCNC: 18 MG/DL (ref 6–20)
CALCIUM SERPL-MCNC: 9.4 MG/DL (ref 8.6–10.2)
CHLORIDE BLD-SCNC: 103 MMOL/L (ref 98–107)
CO2: 28 MMOL/L (ref 22–29)
CREAT SERPL-MCNC: 0.9 MG/DL (ref 0.7–1.2)
EOSINOPHILS ABSOLUTE: 0.34 E9/L (ref 0.05–0.5)
EOSINOPHILS RELATIVE PERCENT: 5 % (ref 0–6)
GFR AFRICAN AMERICAN: >60
GFR NON-AFRICAN AMERICAN: >60 ML/MIN/1.73
GLUCOSE BLD-MCNC: 107 MG/DL (ref 74–99)
HCT VFR BLD CALC: 43.8 % (ref 37–54)
HEMOGLOBIN: 14.8 G/DL (ref 12.5–16.5)
IMMATURE GRANULOCYTES #: 0.05 E9/L
IMMATURE GRANULOCYTES %: 0.7 % (ref 0–5)
LYMPHOCYTES ABSOLUTE: 2 E9/L (ref 1.5–4)
LYMPHOCYTES RELATIVE PERCENT: 29.3 % (ref 20–42)
MCH RBC QN AUTO: 37.4 PG (ref 26–35)
MCHC RBC AUTO-ENTMCNC: 33.8 % (ref 32–34.5)
MCV RBC AUTO: 110.6 FL (ref 80–99.9)
MONOCYTES ABSOLUTE: 0.55 E9/L (ref 0.1–0.95)
MONOCYTES RELATIVE PERCENT: 8.1 % (ref 2–12)
NEUTROPHILS ABSOLUTE: 3.78 E9/L (ref 1.8–7.3)
NEUTROPHILS RELATIVE PERCENT: 55.3 % (ref 43–80)
PDW BLD-RTO: 14.6 FL (ref 11.5–15)
PLATELET # BLD: 481 E9/L (ref 130–450)
PMV BLD AUTO: 9.2 FL (ref 7–12)
POTASSIUM SERPL-SCNC: 5.9 MMOL/L (ref 3.5–5)
RBC # BLD: 3.96 E12/L (ref 3.8–5.8)
RBC # BLD: NORMAL 10*6/UL
SODIUM BLD-SCNC: 137 MMOL/L (ref 132–146)
TROPONIN: <0.01 NG/ML (ref 0–0.03)
WBC # BLD: 6.8 E9/L (ref 4.5–11.5)

## 2021-01-17 PROCEDURE — 71046 X-RAY EXAM CHEST 2 VIEWS: CPT

## 2021-01-17 PROCEDURE — 99285 EMERGENCY DEPT VISIT HI MDM: CPT

## 2021-01-17 PROCEDURE — 85025 COMPLETE CBC W/AUTO DIFF WBC: CPT

## 2021-01-17 PROCEDURE — 80048 BASIC METABOLIC PNL TOTAL CA: CPT

## 2021-01-17 PROCEDURE — 84484 ASSAY OF TROPONIN QUANT: CPT

## 2021-01-17 PROCEDURE — 93005 ELECTROCARDIOGRAM TRACING: CPT | Performed by: EMERGENCY MEDICINE

## 2021-01-17 ASSESSMENT — PAIN DESCRIPTION - LOCATION: LOCATION: RIB CAGE

## 2021-01-17 ASSESSMENT — ENCOUNTER SYMPTOMS
EYE PAIN: 0
SHORTNESS OF BREATH: 0
COUGH: 0
WHEEZING: 0
BACK PAIN: 1
EYE REDNESS: 0
SINUS PRESSURE: 0
EYE DISCHARGE: 0
SORE THROAT: 0
NAUSEA: 0
DIARRHEA: 0
VOMITING: 0
ABDOMINAL PAIN: 0

## 2021-01-17 ASSESSMENT — PAIN SCALES - GENERAL: PAINLEVEL_OUTOF10: 0

## 2021-01-17 ASSESSMENT — PAIN DESCRIPTION - PAIN TYPE: TYPE: ACUTE PAIN

## 2021-01-17 NOTE — ED PROVIDER NOTES
Department of Emergency Medicine   ED  Provider Note  Admit Date/RoomTime: 1/17/2021  1:17 PM  ED Room: 05/05 1/17/21  1:48 PM EST    History of Present Illness:   Pooja Alexandra is a 53 y.o. male presenting to the ED for left lower lateral chest pain, beginning yesterday. The complaint has been intermittent, moderate in severity, and worsened by standing. Denies any current pain. Patient reports since last night he is been having intermittent episodes of pain to his left lower lateral chest that occurs when he stands for prolonged periods. He denies pain with inspiration or any current pain. He denies taking any medication for the pain. He denies any associated shortness of breath palpitations, leg swelling, leg pain or any other complaints. Patient reports history of pleurisy and believes this may be the same      Review of Systems:   Pertinent positives and negatives are stated within HPI, all other systems reviewed and are negative. Review of Systems   Constitutional: Negative for chills and fever. HENT: Negative for ear pain, sinus pressure and sore throat. Eyes: Negative for pain, discharge and redness. Respiratory: Negative for cough, shortness of breath and wheezing. Cardiovascular: Positive for chest pain. Negative for palpitations and leg swelling. Gastrointestinal: Negative for abdominal pain, diarrhea, nausea and vomiting. Genitourinary: Negative for dysuria and frequency. Musculoskeletal: Positive for back pain. Negative for arthralgias. Skin: Negative for rash and wound. Neurological: Negative for weakness and headaches. Hematological: Negative for adenopathy.    All other systems reviewed and are negative.           --------------------------------------------- PAST HISTORY --------------------------------------------- Past Medical History:  has a past medical history of Atheroembolism of left lower extremity ([address] Atheroembolism of left lower extremity ([address] Atrial flutter ([address] Coronary atherosclerosis due to calcified coronary lesion(414.4), Discoloration of skin of toe, ED (erectile dysfunction), Epilepsy ([address] Hyperlipidemia, Hypertension, IBS (irritable bowel syndrome), Irregular heart beat, Ischemic toe, MI, old, Seizures ([address] Tinea, Tobacco dependence, and Tobacco use. Past Surgical History:  has a past surgical history that includes Cardiac surgery; Coronary angioplasty (2009); Upper gastrointestinal endoscopy (8/2014); Colonoscopy (8/2014); Coronary angioplasty with stent; and Upper gastrointestinal endoscopy. Social History:  reports that he has been smoking cigarettes. He has a 10.00 pack-year smoking history. His smokeless tobacco use includes chew. He reports current drug use. Drug: Marijuana. He reports that he does not drink alcohol. Family History: family history includes Diabetes in his brother and brother; Hearing Loss in his maternal uncle and maternal uncle; Other in his father and mother. The patients home medications have been reviewed. Allergies: Acyclovir and related and Doxycycline        ------------------------- NURSING NOTES AND VITALS REVIEWED ---------------------------   The nursing notes within the ED encounter and vital signs as below have been reviewed. /70   Pulse 58   Temp 98.5 °F (36.9 °C) (Temporal)   Resp 16   Ht 6' 1\" (1.854 m)   Wt 168 lb (76.2 kg)   SpO2 99%   BMI 22.16 kg/m²   Oxygen Saturation Interpretation: Normal      ---------------------------------------------------PHYSICAL EXAM--------------------------------------    Physical Exam  Vitals signs and nursing note reviewed. Constitutional:       Appearance: He is well-developed. HENT:      Head: Normocephalic and atraumatic.    Eyes:      Conjunctiva/sclera: Conjunctivae normal.   Neck: Musculoskeletal: Normal range of motion and neck supple. Cardiovascular:      Rate and Rhythm: Normal rate and regular rhythm. Pulses: Normal pulses. Heart sounds: Normal heart sounds. No murmur. Pulmonary:      Effort: Pulmonary effort is normal. No respiratory distress. Breath sounds: Normal breath sounds. No wheezing or rales. Chest:       Abdominal:      General: Bowel sounds are normal.      Palpations: Abdomen is soft. Tenderness: There is no abdominal tenderness. There is no guarding or rebound. Musculoskeletal:         General: No tenderness or deformity. Skin:     General: Skin is warm and dry. Neurological:      Mental Status: He is alert and oriented to person, place, and time. Cranial Nerves: No cranial nerve deficit.       Coordination: Coordination normal.            -------------------------------------------------- RESULTS -------------------------------------------------  All laboratory and radiology results have been personally reviewed by myself   LABS:  Results for orders placed or performed during the hospital encounter of 01/17/21   CBC Auto Differential   Result Value Ref Range    WBC 6.8 4.5 - 11.5 E9/L    RBC 3.96 3.80 - 5.80 E12/L    Hemoglobin 14.8 12.5 - 16.5 g/dL    Hematocrit 43.8 37.0 - 54.0 %    .6 (H) 80.0 - 99.9 fL    MCH 37.4 (H) 26.0 - 35.0 pg    MCHC 33.8 32.0 - 34.5 %    RDW 14.6 11.5 - 15.0 fL    Platelets 493 (H) 117 - 450 E9/L    MPV 9.2 7.0 - 12.0 fL    Neutrophils % 55.3 43.0 - 80.0 %    Immature Granulocytes % 0.7 0.0 - 5.0 %    Lymphocytes % 29.3 20.0 - 42.0 %    Monocytes % 8.1 2.0 - 12.0 %    Eosinophils % 5.0 0.0 - 6.0 %    Basophils % 1.6 0.0 - 2.0 %    Neutrophils Absolute 3.78 1.80 - 7.30 E9/L    Immature Granulocytes # 0.05 E9/L    Lymphocytes Absolute 2.00 1.50 - 4.00 E9/L    Monocytes Absolute 0.55 0.10 - 0.95 E9/L    Eosinophils Absolute 0.34 0.05 - 0.50 E9/L    Basophils Absolute 0.11 0.00 - 0.20 E9/L RBC Morphology Normal    Basic Metabolic Panel   Result Value Ref Range    Sodium 137 132 - 146 mmol/L    Potassium 5.9 (H) 3.5 - 5.0 mmol/L    Chloride 103 98 - 107 mmol/L    CO2 28 22 - 29 mmol/L    Anion Gap 6 (L) 7 - 16 mmol/L    Glucose 107 (H) 74 - 99 mg/dL    BUN 18 6 - 20 mg/dL    CREATININE 0.9 0.7 - 1.2 mg/dL    GFR Non-African American >60 >=60 mL/min/1.73    GFR African American >60     Calcium 9.4 8.6 - 10.2 mg/dL   Troponin   Result Value Ref Range    Troponin <0.01 0.00 - 0.03 ng/mL   EKG 12 Lead   Result Value Ref Range    Ventricular Rate 51 BPM    Atrial Rate 51 BPM    P-R Interval 164 ms    QRS Duration 88 ms    Q-T Interval 428 ms    QTc Calculation (Bazett) 394 ms    P Axis 63 degrees    R Axis 52 degrees    T Axis 56 degrees       RADIOLOGY:  Interpreted by Radiologist.  XR CHEST (2 VW)   Final Result   No acute process. EKG: This EKG is signed by emergency department physician. Rate: 51  Rhythm: Sinus bradycardia  Interpretation: no acute changes  Comparison: stable as compared to patient's most recent EKG 12/27/19      ------------------------------ ED COURSE/MEDICAL DECISION MAKING----------------------  Medications - No data to display    ED Course as of Jan 17 1534   Sun Jan 17, 2021   1448 hemolyzed   Potassium(!): 5.9 [MF]   1505 He remains asymptomatic. Discussed return precautions and follow-up instructions patient stresses understanding. All questions answered    [MF]      ED Course User Index  [MF] Sherrill Olson DO          Medical Decision Making:    With intermittent episodes of left lower lateral chest pain none currently with no associated symptoms. Patient is PERC negative work-up including laboratory studies reviewed by me and are unremarkable. Patient remains asymptomatic. Will discharge home with return precautions    PERC Rule for PE:      Age ?  50 negative     HR ? 100 negative     O2 Sat on Room Air < 95% negative

## 2021-01-17 NOTE — LETTER
5 [facility] Emergency Department  [address] [place] [address]  Phone: [phone]               January 17, 2021    Patient: Mary Amado   YOB: 1970   Date of Visit: 1/17/2021       To Whom It May Concern:    Ligia Malcolm was seen and treated in our emergency department on 1/17/2021.        Sincerely,             Signature:__________________________________

## 2021-01-17 NOTE — LETTER
5 [facility] Emergency Department  [address] [place] [address]  Phone: [phone]               January 17, 2021    Patient: Patrick Soriano   YOB: 1970   Date of Visit: 1/17/2021       To Whom It May Concern:    Nadertammiearielle Shencharles was seen and treated in our emergency department on 1/17/2021.        Sincerely,               Signature:__________________________________

## 2021-01-18 LAB
EKG ATRIAL RATE: 51 BPM
EKG P AXIS: 63 DEGREES
EKG P-R INTERVAL: 164 MS
EKG Q-T INTERVAL: 428 MS
EKG QRS DURATION: 88 MS
EKG QTC CALCULATION (BAZETT): 394 MS
EKG R AXIS: 52 DEGREES
EKG T AXIS: 56 DEGREES
EKG VENTRICULAR RATE: 51 BPM

## 2021-02-06 DIAGNOSIS — I10 ESSENTIAL HYPERTENSION: ICD-10-CM

## 2021-02-06 DIAGNOSIS — G40.909 SEIZURE DISORDER (HCC): ICD-10-CM

## 2021-02-08 RX ORDER — ISOSORBIDE MONONITRATE 30 MG/1
TABLET, EXTENDED RELEASE ORAL
Qty: 30 TABLET | Refills: 3 | Status: SHIPPED
Start: 2021-02-08 | End: 2021-02-09

## 2021-02-08 RX ORDER — LEVETIRACETAM 500 MG/1
TABLET ORAL
Qty: 180 TABLET | Refills: 0 | Status: SHIPPED
Start: 2021-02-08 | End: 2021-03-08

## 2021-02-09 ENCOUNTER — OFFICE VISIT (OUTPATIENT)
Dept: FAMILY MEDICINE CLINIC | Age: 51
End: 2021-02-09
Payer: COMMERCIAL

## 2021-02-09 VITALS
TEMPERATURE: 97.5 F | DIASTOLIC BLOOD PRESSURE: 59 MMHG | HEART RATE: 60 BPM | WEIGHT: 165 LBS | OXYGEN SATURATION: 97 % | BODY MASS INDEX: 21.87 KG/M2 | RESPIRATION RATE: 18 BRPM | SYSTOLIC BLOOD PRESSURE: 96 MMHG | HEIGHT: 73 IN

## 2021-02-09 DIAGNOSIS — F17.200 TOBACCO DEPENDENCE: ICD-10-CM

## 2021-02-09 DIAGNOSIS — I10 ESSENTIAL HYPERTENSION: Primary | ICD-10-CM

## 2021-02-09 PROCEDURE — G8427 DOCREV CUR MEDS BY ELIG CLIN: HCPCS | Performed by: FAMILY MEDICINE

## 2021-02-09 PROCEDURE — 99214 OFFICE O/P EST MOD 30 MIN: CPT | Performed by: FAMILY MEDICINE

## 2021-02-09 PROCEDURE — G8482 FLU IMMUNIZE ORDER/ADMIN: HCPCS | Performed by: FAMILY MEDICINE

## 2021-02-09 PROCEDURE — 4004F PT TOBACCO SCREEN RCVD TLK: CPT | Performed by: FAMILY MEDICINE

## 2021-02-09 PROCEDURE — G8420 CALC BMI NORM PARAMETERS: HCPCS | Performed by: FAMILY MEDICINE

## 2021-02-09 RX ORDER — METOPROLOL SUCCINATE 25 MG/1
25 TABLET, EXTENDED RELEASE ORAL DAILY
Qty: 30 TABLET | Refills: 3 | Status: SHIPPED
Start: 2021-02-09 | End: 2021-06-02

## 2021-02-09 RX ORDER — NICOTINE 21 MG/24HR
1 PATCH, TRANSDERMAL 24 HOURS TRANSDERMAL DAILY
Qty: 42 PATCH | Refills: 0 | Status: SHIPPED
Start: 2021-02-09 | End: 2021-03-09

## 2021-02-09 ASSESSMENT — PATIENT HEALTH QUESTIONNAIRE - PHQ9
9. THOUGHTS THAT YOU WOULD BE BETTER OFF DEAD, OR OF HURTING YOURSELF: 0
SUM OF ALL RESPONSES TO PHQ QUESTIONS 1-9: 3
SUM OF ALL RESPONSES TO PHQ QUESTIONS 1-9: 3
8. MOVING OR SPEAKING SO SLOWLY THAT OTHER PEOPLE COULD HAVE NOTICED. OR THE OPPOSITE, BEING SO FIGETY OR RESTLESS THAT YOU HAVE BEEN MOVING AROUND A LOT MORE THAN USUAL: 0
SUM OF ALL RESPONSES TO PHQ9 QUESTIONS 1 & 2: 3
10. IF YOU CHECKED OFF ANY PROBLEMS, HOW DIFFICULT HAVE THESE PROBLEMS MADE IT FOR YOU TO DO YOUR WORK, TAKE CARE OF THINGS AT HOME, OR GET ALONG WITH OTHER PEOPLE: 0
4. FEELING TIRED OR HAVING LITTLE ENERGY: 0

## 2021-02-09 NOTE — PROGRESS NOTES
Chief Complaint   Patient presents with   [place] ED Follow-up     chest pain; feels like it was muscle related      ER f/u  Was seen for chest pain  Thought to be traumatic chest wall pain  Symptoms have resolved at this time    BP is running low  Has been taking Imdur and Metoprolol  Hx of CAD  Denies any lightheadedness, dizziness, balance issues, sob    Hx of tobacco use    Past Medical History:   Diagnosis Date    Atheroembolism of left lower extremity ([facility] 9/11/2014    Atheroembolism of left lower extremity ([facility] 9/11/2014    Atrial flutter ([facility]     Coronary atherosclerosis due to calcified coronary lesion(414.4)     Discoloration of skin of toe 9/11/2014    ED (erectile dysfunction)     Epilepsy ([facility]     Hyperlipidemia     Hypertension     IBS (irritable bowel syndrome)     Irregular heart beat     Ischemic toe 9/11/2014    MI, old     Seizures ([facility]     Tinea     Tobacco dependence 9/16/2014    Tobacco use      O: Blood pressure (!) 96/59, pulse 60, temperature 97.5 °F (36.4 °C), temperature source Temporal, resp. rate 18, height 6' 1\" (1.854 m), weight 165 lb (74.8 kg), SpO2 97 %. Physical Examination: General appearance - alert, well appearing, and in no distress  Chest - clear to auscultation, no wheezes, rales or rhonchi, symmetric air entry  Heart - normal rate, regular rhythm, normal S1, S2  Abdomen - soft, nontender, nondistended, no masses or organomegaly  Neurological - alert, oriented, normal speech, no focal findings or movement disorder noted  Extremities - peripheral pulses normal, no pedal edema, no clubbing or cyanosis      A/P:  Darinel Lopez was seen today for ed follow-up. Diagnoses and all orders for this visit:    Essential hypertension  -     metoprolol succinate (TOPROL XL) 25 MG extended release tablet;  Take 1 tablet by mouth daily  -    Switch from Lopressor 25 mg BID to Toprol 25 mg daily  -    Discontinue Imdur  -    Monitor BP    Tobacco dependence  -     nicotine (NICODERM CQ) 14 MG/24HR;  Place 1 patch onto the skin daily    F/u as instructed

## 2021-02-20 DIAGNOSIS — I25.84 CORONARY ATHEROSCLEROSIS DUE TO CALCIFIED CORONARY LESION: ICD-10-CM

## 2021-02-20 DIAGNOSIS — I25.10 CORONARY ATHEROSCLEROSIS DUE TO CALCIFIED CORONARY LESION: ICD-10-CM

## 2021-02-23 RX ORDER — ATORVASTATIN CALCIUM 40 MG/1
40 TABLET, FILM COATED ORAL NIGHTLY
Qty: 30 TABLET | Refills: 5 | Status: SHIPPED
Start: 2021-02-23 | End: 2021-08-20 | Stop reason: SDUPTHER

## 2021-02-23 RX ORDER — CLOPIDOGREL BISULFATE 75 MG/1
TABLET ORAL
Qty: 30 TABLET | Refills: 5 | Status: SHIPPED
Start: 2021-02-23 | End: 2021-08-20 | Stop reason: SDUPTHER

## 2021-03-06 DIAGNOSIS — G40.909 SEIZURE DISORDER (HCC): ICD-10-CM

## 2021-03-08 RX ORDER — LEVETIRACETAM 500 MG/1
TABLET ORAL
Qty: 180 TABLET | Refills: 0 | Status: SHIPPED
Start: 2021-03-08 | End: 2021-03-09 | Stop reason: SDUPTHER

## 2021-03-09 ENCOUNTER — OFFICE VISIT (OUTPATIENT)
Dept: FAMILY MEDICINE CLINIC | Age: 51
End: 2021-03-09
Payer: COMMERCIAL

## 2021-03-09 VITALS
OXYGEN SATURATION: 97 % | SYSTOLIC BLOOD PRESSURE: 96 MMHG | HEART RATE: 81 BPM | RESPIRATION RATE: 18 BRPM | TEMPERATURE: 98.1 F | BODY MASS INDEX: 21.87 KG/M2 | DIASTOLIC BLOOD PRESSURE: 69 MMHG | HEIGHT: 73 IN | WEIGHT: 165 LBS

## 2021-03-09 DIAGNOSIS — G40.909 SEIZURE DISORDER (HCC): Primary | ICD-10-CM

## 2021-03-09 DIAGNOSIS — G89.29 CHRONIC MIDLINE LOW BACK PAIN, UNSPECIFIED WHETHER SCIATICA PRESENT: ICD-10-CM

## 2021-03-09 DIAGNOSIS — M54.50 CHRONIC MIDLINE LOW BACK PAIN, UNSPECIFIED WHETHER SCIATICA PRESENT: ICD-10-CM

## 2021-03-09 DIAGNOSIS — D47.3 ESSENTIAL THROMBOCYTHEMIA (HCC): ICD-10-CM

## 2021-03-09 DIAGNOSIS — R63.4 WEIGHT LOSS: ICD-10-CM

## 2021-03-09 DIAGNOSIS — F41.9 ANXIETY: ICD-10-CM

## 2021-03-09 DIAGNOSIS — D47.1 MYELOPROLIFERATIVE DISEASE (HCC): ICD-10-CM

## 2021-03-09 PROCEDURE — G8420 CALC BMI NORM PARAMETERS: HCPCS | Performed by: FAMILY MEDICINE

## 2021-03-09 PROCEDURE — 99214 OFFICE O/P EST MOD 30 MIN: CPT | Performed by: FAMILY MEDICINE

## 2021-03-09 PROCEDURE — 4004F PT TOBACCO SCREEN RCVD TLK: CPT | Performed by: FAMILY MEDICINE

## 2021-03-09 PROCEDURE — G8427 DOCREV CUR MEDS BY ELIG CLIN: HCPCS | Performed by: FAMILY MEDICINE

## 2021-03-09 PROCEDURE — G8482 FLU IMMUNIZE ORDER/ADMIN: HCPCS | Performed by: FAMILY MEDICINE

## 2021-03-09 RX ORDER — ISOSORBIDE MONONITRATE 30 MG/1
1 TABLET, EXTENDED RELEASE ORAL DAILY
COMMUNITY
Start: 2021-01-17 | End: 2021-03-09

## 2021-03-09 RX ORDER — MIRTAZAPINE 30 MG/1
TABLET, FILM COATED ORAL
Qty: 30 TABLET | Refills: 5 | Status: SHIPPED
Start: 2021-03-09 | End: 2021-07-20 | Stop reason: SDUPTHER

## 2021-03-09 RX ORDER — HYDROXYZINE PAMOATE 25 MG/1
25 CAPSULE ORAL 3 TIMES DAILY PRN
Qty: 60 CAPSULE | Refills: 3 | Status: SHIPPED
Start: 2021-03-09 | End: 2021-04-29 | Stop reason: CLARIF

## 2021-03-09 RX ORDER — LEVETIRACETAM 500 MG/1
TABLET ORAL
Qty: 180 TABLET | Refills: 3 | Status: SHIPPED
Start: 2021-03-09 | End: 2021-06-07

## 2021-03-09 NOTE — PROGRESS NOTES
Chief Complaint   Patient presents with    Hypertension     Follow up of hypotension. Patient has hx of HTN and CAD and was on Metoprolol and Imdur  BP were low- 70s/40s to 90s/ 50s. Meds were adjusted. Imdur was discontinued and dose of metoprolol was lowered. Is anxious, has family stressors,  Appetite and sleep disturbed. Seizures controlled but thinks that Keppra could be contributing to his low appetitie. Wants to switch his medications. Lower back pain, chronic, unchanged  6/10, last for few hours. Denies any N/T, weakness in extremities    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    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    Atrial flutter (HCC)     Coronary atherosclerosis due to calcified coronary lesion(414.4)     Discoloration of skin of toe 9/11/2014    ED (erectile dysfunction)     Epile[facility] ([facility]     Hyperlipidemia     Hypertension     IBS (irritable bowel syndrome)     Irregular heart beat     Ischemic toe 9/11/2014    MI, old     Seizures ([facility]     Tinea     Tobacco dependence 9/16/2014    Tobacco use      O: Blood pressure 96/69, pulse 81, temperature 98.1 °F (36.7 °C), temperature source Temporal, resp. rate 18, height 6' 1\" (1.854 m), weight 165 lb (74.8 kg), SpO2 97 %. Physical Examination: General appearance - alert, well appearing, and in no distress  Chest - clear to auscultation, no wheezes, rales or rhonchi, symmetric air entry  Heart - normal rate, regular rhythm, normal S1, S2, no murmurs, rubs, clicks or gallops  Abdomen - soft  Neurological - alert, oriented, normal speech, no focal findings or movement disorder noted  Extremities - no pedal edema  Mental l status: normal mood at this time    A/P:    Adri Or was seen today for hypertension.     Diagnoses and all orders for this visit:    Seizure disorder [facility])  -     Sukhjinder Harris MD, Neurology, Mirella Yousif  -     levETIRAcetam [facility]) 500 MG tablet; take 3 tablets by mouth twice a day  -     Will refer to neurology for further evaluation    Anxiety  -     External Referral To Psychiatry  -     Vistaril prn    Chronic midline low back pain, unspecified whether sciatica present  -     XR LUMBAR SPINE (2-3 VIEWS); Future    Weight loss  -     mirtazapine (REMERON) 30 MG tablet; take 1 tablet by mouth at bedtime    Myeloproliferative disease ([facility]  -  Following with heme /onc    Essential thrombocythemia ([facility]  -   Following with heme/ onc    Other orders  -     hydrOXYzine (VISTARIL) 25 MG capsule;  Take 1 capsule by mouth 3 times daily as needed for Anxiety  F/u as instructed

## 2021-03-22 ENCOUNTER — TELEPHONE (OUTPATIENT)
Dept: ADMINISTRATIVE | Age: 51
End: 2021-03-22

## 2021-03-22 NOTE — TELEPHONE ENCOUNTER
Pt cancelling his apt today with Dr. Hazle Siemens - he is not feeling well - he will callback to r/s later.

## 2021-03-27 DIAGNOSIS — F41.9 ANXIETY: ICD-10-CM

## 2021-03-27 DIAGNOSIS — F33.9 EPISODE OF RECURRENT MAJOR DEPRESSIVE DISORDER, UNSPECIFIED DEPRESSION EPISODE SEVERITY (HCC): ICD-10-CM

## 2021-03-29 RX ORDER — ESCITALOPRAM OXALATE 20 MG/1
TABLET ORAL
Qty: 60 TABLET | Refills: 5 | Status: SHIPPED | OUTPATIENT
Start: 2021-03-29

## 2021-04-13 ENCOUNTER — OFFICE VISIT (OUTPATIENT)
Dept: FAMILY MEDICINE CLINIC | Age: 51
End: 2021-04-13
Payer: COMMERCIAL

## 2021-04-13 VITALS
WEIGHT: 166 LBS | HEART RATE: 66 BPM | OXYGEN SATURATION: 99 % | SYSTOLIC BLOOD PRESSURE: 102 MMHG | DIASTOLIC BLOOD PRESSURE: 60 MMHG | HEIGHT: 73 IN | RESPIRATION RATE: 18 BRPM | BODY MASS INDEX: 22 KG/M2

## 2021-04-13 DIAGNOSIS — I25.10 CORONARY ATHEROSCLEROSIS DUE TO CALCIFIED CORONARY LESION: ICD-10-CM

## 2021-04-13 DIAGNOSIS — D47.3 ESSENTIAL THROMBOCYTHEMIA (HCC): Primary | ICD-10-CM

## 2021-04-13 DIAGNOSIS — E78.5 HYPERLIPIDEMIA, UNSPECIFIED HYPERLIPIDEMIA TYPE: ICD-10-CM

## 2021-04-13 DIAGNOSIS — D47.3 ESSENTIAL THROMBOCYTHEMIA (HCC): ICD-10-CM

## 2021-04-13 DIAGNOSIS — I25.84 CORONARY ATHEROSCLEROSIS DUE TO CALCIFIED CORONARY LESION: ICD-10-CM

## 2021-04-13 LAB
ALBUMIN SERPL-MCNC: 4.6 G/DL (ref 3.5–5.2)
ALP BLD-CCNC: 56 U/L (ref 40–129)
ALT SERPL-CCNC: 33 U/L (ref 0–40)
ANION GAP SERPL CALCULATED.3IONS-SCNC: 16 MMOL/L (ref 7–16)
AST SERPL-CCNC: 50 U/L (ref 0–39)
BILIRUB SERPL-MCNC: 0.4 MG/DL (ref 0–1.2)
BUN BLDV-MCNC: 20 MG/DL (ref 6–20)
CALCIUM SERPL-MCNC: 9.7 MG/DL (ref 8.6–10.2)
CHLORIDE BLD-SCNC: 106 MMOL/L (ref 98–107)
CHOLESTEROL, TOTAL: 139 MG/DL (ref 0–199)
CO2: 24 MMOL/L (ref 22–29)
CREAT SERPL-MCNC: 0.9 MG/DL (ref 0.7–1.2)
GFR AFRICAN AMERICAN: >60
GFR NON-AFRICAN AMERICAN: >60 ML/MIN/1.73
GLUCOSE BLD-MCNC: 86 MG/DL (ref 74–99)
HBA1C MFR BLD: 5.1 % (ref 4–5.6)
HCT VFR BLD CALC: 46.6 % (ref 37–54)
HDLC SERPL-MCNC: 35 MG/DL
HEMOGLOBIN: 15.1 G/DL (ref 12.5–16.5)
LDL CHOLESTEROL CALCULATED: 87 MG/DL (ref 0–99)
MCH RBC QN AUTO: 37.8 PG (ref 26–35)
MCHC RBC AUTO-ENTMCNC: 32.4 % (ref 32–34.5)
MCV RBC AUTO: 116.8 FL (ref 80–99.9)
PDW BLD-RTO: 14.5 FL (ref 11.5–15)
PLATELET # BLD: 499 E9/L (ref 130–450)
PMV BLD AUTO: 9.2 FL (ref 7–12)
POTASSIUM SERPL-SCNC: 5.7 MMOL/L (ref 3.5–5)
RBC # BLD: 3.99 E12/L (ref 3.8–5.8)
SODIUM BLD-SCNC: 146 MMOL/L (ref 132–146)
TOTAL PROTEIN: 7 G/DL (ref 6.4–8.3)
TRIGL SERPL-MCNC: 83 MG/DL (ref 0–149)
TSH SERPL DL<=0.05 MIU/L-ACNC: 1.36 UIU/ML (ref 0.27–4.2)
VLDLC SERPL CALC-MCNC: 17 MG/DL
WBC # BLD: 7.6 E9/L (ref 4.5–11.5)

## 2021-04-13 PROCEDURE — G8420 CALC BMI NORM PARAMETERS: HCPCS | Performed by: FAMILY MEDICINE

## 2021-04-13 PROCEDURE — 99214 OFFICE O/P EST MOD 30 MIN: CPT | Performed by: FAMILY MEDICINE

## 2021-04-13 PROCEDURE — 4004F PT TOBACCO SCREEN RCVD TLK: CPT | Performed by: FAMILY MEDICINE

## 2021-04-13 PROCEDURE — G8427 DOCREV CUR MEDS BY ELIG CLIN: HCPCS | Performed by: FAMILY MEDICINE

## 2021-04-13 NOTE — PROGRESS NOTES
Chief Complaint   Patient presents with    Hypertension     Patient was having hypotension. BP improved after medications were adjusted. Weight has been improving. Gained  1 pound in a month. Saw psych. Dose of trazodone was increased. Sleep is better. Has upcoming appointment with cardiology. Seizures are controlled on keppra. Wants to switch meds. Missed his appointment with neurologist yesterday as he was not sure about the date. 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    Atheroembolism of left lower extremity (HCC) 9/11/2014    Atrial flutter (HCC)     Coronary atherosclerosis due to calcified coronary lesion(414.4)     Discoloration of skin of toe 9/11/2014    ED (erectile dysfunction)     Epilepsy ([address]     Hyperlipidemia     Hypertension     IBS (irritable bowel syndrome)     Irregular heart beat     Ischemic toe 9/11/2014    MI, old     Seizures ([address]     Tinea     Tobacco dependence 9/16/2014    Tobacco use      O: Blood pressure 102/60, pulse 66, resp. rate 18, height 6' 1\" (1.854 m), weight 166 lb (75.3 kg), SpO2 99 %. Physical Examination:  General appearance - alert, well appearing, and in no distress  Chest - clear to auscultation, no wheezes, rales or rhonchi, symmetric air entry  Heart - normal rate, regular rhythm, normal S1, S2, no murmurs, rubs, clicks or gallops  Neurological - alert, oriented, normal speech, no focal findings or movement disorder noted  Extremities - no pedal edema  Skin- warm, dry  Mental status - Normal mood, judgement and thought process    A/P:    Alfred Tovar was seen today for hypertension. Diagnoses and all orders for this visit:    Essential thrombocythemia ([address]  -     CBC; Future    Hyperlipidemia, unspecified hyperlipidemia type  -     CBC; Future  -     Comprehensive Metabolic Panel; Future  -     Hemoglobin A1C; Future  -     TSH without Reflex;  Future  -     Vitamin D 25 Hydroxy;

## 2021-04-14 ENCOUNTER — IMMUNIZATION (OUTPATIENT)
Dept: PRIMARY CARE CLINIC | Age: 51
End: 2021-04-14
Payer: COMMERCIAL

## 2021-04-14 ENCOUNTER — NURSE ONLY (OUTPATIENT)
Dept: FAMILY MEDICINE CLINIC | Age: 51
End: 2021-04-14
Payer: COMMERCIAL

## 2021-04-14 DIAGNOSIS — E87.5 HYPERKALEMIA: ICD-10-CM

## 2021-04-14 DIAGNOSIS — E87.5 HYPERKALEMIA: Primary | ICD-10-CM

## 2021-04-14 LAB — VITAMIN D 25-HYDROXY: 33 NG/ML (ref 30–100)

## 2021-04-14 PROCEDURE — 36415 COLL VENOUS BLD VENIPUNCTURE: CPT | Performed by: FAMILY MEDICINE

## 2021-04-14 PROCEDURE — 0011A COVID-19, MODERNA VACCINE 100MCG/0.5ML DOSE: CPT | Performed by: FAMILY MEDICINE

## 2021-04-14 PROCEDURE — 91301 COVID-19, MODERNA VACCINE 100MCG/0.5ML DOSE: CPT | Performed by: FAMILY MEDICINE

## 2021-04-15 LAB — POTASSIUM SERPL-SCNC: 4.7 MMOL/L (ref 3.5–5)

## 2021-04-29 ENCOUNTER — OFFICE VISIT (OUTPATIENT)
Dept: CARDIOLOGY CLINIC | Age: 51
End: 2021-04-29
Payer: COMMERCIAL

## 2021-04-29 VITALS
HEIGHT: 73 IN | HEART RATE: 53 BPM | WEIGHT: 168.6 LBS | SYSTOLIC BLOOD PRESSURE: 90 MMHG | BODY MASS INDEX: 22.35 KG/M2 | RESPIRATION RATE: 16 BRPM | DIASTOLIC BLOOD PRESSURE: 60 MMHG | OXYGEN SATURATION: 96 %

## 2021-04-29 DIAGNOSIS — I25.10 CORONARY ATHEROSCLEROSIS DUE TO CALCIFIED CORONARY LESION: Primary | ICD-10-CM

## 2021-04-29 DIAGNOSIS — Z95.5 STENTED CORONARY ARTERY: ICD-10-CM

## 2021-04-29 DIAGNOSIS — I25.84 CORONARY ATHEROSCLEROSIS DUE TO CALCIFIED CORONARY LESION: Primary | ICD-10-CM

## 2021-04-29 DIAGNOSIS — I25.2 MYOCARDIAL INFARCTION, OLD: ICD-10-CM

## 2021-04-29 DIAGNOSIS — E78.00 PURE HYPERCHOLESTEROLEMIA: ICD-10-CM

## 2021-04-29 PROCEDURE — 93000 ELECTROCARDIOGRAM COMPLETE: CPT | Performed by: INTERNAL MEDICINE

## 2021-04-29 PROCEDURE — G8420 CALC BMI NORM PARAMETERS: HCPCS | Performed by: INTERNAL MEDICINE

## 2021-04-29 PROCEDURE — G8427 DOCREV CUR MEDS BY ELIG CLIN: HCPCS | Performed by: INTERNAL MEDICINE

## 2021-04-29 PROCEDURE — 99213 OFFICE O/P EST LOW 20 MIN: CPT | Performed by: INTERNAL MEDICINE

## 2021-04-29 PROCEDURE — 4004F PT TOBACCO SCREEN RCVD TLK: CPT | Performed by: INTERNAL MEDICINE

## 2021-04-29 RX ORDER — BUSPIRONE HYDROCHLORIDE 10 MG/1
TABLET ORAL
COMMUNITY
Start: 2021-04-12 | End: 2021-07-28 | Stop reason: ALTCHOICE

## 2021-04-29 RX ORDER — LANSOPRAZOLE 30 MG/1
CAPSULE, DELAYED RELEASE ORAL
COMMUNITY
Start: 2021-04-19

## 2021-04-29 RX ORDER — DICYCLOMINE HYDROCHLORIDE 10 MG/1
CAPSULE ORAL
COMMUNITY
Start: 2021-04-19 | End: 2021-10-08 | Stop reason: SDUPTHER

## 2021-04-29 NOTE — PROGRESS NOTES
Patient Active Problem List   Diagnosis    Tobacco dependence    Hyperlipidemia    Atrial flutter ([facility]    Coronary atherosclerosis due to calcified coronary lesion    ED (erectile dysfunction)    Myocardial infarction, old    IBS (irritable bowel syndrome)    Seizure disorder (HCC)    Essential thrombocythemia ([facility]    Myeloproliferative disease ([facility]    Stented coronary artery    Precordial pain    Bradycardia       Current Outpatient Medications   Medication Sig Dispense Refill    busPIRone (BUSPAR) 10 MG tablet take 1 tablet by mouth twice a day      dicyclomine (BENTYL) 10 MG capsule take 1 capsule by mouth twice a day if needed      lansoprazole (PREVACID) 30 MG delayed release capsule take 1 capsule by mouth every morning      escitalopram (LEXAPRO) 20 MG tablet take 2 tablets by mouth once daily 60 tablet 5    mirtazapine (REMERON) 30 MG tablet take 1 tablet by mouth at bedtime 30 tablet 5    levETIRAcetam (KEPPRA) 500 MG tablet take 3 tablets by mouth twice a day 180 tablet 3    atorvastatin (LIPITOR) 40 MG tablet take 1 tablet by mouth nightly 30 tablet 5    clopidogrel (PLAVIX) 75 MG tablet take 1 tablet by mouth once daily 30 tablet 5    metoprolol succinate (TOPROL XL) 25 MG extended release tablet Take 1 tablet by mouth daily (Patient taking differently: Take 12.5 mg by mouth daily ) 30 tablet 3    traZODone (DESYREL) 100 MG tablet take 1 tablet by mouth EVERY NIGHT (Patient taking differently: 150 mg TAKE 1 TABLET BY MOUTH EVERY NIGHT) 30 tablet 5    allopurinol (ZYLOPRIM) 100 MG tablet Take 1 tablet by mouth daily      Nutritional Supplements (BOOST) LIQD Take 1 can twice daily with meals 60 Can 1    hydroxyurea (HYDREA) 500 MG chemo capsule Take 1 capsule by mouth 2 times daily 60 capsule 1     No current facility-administered medications for this visit. CC:    Patient is seen in follow-up for:  1. Coronary atherosclerosis due to calcified coronary lesion    2. Myocardial infarction, old    3. Pure hypercholesterolemia    4. Stented coronary artery        HPI:  Jesús Gonzalez is seen in routine follow-up. He has done well overall since he was last seen. Denies any chest pain shortness of breath or excessive fatigue. He still has some issues with bradycardia and is on very low-dose beta-blocker therapy. Tolerating current medications well without side effects.       ROS:  General: No unusual weight gain, no change in exercise tolerance  Skin: No rash or itching  EENT: No vision changes or nosebleeds  Cardiovascular: No orthopnea or paroxysmal nocturnal dyspnea  Respiratory: No cough or hemoptysis  Gastrointestinal: No hematemesis or recent changes in bowel habits  Genitourinary: No hematuria, urgency or frequency  Musculoskeletal: No muscular weakness or joint swelling   Neurologic / Psychiatric: No incoordination or convulsions  Allergic / Immunologic/ Lymphatic / Endocrine: No anemia or bleeding tendency    Social History     Socioeconomic History    Marital status:      Spouse name: Not on file    Number of children: Not on file    Years of education: Not on file    Highest education level: Not on file   Occupational History    Not on file   Social Needs    Financial resource strain: Not on file    Food insecurity     Worry: Not on file     Inability: Not on file    Transportation needs     Medical: Not on file     Non-medical: Not on file   Tobacco Use    Smoking status: Current Every Day Smoker     Packs/day: 0.50     Years: 20.00     Pack years: 10.00     Types: Cigarettes    Smokeless tobacco: Current User     Types: Chew    Tobacco comment: cutting down   Substance and Sexual Activity    Alcohol use: No     Alcohol/week: 0.0 standard drinks     Comment: quit 12 months ago    Drug use: Yes     Types: Marijuana     Comment: 5x past year    Sexual activity: Yes     Partners: Female   Lifestyle    Physical activity     Days per week: Not on file all fields. No use of accessory muscle or intercostal retractions. CARDIOVASCULAR: Regular rate and rhythm. No lifts or thrills on palpitation. Auscultation with normal S1-S2 in intensity and splitting. No carotid bruits. Abdominal aorta not enlarged. Femoral arteries without bruits. Pedal pulses 2+. No edema. ABDOMEN: Soft without hepatic or splenic enlargement. No tenderness. MUSCULOSKELETAL: No kyphosis or scoliosis of the back. Good muscle strength and tone. No muscle atrophy. Normal gait and ability to undergo exercise stress testing. EXTREMITIES: No clubbing or cyanosis. SKIN: No Xanthomas or ulcerations. NEUROLOGIC: Oriented to time, place and person. Normal mood and affect. LYMPHATIC:  No palpable neck or supraclavicular nodes. No splenomegaly. EKG: the EKG tracing was reviewed and found to reveal: Alireza sinus rhythm.  ms. ASSESSMENT:                                                     ORDERS:       Diagnosis Orders   1. Coronary atherosclerosis due to calcified coronary lesion  EKG 12 Lead   2. Myocardial infarction, old  EKG 12 Lead   3. Pure hypercholesterolemia  EKG 12 Lead   4. Stented coronary artery  EKG 12 Lead     PLAN:   See above orders. Old records were reviewed and found to reveal: LDL 87  Assessment of medication compliance. Counseled in cigarette smoking cessation. Discussed issues that would prompt earlier evaluation. Same cardiac medications. Follow-up office visit -1 year.

## 2021-05-01 DIAGNOSIS — I10 ESSENTIAL HYPERTENSION: ICD-10-CM

## 2021-05-14 ENCOUNTER — IMMUNIZATION (OUTPATIENT)
Dept: PRIMARY CARE CLINIC | Age: 51
End: 2021-05-14
Payer: COMMERCIAL

## 2021-05-14 PROCEDURE — 91301 COVID-19, MODERNA VACCINE 100MCG/0.5ML DOSE: CPT | Performed by: FAMILY MEDICINE

## 2021-05-14 PROCEDURE — 0012A COVID-19, MODERNA VACCINE 100MCG/0.5ML DOSE: CPT | Performed by: FAMILY MEDICINE

## 2021-05-21 DIAGNOSIS — G47.00 INSOMNIA, UNSPECIFIED TYPE: ICD-10-CM

## 2021-05-21 RX ORDER — TRAZODONE HYDROCHLORIDE 100 MG/1
TABLET ORAL
Qty: 30 TABLET | Refills: 0 | Status: SHIPPED
Start: 2021-05-21 | End: 2021-06-21

## 2021-06-01 DIAGNOSIS — I10 ESSENTIAL HYPERTENSION: ICD-10-CM

## 2021-06-02 RX ORDER — ISOSORBIDE MONONITRATE 30 MG/1
TABLET, EXTENDED RELEASE ORAL
Qty: 30 TABLET | Refills: 3 | Status: SHIPPED
Start: 2021-06-02 | End: 2021-07-28

## 2021-06-02 RX ORDER — METOPROLOL SUCCINATE 25 MG/1
TABLET, EXTENDED RELEASE ORAL
Qty: 30 TABLET | Refills: 3 | Status: SHIPPED
Start: 2021-06-02 | End: 2021-10-08 | Stop reason: SDUPTHER

## 2021-06-07 DIAGNOSIS — G40.909 SEIZURE DISORDER (HCC): ICD-10-CM

## 2021-06-07 RX ORDER — LEVETIRACETAM 500 MG/1
TABLET ORAL
Qty: 180 TABLET | Refills: 0 | Status: SHIPPED
Start: 2021-06-07 | End: 2021-07-12

## 2021-06-19 DIAGNOSIS — G47.00 INSOMNIA, UNSPECIFIED TYPE: ICD-10-CM

## 2021-06-21 RX ORDER — TRAZODONE HYDROCHLORIDE 100 MG/1
TABLET ORAL
Qty: 30 TABLET | Refills: 0 | Status: SHIPPED
Start: 2021-06-21 | End: 2021-07-28 | Stop reason: SDUPTHER

## 2021-07-10 DIAGNOSIS — G40.909 SEIZURE DISORDER (HCC): ICD-10-CM

## 2021-07-12 RX ORDER — LEVETIRACETAM 500 MG/1
TABLET ORAL
Qty: 180 TABLET | Refills: 0 | Status: SHIPPED
Start: 2021-07-12 | End: 2021-08-02 | Stop reason: SDUPTHER

## 2021-07-13 ENCOUNTER — OFFICE VISIT (OUTPATIENT)
Dept: FAMILY MEDICINE CLINIC | Age: 51
End: 2021-07-13
Payer: COMMERCIAL

## 2021-07-13 VITALS
HEART RATE: 60 BPM | RESPIRATION RATE: 16 BRPM | TEMPERATURE: 98 F | WEIGHT: 165 LBS | DIASTOLIC BLOOD PRESSURE: 68 MMHG | HEIGHT: 73 IN | SYSTOLIC BLOOD PRESSURE: 111 MMHG | OXYGEN SATURATION: 99 % | BODY MASS INDEX: 21.87 KG/M2

## 2021-07-13 DIAGNOSIS — E78.5 HYPERLIPIDEMIA, UNSPECIFIED HYPERLIPIDEMIA TYPE: ICD-10-CM

## 2021-07-13 DIAGNOSIS — M25.511 RIGHT SHOULDER PAIN, UNSPECIFIED CHRONICITY: Primary | ICD-10-CM

## 2021-07-13 PROCEDURE — 99214 OFFICE O/P EST MOD 30 MIN: CPT | Performed by: FAMILY MEDICINE

## 2021-07-13 PROCEDURE — G8427 DOCREV CUR MEDS BY ELIG CLIN: HCPCS | Performed by: FAMILY MEDICINE

## 2021-07-13 PROCEDURE — G8420 CALC BMI NORM PARAMETERS: HCPCS | Performed by: FAMILY MEDICINE

## 2021-07-13 PROCEDURE — 3017F COLORECTAL CA SCREEN DOC REV: CPT | Performed by: FAMILY MEDICINE

## 2021-07-13 PROCEDURE — 4004F PT TOBACCO SCREEN RCVD TLK: CPT | Performed by: FAMILY MEDICINE

## 2021-07-13 SDOH — ECONOMIC STABILITY: FOOD INSECURITY: WITHIN THE PAST 12 MONTHS, THE FOOD YOU BOUGHT JUST DIDN'T LAST AND YOU DIDN'T HAVE MONEY TO GET MORE.: NEVER TRUE

## 2021-07-13 SDOH — ECONOMIC STABILITY: FOOD INSECURITY: WITHIN THE PAST 12 MONTHS, YOU WORRIED THAT YOUR FOOD WOULD RUN OUT BEFORE YOU GOT MONEY TO BUY MORE.: NEVER TRUE

## 2021-07-13 ASSESSMENT — SOCIAL DETERMINANTS OF HEALTH (SDOH): HOW HARD IS IT FOR YOU TO PAY FOR THE VERY BASICS LIKE FOOD, HOUSING, MEDICAL CARE, AND HEATING?: NOT HARD AT ALL

## 2021-07-13 NOTE — PATIENT INSTRUCTIONS
Patient Education        Shoulder Pain: Care Instructions  Your Care Instructions     You can hurt your shoulder by using it too much during an activity, such as fishing or baseball. It can also happen as part of the everyday wear and tear of getting older. Shoulder injuries can be slow to heal, but your shoulder should get better with time. Your doctor may recommend a sling to rest your shoulder. If you have injured your shoulder, you may need testing and treatment. Follow-up care is a key part of your treatment and safety. Be sure to make and go to all appointments, and call your doctor if you are having problems. It's also a good idea to know your test results and keep a list of the medicines you take. How can you care for yourself at home? · Take pain medicines exactly as directed. ? If the doctor gave you a prescription medicine for pain, take it as prescribed. ? If you are not taking a prescription pain medicine, ask your doctor if you can take an over-the-counter medicine. ? Do not take two or more pain medicines at the same time unless the doctor told you to. Many pain medicines contain acetaminophen, which is Tylenol. Too much acetaminophen (Tylenol) can be harmful. · If your doctor recommends that you wear a sling, use it as directed. Do not take it off before your doctor tells you to. · Put ice or a cold pack on the sore area for 10 to 20 minutes at a time. Put a thin cloth between the ice and your skin. · If there is no swelling, you can put moist heat, a heating pad, or a warm cloth on your shoulder. Some doctors suggest alternating between hot and cold. · Rest your shoulder for a few days. If your doctor recommends it, you can then begin gentle exercise of the shoulder, but do not lift anything heavy. When should you call for help? Call 911 anytime you think you may need emergency care. For example, call if:    · You have chest pain or pressure.  This may occur with:  ? Sweating. ? Shortness of breath. ? Nausea or vomiting. ? Pain that spreads from the chest to the neck, jaw, or one or both shoulders or arms. ? Dizziness or lightheadedness. ? A fast or uneven pulse. After calling 911, chew 1 adult-strength aspirin. Wait for an ambulance. Do not try to drive yourself.     · Your arm or hand is cool or pale or changes color. Call your doctor now or seek immediate medical care if:    · You have signs of infection, such as:  ? Increased pain, swelling, warmth, or redness in your shoulder. ? Red streaks leading from a place on your shoulder. ? Pus draining from an area of your shoulder. ? Swollen lymph nodes in your neck, armpits, or groin. ? A fever. Watch closely for changes in your health, and be sure to contact your doctor if:    · You cannot use your shoulder.     · Your shoulder does not get better as expected. Where can you learn more? Go to https://[employer]. org and sign in to your [facility] account. Enter G034 in the [employer] box to learn more about \"Shoulder Pain: Care Instructions. \"     If you do not have an account, please click on the \"Sign Up Now\" link. Current as of: November 16, 2020               Content Version: 12.9  © 5379-4749 [employer]. Care instructions adapted under license by [address] If you have questions about a medical condition or this instruction, always ask your healthcare professional. [place] [address] any warranty or liability for your use of this information.

## 2021-07-13 NOTE — PROGRESS NOTES
Chief Complaint   Patient presents with    Shoulder Pain       Patient states that he has been having some mild pain in his right shoulder. He started having the pain acutely when he was repeatedly using his right hand for some work. He denies any active trauma or injury. He denies any numbness or tingling or weakness of his hand. Hyperlipidemia is stable. 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    Atrial flutter (HCC)     Coronary atherosclerosis due to calcified coronary lesion(414.4)     Discoloration of skin of toe 9/11/2014    ED (erectile dysfunction)     Epilepsy ([facility]     Hyperlipidemia     Hypertension     IBS (irritable bowel syndrome)     Irregular heart beat     Ischemic toe 9/11/2014    MI, old     [facility]ures [facility]     Tinea     Tobacco dependence 9/16/2014    Tobacco use      O: Blood pressure 111/68, pulse 60, temperature 98 °F (36.7 °C), temperature source Temporal, resp. rate 16, height 6' 1\" (1.854 m), weight 165 lb (74.8 kg), SpO2 99 %. Physical Examination: General appearance - alert, well appearing, and in no distress  Chest - clear to auscultation, no wheezes, rales or rhonchi, symmetric air entry  Heart - normal rate, regular rhythm, normal S1, S2, no murmurs, rubs, clicks or gallops  Right shoulder-no tenderness on  bony palpation, ROM intact but painful  Neurological - alert, oriented, normal speech, no focal findings or movement disorder noted  Extremities - no pedal edema  Mental l status: normal mood at this time    A/P:    Lalito Dalal was seen today for shoulder pain. Diagnoses and all orders for this visit:    Right shoulder pain, unspecified chronicity  -Shoulder pain could be secondary to muscular sprain or strain. Advised to use cold compresses. Can use OTC analgesics. Will monitor the symptoms for now.     Hyperlipidemia, unspecified hyperlipidemia type  -Stable    Follow-up as instructed

## 2021-07-20 DIAGNOSIS — R63.4 WEIGHT LOSS: ICD-10-CM

## 2021-07-20 RX ORDER — MIRTAZAPINE 30 MG/1
TABLET, FILM COATED ORAL
Qty: 30 TABLET | Refills: 5 | Status: SHIPPED
Start: 2021-07-20 | End: 2021-10-11 | Stop reason: SDUPTHER

## 2021-07-21 ENCOUNTER — TELEPHONE (OUTPATIENT)
Dept: PRIMARY CARE CLINIC | Age: 51
End: 2021-07-21

## 2021-07-21 NOTE — TELEPHONE ENCOUNTER
Call this Patient and cancel him as a new patient tomorrow. I was never asked if I would accept him.

## 2021-07-21 NOTE — TELEPHONE ENCOUNTER
Spoke to wife and cancelled appointment for tomorrow. Dr. Conway never approved acceptance for this patient. Advised Dr. Conway is not accepting new patients at this time.

## 2021-07-28 ENCOUNTER — OFFICE VISIT (OUTPATIENT)
Dept: PRIMARY CARE CLINIC | Age: 51
End: 2021-07-28
Payer: COMMERCIAL

## 2021-07-28 VITALS
BODY MASS INDEX: 21.87 KG/M2 | OXYGEN SATURATION: 96 % | HEIGHT: 73 IN | SYSTOLIC BLOOD PRESSURE: 108 MMHG | DIASTOLIC BLOOD PRESSURE: 70 MMHG | TEMPERATURE: 97.6 F | RESPIRATION RATE: 18 BRPM | WEIGHT: 165 LBS | HEART RATE: 62 BPM

## 2021-07-28 DIAGNOSIS — I25.84 CORONARY ATHEROSCLEROSIS DUE TO CALCIFIED CORONARY LESION: ICD-10-CM

## 2021-07-28 DIAGNOSIS — M54.2 NECK PAIN: ICD-10-CM

## 2021-07-28 DIAGNOSIS — R00.1 BRADYCARDIA: ICD-10-CM

## 2021-07-28 DIAGNOSIS — M79.605 PAIN OF LEFT LOWER EXTREMITY: ICD-10-CM

## 2021-07-28 DIAGNOSIS — G47.00 INSOMNIA, UNSPECIFIED TYPE: ICD-10-CM

## 2021-07-28 DIAGNOSIS — E78.5 HYPERLIPIDEMIA, UNSPECIFIED HYPERLIPIDEMIA TYPE: ICD-10-CM

## 2021-07-28 DIAGNOSIS — M25.511 PAIN IN JOINT OF RIGHT SHOULDER: ICD-10-CM

## 2021-07-28 DIAGNOSIS — E55.9 VITAMIN D DEFICIENCY: Primary | ICD-10-CM

## 2021-07-28 DIAGNOSIS — D47.3 ESSENTIAL THROMBOCYTHEMIA (HCC): ICD-10-CM

## 2021-07-28 DIAGNOSIS — I10 ESSENTIAL HYPERTENSION: ICD-10-CM

## 2021-07-28 DIAGNOSIS — I25.10 CORONARY ATHEROSCLEROSIS DUE TO CALCIFIED CORONARY LESION: ICD-10-CM

## 2021-07-28 DIAGNOSIS — K21.9 GASTROESOPHAGEAL REFLUX DISEASE, UNSPECIFIED WHETHER ESOPHAGITIS PRESENT: ICD-10-CM

## 2021-07-28 DIAGNOSIS — G40.909 SEIZURE DISORDER (HCC): ICD-10-CM

## 2021-07-28 DIAGNOSIS — E87.5 HYPERKALEMIA: ICD-10-CM

## 2021-07-28 DIAGNOSIS — Z72.0 TOBACCO USE: ICD-10-CM

## 2021-07-28 DIAGNOSIS — D47.1 MYELOPROLIFERATIVE DISEASE (HCC): ICD-10-CM

## 2021-07-28 DIAGNOSIS — F41.9 ANXIETY AND DEPRESSION: ICD-10-CM

## 2021-07-28 DIAGNOSIS — K58.2 IRRITABLE BOWEL SYNDROME WITH BOTH CONSTIPATION AND DIARRHEA: ICD-10-CM

## 2021-07-28 DIAGNOSIS — F32.A ANXIETY AND DEPRESSION: ICD-10-CM

## 2021-07-28 LAB
C-REACTIVE PROTEIN: 0.3 MG/DL (ref 0–0.4)
RHEUMATOID FACTOR: <10 IU/ML (ref 0–13)

## 2021-07-28 PROCEDURE — G8420 CALC BMI NORM PARAMETERS: HCPCS | Performed by: FAMILY MEDICINE

## 2021-07-28 PROCEDURE — 93000 ELECTROCARDIOGRAM COMPLETE: CPT | Performed by: FAMILY MEDICINE

## 2021-07-28 PROCEDURE — 99215 OFFICE O/P EST HI 40 MIN: CPT | Performed by: FAMILY MEDICINE

## 2021-07-28 PROCEDURE — G8427 DOCREV CUR MEDS BY ELIG CLIN: HCPCS | Performed by: FAMILY MEDICINE

## 2021-07-28 PROCEDURE — 4004F PT TOBACCO SCREEN RCVD TLK: CPT | Performed by: FAMILY MEDICINE

## 2021-07-28 PROCEDURE — 3017F COLORECTAL CA SCREEN DOC REV: CPT | Performed by: FAMILY MEDICINE

## 2021-07-28 RX ORDER — ERGOCALCIFEROL 1.25 MG/1
50000 CAPSULE ORAL WEEKLY
Qty: 12 CAPSULE | Refills: 2 | Status: SHIPPED
Start: 2021-07-28 | End: 2021-09-22

## 2021-07-28 RX ORDER — HYDROXYZINE HYDROCHLORIDE 10 MG/1
10 TABLET, FILM COATED ORAL 2 TIMES DAILY PRN
COMMUNITY
End: 2022-03-16 | Stop reason: ALTCHOICE

## 2021-07-28 RX ORDER — TRAZODONE HYDROCHLORIDE 100 MG/1
150 TABLET ORAL NIGHTLY
Qty: 30 TABLET | Refills: 0
Start: 2021-07-28

## 2021-07-28 ASSESSMENT — ENCOUNTER SYMPTOMS
COUGH: 0
WHEEZING: 0
SORE THROAT: 0
BACK PAIN: 1
NAUSEA: 0
EYE REDNESS: 0
DIARRHEA: 0
RHINORRHEA: 0
PHOTOPHOBIA: 0
ABDOMINAL PAIN: 1
CONSTIPATION: 0
VOMITING: 0
SHORTNESS OF BREATH: 0
BLOOD IN STOOL: 0

## 2021-07-28 NOTE — PROGRESS NOTES
2021    Chief Complaint   Patient presents with   [facility]    Shoulder Pain     right sided after working  for long periods, gets stabbing pain radiating from elbow to shoulder    Tingling     left calf, gets warm sensation like burning pain        HPI  Izabel Galvan (:  1970) is a 49 y.o. male, here for evaluation of the following medical concerns:    Patient is a 71-year-old male with a past medical history of anxiety/depression, GERD, hyperlipidemia, CAD w/ stent placement in , HTN, HLD, Seizure disorder. Acute issues: Shoulder pain. Follows with cardiology, neurology, gastroenterology, hematology/oncology, and psychiatry. Recent labs: Elevated potassium. Low vitamin D. Family history of leukemia: Patient has elevated MCV and platelets. Patient was placed on medications as noted below for preventive concerns. CAD: Stable. No reported angina. Does have panic attacks at times. Has seen cardiology. Patient is on low-dose beta blocker and dual antiplatelet therapy. Has had stent placement. Hypertension: Blood pressure slightly on the lower and today. Patient is slightly bradycardic. Patient does report at times feeling lightheaded and dizzy. This may be secondary to these issues. Hyperlipidemia: Patient is on atorvastatin 40 mg daily. Wilman medication well without side effects. Low vitamin D: Not on vitamin D replacement currently. GERD: Follows with gastroenterology. Uses Bentyl and Prevacid for such. Shoulder Pain: Right sided neck pain with radiation into his right shoulder. Reports sharp pain with burning sensation. Reports that symptoms are worse with movement. No change in sensation in his fingers. No weakness. Was in PT briefly. Left Calf Burning: Mostly with standing. Denies any hip, knee, ankle, back pain. Siting makes it better. Episodic. Review of Systems   Constitutional: Positive for fatigue. Negative for chills and fever.    HENT: Positive for congestion. Negative for hearing loss, postnasal drip, rhinorrhea, sneezing, sore throat and tinnitus. Eyes: Negative for photophobia and redness. Respiratory: Negative for cough, shortness of breath and wheezing. Cardiovascular: Negative for chest pain, palpitations and leg swelling. Gastrointestinal: Positive for abdominal pain. Negative for blood in stool, constipation, diarrhea, nausea and vomiting. Endocrine: Negative for polydipsia and polyuria. Genitourinary: Negative for difficulty urinating, dysuria, frequency and hematuria. Musculoskeletal: Positive for arthralgias, back pain, myalgias and neck pain. Skin: Negative for rash. Neurological: Positive for dizziness and light-headedness. Negative for syncope, weakness, numbness and headaches. Psychiatric/Behavioral: Negative for suicidal ideas. The patient is nervous/anxious. Prior to Visit Medications    Medication Sig Taking?  Authorizing Provider   hydrOXYzine (ATARAX) 10 MG tablet Take 10 mg by mouth 2 times daily as needed for Itching Yes Historical Provider, MD   traZODone (DESYREL) 100 MG tablet Take 1.5 tablets by mouth nightly Yes Janett Franco MD   vitamin D (ERGOCALCIFEROL) 1.25 MG (78411 UT) CAPS capsule Take 1 capsule by mouth once a week Yes Janett Franco MD   mirtazapine (REMERON) 30 MG tablet take 1 tablet by mouth at bedtime Yes Olinda Shea MD   levETIRAcetam (KEPPRA) 500 MG tablet TAKE THREE TABLETS BY MOUTH TWICE DAILY  Yes Olinda Shea MD   metoprolol succinate (TOPROL XL) 25 MG extended release tablet take 1 tablet by mouth once daily Yes Olinda Shea MD   dicyclomine (BENTYL) 10 MG capsule take 1 capsule by mouth twice a day if needed Yes Historical Provider, MD   lansoprazole (PREVACID) 30 MG delayed release capsule take 1 capsule by mouth every morning Yes Historical Provider, MD   escitalopram (LEXAPRO) 20 MG tablet take 2 tablets by mouth once daily Yes Adelita Lynch MD   atorvastatin (LIPITOR) 40 MG tablet take 1 tablet by mouth nightly Yes Adelita Lynch MD   clopidogrel (PLAVIX) 75 MG tablet take 1 tablet by mouth once daily Yes Adelita Lynch MD   allopurinol (ZYLOPRIM) 100 MG tablet Take 1 tablet by mouth daily Yes Historical Provider, MD   hydroxyurea (HYDREA) 500 MG chemo capsule Take 1 capsule by mouth 2 times daily Yes Adelita Lynch MD        Allergies   Allergen Reactions    Acyclovir And Related Other (See Comments)    Doxycycline Itching       Past Medical History:   Diagnosis Date    Atheroembolism of left lower extremity ([address] 09/11/2014    Atheroembolism of left lower extremity ([address] 09/11/2014    Atrial flutter ([address]     Coronary atherosclerosis due to calcified coronary lesion(414.4)     ED (erectile dysfunction)     Epile[address] [facility] [address]     Hyperlipidemia     Hypertension     IBS (irritable bowel syndrome)     Irregular heart beat     Ischemic toe 09/11/2014    MI, old     Seizures ([address]     Tinea     Tobacco dependence 09/16/2014    Tobacco use         Past Surgical History:   Procedure Laterality Date    CARDIAC SURGERY      COLONOSCOPY  08/2014    CORONARY ANGIOPLASTY  2009    stent placement    CORONARY ANGIOPLASTY WITH STENT PLACEMENT      KNEE SURGERY Left     Skin graft    UPPER GASTROINTESTINAL ENDOSCOPY  08/2014    UPPER GASTROINTESTINAL ENDOSCOPY         Family History   Problem Relation Age of Onset    Other Mother         COPD    Coronary Art Dis Mother     Other Father         cirrhosis of liver    Seizures Father     Diabetes Brother     Cancer Brother 64        leukemia    Diabetes Brother     Hearing Loss Maternal Uncle     Hearing Loss Maternal Uncle        Immunization History   Administered Date(s) Administered    COVID-19, Moderna, PF, 100mcg/0.5mL 04/14/2021, 05/14/2021    Influenza Vaccine, unspecified formulation 10/06/2014, 10/15/2016    Last Year: Never true   Transportation Needs:     Lack of Transportation (Medical):  Lack of Transportation (Non-Medical):    Physical Activity:     Days of Exercise per Week:     Minutes of Exercise per Session:    Stress:     Feeling of Stress :    Social Connections:     Frequency of Communication with Friends and Family:     Frequency of Social Gatherings with Friends and Family:     Attends [employer] Services:     Active Member of Clubs or Organizations:     Attends Club or Organization Meetings:     Marital Status:    Intimate Partner Violence:     Fear of Current or Ex-Partner:     Emotionally Abused:     Physically Abused:     Sexually Abused:            Vitals:    07/28/21 1103   BP: 108/70   Pulse: 62   Resp: 18   Temp: 97.6 °F (36.4 °C)   TempSrc: Temporal   SpO2: 96%   Weight: 165 lb (74.8 kg)   Height: 6' 1\" (1.854 m)       Estimated body mass index is 21.77 kg/m² as calculated from the following:    Height as of this encounter: 6' 1\" (1.854 m). Weight as of this encounter: 165 lb (74.8 kg). Physical Exam  Vitals and nursing note reviewed. Constitutional:       Appearance: He is well-developed. HENT:      Head: Normocephalic. Right Ear: Tympanic membrane and external ear normal.      Left Ear: Tympanic membrane and external ear normal.   Eyes:      Extraocular Movements: Extraocular movements intact. Conjunctiva/sclera: Conjunctivae normal.      Pupils: Pupils are equal, round, and reactive to light. Neck:      Trachea: Trachea normal.   Cardiovascular:      Rate and Rhythm: Normal rate and regular rhythm. Pulses:           Radial pulses are 2+ on the right side and 2+ on the left side. Posterior tibial pulses are 2+ on the right side and 2+ on the left side. Heart sounds: Normal heart sounds. No murmur heard. Pulmonary:      Effort: Pulmonary effort is normal. No respiratory distress. Breath sounds: Normal breath sounds.  No decreased breath sounds, wheezing or rales. Abdominal:      General: Bowel sounds are normal. There is no distension. Palpations: Abdomen is soft. Tenderness: There is no abdominal tenderness. There is no rebound. Musculoskeletal:      Right shoulder: Tenderness present. Decreased range of motion. Decreased strength. Cervical back: Neck supple. Right lower leg: No edema. Left lower leg: No edema. Comments: Positive impingement syndrome testing and rotator cuff tendinitis testing. Skin:     General: Skin is warm and dry. Findings: No rash. Neurological:      Mental Status: He is alert and oriented to person, place, and time. Cranial Nerves: No cranial nerve deficit. Sensory: No sensory deficit. Deep Tendon Reflexes:      Reflex Scores:       Patellar reflexes are 2+ on the right side and 2+ on the left side. Psychiatric:         Mood and Affect: Mood normal.         Behavior: Behavior normal.         Patient Active Problem List    Diagnosis Date Noted    Anxiety and depression 07/28/2021    Gastroesophageal reflux disease 07/28/2021    Essential hypertension 07/28/2021    Vitamin D deficiency 07/28/2021    Insomnia 07/28/2021    Tobacco use     Precordial pain 09/18/2018    Bradycardia 09/18/2018    Stented coronary artery     Essential thrombocythemia ([facility] [address] 03/18/2016    Myeloproliferative disease ([facility] [address] 03/18/2016    Seizure disorder ([facility] [address] 10/28/2015    Hyperlipidemia     Coronary atherosclerosis due to calcified coronary lesion     ED (erectile dysfunction)     Myocardial infarction, old     IBS (irritable bowel syndrome)     Tobacco dependence 09/16/2014         ASSESSMENT/PLAN:  Owen Gasca was seen today for established new doctor, shoulder pain and tingling. Diagnoses and all orders for this visit:    Vitamin D deficiency  -     vitamin D (ERGOCALCIFEROL) 1.25 MG (51968 UT) CAPS capsule;  Take 1 capsule by mouth once a week    Tobacco use  Pre-contemplative. Tobacco cessation advised. Insomnia, unspecified type  -     traZODone (DESYREL) 100 MG tablet; Take 1.5 tablets by mouth nightly  Following with psychiatry/psychology. Reports modest control with his current medications as noted above. Hyperkalemia  -     BASIC METABOLIC PANEL; Future  5.7 on recent testing. Repeat BMP today. Neck pain  -     XR CERVICAL SPINE (4-5 VIEWS); Future  -     XR SHOULDER RIGHT (MIN 2 VIEWS); Future  -     SULEIMAN; Future  -     Rheumatoid Factor; Future  -     C-Reactive Protein; Future  Concerns for possible degenerative disc disease versus arthritis. We'll review x-rays and consider physical therapy versus referral to chiropractic care versus NCV/EMG. Pain of left lower extremity  -     XR TIBIA FIBULA LEFT (2 VIEWS); Future  -     SULEIMAN; Future  -     Rheumatoid Factor; Future  -     C-Reactive Protein; Future  Neuropathy-like symptoms. Check x-ray. Consider NCV/EMG. Pain in joint of right shoulder  -     XR SHOULDER RIGHT (MIN 2 VIEWS); Future  -     SULEIMAN; Future  -     Rheumatoid Factor; Future  -     C-Reactive Protein; Future  Concerns for impingement syndrome and rotator cuff tendinitis. Consider referral to physical therapy versus chiropractic care versus NCV/EMG testing. Coronary atherosclerosis due to calcified coronary lesion  -     EKG 12 lead; Future  -     EKG 12 lead  Status post stem placement. Patient is on telemetry platelet therapy and a beta blocker. Otherwise patient is not on any other medications for this issue. Patient does follow with cardiology regards this issue. Stable no recent angina. Essential hypertension  -     EKG 12 lead; Future  -     EKG 12 lead  Blood pressure on the lower and today. Patient also has bradycardia. Patient is discuss modifying his metoprolol with cardiology due to his symptomatic lightheadedness and dizziness.     Essential thrombocythemia (HCC)/Myeloproliferative disease ([address]  Following with hematology/oncology. Patient is on allopurinol and hydroxyurea for this issue. Concerns for family history of leukemia. Seizure disorder ([facility] [address]  No breakthrough seizures in several years. Stable. Tolerating Keppra well besides concerns for some weight loss over the last several years. Patient to consider following up with neurology for alternative medications. Hyperlipidemia, unspecified hyperlipidemia type  Most recent lipid panel appropriate. Patient is tolerating statin medication well without side effects. Irritable bowel syndrome with both constipation and diarrhea  Following with gastroenterology. Stable. Has had a EGD and colonoscopy in 2014. Bradycardia  As noted above. Consider decreasing or changing the Toprol. Gastroesophageal reflux disease, unspecified whether esophagitis present  As noted above following with GI. Anxiety and depression  As noted above following with psychiatry. Patient is on extensive list of psychiatric medications. No SI/HI. Greater than 45 minutes in duration was spent in total on this patient's encounter. Health Maintenance reviewed     Return in about 3 weeks (around 8/18/2021).       Educational materials and/or home exercises printed for patient's review and were included in patient instructions on his/her After Visit Summary and given to patient at the end of visit.       Counseled regarding above diagnosis, including possible risks and complications,  especially if left uncontrolled.     Counseled regarding the possible side effects, risks, benefits and alternatives to treatment; patient and/or guardianverbalizes understanding, agrees, feels comfortable with and wishes to proceed with above treatment plan.     Advised patient to call with any new medication issues, and read all Rx info from pharmacy to assure aware of all possible risks and side effects of medication before taking.     Reviewed age and gender appropriate health screening exams and vaccinations. Advised patient regarding importance of keeping up withrecommended health maintenance and to schedule as soon as possible if overdue, as this is important in assessing for undiagnosed pathology, especially cancer, as well as protecting against potentially harmful/lifethreatening disease.       Patient and/or guardian verbalizes understanding and agrees with abovecounseling, assessment and plan.     All questions answered. An  electronic signature was used to authenticatethis note.     --Herbie Willett MD on 7/28/21 at 8:10 AM EDT

## 2021-07-29 DIAGNOSIS — M25.511 PAIN IN JOINT OF RIGHT SHOULDER: ICD-10-CM

## 2021-07-29 DIAGNOSIS — M75.81 RIGHT ROTATOR CUFF TENDINITIS: ICD-10-CM

## 2021-07-29 DIAGNOSIS — M75.41 IMPINGEMENT SYNDROME OF RIGHT SHOULDER: ICD-10-CM

## 2021-07-29 DIAGNOSIS — M50.30 DEGENERATIVE DISC DISEASE, CERVICAL: Primary | ICD-10-CM

## 2021-07-29 LAB
ANION GAP SERPL CALCULATED.3IONS-SCNC: 16 MMOL/L (ref 7–16)
ANTI-NUCLEAR ANTIBODY (ANA): NEGATIVE
BUN BLDV-MCNC: 19 MG/DL (ref 6–20)
CALCIUM SERPL-MCNC: 9.6 MG/DL (ref 8.6–10.2)
CHLORIDE BLD-SCNC: 103 MMOL/L (ref 98–107)
CO2: 23 MMOL/L (ref 22–29)
CREAT SERPL-MCNC: 0.9 MG/DL (ref 0.7–1.2)
GFR AFRICAN AMERICAN: >60
GFR NON-AFRICAN AMERICAN: >60 ML/MIN/1.73
GLUCOSE BLD-MCNC: 99 MG/DL (ref 74–99)
POTASSIUM SERPL-SCNC: 5 MMOL/L (ref 3.5–5)
SODIUM BLD-SCNC: 142 MMOL/L (ref 132–146)

## 2021-08-02 DIAGNOSIS — G40.909 SEIZURE DISORDER (HCC): ICD-10-CM

## 2021-08-02 RX ORDER — LEVETIRACETAM 500 MG/1
TABLET ORAL
Qty: 180 TABLET | Refills: 3 | Status: SHIPPED
Start: 2021-08-02 | End: 2021-09-27 | Stop reason: SDUPTHER

## 2021-08-15 ENCOUNTER — HOSPITAL ENCOUNTER (EMERGENCY)
Age: 51
Discharge: HOME | End: 2021-08-15
Payer: COMMERCIAL

## 2021-08-15 VITALS
SYSTOLIC BLOOD PRESSURE: 104 MMHG | OXYGEN SATURATION: 95 % | HEART RATE: 73 BPM | HEIGHT: 73 IN | RESPIRATION RATE: 16 BRPM | DIASTOLIC BLOOD PRESSURE: 77 MMHG | BODY MASS INDEX: 21.87 KG/M2 | WEIGHT: 165 LBS | TEMPERATURE: 98 F

## 2021-08-15 DIAGNOSIS — H60.392 OTHER INFECTIVE OTITIS EXTERNA OF LEFT EAR, UNSPECIFIED CHRONICITY: Primary | ICD-10-CM

## 2021-08-15 PROCEDURE — 6370000000 HC RX 637 (ALT 250 FOR IP): Performed by: PHYSICIAN ASSISTANT

## 2021-08-15 PROCEDURE — 99284 EMERGENCY DEPT VISIT MOD MDM: CPT

## 2021-08-15 RX ORDER — IBUPROFEN 800 MG/1
800 TABLET ORAL EVERY 8 HOURS PRN
Qty: 21 TABLET | Refills: 0 | Status: SHIPPED | OUTPATIENT
Start: 2021-08-15 | End: 2021-11-08

## 2021-08-15 RX ORDER — IBUPROFEN 800 MG/1
800 TABLET ORAL ONCE
Status: COMPLETED | OUTPATIENT
Start: 2021-08-15 | End: 2021-08-15

## 2021-08-15 RX ADMIN — IBUPROFEN 800 MG: 800 TABLET, FILM COATED ORAL at 21:53

## 2021-08-15 ASSESSMENT — PAIN SCALES - GENERAL
PAINLEVEL_OUTOF10: 0
PAINLEVEL_OUTOF10: 0

## 2021-08-16 ENCOUNTER — OFFICE VISIT (OUTPATIENT)
Dept: FAMILY MEDICINE CLINIC | Age: 51
End: 2021-08-16
Payer: COMMERCIAL

## 2021-08-16 ENCOUNTER — TELEPHONE (OUTPATIENT)
Dept: PRIMARY CARE CLINIC | Age: 51
End: 2021-08-16

## 2021-08-16 VITALS
HEART RATE: 72 BPM | HEIGHT: 73 IN | TEMPERATURE: 97.3 F | BODY MASS INDEX: 22 KG/M2 | DIASTOLIC BLOOD PRESSURE: 76 MMHG | RESPIRATION RATE: 16 BRPM | WEIGHT: 166 LBS | SYSTOLIC BLOOD PRESSURE: 120 MMHG | OXYGEN SATURATION: 98 %

## 2021-08-16 DIAGNOSIS — H60.312 ACUTE DIFFUSE OTITIS EXTERNA OF LEFT EAR: ICD-10-CM

## 2021-08-16 DIAGNOSIS — Z20.822 EXPOSURE TO COVID-19 VIRUS: Primary | ICD-10-CM

## 2021-08-16 PROCEDURE — G8427 DOCREV CUR MEDS BY ELIG CLIN: HCPCS | Performed by: PHYSICIAN ASSISTANT

## 2021-08-16 PROCEDURE — 3017F COLORECTAL CA SCREEN DOC REV: CPT | Performed by: PHYSICIAN ASSISTANT

## 2021-08-16 PROCEDURE — 4004F PT TOBACCO SCREEN RCVD TLK: CPT | Performed by: PHYSICIAN ASSISTANT

## 2021-08-16 PROCEDURE — 99203 OFFICE O/P NEW LOW 30 MIN: CPT | Performed by: PHYSICIAN ASSISTANT

## 2021-08-16 PROCEDURE — 4130F TOPICAL PREP RX AOE: CPT | Performed by: PHYSICIAN ASSISTANT

## 2021-08-16 PROCEDURE — G8420 CALC BMI NORM PARAMETERS: HCPCS | Performed by: PHYSICIAN ASSISTANT

## 2021-08-16 NOTE — ED PROVIDER NOTES
Independent MLP  HPI:  8/15/21, Time: 9:02 PM EDT         Xander Anglin is a 49 y.o. male presenting to the ED for left ear fullness. Beginning today. The complaint has been persistent, mild in severity, and worsened by nothing. Patient comes in with complaint of left ear fullness that started earlier today while he was at work. Has had some runny nose congestion denies any sore throat cough no fever chills. States he did remove a lot of wax from the ear. No hearing loss. Review of Systems:   A complete review of systems was performed and pertinent positives and negatives are stated within HPI, all other systems reviewed and are negative.          --------------------------------------------- PAST HISTORY ---------------------------------------------  Past Medical History:  has a past medical history of Atheroembolism of left lower extremity ([address] Atheroembolism of left lower extremity ([address] Atrial flutter ([address] Coronary atherosclerosis due to calcified coronary lesion(414.4), ED (erectile dysfunction), Epilepsy ([address] Hyperlipidemia, Hypertension, IBS (irritable bowel syndrome), Irregular heart beat, Ischemic toe, MI, old, Seizures ([address] Tinea, Tobacco dependence, and Tobacco use. Past Surgical History:  has a past surgical history that includes Cardiac surgery; Coronary angioplasty (2009); Upper gastrointestinal endoscopy (08/2014); Colonoscopy (08/2014); Coronary angioplasty with stent; Upper gastrointestinal endoscopy; and knee surgery (Left). Social History:  reports that he has been smoking cigarettes. He has a 10.00 pack-year smoking history. His smokeless tobacco use includes chew. He reports previous drug use. Drug: Marijuana. He reports that he does not drink alcohol. Family History: family history includes Cancer (age of onset: 64) in his brother; Coronary Art Dis in his mother; Diabetes in his brother and brother; Hearing Loss in his maternal uncle and maternal uncle;  Other in his father and mother; Seizures in his father. The patients home medications have been reviewed. Allergies: Acyclovir and related and Doxycycline    -------------------------------------------------- RESULTS -------------------------------------------------  All laboratory and radiology results have been personally reviewed by myself   LABS:  No results found for this visit on 08/15/21. RADIOLOGY:  Interpreted by Radiologist.  No orders to display       ------------------------- NURSING NOTES AND VITALS REVIEWED ---------------------------   The nursing notes within the ED encounter and vital signs as below have been reviewed. /77   Pulse 73   Temp 98 °F (36.7 °C) (Oral)   Resp 16   Ht 6' 1\" (1.854 m)   Wt 165 lb (74.8 kg)   SpO2 95%   BMI 21.77 kg/m²   Oxygen Saturation Interpretation: Normal      ---------------------------------------------------PHYSICAL EXAM--------------------------------------      Constitutional/General: Alert and oriented x3, well appearing, non toxic in NAD  Head: Normocephalic and atraumatic  Eyes: PERRL, EOMI  Ears mild erythema and inflammation of the left canal.  TM intact no erythema. Mouth: Oropharynx clear, handling secretions, no trismus  Neck: Supple, full ROM,   Pulmonary: Lungs clear to auscultation bilaterally, no wheezes, rales, or rhonchi. Not in respiratory distress  Cardiovascular:  Regular rate and rhythm, no murmurs, gallops, or rubs. 2+ distal pulses  Abdomen: Soft, non tender, non distended,   Extremities: Moves all extremities x 4.  Warm and well perfused  Skin: warm and dry without rash  Neurologic: GCS 15,  Psych: Normal Affect      ------------------------------ ED COURSE/MEDICAL DECISION MAKING----------------------  Medications   ibuprofen (ADVIL;MOTRIN) tablet 800 mg (800 mg Oral Given 8/15/21 2593)         ED COURSE:       Medical Decision Making:    Patient came in with complaint of left ear pain with mild on otitis media present patient was placed on polymyxin on eyedrops and Motrin to follow-up with primary care 1 to 2 days    Counseling: The emergency provider has spoken with the patient and discussed todays results, in addition to providing specific details for the plan of care and counseling regarding the diagnosis and prognosis. Questions are answered at this time and they are agreeable with the plan.      --------------------------------- IMPRESSION AND DISPOSITION ---------------------------------    IMPRESSION  1. Other infective otitis externa of left ear, unspecified chronicity        DISPOSITION  Disposition: Discharge to home  Patient condition is good      NOTE: This report was transcribed using voice recognition software.  Every effort was made to ensure accuracy; however, inadvertent computerized transcription errors may be present     Shanta Carey, [id number]  08/15/21 3718

## 2021-08-16 NOTE — PROGRESS NOTES
21  Gayle Crisostomo : 1970 Sex: male  Age 49 y.o. Subjective:  Chief Complaint   Patient presents with    Covid Testing     no symptoms          HPI:   Gayle Crisostomo , 49 y.o. male presents to express care for evaluation of Covid testing    HPI  60-year-old male presents to express care for evaluation of Covid testing. The patient is here without many symptoms. The patient states that he lives with his wife's parents and they have home health aide who have noted that she has been diagnosed with COVID-19. The patient is being treated for a left ear infection with eardrops. The patient has a otitis externa. The patient states that that is not really bothering him he just wanted checked for Covid. ROS:   Unless otherwise stated in this report the patient's positive and negative responses for review of systems for constitutional, eyes, ENT, cardiovascular, respiratory, gastrointestinal, neurological, , musculoskeletal, and integument systems and related systems to the presenting problem are either stated in the history of present illness or were not pertinent or were negative for the symptoms and/or complaints related to the presenting medical problem. Positives and pertinent negatives as per HPI. All others reviewed and are negative.       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    Atheroembolism of left lower extremity (HCC) 2014    Atrial flutter (HCC)     Coronary atherosclerosis due to calcified coronary lesion(414.4)     ED (erectile dysfunction)     Epilepsy ([address]     Hyperlipidemia     Hypertension     IBS (irritable bowel syndrome)     Irregular heart beat     Ischemic toe 2014    MI, old     Seizures ([address]     Tinea     Tobacco dependence 2014    Tobacco use        Past Surgical History:   Procedure Laterality Date    CARDIAC SURGERY      COLONOSCOPY  2014   Wendy Mejia CORONARY ANGIOPLASTY  2009    stent placement    CORONARY ANGIOPLASTY WITH STENT PLACEMENT      KNEE SURGERY Left     Skin graft    UPPER GASTROINTESTINAL ENDOSCOPY  08/2014    UPPER GASTROINTESTINAL ENDOSCOPY         Family History   Problem Relation Age of Onset    Other Mother         COPD    Coronary Art Dis Mother     Other Father         cirrhosis of liver    Seizures Father     Diabetes Brother     Cancer Brother 64        leukemia    Diabetes Brother     Hearing Loss Maternal Uncle     Hearing Loss Maternal Uncle        Medications:     Current Outpatient Medications:     neomycin-polymyxin-hydrocortisone (CORTISPORIN) 3.5-18463-3 otic solution, Place 4 drops into the left ear 3 times daily for 10 days Instill into left ear, Disp: 1 Bottle, Rfl: 0    ibuprofen (IBU) 800 MG tablet, Take 1 tablet by mouth every 8 hours as needed for Pain, Disp: 21 tablet, Rfl: 0    levETIRAcetam (KEPPRA) 500 MG tablet, Take three tablets by mouth twice daily, Disp: 180 tablet, Rfl: 3    hydrOXYzine (ATARAX) 10 MG tablet, Take 10 mg by mouth 2 times daily as needed for Itching, Disp: , Rfl:     traZODone (DESYREL) 100 MG tablet, Take 1.5 tablets by mouth nightly, Disp: 30 tablet, Rfl: 0    vitamin D (ERGOCALCIFEROL) 1.25 MG (43996 UT) CAPS capsule, Take 1 capsule by mouth once a week, Disp: 12 capsule, Rfl: 2    mirtazapine (REMERON) 30 MG tablet, take 1 tablet by mouth at bedtime, Disp: 30 tablet, Rfl: 5    metoprolol succinate (TOPROL XL) 25 MG extended release tablet, take 1 tablet by mouth once daily, Disp: 30 tablet, Rfl: 3    dicyclomine (BENTYL) 10 MG capsule, take 1 capsule by mouth twice a day if needed, Disp: , Rfl:     lansoprazole (PREVACID) 30 MG delayed release capsule, take 1 capsule by mouth every morning, Disp: , Rfl:     escitalopram (LEXAPRO) 20 MG tablet, take 2 tablets by mouth once daily, Disp: 60 tablet, Rfl: 5    atorvastatin (LIPITOR) 40 MG tablet, take 1 tablet by mouth nightly, Disp: 30 tablet, Rfl: 5    clopidogrel (PLAVIX) 75 MG tablet, take 1 tablet by mouth once daily, Disp: 30 tablet, Rfl: 5    allopurinol (ZYLOPRIM) 100 MG tablet, Take 1 tablet by mouth daily, Disp: , Rfl:     hydroxyurea (HYDREA) 500 MG chemo capsule, Take 1 capsule by mouth 2 times daily, Disp: 60 capsule, Rfl: 1    Allergies: Allergies   Allergen Reactions    Acyclovir And Related Other (See Comments)    Doxycycline Itching       Social History:     Social History     Tobacco Use    Smoking status: Current Every Day Smoker     Packs/day: 0.50     Years: 20.00     Pack years: 10.00     Types: Cigarettes    Smokeless tobacco: Current User     Types: Chew    Tobacco comment: cutting down   Vaping Use    Vaping Use: Never used   Substance Use Topics    Alcohol use: No     Comment: Quit 2019.  Drug use: Not Currently     Types: Marijuana     Comment: Daily for pain. Patient lives at home. Physical Exam:     Vitals:    08/16/21 1132   BP: 120/76   Pulse: 72   Resp: 16   Temp: 97.3 °F (36.3 °C)   SpO2: 98%   Weight: 166 lb (75.3 kg)   Height: 6' 1\" (1.854 m)       Exam:  Physical Exam  Nurse's notes and vital signs reviewed. The patient is not hypoxic. ? General: Alert, no acute distress, patient resting comfortably Patient is not toxic or lethargic. Skin: Warm, intact, no pallor noted. There is no evidence of rash at this time. Head: Normocephalic, atraumatic  Eye: Normal conjunctiva  Ears, Nose, Throat: Right tympanic membrane clear, left tympanic membrane clear. No drainage or discharge noted. There is swelling noted to the external canal that seems to be consistent with otitis externa no pre- or post-auricular tenderness, erythema, or swelling noted. No rhinorrhea or congestion noted. Posterior oropharynx shows no erythema, tonsillar hypertrophy, or exudate. the uvula is midline. No trismus or drooling is noted. Moist mucous membranes.   Neck: No anterior/posterior lymphadenopathy noted. No erythema, no masses, no fluctuance or induration noted. No meningeal signs. Cardiovascular: Regular Rate and Rhythm  Respiratory: No acute distress, no rhonchi, wheezing or crackles noted. No stridor or retractions are noted. Neurological: A&O x4, normal speech  Psychiatric: Cooperative         Testing:     No results found for this visit on 08/16/21. Medical Decision Making:     Vital signs reviewed    Past medical history reviewed. Allergies reviewed. Medications reviewed. Patient on arrival does not appear to be in any apparent distress or discomfort. The patient has been seen and evaluated. The patient does not appear to be toxic or lethargic. The patient had a PCR testing performed. The patient will continue with the eardrops that has been prescribed. We will update the patient with the results as soon as they are available. The patient has been vaccinated. His last vaccine was in May    The patient is to return to express care or go directly to the emergency department should any of the signs or symptoms worsen. The patient is to followup with primary care physician in 2-3 days for repeat evaluation. The patient has no other questions or concerns at this time the patient will be discharged home. Clinical Impression:   Owen Gasca was seen today for covid testing. Diagnoses and all orders for this visit:    Exposure to COVID-19 virus  -     Covid-19 Ambulatory; Future    Acute diffuse otitis externa of left ear        The patient is to call for any concerns or return if any of the signs or symptoms worsen. The patient is to follow-up with PCP in the next 2-3 days for repeat evaluation repeat assessment or go directly to the emergency department.      SIGNATURE: Alivia Gonsalves III, PA-C

## 2021-08-16 NOTE — TELEPHONE ENCOUNTER
----- Message from Wilma Lopez sent at 8/16/2021 10:34 AM EDT -----  Subject: Message to Provider    QUESTIONS  Information for Provider? Pt and his family may have been exposed to   someone with Covid. Do you do testing in the office or where should they   go to get tested. ---------------------------------------------------------------------------  --------------  Luisana MARMOLEJO  What is the best way for the office to contact you? OK to leave message on   voicemail  Preferred Call Back Phone Number? [phone]  ---------------------------------------------------------------------------  --------------  SCRIPT ANSWERS  Relationship to Patient?  Self

## 2021-08-20 DIAGNOSIS — I25.84 CORONARY ATHEROSCLEROSIS DUE TO CALCIFIED CORONARY LESION: ICD-10-CM

## 2021-08-20 DIAGNOSIS — I25.10 CORONARY ATHEROSCLEROSIS DUE TO CALCIFIED CORONARY LESION: ICD-10-CM

## 2021-08-20 RX ORDER — CLOPIDOGREL BISULFATE 75 MG/1
TABLET ORAL
Qty: 30 TABLET | Refills: 5 | Status: SHIPPED
Start: 2021-08-20 | End: 2021-10-11 | Stop reason: SDUPTHER

## 2021-08-20 RX ORDER — ATORVASTATIN CALCIUM 40 MG/1
40 TABLET, FILM COATED ORAL NIGHTLY
Qty: 30 TABLET | Refills: 5 | Status: SHIPPED
Start: 2021-08-20 | End: 2021-09-16 | Stop reason: SDUPTHER

## 2021-08-25 ENCOUNTER — OFFICE VISIT (OUTPATIENT)
Dept: PRIMARY CARE CLINIC | Age: 51
End: 2021-08-25
Payer: COMMERCIAL

## 2021-08-25 VITALS
TEMPERATURE: 97.7 F | HEIGHT: 73 IN | HEART RATE: 64 BPM | RESPIRATION RATE: 16 BRPM | BODY MASS INDEX: 22 KG/M2 | DIASTOLIC BLOOD PRESSURE: 74 MMHG | SYSTOLIC BLOOD PRESSURE: 128 MMHG | WEIGHT: 166 LBS | OXYGEN SATURATION: 97 %

## 2021-08-25 DIAGNOSIS — G47.00 INSOMNIA, UNSPECIFIED TYPE: ICD-10-CM

## 2021-08-25 DIAGNOSIS — I10 ESSENTIAL HYPERTENSION: ICD-10-CM

## 2021-08-25 DIAGNOSIS — M50.30 DEGENERATIVE DISC DISEASE, CERVICAL: Primary | ICD-10-CM

## 2021-08-25 DIAGNOSIS — M75.41 IMPINGEMENT SYNDROME OF RIGHT SHOULDER: ICD-10-CM

## 2021-08-25 DIAGNOSIS — R00.1 BRADYCARDIA: ICD-10-CM

## 2021-08-25 PROCEDURE — G8427 DOCREV CUR MEDS BY ELIG CLIN: HCPCS | Performed by: FAMILY MEDICINE

## 2021-08-25 PROCEDURE — 4004F PT TOBACCO SCREEN RCVD TLK: CPT | Performed by: FAMILY MEDICINE

## 2021-08-25 PROCEDURE — 3017F COLORECTAL CA SCREEN DOC REV: CPT | Performed by: FAMILY MEDICINE

## 2021-08-25 PROCEDURE — G8420 CALC BMI NORM PARAMETERS: HCPCS | Performed by: FAMILY MEDICINE

## 2021-08-25 PROCEDURE — 99213 OFFICE O/P EST LOW 20 MIN: CPT | Performed by: FAMILY MEDICINE

## 2021-08-25 ASSESSMENT — ENCOUNTER SYMPTOMS
SHORTNESS OF BREATH: 0
DIARRHEA: 0
ABDOMINAL PAIN: 0
WHEEZING: 0
CONSTIPATION: 0
VOMITING: 0
SORE THROAT: 0
NAUSEA: 0
RHINORRHEA: 0

## 2021-08-25 NOTE — PROGRESS NOTES
2021     Chief Complaint   Patient presents with    Insomnia     follow up    Back Pain     follow up-therapy causing a lot of pain     HPI  Jed Doll (:  1970) is a 49 y.o. male, here for evaluation of the following medical concerns:    Patient is a 56-year-old male with a past medical history of anxiety/depression, family history of luekemia w thrombocytopenia, DDD, arthritis, low vit d, GERD, hyperlipidemia, CAD w/ stent placement in , HTN, HLD, Seizure disorder. Acute issues: Shoulder pain. Follows with cardiology, neurology, gastroenterology, hematology/oncology, and psychiatry.     At the patient's last office appointment work-up was ordered in regards to the patient's arthritic-like pains. Normal leg and shoulder x-rays were noted. Patient did have some mild degenerative and arthritic-like changes in the neck. Patient was advised on physical therapy. Patient was referred to [facility]. BMP within normal limits. SULEIMAN negative. Rheumatoid factor less than 10. CRP 0.3. Review of Systems   Constitutional: Negative for chills and fever. HENT: Negative for congestion, rhinorrhea and sore throat. Respiratory: Negative for shortness of breath and wheezing. Cardiovascular: Negative for chest pain and leg swelling. Gastrointestinal: Negative for abdominal pain, constipation, diarrhea, nausea and vomiting. Musculoskeletal: Positive for arthralgias. Skin: Negative for rash. Neurological: Negative for light-headedness and headaches.        Past Medical History:   Diagnosis Date    [facility]roembolism of left lower extremity ([facility] 2014    Atheroembolism of left lower extremity (HCC) 2014    Atrial flutter (HCC)     Coronary atherosclerosis due to calcified coronary lesion(414.4)     ED (erectile dysfunction)     Epilepsy ([facility]     Hyperlipidemia     Hypertension     IBS (irritable bowel syndrome)     Irregular heart beat     Ischemic toe 09/11/2014    MI, old     Se[facility]ures [facility]     Tinea     Tobacco dependence 09/16/2014    Tobacco use        Prior to Visit Medications    Medication Sig Taking?  Authorizing Provider   atorvastatin (LIPITOR) 40 MG tablet Take 1 tablet by mouth nightly Yes Eda Greer,    clopidogrel (PLAVIX) 75 MG tablet take 1 tablet by mouth once daily Yes Eda Greer,    neomycin-polymyxin-hydrocortisone (CORTISPORIN) 3.5-00627-3 otic solution Place 4 drops into the left ear 3 times daily for 10 days Instill into left ear Yes RUBÉN Goodman   levETIRAcetam (KEPPRA) 500 MG tablet Take three tablets by mouth twice daily Yes Eliz Enciso MD   hydrOXYzine (ATARAX) 10 MG tablet Take 10 mg by mouth 2 times daily as needed for Itching Yes Historical Provider, MD   traZODone (DESYREL) 100 MG tablet Take 1.5 tablets by mouth nightly Yes Eliz Enciso MD   vitamin D (ERGOCALCIFEROL) 1.25 MG (65324 UT) CAPS capsule Take 1 capsule by mouth once a week Yes Eliz Enciso MD   mirtazapine (REMERON) 30 MG tablet take 1 tablet by mouth at bedtime Yes Mark Aguirre MD   metoprolol succinate (TOPROL XL) 25 MG extended release tablet take 1 tablet by mouth once daily Yes Mark Aguirre MD   dicyclomine (BENTYL) 10 MG capsule take 1 capsule by mouth twice a day if needed Yes Historical Provider, MD   lansoprazole (PREVACID) 30 MG delayed release capsule take 1 capsule by mouth every morning Yes Historical Provider, MD   escitalopram (LEXAPRO) 20 MG tablet take 2 tablets by mouth once daily Yes Mark Aguirre MD   allopurinol (ZYLOPRIM) 100 MG tablet Take 1 tablet by mouth daily Yes Historical Provider, MD   hydroxyurea (HYDREA) 500 MG chemo capsule Take 1 capsule by mouth 2 times daily Yes Mark Aguirre MD   ibuprofen (IBU) 800 MG tablet Take 1 tablet by mouth every 8 hours as needed for Pain  RUBÉN Goodman        Allergies   Allergen Reactions    Acyclovir And Related Other (See Comments)    Doxycycline Itching       Social History     Tobacco Use    Smoking status: Current Every Day Smoker     Packs/day: 0.50     Years: 20.00     Pack years: 10.00     Types: Cigarettes    Smokeless tobacco: Current User     Types: Chew    Tobacco comment: cutting down   Substance Use Topics    Alcohol use: No     Comment: Quit 2019. Vitals:    08/25/21 1045   BP: 128/74   Pulse: 64   Resp: 16   Temp: 97.7 °F (36.5 °C)   TempSrc: Temporal   SpO2: 97%   Weight: 166 lb (75.3 kg)   Height: 6' 1\" (1.854 m)     Estimated body mass index is 21.9 kg/m² as calculated from the following:    Height as of this encounter: 6' 1\" (1.854 m). Weight as of this encounter: 166 lb (75.3 kg). Physical Exam  Constitutional:       Appearance: He is well-developed. HENT:      Head: Normocephalic. Eyes:      Extraocular Movements: Extraocular movements intact. Conjunctiva/sclera: Conjunctivae normal.   Cardiovascular:      Rate and Rhythm: Regular rhythm. Bradycardia present. Heart sounds: Normal heart sounds. No murmur heard. Pulmonary:      Effort: Pulmonary effort is normal.      Breath sounds: Normal breath sounds. No wheezing or rales. Abdominal:      General: Bowel sounds are normal.      Palpations: Abdomen is soft. Tenderness: There is no abdominal tenderness. Musculoskeletal:      Right lower leg: No edema. Left lower leg: No edema. Neurological:      General: No focal deficit present. Mental Status: He is alert. Comments: Cranial nerves grossly intact   Psychiatric:         Mood and Affect: Mood normal.         Judgment: Judgment normal.         ASSESSMENT/PLAN:    BMP within normal limits. SULEIMAN negative. Rheumatoid factor less than 10. CRP 0.3. X-rays essentially normal besides some degenerative changes in the cervical spine. Dottie Parekh was seen today for insomnia and back pain.     Diagnoses and all orders for this visit:    Degenerative disc disease, cervical  Impingement syndrome of right shoulder  Continue with physical therapy. Consider referral to chiropractic care. Labs and imaging reviewed during appointment today. Essential hypertension  Blood pressure well controlled. Tolerating current medications besides some bradycardia. Bradycardia  Patient to contact cardiology in regards to this issue and to change his beta-blocker or decrease. Insomnia, unspecified type  Continue trazodone. Add melatonin and magnesium nightly. Return in about 4 months (around 12/25/2021). An [employer]ature was used to authenticate this note.     --Isabel Ruiz MD on 8/25/21 at 10:34 AM EDT

## 2021-09-09 ENCOUNTER — NURSE TRIAGE (OUTPATIENT)
Dept: OTHER | Facility: CLINIC | Age: 51
End: 2021-09-09

## 2021-09-13 ENCOUNTER — OFFICE VISIT (OUTPATIENT)
Dept: FAMILY MEDICINE CLINIC | Age: 51
End: 2021-09-13
Payer: COMMERCIAL

## 2021-09-13 ENCOUNTER — TELEPHONE (OUTPATIENT)
Dept: PRIMARY CARE CLINIC | Age: 51
End: 2021-09-13

## 2021-09-13 VITALS
SYSTOLIC BLOOD PRESSURE: 102 MMHG | TEMPERATURE: 97.3 F | HEART RATE: 82 BPM | DIASTOLIC BLOOD PRESSURE: 74 MMHG | WEIGHT: 157 LBS | OXYGEN SATURATION: 97 % | BODY MASS INDEX: 20.71 KG/M2

## 2021-09-13 DIAGNOSIS — R06.02 SHORTNESS OF BREATH: ICD-10-CM

## 2021-09-13 DIAGNOSIS — J06.9 ACUTE UPPER RESPIRATORY INFECTION, UNSPECIFIED: ICD-10-CM

## 2021-09-13 DIAGNOSIS — Z20.822 EXPOSURE TO COVID-19 VIRUS: Primary | ICD-10-CM

## 2021-09-13 DIAGNOSIS — Z20.822 EXPOSURE TO CONFIRMED CASE OF COVID-19: Primary | ICD-10-CM

## 2021-09-13 PROCEDURE — 99213 OFFICE O/P EST LOW 20 MIN: CPT | Performed by: PHYSICIAN ASSISTANT

## 2021-09-13 PROCEDURE — 3017F COLORECTAL CA SCREEN DOC REV: CPT | Performed by: PHYSICIAN ASSISTANT

## 2021-09-13 PROCEDURE — G8420 CALC BMI NORM PARAMETERS: HCPCS | Performed by: PHYSICIAN ASSISTANT

## 2021-09-13 PROCEDURE — 4004F PT TOBACCO SCREEN RCVD TLK: CPT | Performed by: PHYSICIAN ASSISTANT

## 2021-09-13 PROCEDURE — G8427 DOCREV CUR MEDS BY ELIG CLIN: HCPCS | Performed by: PHYSICIAN ASSISTANT

## 2021-09-13 RX ORDER — BENZONATATE 100 MG/1
100 CAPSULE ORAL 3 TIMES DAILY PRN
Qty: 21 CAPSULE | Refills: 0 | Status: SHIPPED | OUTPATIENT
Start: 2021-09-13 | End: 2021-09-20

## 2021-09-13 RX ORDER — AZITHROMYCIN 250 MG/1
250 TABLET, FILM COATED ORAL SEE ADMIN INSTRUCTIONS
Qty: 6 TABLET | Refills: 0 | Status: SHIPPED | OUTPATIENT
Start: 2021-09-13 | End: 2021-09-18

## 2021-09-13 NOTE — TELEPHONE ENCOUNTER
Spoke with wife, patient was not directly exposed but she was. Currently only symptoms he is having are his migraines.

## 2021-09-13 NOTE — TELEPHONE ENCOUNTER
----- Message from Cherie Serna sent at 9/13/2021  9:23 AM EDT -----  Subject: Message to Provider    QUESTIONS  Information for Provider? Patient's wife is calling for advise about COVID   testing. Patient was exposed 9-10-21. Please call Doc Berlin  ---------------------------------------------------------------------------  --------------  Genesis MARMOLEJO  What is the best way for the office to contact you? OK to leave message on   voicemail  Preferred Call Back Phone Number? [phone]  ---------------------------------------------------------------------------  --------------  SCRIPT ANSWERS  Relationship to Patient?  Self

## 2021-09-13 NOTE — TELEPHONE ENCOUNTER
COVID-19 test ordered. Please have the patient complete. Will call patient with results and further recommendations based off of such.

## 2021-09-13 NOTE — PROGRESS NOTES
21  Jed Doll : 1970 Sex: male  Age 49 y.o. Subjective:  Chief Complaint   Patient presents with    Headache    Nausea    Shortness of Breath         HPI:   Jed Doll , 49 y.o. male presents to [facility] care for evaluation of congestion, nausea, headache    HPI  60-year-old male presents to [facility] for evaluation of body aches, headache, nausea. The patient is had the symptoms ongoing for the last couple of days. The patient was recently exposed to a family member who had tested positive for COVID-19. The wife had the direct exposure. The patient has been vaccinated. The patient is starting to show some symptoms. The patient was encouraged by PCP to come for evaluation and Covid swab. ROS:   Unless otherwise stated in this report the patient's positive and negative responses for review of systems for constitutional, eyes, ENT, cardiovascular, respiratory, gastrointestinal, neurological, , musculoskeletal, and integument systems and related systems to the presenting problem are either stated in the history of present illness or were not pertinent or were negative for the symptoms and/or complaints related to the presenting medical problem. Positives and pertinent negatives as per HPI. All others reviewed and are negative.       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    Atheroembolism of left lower extremity (HCC) 2014    Atrial flutter (HCC)     Coronary atherosclerosis due to calcified coronary lesion(414.4)     ED (erectile dysfunction)     Epilepsy ([address]     Hyperlipidemia     Hypertension     IBS (irritable bowel syndrome)     Irregular heart beat     Ischemic toe 2014    MI, old     Seizures ([address]     Tinea     Tobacco dependence 2014    Tobacco use        Past Surgical History:   Procedure Laterality Date    CARDIAC SURGERY      COLONOSCOPY  2014  CORONARY ANGIOPLASTY  2009    stent placement    CORONARY ANGIOPLASTY WITH STENT PLACEMENT      KNEE SURGERY Left     Skin graft    UPPER GASTROINTESTINAL ENDOSCOPY  08/2014    UPPER GASTROINTESTINAL ENDOSCOPY         Family History   Problem Relation Age of Onset    Other Mother         COPD    Coronary Art Dis Mother     Other Father         cirrhosis of liver    Seizures Father     Diabetes Brother     Cancer Brother 64        leukemia    Diabetes Brother     Hearing Loss Maternal Uncle     Hearing Loss Maternal Uncle        Medications:     Current Outpatient Medications:     benzonatate (TESSALON) 100 MG capsule, Take 1 capsule by mouth 3 times daily as needed for Cough, Disp: 21 capsule, Rfl: 0    azithromycin (ZITHROMAX) 250 MG tablet, Take 1 tablet by mouth See Admin Instructions for 5 days 500mg on day 1 followed by 250mg on days 2 - 5, Disp: 6 tablet, Rfl: 0    atorvastatin (LIPITOR) 40 MG tablet, Take 1 tablet by mouth nightly, Disp: 30 tablet, Rfl: 5    clopidogrel (PLAVIX) 75 MG tablet, take 1 tablet by mouth once daily, Disp: 30 tablet, Rfl: 5    ibuprofen (IBU) 800 MG tablet, Take 1 tablet by mouth every 8 hours as needed for Pain, Disp: 21 tablet, Rfl: 0    levETIRAcetam (KEPPRA) 500 MG tablet, Take three tablets by mouth twice daily, Disp: 180 tablet, Rfl: 3    hydrOXYzine (ATARAX) 10 MG tablet, Take 10 mg by mouth 2 times daily as needed for Itching, Disp: , Rfl:     traZODone (DESYREL) 100 MG tablet, Take 1.5 tablets by mouth nightly, Disp: 30 tablet, Rfl: 0    vitamin D (ERGOCALCIFEROL) 1.25 MG (24906 UT) CAPS capsule, Take 1 capsule by mouth once a week, Disp: 12 capsule, Rfl: 2    mirtazapine (REMERON) 30 MG tablet, take 1 tablet by mouth at bedtime, Disp: 30 tablet, Rfl: 5    metoprolol succinate (TOPROL XL) 25 MG extended release tablet, take 1 tablet by mouth once daily, Disp: 30 tablet, Rfl: 3    dicyclomine (BENTYL) 10 MG capsule, take 1 capsule by mouth twice No anterior/posterior lymphadenopathy noted. No erythema, no masses, no fluctuance or induration noted. No meningeal signs. Cardiovascular: Regular Rate and Rhythm  Respiratory: No acute distress, no rhonchi, wheezing or crackles noted. No stridor or retractions are noted. Neurological: A&O x4, normal speech  Psychiatric: Cooperative         Testing:           Medical Decision Making:     Vital signs reviewed    Past medical history reviewed. Allergies reviewed. Medications reviewed. Patient on arrival does not appear to be in any apparent distress or discomfort. The patient has been seen and evaluated. The patient does not appear to be toxic or lethargic. The patient had relatively benign physical exam.  Vital signs reviewed and noted to be within normal limits. The patient will have Covid swab performed. The patient will be started on a azithromycin, Tessalon Perles. The patient is to quarantine, isolate, start vitamin regimen. Push fluids get plenty of rest.  Call with any questions. The patient is to return to express care or go directly to the emergency department should any of the signs or symptoms worsen. The patient is to followup with primary care physician in 2-3 days for repeat evaluation. The patient has no other questions or concerns at this time the patient will be discharged home. Clinical Impression:   Laisha Ferrera was seen today for headache, nausea and shortness of breath. Diagnoses and all orders for this visit:    Exposure to COVID-19 virus  -     azithromycin (ZITHROMAX) 250 MG tablet; Take 1 tablet by mouth See Admin Instructions for 5 days 500mg on day 1 followed by 250mg on days 2 - 5    Shortness of breath  -     COVID-19 Ambulatory    Acute upper respiratory infection, unspecified    Other orders  -     benzonatate (TESSALON) 100 MG capsule;  Take 1 capsule by mouth 3 times daily as needed for Cough        The patient is to call for any concerns or return if any

## 2021-09-16 ENCOUNTER — TELEPHONE (OUTPATIENT)
Dept: PRIMARY CARE CLINIC | Age: 51
End: 2021-09-16

## 2021-09-16 DIAGNOSIS — G40.909 SEIZURE DISORDER (HCC): Primary | ICD-10-CM

## 2021-09-16 RX ORDER — ATORVASTATIN CALCIUM 40 MG/1
40 TABLET, FILM COATED ORAL NIGHTLY
Qty: 30 TABLET | Refills: 5 | Status: SHIPPED
Start: 2021-09-16 | End: 2021-10-11 | Stop reason: SDUPTHER

## 2021-09-16 NOTE — TELEPHONE ENCOUNTER
Referral placed to [facility] neurology.

## 2021-09-16 NOTE — TELEPHONE ENCOUNTER
Currently sees Dr. Stacy Middleton in [place], they would prefer not to switch but states it is too far to travel to see provider.   Would like someone in [facility]

## 2021-09-16 NOTE — TELEPHONE ENCOUNTER
----- Message from Eden Archer sent at 9/16/2021  1:25 PM EDT -----  Subject: Message to Provider    QUESTIONS  Information for Provider? Need new neurologist - existing one is too far  ---------------------------------------------------------------------------  --------------  CALL BACK INFO  What is the best way for the office to contact you? OK to leave message on   voicemail  Preferred Call Back Phone Number? [phone]  ---------------------------------------------------------------------------  --------------  SCRIPT ANSWERS  Relationship to Patient?  Self

## 2021-09-16 NOTE — TELEPHONE ENCOUNTER
----- Message from Meltaliae Pulse sent at 9/16/2021  1:25 PM EDT -----  Subject: Message to Provider    QUESTIONS  Information for Provider? Need new neurologist - existing one is too far  ---------------------------------------------------------------------------  --------------  CALL BACK INFO  What is the best way for the office to contact you? OK to leave message on   voicemail  Preferred Call Back Phone Number? [phone]  ---------------------------------------------------------------------------  --------------  SCRIPT ANSWERS  Relationship to Patient?  Self

## 2021-09-21 DIAGNOSIS — E55.9 VITAMIN D DEFICIENCY: ICD-10-CM

## 2021-09-22 RX ORDER — ERGOCALCIFEROL 1.25 MG/1
CAPSULE ORAL
Qty: 4 CAPSULE | Refills: 5 | Status: SHIPPED
Start: 2021-09-22 | End: 2022-02-22 | Stop reason: SDUPTHER

## 2021-09-24 DIAGNOSIS — G40.909 SEIZURE DISORDER (HCC): ICD-10-CM

## 2021-09-27 RX ORDER — LEVETIRACETAM 500 MG/1
TABLET ORAL
Qty: 180 TABLET | Refills: 3 | Status: SHIPPED
Start: 2021-09-27 | End: 2021-09-29 | Stop reason: SDUPTHER

## 2021-09-28 DIAGNOSIS — G40.909 SEIZURE DISORDER (HCC): ICD-10-CM

## 2021-09-29 ENCOUNTER — OFFICE VISIT (OUTPATIENT)
Dept: PRIMARY CARE CLINIC | Age: 51
End: 2021-09-29
Payer: COMMERCIAL

## 2021-09-29 VITALS
BODY MASS INDEX: 22.29 KG/M2 | DIASTOLIC BLOOD PRESSURE: 64 MMHG | RESPIRATION RATE: 18 BRPM | TEMPERATURE: 97.3 F | WEIGHT: 168.2 LBS | HEIGHT: 73 IN | OXYGEN SATURATION: 98 % | HEART RATE: 61 BPM | SYSTOLIC BLOOD PRESSURE: 110 MMHG

## 2021-09-29 DIAGNOSIS — M75.41 IMPINGEMENT SYNDROME OF RIGHT SHOULDER: ICD-10-CM

## 2021-09-29 DIAGNOSIS — M25.511 ACUTE PAIN OF RIGHT SHOULDER: Primary | ICD-10-CM

## 2021-09-29 PROCEDURE — 4004F PT TOBACCO SCREEN RCVD TLK: CPT | Performed by: FAMILY MEDICINE

## 2021-09-29 PROCEDURE — G8420 CALC BMI NORM PARAMETERS: HCPCS | Performed by: FAMILY MEDICINE

## 2021-09-29 PROCEDURE — G8427 DOCREV CUR MEDS BY ELIG CLIN: HCPCS | Performed by: FAMILY MEDICINE

## 2021-09-29 PROCEDURE — 3017F COLORECTAL CA SCREEN DOC REV: CPT | Performed by: FAMILY MEDICINE

## 2021-09-29 PROCEDURE — 99213 OFFICE O/P EST LOW 20 MIN: CPT | Performed by: FAMILY MEDICINE

## 2021-09-29 RX ORDER — LEVETIRACETAM 500 MG/1
TABLET ORAL
Qty: 180 TABLET | Refills: 3 | Status: SHIPPED
Start: 2021-09-29 | End: 2021-10-11 | Stop reason: SDUPTHER

## 2021-09-29 ASSESSMENT — ENCOUNTER SYMPTOMS
RHINORRHEA: 0
SORE THROAT: 0
SHORTNESS OF BREATH: 0
BACK PAIN: 1
VOMITING: 0
NAUSEA: 0
CONSTIPATION: 0
ABDOMINAL PAIN: 0
DIARRHEA: 0
WHEEZING: 0

## 2021-09-29 NOTE — PROGRESS NOTES
2021     Chief Complaint   Patient presents with    Shoulder Pain     progressly getting work      HPI  Alivia Pradhan (:  1970) is a 49 y.o. male, here for evaluation of the following medical concerns:    Patient is a 61-year-old male with a past medical history of anxiety/depression, family history of luekemia w thrombocytopenia, DDD, arthritis, low vit d, GERD, hyperlipidemia, CAD w/ stent placement in , HTN, HLD, Seizure disorder. Acute issues: Shoulder pain. Follows with cardiology, neurology, gastroenterology, hematology/oncology, and psychiatry. Patient returns today primarily in regards to his neck and back pain. Patient reports that he has been in physical therapy without any benefit. Reports worsening of his symptoms. Patient reports he is having difficulty lifting his arm at this time. Certain movements also with his neck seem to exacerbate such. Review of Systems   Constitutional: Negative for chills and fever. HENT: Negative for congestion, rhinorrhea and sore throat. Respiratory: Negative for shortness of breath and wheezing. Cardiovascular: Negative for chest pain and leg swelling. Gastrointestinal: Negative for abdominal pain, constipation, diarrhea, nausea and vomiting. Musculoskeletal: Positive for arthralgias, back pain and neck pain. Skin: Negative for rash. Neurological: Negative for light-headedness and headaches.        Past Medical History:   Diagnosis Date    Atheroembolism of left lower extremity ([address] 2014    Atheroembolism of left lower extremity (HCC) 2014    Atrial flutter (HCC)     Coronary atherosclerosis due to calcified coronary lesion(414.4)     ED (erectile dysfunction)     Epilepsy ([address]     Hyperlipidemia     Hypertension     IBS (irritable bowel syndrome)     Irregular heart beat     Ischemic toe 2014    MI, old     Seizures ([address]     Tinea     Tobacco dependence 2014    Tobacco use Prior to Visit Medications    Medication Sig Taking?  Authorizing Provider   levETIRAcetam (KEPPRA) 500 MG tablet Take three tablets by mouth twice daily Yes Nalini Quintero MD   vitamin D (ERGOCALCIFEROL) 1.25 MG (17182 UT) CAPS capsule take 1 capsule by mouth every week Yes Nalini Quintero MD   atorvastatin (LIPITOR) 40 MG tablet Take 1 tablet by mouth nightly Yes Nalini Quintero MD   clopidogrel (PLAVIX) 75 MG tablet take 1 tablet by mouth once daily Yes Van Portillo DO   hydrOXYzine (ATARAX) 10 MG tablet Take 10 mg by mouth 2 times daily as needed for Itching Yes Historical Provider, MD   traZODone (DESYREL) 100 MG tablet Take 1.5 tablets by mouth nightly Yes Nalini Quintero MD   mirtazapine (REMERON) 30 MG tablet take 1 tablet by mouth at bedtime Yes Mora Connell MD   metoprolol succinate (TOPROL XL) 25 MG extended release tablet take 1 tablet by mouth once daily Yes Mora Connell MD   dicyclomine (BENTYL) 10 MG capsule take 1 capsule by mouth twice a day if needed Yes Historical Provider, MD   lansoprazole (PREVACID) 30 MG delayed release capsule take 1 capsule by mouth every morning Yes Historical Provider, MD   escitalopram (LEXAPRO) 20 MG tablet take 2 tablets by mouth once daily Yes Mora Connell MD   allopurinol (ZYLOPRIM) 100 MG tablet Take 1 tablet by mouth daily Yes Historical Provider, MD   hydroxyurea (HYDREA) 500 MG chemo capsule Take 1 capsule by mouth 2 times daily Yes Mora Connell MD   ibuprofen (IBU) 800 MG tablet Take 1 tablet by mouth every 8 hours as needed for Pain  RUBÉN Drake        Allergies   Allergen Reactions    Acyclovir And Related Other (See Comments)    Doxycycline Itching       Social History     Tobacco Use    Smoking status: Current Every Day Smoker     Packs/day: 0.50     Years: 20.00     Pack years: 10.00     Types: Cigarettes    Smokeless tobacco: Current User     Types: Chew    Tobacco comment: cutting down   Substance Use Topics    Alcohol use: No     Comment: Quit 2019. Vitals:    09/29/21 1403   BP: 110/64   Pulse: 61   Resp: 18   Temp: 97.3 °F (36.3 °C)   SpO2: 98%   Weight: 168 lb 3.2 oz (76.3 kg)   Height: 6' 1\" (1.854 m)     Estimated body mass index is 22.19 kg/m² as calculated from the following:    Height as of this encounter: 6' 1\" (1.854 m). Weight as of this encounter: 168 lb 3.2 oz (76.3 kg). Physical Exam  Constitutional:       Appearance: He is well-developed. HENT:      Head: Normocephalic. Eyes:      Extraocular Movements: Extraocular movements intact. Conjunctiva/sclera: Conjunctivae normal.   Cardiovascular:      Rate and Rhythm: Normal rate and regular rhythm. Heart sounds: Normal heart sounds. No murmur heard. Pulmonary:      Effort: Pulmonary effort is normal.      Breath sounds: Normal breath sounds. No wheezing or rales. Abdominal:      General: Bowel sounds are normal.      Palpations: Abdomen is soft. Tenderness: There is no abdominal tenderness. Musculoskeletal:      Right lower leg: No edema. Left lower leg: No edema. Comments: Positive right-sided rotator cuff impingement and tendinitis testing. Pain along the scapula of the patient. Pain also near the patient's right paraspinal musculature. Neurological:      General: No focal deficit present. Mental Status: He is alert. Comments: Cranial nerves grossly intact   Psychiatric:         Mood and Affect: Mood normal.         Judgment: Judgment normal.         ASSESSMENT/PLAN:  An Cullen was seen today for shoulder pain. Diagnoses and all orders for this visit:    Acute pain of right shoulder  -     Cancel: Martin Almanzar MD, Orthopaedics and Rehabilitation, [facility]  -     External Referral To Orthopedic Surgery    Impingement syndrome of right shoulder    Referral to orthopedic surgery for shoulder injection.   Patient may need MRI of his right shoulder. Concerns that his thoracic and cervical spine may be contributing to some of his symptoms. In such case patient will need to see pain management for possible injection in the near future. Referral as noted above. Return as needed. Return if symptoms worsen or fail to improve. An [employer] was used to authenticate this note.     --Tiff Hughes MD on 9/29/21 at 2:05 PM EDT

## 2021-10-08 ENCOUNTER — HOSPITAL ENCOUNTER (EMERGENCY)
Age: 51
Discharge: HOME | End: 2021-10-08
Payer: COMMERCIAL

## 2021-10-08 VITALS
WEIGHT: 268 LBS | RESPIRATION RATE: 18 BRPM | DIASTOLIC BLOOD PRESSURE: 74 MMHG | HEART RATE: 63 BPM | OXYGEN SATURATION: 98 % | BODY MASS INDEX: 35.52 KG/M2 | SYSTOLIC BLOOD PRESSURE: 126 MMHG | TEMPERATURE: 97.1 F | HEIGHT: 73 IN

## 2021-10-08 DIAGNOSIS — I10 ESSENTIAL HYPERTENSION: ICD-10-CM

## 2021-10-08 DIAGNOSIS — Z76.0 ENCOUNTER FOR MEDICATION REFILL: Primary | ICD-10-CM

## 2021-10-08 DIAGNOSIS — I25.84 CORONARY ATHEROSCLEROSIS DUE TO CALCIFIED CORONARY LESION: ICD-10-CM

## 2021-10-08 DIAGNOSIS — I25.10 CORONARY ATHEROSCLEROSIS DUE TO CALCIFIED CORONARY LESION: ICD-10-CM

## 2021-10-08 DIAGNOSIS — G40.909 SEIZURE DISORDER (HCC): ICD-10-CM

## 2021-10-08 DIAGNOSIS — R63.4 WEIGHT LOSS: ICD-10-CM

## 2021-10-08 PROCEDURE — 99283 EMERGENCY DEPT VISIT LOW MDM: CPT

## 2021-10-08 PROCEDURE — 6370000000 HC RX 637 (ALT 250 FOR IP): Performed by: PHYSICIAN ASSISTANT

## 2021-10-08 RX ORDER — LEVETIRACETAM 500 MG/1
500 TABLET ORAL ONCE
Status: DISCONTINUED | OUTPATIENT
Start: 2021-10-08 | End: 2021-10-08

## 2021-10-08 RX ORDER — LEVETIRACETAM 500 MG/1
1500 TABLET ORAL ONCE
Status: COMPLETED | OUTPATIENT
Start: 2021-10-08 | End: 2021-10-08

## 2021-10-08 RX ORDER — LEVETIRACETAM 500 MG/1
1500 TABLET ORAL 2 TIMES DAILY
Qty: 60 TABLET | Refills: 0 | Status: SHIPPED | OUTPATIENT
Start: 2021-10-08 | End: 2021-11-08 | Stop reason: SDUPTHER

## 2021-10-08 RX ADMIN — LEVETIRACETAM 1500 MG: 500 TABLET ORAL at 21:46

## 2021-10-08 NOTE — TELEPHONE ENCOUNTER
----- Message from Octavio Short sent at 10/8/2021  9:57 AM EDT -----  Subject: Refill Request    QUESTIONS  Name of Medication? metoprolol succinate (TOPROL XL) 25 MG extended   release tablet  Patient-reported dosage and instructions? 25 mg extend release, daily  How many days do you have left? 0  Preferred Pharmacy? [facility] phone number (if available)? [phone]  Additional Information for Provider? URGENT OUT OF MEDS  ---------------------------------------------------------------------------  --------------,  Name of Medication? mirtazapine (REMERON) 30 MG tablet  Patient-reported dosage and instructions? 30 mg / daliy  How many days do you have left? 0  Preferred Pharmacy? [facility] phone number (if available)? [phone]  Additional Information for Provider? URGENT OF OF MEDS  ---------------------------------------------------------------------------  --------------,  Name of Medication? allopurinol (ZYLOPRIM) 100 MG tablet  Patient-reported dosage and instructions? 100 mg / daliy  How many days do you have left? 0  Preferred Pharmacy? [facility] phone number (if available)? [phone]  Additional Information for Provider? URGENT OUT OF MEDS  ---------------------------------------------------------------------------  --------------,  Name of Medication? dicyclomine (BENTYL) 10 MG capsule  Patient-reported dosage and instructions? 10 mg / twice daily  How many days do you have left? 0  Preferred Pharmacy? [address] [facility] phone number (if available)? [phone]  Additional Information for Provider? URGENT OUT OF MED  ---------------------------------------------------------------------------  --------------,  Name of Medication? lansoprazole (PREVACID) 30 MG delayed release capsule  Patient-reported dosage and instructions? 30 mg / daliy  How many days do you have left? 0  Preferred Pharmacy? [address] [facility] phone number (if available)? [phone]  Additional Information for Provider? URGENT OUT OF MEDS  ---------------------------------------------------------------------------  --------------,  Name of Medication? atorvastatin (LIPITOR) 40 MG tablet  Patient-reported dosage and instructions? 40 mg / daily  How many days do you have left? 0  Preferred Pharmacy? [address] [facility] phone number (if available)? [phone]  Additional Information for Provider? URGENT OUT OF MEDS  ---------------------------------------------------------------------------  --------------,  Name of Medication? vitamin D (ERGOCALCIFEROL) 1.25 MG (23011 UT) CAPS   capsule  Patient-reported dosage and instructions? 1.25 MG / daily  How many days do you have left? 0  Preferred Pharmacy? [address] [facility] phone number (if available)? [phone]  Additional Information for Provider? URGENT OUT OF MEDS  ---------------------------------------------------------------------------  --------------,  Name of Medication? levETIRAcetam (KEPPRA) 500 MG tablet  Patient-reported dosage and instructions? 500 mg / twice daily  How many days do you have left? 0  Preferred Pharmacy? [address] [facility] phone number (if available)? [phone]  Additional Information for Provider? URGENT OUT OF MEDS  ---------------------------------------------------------------------------  --------------,  Name of Medication? clopidogrel (PLAVIX) 75 MG tablet  Patient-reported dosage and instructions? 75 mg / daily  How many days do you have left? 0  Preferred Pharmacy? [address] phone number (if available)? [phone]  Additional Information for Provider? URGENT OUT OF MEDS  ---------------------------------------------------------------------------  --------------  CALL BACK INFO  What is the best way for the office to contact you? OK to leave message on   voicemail  Preferred Call Back Phone Number?  [phone]

## 2021-10-09 NOTE — ED PROVIDER NOTES
[facility]     Department of Emergency Medicine   ED  Provider Note  Admit Date/RoomTime: 10/8/2021 10:01 PM  ED Room: 36/36    CHIEF COMPLAINT:   Chief Complaint   Patient presents with    Medication Refill     kepra     ---------------------------------HISTORY OF PRESENT ILLNESS-----------------------------------     Otoniel Capellan is a 49 y.o. male presenting to the ED for medication refill. Patient states he is out of refills for his Keppra and took his last dose this morning. Patient states he takes 1500mg twice daily. He states he has not had a seizure in over 2 years. He is denying any headache, dizziness, vision changes, chest pain, shortness of breath, pain with breathing, numbness/tingling, sensation changes, abdominal pain, nausea, vomiting, or difficulty with ambulation or balance. Patient is alert and oriented x3 and in no apparent distress at this exam.  He is nontoxic-appearing. He has no other complaints or concerns at this ED visit. Review of Systems:   Pertinent positives and negatives are stated within HPI, all other systems reviewed and are negative.     --------------------------------------------- PAST HISTORY ---------------------------------------------    Past Medical History:  has a past medical history of Atheroembolism of left lower extremity ([address] Atheroembolism of left lower extremity ([address] Atrial flutter ([address] Coronary atherosclerosis due to calcified coronary lesion(414.4), ED (erectile dysfunction), Epilepsy ([address] Hyperlipidemia, Hypertension, IBS (irritable bowel syndrome), Irregular heart beat, Ischemic toe, MI, old, Seizures ([address] Tinea, Tobacco dependence, and Tobacco use. Past Surgical History:  has a past surgical history that includes Cardiac surgery; Coronary angioplasty (2009); Upper gastrointestinal endoscopy (08/2014); Colonoscopy (08/2014); Coronary angioplasty with stent; Upper gastrointestinal endoscopy; and knee surgery (Left).     Social History:  reports that he has been smoking cigarettes. He has a 10.00 pack-year smoking history. His smokeless tobacco use includes chew. He reports previous drug use. Drug: Marijuana. He reports that he does not drink alcohol. Family History: family history includes Cancer (age of onset: 64) in his brother; Coronary Art Dis in his mother; Diabetes in his brother and brother; Hearing Loss in his maternal uncle and maternal uncle; Other in his father and mother; Seizures in his father. The patients home medications have been reviewed. Allergies: Acyclovir and related and Doxycycline  Allergies have been reviewed with patient.     -------------------------------------------------- RESULTS -------------------------------------------------  All laboratory and radiology results have been personally reviewed by myself   LABS:  No results found for this visit on 10/08/21. RADIOLOGY:  Interpreted by Radiologist.  No orders to display     ------------------------- NURSING NOTES AND VITALS REVIEWED ---------------------------  The nursing notes within the ED encounter and vital signs as below have been reviewed. /74   Pulse 63   Temp 97.1 °F (36.2 °C) (Oral)   Resp 18   Ht 6' 1\" (1.854 m)   Wt 268 lb (121.6 kg)   SpO2 98%   BMI 35.36 kg/m²   Oxygen Saturation Interpretation: Normal    ---------------------------------------------------PHYSICAL EXAM--------------------------------------    Constitutional/General: Alert and oriented x3, well appearing, NAD  HEENT: NC/AT, EOMI, Airway patent  Neck: Supple. Non-tender with no lymphadenopathy   CVS: Regular rate and rhythm  Resp: Clear and equal bilaterally with good airflow, no distress  Abdomen:  Abdomen soft, nontender, No guarding or rigidity   Back:  No midline tenderness. No CVA tenderness. Musculo: Moves all extremities x 4. Warm and well perfused, No edema   Integument:  Normal turgor.  Warm, dry, without visible rash, unless noted elsewhere. Neurological:  Motor functions intact. GCS 15, CN II-XII grossly intact     ------------------------------ ED COURSE/MEDICAL DECISION MAKING----------------------  ED Medications:  Medications   levETIRAcetam (KEPPRA) tablet 1,500 mg (1,500 mg Oral Given 10/8/21 2146)     Procedures:  None    Consultations:   None     Counseling: The emergency provider has spoken with the patient/caregiver and discussed todays results, in addition to providing specific details for the plan of care and counseling regarding the diagnosis and prognosis. Questions are answered at this time and they are agreeable with the plan. All results reviewed with pt and all questions answered. Patient will be given his nightly dose of Keppra. He will be given a prescription to get filled tomorrow morning when the pharmacies reopen. I discussed the differential, results and discharge plan with the patient and/or family/friend/caregiver if present. I emphasized the importance of follow-up with the physician I referred them to in the timeframe recommended. I explained reasons for the patient to return to the Emergency Department. Additional verbal discharge instructions were also given and discussed with the patient to supplement those generated by the EMR. We also discussed medications that were prescribed (if any) including common side effects and interactions. All questions were addressed. They understand return precautions and discharge instructions. The patient and/or family/friend/caregiver expressed understanding. Vitals were stable and they were in no distress at discharge.      --------------------------------- IMPRESSION AND DISPOSITION ---------------------------------    IMPRESSION  1. Encounter for medication refill    2.  Seizure disorder [facility])      DISPOSITION  Discharge to home  Patient condition is good    NEW MEDICATIONS   Discharge Medication List as of 10/8/2021  9:59 PM      START taking these medications Details   !! levETIRAcetam (KEPPRA) 500 MG tablet Take 3 tablets by mouth 2 times daily, Disp-60 tablet, R-0Print       !! - Potential duplicate medications found. Please discuss with provider. NOTE: This report was transcribed using voice recognition software.  Every effort was made to ensure accuracy; however, inadvertent computerized transcription errors may be present    END OF PROVIDER NOTE         Louis Benjamin PA-C  10/08/21 6911

## 2021-10-11 ENCOUNTER — TELEPHONE (OUTPATIENT)
Dept: ADMINISTRATIVE | Age: 51
End: 2021-10-11

## 2021-10-11 RX ORDER — METOPROLOL SUCCINATE 25 MG/1
TABLET, EXTENDED RELEASE ORAL
Qty: 30 TABLET | Refills: 3 | Status: SHIPPED
Start: 2021-10-11 | End: 2021-11-01 | Stop reason: SDUPTHER

## 2021-10-11 RX ORDER — DICYCLOMINE HYDROCHLORIDE 10 MG/1
CAPSULE ORAL
Qty: 120 CAPSULE | Refills: 3 | Status: SHIPPED
Start: 2021-10-11 | End: 2022-01-19 | Stop reason: SDUPTHER

## 2021-10-11 RX ORDER — ATORVASTATIN CALCIUM 40 MG/1
40 TABLET, FILM COATED ORAL NIGHTLY
Qty: 30 TABLET | Refills: 5 | Status: SHIPPED
Start: 2021-10-11 | End: 2022-02-23 | Stop reason: SDUPTHER

## 2021-10-11 RX ORDER — CLOPIDOGREL BISULFATE 75 MG/1
TABLET ORAL
Qty: 30 TABLET | Refills: 5 | Status: SHIPPED
Start: 2021-10-11 | End: 2022-02-23 | Stop reason: SDUPTHER

## 2021-10-11 RX ORDER — ALLOPURINOL 100 MG/1
100 TABLET ORAL DAILY
Qty: 30 TABLET | Refills: 3 | Status: SHIPPED | OUTPATIENT
Start: 2021-10-11

## 2021-10-11 RX ORDER — MIRTAZAPINE 30 MG/1
TABLET, FILM COATED ORAL
Qty: 30 TABLET | Refills: 5 | Status: SHIPPED
Start: 2021-10-11 | End: 2022-02-23 | Stop reason: SDUPTHER

## 2021-10-11 RX ORDER — LEVETIRACETAM 500 MG/1
TABLET ORAL
Qty: 180 TABLET | Refills: 3 | Status: SHIPPED
Start: 2021-10-11 | End: 2021-10-20

## 2021-10-20 ENCOUNTER — OFFICE VISIT (OUTPATIENT)
Dept: PRIMARY CARE CLINIC | Age: 51
End: 2021-10-20
Payer: COMMERCIAL

## 2021-10-20 VITALS
WEIGHT: 268 LBS | RESPIRATION RATE: 18 BRPM | SYSTOLIC BLOOD PRESSURE: 102 MMHG | TEMPERATURE: 97.8 F | BODY MASS INDEX: 35.52 KG/M2 | DIASTOLIC BLOOD PRESSURE: 62 MMHG | OXYGEN SATURATION: 98 % | HEART RATE: 57 BPM | HEIGHT: 73 IN

## 2021-10-20 DIAGNOSIS — M50.30 DEGENERATIVE DISC DISEASE, CERVICAL: ICD-10-CM

## 2021-10-20 DIAGNOSIS — D47.3 ESSENTIAL THROMBOCYTHEMIA (HCC): ICD-10-CM

## 2021-10-20 DIAGNOSIS — Z13.220 LIPID SCREENING: ICD-10-CM

## 2021-10-20 DIAGNOSIS — Z23 NEED FOR INFLUENZA VACCINATION: ICD-10-CM

## 2021-10-20 DIAGNOSIS — M25.511 ACUTE PAIN OF RIGHT SHOULDER: ICD-10-CM

## 2021-10-20 DIAGNOSIS — E55.9 VITAMIN D DEFICIENCY: ICD-10-CM

## 2021-10-20 DIAGNOSIS — G40.909 SEIZURE DISORDER (HCC): Primary | ICD-10-CM

## 2021-10-20 DIAGNOSIS — K58.2 IRRITABLE BOWEL SYNDROME WITH BOTH CONSTIPATION AND DIARRHEA: ICD-10-CM

## 2021-10-20 DIAGNOSIS — D47.1 MYELOPROLIFERATIVE DISEASE (HCC): ICD-10-CM

## 2021-10-20 DIAGNOSIS — N52.9 ERECTILE DYSFUNCTION, UNSPECIFIED ERECTILE DYSFUNCTION TYPE: ICD-10-CM

## 2021-10-20 DIAGNOSIS — I10 ESSENTIAL HYPERTENSION: ICD-10-CM

## 2021-10-20 DIAGNOSIS — G47.00 INSOMNIA, UNSPECIFIED TYPE: ICD-10-CM

## 2021-10-20 PROCEDURE — G8482 FLU IMMUNIZE ORDER/ADMIN: HCPCS | Performed by: FAMILY MEDICINE

## 2021-10-20 PROCEDURE — 99214 OFFICE O/P EST MOD 30 MIN: CPT | Performed by: FAMILY MEDICINE

## 2021-10-20 PROCEDURE — G8427 DOCREV CUR MEDS BY ELIG CLIN: HCPCS | Performed by: FAMILY MEDICINE

## 2021-10-20 PROCEDURE — 3017F COLORECTAL CA SCREEN DOC REV: CPT | Performed by: FAMILY MEDICINE

## 2021-10-20 PROCEDURE — 90674 CCIIV4 VAC NO PRSV 0.5 ML IM: CPT | Performed by: FAMILY MEDICINE

## 2021-10-20 PROCEDURE — 90471 IMMUNIZATION ADMIN: CPT | Performed by: FAMILY MEDICINE

## 2021-10-20 PROCEDURE — G8417 CALC BMI ABV UP PARAM F/U: HCPCS | Performed by: FAMILY MEDICINE

## 2021-10-20 PROCEDURE — 4004F PT TOBACCO SCREEN RCVD TLK: CPT | Performed by: FAMILY MEDICINE

## 2021-10-20 ASSESSMENT — ENCOUNTER SYMPTOMS
SORE THROAT: 0
CONSTIPATION: 1
NAUSEA: 0
WHEEZING: 0
VOMITING: 0
ABDOMINAL PAIN: 0
SHORTNESS OF BREATH: 0
DIARRHEA: 0
BACK PAIN: 1
RHINORRHEA: 1

## 2021-10-20 NOTE — PROGRESS NOTES
old     Seizures ([facility]     Tinea     Tobacco dependence 09/16/2014    Tobacco use        Prior to Visit Medications    Medication Sig Taking?  Authorizing Provider   allopurinol (ZYLOPRIM) 100 MG tablet Take 1 tablet by mouth daily Yes Pearl Irvin MD   dicyclomine (BENTYL) 10 MG capsule take 1 capsule by mouth twice a day if needed Yes Pearl Irvin MD   atorvastatin (LIPITOR) 40 MG tablet Take 1 tablet by mouth nightly Yes Pearl Irvin MD   mirtazapine (REMERON) 30 MG tablet take 1 tablet by mouth at bedtime Yes Pearl Irvin MD   metoprolol succinate (TOPROL XL) 25 MG extended release tablet take 1 tablet by mouth once daily Yes Pearl Irvin MD   clopidogrel (PLAVIX) 75 MG tablet take 1 tablet by mouth once daily Yes Pearl Irvin MD   levETIRAcetam (KEPPRA) 500 MG tablet Take 3 tablets by mouth 2 times daily Yes Dennis Ram PA-C   vitamin D (ERGOCALCIFEROL) 1.25 MG (31421 UT) CAPS capsule take 1 capsule by mouth every week Yes Pearl Irvin MD   hydrOXYzine (ATARAX) 10 MG tablet Take 10 mg by mouth 2 times daily as needed for Itching Yes Historical Provider, MD   traZODone (DESYREL) 100 MG tablet Take 1.5 tablets by mouth nightly Yes Pearl Irvin MD   lansoprazole (PREVACID) 30 MG delayed release capsule take 1 capsule by mouth every morning Yes Historical Provider, MD   escitalopram (LEXAPRO) 20 MG tablet take 2 tablets by mouth once daily Yes Scott Esquivel MD   hydroxyurea (HYDREA) 500 MG chemo capsule Take 1 capsule by mouth 2 times daily Yes Scott Esquivel MD   ibuprofen (IBU) 800 MG tablet Take 1 tablet by mouth every 8 hours as needed for Pain  RUBÉN Abraham        Allergies   Allergen Reactions    Acyclovir And Related Other (See Comments)    Doxycycline Itching       Social History     Tobacco Use    Smoking status: Current Every Day Smoker     Packs/day: 0.50     Years: 20.00     Pack years: 10.00     Types: Cigarettes    Smokeless issues. Goal to keep blood pressure less than 140/90. Acute pain of right shoulder/Degenerative disc disease, cervical  Patient has seen orthopedic surgery. Patient did have a steroid injection with modest benefit. However, they are concerned that his symptoms may be secondary to degenerative disc disease in the cervical spine. Patient to follow-up with a spine specialist in the near future for this issue and his lower back pain. Vitamin D deficiency  -     Vitamin D 25 Hydroxy; Future    Insomnia, unspecified type  Following with psych. Worse due to his pain. Lipid screening  -     Lipid Panel; Future    Need for influenza vaccination  -     INFLUENZA, MDCK QUADV, 2 YRS AND OLDER, IM, PF, PREFILL SYR OR SDV, 0.5ML (FLUCELVAX QUADV, PF)    Myeloproliferative disease (HCC)/Essential thrombocythemia ([facility]  Patient follow-up with hematology/oncology next month in regards to this issue. Irritable bowel syndrome with both constipation and diarrhea  Patient reports some bouts of nausea patient recently. Recently started stool softeners to help with such. Continue to follow. Erectile dysfunction, unspecified erectile dysfunction type  Patient recently has been following with urology. Not currently on any medications for such. Patient to complete labs. Will call with results. Otherwise follow-up in 3 to 4 months. Return in about 3 months (around 1/20/2022) for To Discuss Labs Results, Routine Follow-up of Chronic Conditions. An [email or web address] was used to authenticate this note.     --Pat Malik MD on 10/20/21 at 11:05 AM ADIT

## 2021-10-28 DIAGNOSIS — G40.909 SEIZURE DISORDER (HCC): ICD-10-CM

## 2021-10-28 DIAGNOSIS — Z13.220 LIPID SCREENING: ICD-10-CM

## 2021-10-28 DIAGNOSIS — E55.9 VITAMIN D DEFICIENCY: ICD-10-CM

## 2021-10-28 DIAGNOSIS — I10 ESSENTIAL HYPERTENSION: ICD-10-CM

## 2021-10-28 LAB
ALBUMIN SERPL-MCNC: 4.3 G/DL (ref 3.5–5.2)
ALP BLD-CCNC: 55 U/L (ref 40–129)
ALT SERPL-CCNC: 19 U/L (ref 0–40)
ANION GAP SERPL CALCULATED.3IONS-SCNC: 10 MMOL/L (ref 7–16)
ANISOCYTOSIS: ABNORMAL
AST SERPL-CCNC: 19 U/L (ref 0–39)
BASOPHILS ABSOLUTE: 0.04 E9/L (ref 0–0.2)
BASOPHILS RELATIVE PERCENT: 0.9 % (ref 0–2)
BILIRUB SERPL-MCNC: 0.4 MG/DL (ref 0–1.2)
BUN BLDV-MCNC: 15 MG/DL (ref 6–20)
CALCIUM SERPL-MCNC: 9 MG/DL (ref 8.6–10.2)
CHLORIDE BLD-SCNC: 105 MMOL/L (ref 98–107)
CHOLESTEROL, TOTAL: 131 MG/DL (ref 0–199)
CO2: 26 MMOL/L (ref 22–29)
CREAT SERPL-MCNC: 0.9 MG/DL (ref 0.7–1.2)
EOSINOPHILS ABSOLUTE: 0.12 E9/L (ref 0.05–0.5)
EOSINOPHILS RELATIVE PERCENT: 2.6 % (ref 0–6)
GFR AFRICAN AMERICAN: >60
GFR NON-AFRICAN AMERICAN: >60 ML/MIN/1.73
GLUCOSE BLD-MCNC: 108 MG/DL (ref 74–99)
HCT VFR BLD CALC: 37.8 % (ref 37–54)
HDLC SERPL-MCNC: 37 MG/DL
HEMOGLOBIN: 13.4 G/DL (ref 12.5–16.5)
LDL CHOLESTEROL CALCULATED: 76 MG/DL (ref 0–99)
LYMPHOCYTES ABSOLUTE: 1.76 E9/L (ref 1.5–4)
LYMPHOCYTES RELATIVE PERCENT: 39.1 % (ref 20–42)
MCH RBC QN AUTO: 45.6 PG (ref 26–35)
MCHC RBC AUTO-ENTMCNC: 35.4 % (ref 32–34.5)
MCV RBC AUTO: 128.6 FL (ref 80–99.9)
MONOCYTES ABSOLUTE: 0.45 E9/L (ref 0.1–0.95)
MONOCYTES RELATIVE PERCENT: 10.4 % (ref 2–12)
NEUTROPHILS ABSOLUTE: 2.12 E9/L (ref 1.8–7.3)
NEUTROPHILS RELATIVE PERCENT: 47 % (ref 43–80)
PDW BLD-RTO: 13.2 FL (ref 11.5–15)
PLATELET # BLD: 240 E9/L (ref 130–450)
PMV BLD AUTO: 9 FL (ref 7–12)
POTASSIUM SERPL-SCNC: 4.7 MMOL/L (ref 3.5–5)
RBC # BLD: 2.94 E12/L (ref 3.8–5.8)
SODIUM BLD-SCNC: 141 MMOL/L (ref 132–146)
TOTAL PROTEIN: 6.2 G/DL (ref 6.4–8.3)
TRIGL SERPL-MCNC: 88 MG/DL (ref 0–149)
TSH SERPL DL<=0.05 MIU/L-ACNC: 1.41 UIU/ML (ref 0.27–4.2)
VITAMIN D 25-HYDROXY: 39 NG/ML (ref 30–100)
VLDLC SERPL CALC-MCNC: 18 MG/DL
WBC # BLD: 4.5 E9/L (ref 4.5–11.5)

## 2021-11-01 DIAGNOSIS — I10 ESSENTIAL HYPERTENSION: ICD-10-CM

## 2021-11-01 RX ORDER — METOPROLOL SUCCINATE 25 MG/1
TABLET, EXTENDED RELEASE ORAL
Qty: 30 TABLET | Refills: 3 | Status: SHIPPED
Start: 2021-11-01 | End: 2022-01-19 | Stop reason: SDUPTHER

## 2021-11-03 ENCOUNTER — OFFICE VISIT (OUTPATIENT)
Dept: FAMILY MEDICINE CLINIC | Age: 51
End: 2021-11-03
Payer: COMMERCIAL

## 2021-11-03 VITALS
TEMPERATURE: 97 F | DIASTOLIC BLOOD PRESSURE: 60 MMHG | OXYGEN SATURATION: 97 % | WEIGHT: 168 LBS | BODY MASS INDEX: 22.16 KG/M2 | SYSTOLIC BLOOD PRESSURE: 100 MMHG | HEART RATE: 57 BPM

## 2021-11-03 DIAGNOSIS — L02.91 ABSCESS: Primary | ICD-10-CM

## 2021-11-03 DIAGNOSIS — L72.0 EPIDERMOID CYST: ICD-10-CM

## 2021-11-03 PROCEDURE — G8420 CALC BMI NORM PARAMETERS: HCPCS | Performed by: PHYSICIAN ASSISTANT

## 2021-11-03 PROCEDURE — G8427 DOCREV CUR MEDS BY ELIG CLIN: HCPCS | Performed by: PHYSICIAN ASSISTANT

## 2021-11-03 PROCEDURE — 99213 OFFICE O/P EST LOW 20 MIN: CPT | Performed by: PHYSICIAN ASSISTANT

## 2021-11-03 PROCEDURE — 4004F PT TOBACCO SCREEN RCVD TLK: CPT | Performed by: PHYSICIAN ASSISTANT

## 2021-11-03 PROCEDURE — 3017F COLORECTAL CA SCREEN DOC REV: CPT | Performed by: PHYSICIAN ASSISTANT

## 2021-11-03 PROCEDURE — G8482 FLU IMMUNIZE ORDER/ADMIN: HCPCS | Performed by: PHYSICIAN ASSISTANT

## 2021-11-03 RX ORDER — NAPROXEN 500 MG/1
500 TABLET ORAL 2 TIMES DAILY PRN
Qty: 20 TABLET | Refills: 0 | Status: SHIPPED
Start: 2021-11-03 | End: 2021-11-08

## 2021-11-03 RX ORDER — SULFAMETHOXAZOLE AND TRIMETHOPRIM 800; 160 MG/1; MG/1
1 TABLET ORAL 2 TIMES DAILY
Qty: 20 TABLET | Refills: 0 | Status: SHIPPED | OUTPATIENT
Start: 2021-11-03 | End: 2021-11-13

## 2021-11-03 RX ORDER — CEFDINIR 300 MG/1
300 CAPSULE ORAL 2 TIMES DAILY
Qty: 20 CAPSULE | Refills: 0 | Status: SHIPPED | OUTPATIENT
Start: 2021-11-03 | End: 2021-11-13

## 2021-11-03 NOTE — PROGRESS NOTES
11/3/21  Piper Fitzgerald : 1970 Sex: male  Age 49 y.o. Subjective:  Chief Complaint   Patient presents with    Abscess     above penis x2d          HPI:   Piper Fitzgerald , 49 y.o. male presents to express care for evaluation of abscess    HPI  60-year-old male presents to [facility] for evaluation of an abscess noted to the right suprapubic area. The patient has noted this area ongoing for about 5 days. The patient states that he was able to express the area and some white purulent material had come out of this area. The patient is not having any fever, chills. No traumatic injury. No significant involvement of the penis or scrotum. The patient has no involvement into the groin area. The patient is not having any back pain, flank pain, myalgias. ROS:   Unless otherwise stated in this report the patient's positive and negative responses for review of systems for constitutional, eyes, ENT, cardiovascular, respiratory, gastrointestinal, neurological, , musculoskeletal, and integument systems and related systems to the presenting problem are either stated in the history of present illness or were not pertinent or were negative for the symptoms and/or complaints related to the presenting medical problem. Positives and pertinent negatives as per HPI. All others reviewed and are negative.       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    Atheroembolism of left lower extremity (HCC) 2014    Atrial flutter (HCC)     Coronary atherosclerosis due to calcified coronary lesion(414.4)     ED (erectile dysfunction)     Epilepsy ([address]     Hyperlipidemia     Hypertension     IBS (irritable bowel syndrome)     Irregular heart beat     Ischemic toe 2014    MI, old     Seizures ([address]     Tinea     Tobacco dependence 2014    Tobacco use        Past Surgical History:   Procedure Laterality Date    CARDIAC SURGERY      COLONOSCOPY  08/2014    CORONARY ANGIOPLASTY  2009    stent placement    CORONARY ANGIOPLASTY WITH STENT PLACEMENT      KNEE SURGERY Left     Skin graft    UPPER GASTROINTESTINAL ENDOSCOPY  08/2014    UPPER GASTROINTESTINAL ENDOSCOPY         Family History   Problem Relation Age of Onset    Other Mother         COPD    Coronary Art Dis Mother     Other Father         cirrhosis of liver    Seizures Father     Diabetes Brother     Cancer Brother 64        leukemia    Diabetes Brother     Hearing Loss Maternal Uncle     Hearing Loss Maternal Uncle        Medications:     Current Outpatient Medications:     cefdinir (OMNICEF) 300 MG capsule, Take 1 capsule by mouth 2 times daily for 10 days, Disp: 20 capsule, Rfl: 0    sulfamethoxazole-trimethoprim (BACTRIM DS;SEPTRA DS) 800-160 MG per tablet, Take 1 tablet by mouth 2 times daily for 10 days, Disp: 20 tablet, Rfl: 0    naproxen (NAPROSYN) 500 MG tablet, Take 1 tablet by mouth 2 times daily as needed for Pain, Disp: 20 tablet, Rfl: 0    metoprolol succinate (TOPROL XL) 25 MG extended release tablet, take 1 tablet by mouth once daily, Disp: 30 tablet, Rfl: 3    allopurinol (ZYLOPRIM) 100 MG tablet, Take 1 tablet by mouth daily, Disp: 30 tablet, Rfl: 3    dicyclomine (BENTYL) 10 MG capsule, take 1 capsule by mouth twice a day if needed, Disp: 120 capsule, Rfl: 3    atorvastatin (LIPITOR) 40 MG tablet, Take 1 tablet by mouth nightly, Disp: 30 tablet, Rfl: 5    mirtazapine (REMERON) 30 MG tablet, take 1 tablet by mouth at bedtime, Disp: 30 tablet, Rfl: 5    clopidogrel (PLAVIX) 75 MG tablet, take 1 tablet by mouth once daily, Disp: 30 tablet, Rfl: 5    levETIRAcetam (KEPPRA) 500 MG tablet, Take 3 tablets by mouth 2 times daily, Disp: 60 tablet, Rfl: 0    vitamin D (ERGOCALCIFEROL) 1.25 MG (89223 UT) CAPS capsule, take 1 capsule by mouth every week, Disp: 4 capsule, Rfl: 5    ibuprofen (IBU) 800 MG tablet, Take 1 tablet by mouth every 8 hours as needed for Pain, Disp: 21 tablet, Rfl: 0    hydrOXYzine (ATARAX) 10 MG tablet, Take 10 mg by mouth 2 times daily as needed for Itching, Disp: , Rfl:     traZODone (DESYREL) 100 MG tablet, Take 1.5 tablets by mouth nightly, Disp: 30 tablet, Rfl: 0    lansoprazole (PREVACID) 30 MG delayed release capsule, take 1 capsule by mouth every morning, Disp: , Rfl:     escitalopram (LEXAPRO) 20 MG tablet, take 2 tablets by mouth once daily, Disp: 60 tablet, Rfl: 5    hydroxyurea (HYDREA) 500 MG chemo capsule, Take 1 capsule by mouth 2 times daily, Disp: 60 capsule, Rfl: 1    Allergies: Allergies   Allergen Reactions    Acyclovir And Related Other (See Comments)    Doxycycline Itching       Social History:     Social History     Tobacco Use    Smoking status: Current Every Day Smoker     Packs/day: 0.50     Years: 20.00     Pack years: 10.00     Types: Cigarettes    Smokeless tobacco: Current User     Types: Chew    Tobacco comment: cutting down   Vaping Use    Vaping Use: Never used   Substance Use Topics    Alcohol use: No     Comment: Quit 2019.  Drug use: Not Currently     Types: Marijuana [place])     Comment: Daily for pain. Patient lives at home. Physical Exam:     Vitals:    11/03/21 1509   BP: 100/60   Pulse: 57   Temp: 97 °F (36.1 °C)   SpO2: 97%   Weight: 168 lb (76.2 kg)       Exam:  Physical Exam  Nurses note and vital signs reviewed and patient is not hypoxic. General: The patient appears well and in no apparent distress. Patient is resting comfortably on cart. Skin: Warm, dry, no pallor noted. There is no rash noted. Head: Normocephalic, atraumatic. Eye: Normal conjunctiva  Ears, Nose, Mouth, and Throat: Wearing a mask  Cardiovascular: Regular Rate and Rhythm  Respiratory: Patient is in no distress, no accessory muscle use, lungs are clear to auscultation, no wheezing, crackles or rhonchi  Back: Non-tender, no CVA tenderness bilaterally to percussion.   GI: Normal bowel sounds, no tenderness to palpation, no masses appreciated. No rebound, guarding, or rigidity noted. Circumcised male, the patient does have evidence of erythema and tenderness noted in the right suprapubic area. The patient does have some indurated area that is approximately 1.5 cm in diameter. The area does appear to be erythematous. There is no evidence of gross cellulitis. There is no involvement to the scrotum or to the testicles. There is minimal lymphadenopathy noted  Musculoskeletal: No obvious deformity noted to the upper or lower extremities  Neurological: A&O x4, normal speech      Testing:           Medical Decision Making:     Vital signs reviewed    Past medical history reviewed. Allergies reviewed. Medications reviewed. Patient on arrival does not appear to be in any apparent distress or discomfort. The patient has been seen and evaluated. The patient does not appear to be toxic or lethargic. Evaluated the area and this does seem to be consistent with either abscess or epidermoid cyst.  The patient does have some erythema in the area. The area is very indurated at this point. There is no fluctuance. Patient will be treated with Omnicef, Bactrim, naproxen. The patient is to use warm compresses. He did shave the area minimally he is not to shave or agitate the area he is not to poke or prod or express the area. The patient is to return to express care or go directly to the emergency department should any of the signs or symptoms worsen. The patient is to followup with primary care physician in 2-3 days for repeat evaluation. The patient has no other questions or concerns at this time the patient will be discharged home. Clinical Impression:   Ayesha Severino was seen today for abscess. Diagnoses and all orders for this visit:    Abscess    Epidermoid cyst    Other orders  -     cefdinir (OMNICEF) 300 MG capsule;  Take 1 capsule by mouth 2 times daily for 10 days  - sulfamethoxazole-trimethoprim (BACTRIM DS;SEPTRA DS) 800-160 MG per tablet; Take 1 tablet by mouth 2 times daily for 10 days  -     naproxen (NAPROSYN) 500 MG tablet; Take 1 tablet by mouth 2 times daily as needed for Pain        The patient is to call for any concerns or return if any of the signs or symptoms worsen. The patient is to follow-up with PCP in the next 2-3 days for repeat evaluation repeat assessment or go directly to the emergency department.      SIGNATURE: Scot Maurice III, PA-C

## 2021-11-08 ENCOUNTER — OFFICE VISIT (OUTPATIENT)
Dept: NEUROLOGY | Age: 51
End: 2021-11-08
Payer: COMMERCIAL

## 2021-11-08 VITALS
BODY MASS INDEX: 22.4 KG/M2 | SYSTOLIC BLOOD PRESSURE: 91 MMHG | HEIGHT: 73 IN | DIASTOLIC BLOOD PRESSURE: 65 MMHG | HEART RATE: 72 BPM | WEIGHT: 169 LBS | TEMPERATURE: 98.4 F | OXYGEN SATURATION: 96 %

## 2021-11-08 DIAGNOSIS — R41.89 SUBJECTIVE MEMORY COMPLAINTS: ICD-10-CM

## 2021-11-08 DIAGNOSIS — G40.309 NONINTRACTABLE GENERALIZED IDIOPATHIC EPILEPSY WITHOUT STATUS EPILEPTICUS (HCC): Primary | ICD-10-CM

## 2021-11-08 PROBLEM — R07.2 PRECORDIAL PAIN: Status: RESOLVED | Noted: 2018-09-18 | Resolved: 2021-11-08

## 2021-11-08 PROBLEM — R00.1 BRADYCARDIA: Status: RESOLVED | Noted: 2018-09-18 | Resolved: 2021-11-08

## 2021-11-08 PROCEDURE — G8420 CALC BMI NORM PARAMETERS: HCPCS | Performed by: NURSE PRACTITIONER

## 2021-11-08 PROCEDURE — G8427 DOCREV CUR MEDS BY ELIG CLIN: HCPCS | Performed by: NURSE PRACTITIONER

## 2021-11-08 PROCEDURE — 4004F PT TOBACCO SCREEN RCVD TLK: CPT | Performed by: NURSE PRACTITIONER

## 2021-11-08 PROCEDURE — 3017F COLORECTAL CA SCREEN DOC REV: CPT | Performed by: NURSE PRACTITIONER

## 2021-11-08 PROCEDURE — 99204 OFFICE O/P NEW MOD 45 MIN: CPT | Performed by: NURSE PRACTITIONER

## 2021-11-08 PROCEDURE — G8482 FLU IMMUNIZE ORDER/ADMIN: HCPCS | Performed by: NURSE PRACTITIONER

## 2021-11-08 RX ORDER — LEVETIRACETAM 500 MG/1
1500 TABLET ORAL 2 TIMES DAILY
Qty: 540 TABLET | Refills: 3 | Status: SHIPPED
Start: 2021-11-08 | End: 2022-02-23 | Stop reason: SDUPTHER

## 2021-11-08 NOTE — PATIENT INSTRUCTIONS
Patient Education        Epilepsy: Care Instructions  Your Care Instructions     Epilepsy is a common condition that causes repeated seizures. The seizures are caused by bursts of electrical activity in the brain that aren't normal. Seizures may cause problems with muscle control, movement, speech, vision, or awareness. They can be scary. Epilepsy affects each person differently. Some people have only a few seizures. Others get them more often. If you know what triggers a seizure, you may be able to avoid having one. You can take medicines to control and reduce seizures. You and your doctor will need to find the right combination, schedule, and dose of medicine. This may take time and careful changes. Seizures may get worse and happen more often over time. Follow-up care is a key part of your treatment and safety. Be sure to make and go to all appointments, and call your doctor if you are having problems. It's also a good idea to know your test results and keep a list of the medicines you take. How can you care for yourself at home? To control your seizures, you need to follow your treatment plan. If you take medicine to control seizures, you must take it exactly as prescribed. The medicine works only if you take the right amount on the schedule your doctor sets up. Following this schedule keeps the right level of medicine in your body. Even missing just a few doses can allow seizures to happen. You might be on a special ketogenic diet. If so, you'll need to follow the diet exactly for it to help prevent seizures. As you follow your treatment plan, also try to figure out and avoid things that may make you more likely to have a seizure. These may include:  · Not getting enough sleep. · Using drugs or alcohol. · Being stressed. · Skipping meals. If you keep having seizures despite treatment, keep a record of them. Note the date, time of day, and any details about the seizure that you can remember.  Your doctor can use this information to plan or adjust your medicine or other treatment. The record can also help your doctor find out what kinds of seizures you are having. If you have epilepsy:  · Be sure that any doctor who treats you knows that you have epilepsy. And let the doctor know what medicines you take, if any. · Wear a medical ID bracelet. If you have a seizure or accident that leaves you unconscious or unable to speak for yourself, the bracelet will let those who are treating you know that you have epilepsy. It will also list any medicines you take to control your seizures. That way, you won't be given any medicines that will react badly with those already in your body. · Ask your doctor if it's safe for you to do things like drive or swim. · Create a seizure first-aid plan with your friends and family. The plan will help them know how to help you. The kind of plan you need can depend on the kind of seizures you have. Your doctor can tell you more about this. When should you call for help? Call 911 anytime you think you may need emergency care. For example, call if:    · A seizure does not stop as it normally does.     · You have new symptoms such as:  ? Numbness, tingling, or weakness on one side of your body or face. ? Vision changes. ? Trouble speaking or thinking clearly. Call your doctor now or seek immediate medical care if:    · You have a fever.     · You have a severe headache. Watch closely for changes in your health, and be sure to contact your doctor if:    · The normal pattern or features of your seizures change. Where can you learn more? Go to https://Showcase-TVallanSnapkin.[email or web address]. org and sign in to your [employer] account. Enter P682 in the [employer] box to learn more about \"Epilepsy: Care Instructions. \"     If you do not have an account, please click on the \"Sign Up Now\" link.   Current as of: April 8, 2021               Content Version: 13.0  © 1787-2987 Healthwise, Incorporated. Care instructions adapted under license by [facility] ([facility]). If you have questions about a medical condition or this instruction, always ask your healthcare professional. [address] any warranty or liability for your use of this information. Patient Education        Stopping Smoking: Care Instructions  Your Care Instructions     Cigarette smokers crave the nicotine in cigarettes. Giving it up is much harder than simply changing a habit. Your body has to stop craving the nicotine. It is hard to quit, but you can do it. There are many tools that people use to quit smoking. You may find that combining tools works best for you. There are several steps to quitting. First you get ready to quit. Then you get support to help you. After that, you learn new skills and behaviors to become a nonsmoker. For many people, a necessary step is getting and using medicine. Your doctor will help you set up the plan that best meets your needs. You may want to attend a smoking cessation program to help you quit smoking. When you choose a program, look for one that has proven success. Ask your doctor for ideas. You will greatly increase your chances of success if you take medicine as well as get counseling or join a cessation program.  Some of the changes you feel when you first quit tobacco are uncomfortable. Your body will miss the nicotine at first, and you may feel short-tempered and grumpy. You may have trouble sleeping or concentrating. Medicine can help you deal with these symptoms. You may struggle with changing your smoking habits and rituals. The last step is the tricky one: Be prepared for the smoking urge to continue for a time. This is a lot to deal with, but keep at it. You will feel better. Follow-up care is a key part of your treatment and safety. Be sure to make and go to all appointments, and call your doctor if you are having problems.  It's also a good idea to nicotine. They help you by reducing withdrawal symptoms, such as stress and anxiety. · Some people find hypnosis, acupuncture, and massage helpful for ending the smoking habit. · Eat a healthy diet and get regular exercise. Having healthy habits will help your body move past its craving for nicotine. · Be prepared to keep trying. Most people are not successful the first few times they try to quit. Do not get mad at yourself if you smoke again. Make a list of things you learned and think about when you want to try again, such as next week, next month, or next year. Where can you learn more? Go to https://Re[employer] Compasspepiceweb.WordRake. org and sign in to your [email or web address] account. Enter M334 in the iPowerUp box to learn more about \"Stopping Smoking: Care Instructions. \"     If you do not have an account, please click on the \"Sign Up Now\" link. Current as of: February 11, 2021               Content Version: 13.0  © 2006-2021 [email or web address]. Care instructions adapted under license by [facility] ([facility]). If you have questions about a medical condition or this instruction, always ask your healthcare professional. [place] [address] any warranty or liability for your use of this information. Patient Education         Sleeping Better (02:11)  Your health professional recommends that you watch this short online health video. Learn tips for getting a good night's sleep. Purpose:  Gives tips for better sleep, including getting exercise, having a set bedtime, and avoiding caffeine. Goal:  User will learn tips for getting better sleep, including being active during the day, having bedtime routines, and avoiding caffeine. How to watch the video    Scan the QR code   OR Visit the website    https://hwi. se/r/[email or web address]   Current as of: June 16, 2021               Content Version: 13.0  © 2006-2021 [email or web address].    Care instructions adapted under license by [facility]. If you have questions about a medical condition or this instruction, always ask your healthcare professional. [place] [address] any warranty or liability for your use of this information.

## 2021-11-08 NOTE — PROGRESS NOTES
[address] [facility]. Remy Becerra M.D., F.A.C.P. Jeniffer Barajas, BELGICA, APRN, CNS  [place].  Jacobo Mcintosh, MSN, APRN-FNP-C  Tamar Salas MSN, APRN, FNP-C  [address]o [address]AL [address] MSN, APRN, FNP-C  [address], [address]  [place], [address] [address]  Phone: [phone]  Fax: [phone]       Ulises Garcia is a 49 y.o. right handed male     Past Medical History:     Past Medical History:   Diagnosis Date    Anxiety and depression     Atheroembolism of left lower extremity ([facility] 09/11/2014    Atrial flutter ([facility]     Coronary atherosclerosis due to calcified coronary lesion(414.4)     ED (erectile dysfunction)     Epilepsy ([facility]     Hyperlipidemia     Hypertension     IBS (irritable bowel syndrome)     Ischemic toe 09/11/2014    MI, old     Thrombocytosis     Tinea     Tobacco dependence 09/16/2014       Past Surgical History:       Past Surgical History:   Procedure Laterality Date    COLONOSCOPY  08/2014    CORONARY ANGIOPLASTY WITH STENT PLACEMENT      KNEE SURGERY Left     Skin graft    UPPER GASTROINTESTINAL ENDOSCOPY  08/2014     Allergies:       Acyclovir and related and Doxycycline    Medications:     Current Outpatient Medications   Medication Sig Dispense Refill    Cobalamin Combinations (B-47 + FOLIC ACID PO) Take by mouth      cefdinir (OMNICEF) 300 MG capsule Take 1 capsule by mouth 2 times daily for 10 days 20 capsule 0    sulfamethoxazole-trimethoprim (BACTRIM DS;SEPTRA DS) 800-160 MG per tablet Take 1 tablet by mouth 2 times daily for 10 days 20 tablet 0    metoprolol succinate (TOPROL XL) 25 MG extended release tablet take 1 tablet by mouth once daily 30 tablet 3    allopurinol (ZYLOPRIM) 100 MG tablet Take 1 tablet by mouth daily 30 tablet 3    dicyclomine (BENTYL) 10 MG capsule take 1 capsule by mouth twice a day if needed 120 capsule 3    atorvastatin (LIPITOR) 40 MG tablet Take 1 tablet by mouth nightly 30 tablet 5  mirtazapine (REMERON) 30 MG tablet take 1 tablet by mouth at bedtime 30 tablet 5    clopidogrel (PLAVIX) 75 MG tablet take 1 tablet by mouth once daily 30 tablet 5    levETIRAcetam (KEPPRA) 500 MG tablet Take 3 tablets by mouth 2 times daily 60 tablet 0    vitamin D (ERGOCALCIFEROL) 1.25 MG (85526 UT) CAPS capsule take 1 capsule by mouth every week 4 capsule 5    hydrOXYzine (ATARAX) 10 MG tablet Take 10 mg by mouth 2 times daily as needed for Itching      traZODone (DESYREL) 100 MG tablet Take 1.5 tablets by mouth nightly 30 tablet 0    lansoprazole (PREVACID) 30 MG delayed release capsule take 1 capsule by mouth every morning      escitalopram (LEXAPRO) 20 MG tablet take 2 tablets by mouth once daily 60 tablet 5    hydroxyurea (HYDREA) 500 MG chemo capsule Take 1 capsule by mouth 2 times daily 60 capsule 1     No current facility-administered medications for this visit.      Social History:       He is  with no children  He works at [employer] in [facility]    He is a current smoker of 1 pack every 2 days for the past 30 years and also chews 2 pouches of tobacco every day; does not drink alcohol; he uses marijuana nightly for pain    Review of Systems:     No chest pain or palpitations  No SOB  No vertigo, lightheadedness or loss of consciousness  No falls, tripping or stumbling  No incontinence of bowels or bladder  No itching or bruising appreciated  No numbness, tingling or focal arm/leg weakness    ROS is otherwise negative    Family History:     Family History   Problem Relation Age of Onset    Coronary Art Dis Mother     COPD Mother     Seizures Father     Alcohol Abuse Father     Liver Disease Father     Diabetes Brother     Cancer Brother 64        leukemia    Diabetes Brother     Heart Disease Maternal Uncle     Heart Disease Maternal Uncle      History of Present Illness:     Patient was referred by Dr. Claire Hansen for seizures    He presents with his wife, who also follows with me in the office, and is a good historian. He has a significant past medical history of seizures, CAD s/p stents, a flutter, HLD, HTN, IBS, anxiety, depression, tobacco abuse. He began suffering with seizures as a teenager. His father has seizures as well. He will feel like his \"head is compacted\" and his ears get warm. He then feels lightheaded. He can occasionally sit down and relax and prevent a full seizure, otherwise he will develop GTC movements with tongue biting, urinary incontinence, and post ictal fatigue. He has fractured his nose and had several injuries from these. He was on Dilantin years ago and has since been on Keppra 1500 mg twice daily. He has not had a seizure in 2 years and is compliant with his meds. He previously followed with Dr. Jameson, but her office is too far for him. He is concerned about short-term memory loss. He feels mentally foggy, loses his train of thought in the middle of a sentence, and has poor attention and concentration. He notes excessive stress at work. He sleeps only 5 hours nightly and has significant trouble falling asleep, despite use of trazodone and Remeron. He feels his mind races and anxiety is out of control. He has Atarax he uses PRN and follows with a mental health provider. He smokes marijuana every night to help him sleep and for low back pains--which are being worked up by another provider. .    He states years ago, when he was following with Dr. Katharina Cuellar, he was told he had \"lesions on my brain\" which were from smoking, but he is concerned about these. No previous history of strokes, major head injuries, meningitis--no family history of early dementia.     Objective:     BP 91/65 (Site: Right Upper Arm, Position: Sitting, Cuff Size: Medium Adult)   Pulse 72   Temp 98.4 °F (36.9 °C) (Infrared)   Ht 6' 1\" (1.854 m)   Wt 169 lb (76.7 kg)   SpO2 96%   BMI 22.30 kg/m²     General appearance: alert, appears stated age, cooperative and in no distress--appears fatigued  Head: normocephalic, without obvious abnormality, atraumatic  Eyes: conjunctivae/corneas clear; no drainage  Neck: full ROM  Back: symmetric, no curvature.  ROM normal.   Lungs: clear to auscultation bilaterally  Heart: regular rate and rhythm--no murmur  Abdomen: soft, non-tender; bowel sounds normal; no masses,  no organomegaly  Extremities: normal, atraumatic, no cyanosis or edema  Pulses: 2+ and symmetric  Skin:  color, texture, turgor normal--no rashes or lesions      Mental Status: alert and oriented x 4    Appropriate attention/concentration  Intact fundus of knowledge  Memories intact    MMSE 28/30  Draws clock well  Able to follow simple and complex commands well; pattern recognition intact    Speech: no dysarthria  Language: no aphasias    Cranial Nerves:  I: smell    II: visual acuity     II: visual fields Full    II: pupils MADIE   III,VII: ptosis None   III,IV,VI: extraocular muscles  EOMI without nystagmus   V: mastication Normal   V: facial light touch sensation  Normal   V,VII: corneal reflex     VII: facial muscle function - upper  Normal   VII: facial muscle function - lower Normal   VIII: hearing Normal   IX: soft palate elevation  Normal   IX,X: gag reflex    XI: trapezius strength  5/5   XI: sternocleidomastoid strength 5/5   XI: neck extension strength  5/5   XII: tongue strength  Normal     Motor:  5/5 throughout  Normal bulk and tone  No drift   No abnormal movements    Sensory:  LT and PP normal  Vibration normal    Coordination:   FN, FFM and DIANE normal  HS normal    Gait:  Normal    DTR:   2+ throughout  No Leiva's    No other pathological reflexes    Laboratory/Radiology:  ry/Radiology:     CBC with Differential:    Lab Results   Component Value Date    WBC 4.5 10/28/2021    RBC 2.94 10/28/2021    HGB 13.4 10/28/2021    HCT 37.8 10/28/2021     10/28/2021    .6 10/28/2021    MCH 45.6 10/28/2021    MCHC 35.4 10/28/2021    RDW 13.2 10/28/2021 NRBC 0.0 01/17/2017    LYMPHOPCT 39.1 10/28/2021    MONOPCT 10.4 10/28/2021    BASOPCT 0.9 10/28/2021    MONOSABS 0.45 10/28/2021    LYMPHSABS 1.76 10/28/2021    EOSABS 0.12 10/28/2021    BASOSABS 0.04 10/28/2021     CMP:    Lab Results   Component Value Date     10/28/2021    K 4.7 10/28/2021    K 4.1 08/09/2019     10/28/2021    CO2 26 10/28/2021    BUN 15 10/28/2021    CREATININE 0.9 10/28/2021    GFRAA >60 10/28/2021    LABGLOM >60 10/28/2021    GLUCOSE 108 10/28/2021    PROT 6.2 10/28/2021    LABALBU 4.3 10/28/2021    CALCIUM 9.0 10/28/2021    BILITOT 0.4 10/28/2021    ALKPHOS 55 10/28/2021    AST 19 10/28/2021    ALT 19 10/28/2021     TSH:    Lab Results   Component Value Date    TSH 1.410 10/28/2021     CT head 2019: unremarkable    All labs and images were personally reviewed at the time of this visit    Assessment:     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 His exam is normal    Subjective memory loss: I suspect his sleep deprivation, severe anxiety, stress, and daily marijuana use are all contributing. MMSE was normal here, and there is low suspicion for neurodegenerative process    Tobacco abuse    Plan:     No need for repeat MRI at this time    Continue Keppra 1500 mg BID    Sleep hygiene and tobacco cessation reviewed    Risks of SUDEP were reviewed as well as seizure precautions today. RTO in 1 year or sooner ELAINE Rodriguez Began, APRN - CNP  7:54 AM  11/8/2021    I spent 45 minutes with this patient and his wife obtaining the HPI and discussing the exam with greater than 50% of the time providing counseling and education on medications and other treatment plans. All questions were answered prior to leaving my office.

## 2021-11-15 ENCOUNTER — TELEPHONE (OUTPATIENT)
Dept: PRIMARY CARE CLINIC | Age: 51
End: 2021-11-15

## 2021-11-15 NOTE — TELEPHONE ENCOUNTER
Patient should reach out to his cardiologist or Dr. Juan M Francois should reach out to his cardiologist in regards to this question.

## 2021-11-15 NOTE — TELEPHONE ENCOUNTER
----- Message from Chastitykaro Black sent at 11/15/2021 11:20 AM EST -----  Subject: Message to Provider    QUESTIONS  Information for Provider? Patients wife called in she stated Dr. Mitra Alegria   wants the ok for the patient to be placed in Erectile disfunction   medication his fax number is [phone]  ---------------------------------------------------------------------------  --------------  CALL BACK INFO  What is the best way for the office to contact you? OK to leave message on   voicemail  Preferred Call Back Phone Number? [phone]  ---------------------------------------------------------------------------  --------------  SCRIPT ANSWERS  Relationship to Patient? Third Party  Representative Name?  wife

## 2022-01-01 ENCOUNTER — HOSPITAL ENCOUNTER (EMERGENCY)
Age: 52
Discharge: HOME | End: 2022-01-01
Payer: COMMERCIAL

## 2022-01-01 VITALS
HEART RATE: 70 BPM | BODY MASS INDEX: 22.4 KG/M2 | HEIGHT: 73 IN | RESPIRATION RATE: 16 BRPM | DIASTOLIC BLOOD PRESSURE: 73 MMHG | OXYGEN SATURATION: 96 % | SYSTOLIC BLOOD PRESSURE: 110 MMHG | WEIGHT: 169 LBS | TEMPERATURE: 97.8 F

## 2022-01-01 DIAGNOSIS — R53.83 FATIGUE, UNSPECIFIED TYPE: Primary | ICD-10-CM

## 2022-01-01 DIAGNOSIS — Z20.822 EXPOSURE TO CONFIRMED CASE OF COVID-19: ICD-10-CM

## 2022-01-01 PROCEDURE — U0005 INFEC AGEN DETEC AMPLI PROBE: HCPCS

## 2022-01-01 PROCEDURE — U0003 INFECTIOUS AGENT DETECTION BY NUCLEIC ACID (DNA OR RNA); SEVERE ACUTE RESPIRATORY SYNDROME CORONAVIRUS 2 (SARS-COV-2) (CORONAVIRUS DISEASE [COVID-19]), AMPLIFIED PROBE TECHNIQUE, MAKING USE OF HIGH THROUGHPUT TECHNOLOGIES AS DESCRIBED BY CMS-2020-01-R: HCPCS

## 2022-01-01 PROCEDURE — 99282 EMERGENCY DEPT VISIT SF MDM: CPT

## 2022-01-01 NOTE — Clinical Note
Warden Dutta was seen and treated in our emergency department on 1/1/2022. He may return to work on 01/05/2022. If you have any questions or concerns, please don't hesitate to call.       Gerhardt Snooks, PA-C

## 2022-01-01 NOTE — ED PROVIDER NOTES
[facility]                                                                                                                                      Department of Emergency Medicine   ED  Provider Note  Admit Date/RoomTime: 1/1/2022  4:59 PM  ED Room: [facility]        HPI:  1/1/22,   Time: 5:00 PM LISA Johnson is a 49 y.o. male presenting to the ED for mild fatigue, exposure to Covid 19 , beginning 1 week ago. The complaint has been persistent, mild in severity, and worsened by nothing. Patient states that his mother-in-law has COVID-23 and is here in the emergency room to be seen as well. His only complaint at this time is fatigue and insomnia. She denies any shortness of breath or chest pain. He took a routine test the day before Christmas prior to the exposure. Apparently several family members that were over for the holidays later found out that they had COVID-23. Patient has been vaccinated.   He is here with his wife to be tested        ROS:     Constitutional: See HPI  HENT: See HPI  Eyes: Negative for pain, discharge and redness  Respiratory: See HPI  Cardiovascular: Negative for CP, edema or palpitations  Gastrointestinal: Negative for nausea, vomiting, diarrhea and abdominal distention  Genitourinary: Negative for dysuria and frequency  Musculoskeletal: Negative for back pain and arthralgia  Skin: Negative for rash and wound  Neurological: Negative for weakness and headaches  Hematological: Negative for adenopathy    All other systems reviewed and are negative      -------------------------------- PAST HISTORY ----------------------------------  Past Medical History:  has a past medical history of Anxiety and depression, Atheroembolism of left lower extremity ([address] Atrial flutter ([address] Coronary atherosclerosis due to calcified coronary lesion(414.4), ED (erectile dysfunction), Epilepsy ([address] Hyperlipidemia, Hypertension, IBS (irritable bowel syndrome), Ischemic toe, MI, old, Thrombocytosis, Tinea, and Tobacco dependence. Past Surgical History:  has a past surgical history that includes Upper gastrointestinal endoscopy (08/2014); Colonoscopy (08/2014); Coronary angioplasty with stent; and knee surgery (Left). Social History:  reports that he has been smoking cigarettes. He has a 10.00 pack-year smoking history. His smokeless tobacco use includes chew. He reports previous drug use. Drug: Marijuana Alpheus Demi). He reports that he does not drink alcohol. Family History: family history includes Alcohol Abuse in his father; COPD in his mother; Cancer (age of onset: 64) in his brother; Coronary Art Dis in his mother; Diabetes in his brother and brother; Heart Disease in his maternal uncle and maternal uncle; Liver Disease in his father; Seizures in his father. The patients home medications have been reviewed. Allergies: Acyclovir and related and Doxycycline    --------------------------------- RESULTS ------------------------------------------  All laboratory and radiology results have been personally reviewed by myself   LABS:  No results found for this visit on 01/01/22. RADIOLOGY:  Interpreted by Radiologist.  No orders to display       ----------------- NURSING NOTES AND VITALS REVIEWED ---------------   The nursing notes within the ED encounter and vital signs as below have been reviewed. /73   Pulse 70   Temp 97.8 °F (36.6 °C)   Resp 16   Ht 6' 1\" (1.854 m)   Wt 169 lb (76.7 kg)   SpO2 96%   BMI 22.30 kg/m²   Oxygen Saturation Interpretation: Normal      --------------------------------PHYSICAL EXAM------------------------------------      Constitutional/General: Alert and oriented x3, well appearing, non toxic in NAD  Head: NC/AT  Eyes: PERRL, EOMI  Mouth: Oropharynx clear, handling secretions, no trismus  Neck: Supple, full ROM, no meningeal signs  Pulmonary: Lungs clear to auscultation bilaterally, no wheezes, rales, or rhonchi.  Not in respiratory distress  Cardiovascular:  Regular rate and rhythm, no murmurs, gallops, or rubs. 2+ distal pulses  Abdomen: Soft, + BS. No distension. Nontender. No palpable rigidity, rebound or guarding  Extremities: Moves all extremities x 4. Warm and well perfused  Skin: warm and dry without rash  Neurologic: GCS 15,  Intact. No focal deficits  Psych: Normal Affect      ------------------------ ED COURSE/MEDICAL DECISION MAKING----------------------  Medications - No data to display      Medical Decision Making:    Vitals stable and exam rather unremarkable. Will check for Covid with PCR testing. OK for discharge home after swab. Results will take 2-3 days to return. Discussed plan for F/U with PCP. Return as needed. Counseling: The emergency provider has spoken with the patient and discussed todays results, in addition to providing specific details for the plan of care and counseling regarding the diagnosis and prognosis. Questions are answered at this time and they are agreeable with the plan.      ------------------------ IMPRESSION AND DISPOSITION -------------------------------    IMPRESSION  1. Fatigue, unspecified type    2.  Exposure to confirmed case of COVID-19        DISPOSITION  Disposition: Discharge to home  Patient condition is stable                   Mick Flood PA-C  01/01/22 6164

## 2022-01-03 LAB — SARS-COV-2, PCR: DETECTED

## 2022-01-04 ENCOUNTER — TELEPHONE (OUTPATIENT)
Dept: PRIMARY CARE CLINIC | Age: 52
End: 2022-01-04

## 2022-01-05 ENCOUNTER — OFFICE VISIT (OUTPATIENT)
Dept: FAMILY MEDICINE CLINIC | Age: 52
End: 2022-01-05
Payer: COMMERCIAL

## 2022-01-05 VITALS
OXYGEN SATURATION: 98 % | HEART RATE: 89 BPM | BODY MASS INDEX: 22.4 KG/M2 | DIASTOLIC BLOOD PRESSURE: 74 MMHG | HEIGHT: 73 IN | RESPIRATION RATE: 18 BRPM | SYSTOLIC BLOOD PRESSURE: 96 MMHG | TEMPERATURE: 98.4 F | WEIGHT: 169 LBS

## 2022-01-05 DIAGNOSIS — R11.0 NAUSEA: ICD-10-CM

## 2022-01-05 DIAGNOSIS — R09.81 NASAL CONGESTION: ICD-10-CM

## 2022-01-05 DIAGNOSIS — U07.1 COVID-19: Primary | ICD-10-CM

## 2022-01-05 PROCEDURE — 4004F PT TOBACCO SCREEN RCVD TLK: CPT | Performed by: FAMILY MEDICINE

## 2022-01-05 PROCEDURE — 3017F COLORECTAL CA SCREEN DOC REV: CPT | Performed by: FAMILY MEDICINE

## 2022-01-05 PROCEDURE — G8427 DOCREV CUR MEDS BY ELIG CLIN: HCPCS | Performed by: FAMILY MEDICINE

## 2022-01-05 PROCEDURE — G8482 FLU IMMUNIZE ORDER/ADMIN: HCPCS | Performed by: FAMILY MEDICINE

## 2022-01-05 PROCEDURE — G8420 CALC BMI NORM PARAMETERS: HCPCS | Performed by: FAMILY MEDICINE

## 2022-01-05 PROCEDURE — 99213 OFFICE O/P EST LOW 20 MIN: CPT | Performed by: FAMILY MEDICINE

## 2022-01-05 RX ORDER — ONDANSETRON 4 MG/1
4 TABLET, FILM COATED ORAL 3 TIMES DAILY PRN
Qty: 30 TABLET | Refills: 0 | Status: SHIPPED | OUTPATIENT
Start: 2022-01-05

## 2022-01-05 RX ORDER — LORATADINE 10 MG/1
10 TABLET ORAL DAILY
Qty: 30 TABLET | Refills: 0 | Status: SHIPPED | OUTPATIENT
Start: 2022-01-05 | End: 2022-02-04

## 2022-01-05 RX ORDER — AZELASTINE 1 MG/ML
1 SPRAY, METERED NASAL 2 TIMES DAILY
Qty: 60 ML | Refills: 1 | Status: SHIPPED | OUTPATIENT
Start: 2022-01-05

## 2022-01-05 RX ORDER — AZITHROMYCIN 250 MG/1
250 TABLET, FILM COATED ORAL SEE ADMIN INSTRUCTIONS
Qty: 6 TABLET | Refills: 0 | Status: SHIPPED | OUTPATIENT
Start: 2022-01-05 | End: 2022-01-10

## 2022-01-05 RX ORDER — METHYLPREDNISOLONE 4 MG/1
TABLET ORAL
Qty: 1 KIT | Refills: 0 | Status: SHIPPED | OUTPATIENT
Start: 2022-01-05 | End: 2022-01-11

## 2022-01-05 NOTE — PROGRESS NOTES
spray by Nasal route 2 times daily Use in each nostril as directed Yes Manasa Barillas MD   ondansetron (ZOFRAN) 4 MG tablet Take 1 tablet by mouth 3 times daily as needed for Nausea or Vomiting Yes Manasa Barillas MD   loratadine (CLARITIN) 10 MG tablet Take 1 tablet by mouth daily Yes Manasa Barillas MD   Cobalamin Combinations (U-03 + FOLIC ACID PO) Take by mouth Yes Historical Provider, MD   levETIRAcetam (KEPPRA) 500 MG tablet Take 3 tablets by mouth 2 times daily Yes Sanjana Miguel APRN - CNP   metoprolol succinate (TOPROL XL) 25 MG extended release tablet take 1 tablet by mouth once daily Yes Manasa Barillas MD   allopurinol (ZYLOPRIM) 100 MG tablet Take 1 tablet by mouth daily Yes Manasa Barillas MD   dicyclomine (BENTYL) 10 MG capsule take 1 capsule by mouth twice a day if needed Yes Manasa Barillas MD   atorvastatin (LIPITOR) 40 MG tablet Take 1 tablet by mouth nightly Yes Manasa Barillas MD   mirtazapine (REMERON) 30 MG tablet take 1 tablet by mouth at bedtime Yes Manasa Barillas MD   clopidogrel (PLAVIX) 75 MG tablet take 1 tablet by mouth once daily Yes Manasa Barillas MD   vitamin D (ERGOCALCIFEROL) 1.25 MG (40207 UT) CAPS capsule take 1 capsule by mouth every week Yes Manasa Barillas MD   hydrOXYzine (ATARAX) 10 MG tablet Take 10 mg by mouth 2 times daily as needed for Itching Yes Historical Provider, MD   traZODone (DESYREL) 100 MG tablet Take 1.5 tablets by mouth nightly Yes Manasa Barillas MD   lansoprazole (PREVACID) 30 MG delayed release capsule take 1 capsule by mouth every morning Yes Historical Provider, MD   escitalopram (LEXAPRO) 20 MG tablet take 2 tablets by mouth once daily Yes Jacqui Forrester MD   hydroxyurea (HYDREA) 500 MG chemo capsule Take 1 capsule by mouth 2 times daily Yes Jacqui Forrester MD        Allergies   Allergen Reactions    Acyclovir And Related Hives and Nausea And Vomiting    Doxycycline Itching       Social History     Tobacco Use    Smoking status: Current Every Day Smoker     Packs/day: 0.50     Years: 20.00     Pack years: 10.00     Types: Cigarettes    Smokeless tobacco: Current User     Types: Chew    Tobacco comment: cutting down   Substance Use Topics    Alcohol use: No     Comment: Quit 2019. Vitals:    01/05/22 1107   BP: 96/74   Pulse: 89   Resp: 18   Temp: 98.4 °F (36.9 °C)   TempSrc: Temporal   SpO2: 98%   Weight: 169 lb (76.7 kg)   Height: 6' 1\" (1.854 m)     Estimated body mass index is 22.3 kg/m² as calculated from the following:    Height as of this encounter: 6' 1\" (1.854 m). Weight as of this encounter: 169 lb (76.7 kg). Physical Exam  Constitutional:       Appearance: He is well-developed. HENT:      Head: Normocephalic. Eyes:      Extraocular Movements: Extraocular movements intact. Conjunctiva/sclera: Conjunctivae normal.   Cardiovascular:      Rate and Rhythm: Normal rate and regular rhythm. Heart sounds: Normal heart sounds. No murmur heard. Pulmonary:      Effort: Pulmonary effort is normal.      Breath sounds: Normal breath sounds. No wheezing or rales. Abdominal:      General: Bowel sounds are normal.      Palpations: Abdomen is soft. Tenderness: There is no abdominal tenderness. Musculoskeletal:      Right lower leg: No edema. Left lower leg: No edema. Neurological:      General: No focal deficit present. Mental Status: He is alert. Comments: Cranial nerves grossly intact   Psychiatric:         Mood and Affect: Mood normal.         Judgment: Judgment normal.         ASSESSMENT/PLAN:  Isaiah Montelongo was seen today for positive for covid-19. Diagnoses and all orders for this visit:    COVID-19  -     azithromycin (ZITHROMAX) 250 MG tablet; Take 1 tablet by mouth See Admin Instructions for 5 days 500mg on day 1 followed by 250mg on days 2 - 5  -     methylPREDNISolone (MEDROL DOSEPACK) 4 MG tablet;  Take by mouth.  -     azelastine (ASTELIN) 0.1 % nasal spray; 1 spray by Nasal route 2 times daily Use in each nostril as directed  -     loratadine (CLARITIN) 10 MG tablet; Take 1 tablet by mouth daily    Nausea  -     ondansetron (ZOFRAN) 4 MG tablet; Take 1 tablet by mouth 3 times daily as needed for Nausea or Vomiting    Nasal congestion  -     loratadine (CLARITIN) 10 MG tablet; Take 1 tablet by mouth daily    Patient was started on  Azithromycin Z-Rene for a possible bacterial infection causing the patients reported symptoms. Patient was advised on common side effects along with the risk for C. Diff. Patient was advised to proceed to the ER with any allergic reaction to the above antibiotic. Azelastin nasal spray and second generation antihistamine advised for nasal congestion/rhinorrhea. For nausea Zofran PRN. Start Oral steroids. SE of above medications reviewed. All questions and concerns reviewed/answered. Advised on OTC supplementation with vitamin D, vitamin C, quercetin, zinc, melatonin. Patient to proceed to the ED or FLU clinic sooner if symptoms worsen or change. Patient to proceed to the ED immediately with high fevers, progressive SOB, dyspnea, CP, calf pain/swelling, signs of dehydration, (decreased urinary output or inability to tolerate PO). Patient verbalizes understanding and is in agreement with plan of care. All questions answered. This visit was provided as a focused evaluation during the COVID -19 pandemic/national emergency. A comprehensive review of all previous patient history and testing was not conducted. Pertinent findings were elicited during the visit. This report was transcribed using voice recognition software. Every effort was made to ensure accuracy; however, inadvertent computerized transcription errors may be present. Return if symptoms worsen or fail to improve. An electronicsignature was used to authenticate this note.     --Carrington Tracy MD on 1/5/22 at 11:17 AM EST

## 2022-01-05 NOTE — LETTER
[facility]  [address] [address]  Phone: [phone]  Fax: [phone]    Fernanda Henry MD        January 5, 2022     Patient: Avtar Reynoso   YOB: 1970   Date of Visit: 1/5/2022       To Whom it May Concern:    Sony You was seen in my clinic on 1/5/2022. He may return to work on 1/11/2022. If you have any questions or concerns, please don't hesitate to call.     Sincerely,         Fernanda Henry MD

## 2022-01-19 ENCOUNTER — OFFICE VISIT (OUTPATIENT)
Dept: ORTHOPEDIC SURGERY | Age: 52
End: 2022-01-19
Payer: COMMERCIAL

## 2022-01-19 ENCOUNTER — OFFICE VISIT (OUTPATIENT)
Dept: PRIMARY CARE CLINIC | Age: 52
End: 2022-01-19
Payer: COMMERCIAL

## 2022-01-19 VITALS
RESPIRATION RATE: 18 BRPM | TEMPERATURE: 98.2 F | BODY MASS INDEX: 21.87 KG/M2 | HEIGHT: 73 IN | HEART RATE: 73 BPM | WEIGHT: 165 LBS | DIASTOLIC BLOOD PRESSURE: 70 MMHG | OXYGEN SATURATION: 98 % | SYSTOLIC BLOOD PRESSURE: 106 MMHG

## 2022-01-19 VITALS — BODY MASS INDEX: 21.87 KG/M2 | WEIGHT: 165 LBS | HEIGHT: 73 IN

## 2022-01-19 DIAGNOSIS — G40.909 SEIZURE DISORDER (HCC): ICD-10-CM

## 2022-01-19 DIAGNOSIS — E55.9 VITAMIN D DEFICIENCY: ICD-10-CM

## 2022-01-19 DIAGNOSIS — K58.2 IRRITABLE BOWEL SYNDROME WITH BOTH CONSTIPATION AND DIARRHEA: ICD-10-CM

## 2022-01-19 DIAGNOSIS — E78.5 HYPERLIPIDEMIA, UNSPECIFIED HYPERLIPIDEMIA TYPE: ICD-10-CM

## 2022-01-19 DIAGNOSIS — Z86.16 HISTORY OF COVID-19: ICD-10-CM

## 2022-01-19 DIAGNOSIS — I25.84 CORONARY ATHEROSCLEROSIS DUE TO CALCIFIED CORONARY LESION: ICD-10-CM

## 2022-01-19 DIAGNOSIS — K21.9 GASTROESOPHAGEAL REFLUX DISEASE, UNSPECIFIED WHETHER ESOPHAGITIS PRESENT: ICD-10-CM

## 2022-01-19 DIAGNOSIS — I25.10 CORONARY ATHEROSCLEROSIS DUE TO CALCIFIED CORONARY LESION: ICD-10-CM

## 2022-01-19 DIAGNOSIS — D47.3 ESSENTIAL THROMBOCYTHEMIA (HCC): ICD-10-CM

## 2022-01-19 DIAGNOSIS — I10 ESSENTIAL HYPERTENSION: Primary | ICD-10-CM

## 2022-01-19 DIAGNOSIS — F32.A ANXIETY AND DEPRESSION: ICD-10-CM

## 2022-01-19 DIAGNOSIS — F41.9 ANXIETY AND DEPRESSION: ICD-10-CM

## 2022-01-19 DIAGNOSIS — D47.1 MYELOPROLIFERATIVE DISEASE (HCC): ICD-10-CM

## 2022-01-19 DIAGNOSIS — Z72.0 TOBACCO USE: ICD-10-CM

## 2022-01-19 DIAGNOSIS — M25.511 RIGHT SHOULDER PAIN, UNSPECIFIED CHRONICITY: Primary | ICD-10-CM

## 2022-01-19 LAB
ALBUMIN SERPL-MCNC: 4.4 G/DL (ref 3.5–5.2)
ALP BLD-CCNC: 52 U/L (ref 40–129)
ALT SERPL-CCNC: 10 U/L (ref 0–40)
ANION GAP SERPL CALCULATED.3IONS-SCNC: 11 MMOL/L (ref 7–16)
ANISOCYTOSIS: ABNORMAL
AST SERPL-CCNC: 16 U/L (ref 0–39)
BASOPHILS ABSOLUTE: 0.06 E9/L (ref 0–0.2)
BASOPHILS RELATIVE PERCENT: 1.7 % (ref 0–2)
BILIRUB SERPL-MCNC: 0.5 MG/DL (ref 0–1.2)
BUN BLDV-MCNC: 19 MG/DL (ref 6–20)
CALCIUM SERPL-MCNC: 9.7 MG/DL (ref 8.6–10.2)
CHLORIDE BLD-SCNC: 100 MMOL/L (ref 98–107)
CHOLESTEROL, TOTAL: 171 MG/DL (ref 0–199)
CO2: 27 MMOL/L (ref 22–29)
CREAT SERPL-MCNC: 0.9 MG/DL (ref 0.7–1.2)
EOSINOPHILS ABSOLUTE: 0.09 E9/L (ref 0.05–0.5)
EOSINOPHILS RELATIVE PERCENT: 2.5 % (ref 0–6)
GFR AFRICAN AMERICAN: >60
GFR NON-AFRICAN AMERICAN: >60 ML/MIN/1.73
GLUCOSE BLD-MCNC: 109 MG/DL (ref 74–99)
HCT VFR BLD CALC: 39.7 % (ref 37–54)
HDLC SERPL-MCNC: 34 MG/DL
HEMOGLOBIN: 13.9 G/DL (ref 12.5–16.5)
LDL CHOLESTEROL CALCULATED: 108 MG/DL (ref 0–99)
LYMPHOCYTES ABSOLUTE: 1.58 E9/L (ref 1.5–4)
LYMPHOCYTES RELATIVE PERCENT: 44.1 % (ref 20–42)
MCH RBC QN AUTO: 44.6 PG (ref 26–35)
MCHC RBC AUTO-ENTMCNC: 35 % (ref 32–34.5)
MCV RBC AUTO: 127.2 FL (ref 80–99.9)
MONOCYTES ABSOLUTE: 0.22 E9/L (ref 0.1–0.95)
MONOCYTES RELATIVE PERCENT: 5.9 % (ref 2–12)
NEUTROPHILS ABSOLUTE: 1.66 E9/L (ref 1.8–7.3)
NEUTROPHILS RELATIVE PERCENT: 45.8 % (ref 43–80)
OVALOCYTES: ABNORMAL
PDW BLD-RTO: 12.5 FL (ref 11.5–15)
PLATELET # BLD: 228 E9/L (ref 130–450)
PMV BLD AUTO: 9.2 FL (ref 7–12)
POIKILOCYTES: ABNORMAL
POTASSIUM SERPL-SCNC: 5.1 MMOL/L (ref 3.5–5)
RBC # BLD: 3.12 E12/L (ref 3.8–5.8)
SEDIMENTATION RATE, ERYTHROCYTE: 13 MM/HR (ref 0–15)
SODIUM BLD-SCNC: 138 MMOL/L (ref 132–146)
TOTAL PROTEIN: 7.2 G/DL (ref 6.4–8.3)
TRIGL SERPL-MCNC: 144 MG/DL (ref 0–149)
TSH SERPL DL<=0.05 MIU/L-ACNC: 1.89 UIU/ML (ref 0.27–4.2)
VITAMIN D 25-HYDROXY: 40 NG/ML (ref 30–100)
VLDLC SERPL CALC-MCNC: 29 MG/DL
WBC # BLD: 3.6 E9/L (ref 4.5–11.5)

## 2022-01-19 PROCEDURE — 3017F COLORECTAL CA SCREEN DOC REV: CPT | Performed by: ORTHOPAEDIC SURGERY

## 2022-01-19 PROCEDURE — 4004F PT TOBACCO SCREEN RCVD TLK: CPT | Performed by: ORTHOPAEDIC SURGERY

## 2022-01-19 PROCEDURE — G8427 DOCREV CUR MEDS BY ELIG CLIN: HCPCS | Performed by: ORTHOPAEDIC SURGERY

## 2022-01-19 PROCEDURE — 99213 OFFICE O/P EST LOW 20 MIN: CPT | Performed by: ORTHOPAEDIC SURGERY

## 2022-01-19 PROCEDURE — 99214 OFFICE O/P EST MOD 30 MIN: CPT | Performed by: FAMILY MEDICINE

## 2022-01-19 PROCEDURE — G8420 CALC BMI NORM PARAMETERS: HCPCS | Performed by: ORTHOPAEDIC SURGERY

## 2022-01-19 PROCEDURE — 3017F COLORECTAL CA SCREEN DOC REV: CPT | Performed by: FAMILY MEDICINE

## 2022-01-19 PROCEDURE — G8482 FLU IMMUNIZE ORDER/ADMIN: HCPCS | Performed by: FAMILY MEDICINE

## 2022-01-19 PROCEDURE — G8427 DOCREV CUR MEDS BY ELIG CLIN: HCPCS | Performed by: FAMILY MEDICINE

## 2022-01-19 PROCEDURE — G8420 CALC BMI NORM PARAMETERS: HCPCS | Performed by: FAMILY MEDICINE

## 2022-01-19 PROCEDURE — G8482 FLU IMMUNIZE ORDER/ADMIN: HCPCS | Performed by: ORTHOPAEDIC SURGERY

## 2022-01-19 PROCEDURE — 4004F PT TOBACCO SCREEN RCVD TLK: CPT | Performed by: FAMILY MEDICINE

## 2022-01-19 RX ORDER — DICYCLOMINE HYDROCHLORIDE 10 MG/1
CAPSULE ORAL
Qty: 120 CAPSULE | Refills: 3 | Status: SHIPPED
Start: 2022-01-19 | End: 2022-05-03 | Stop reason: CLARIF

## 2022-01-19 RX ORDER — METOPROLOL SUCCINATE 25 MG/1
TABLET, EXTENDED RELEASE ORAL
Qty: 30 TABLET | Refills: 5 | Status: SHIPPED
Start: 2022-01-19 | End: 2022-02-23 | Stop reason: SDUPTHER

## 2022-01-19 ASSESSMENT — ENCOUNTER SYMPTOMS
RHINORRHEA: 1
DIARRHEA: 0
ABDOMINAL PAIN: 1
NAUSEA: 0
SHORTNESS OF BREATH: 1
VOMITING: 0
CONSTIPATION: 1
WHEEZING: 0
SORE THROAT: 0

## 2022-01-19 NOTE — PROGRESS NOTES
New Shoulder Patient Visit     Referring Provider:   No referring provider defined for this encounter. CHIEF COMPLAINT:   Right shoulder pain    HPI:      Mireille Mccurdy is a 49y.o. year old male who is seen today  for evaluation of right shoulder pain. He presents today requesting a second opinion for his right shoulder pain. He reports the pain has been ongoing for the past 6 months. He does not recall a specific injury which started the pain. Patient reports gradual onset of symptoms that he noticed while he was at work. 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 The patient does not have mechanical symptoms. He does have night pain. He denies a feeling of instability. The prior treatments have been Injection reports 6 months ago with good improvement. He reports symptoms have returned to baseline. The patient is right hand dominant. The patient is working. The patients occupation is [employer].       PAST MEDICAL HISTORY  Past Medical History:   Diagnosis Date    Anxiety and depression     Atheroembolism of left lower extremity ([facility] [address] 09/11/2014    Atrial flutter (HCC)     Coronary atherosclerosis due to calcified coronary lesion(414.4)     ED (erectile dysfunction)     Epilepsy ([facility] [address]     Hyperlipidemia     Hypertension     IBS (irritable bowel syndrome)     Ischemic toe 09/11/2014    MI, old     Thrombocytosis     Tinea     Tobacco dependence 09/16/2014       PAST SURGICAL HISTORY  Past Surgical History:   Procedure Laterality Date    COLONOSCOPY  08/2014    CORONARY ANGIOPLASTY WITH STENT PLACEMENT      KNEE SURGERY Left     Skin graft    UPPER GASTROINTESTINAL ENDOSCOPY  08/2014       FAMILY HISTORY   Family History   Problem Relation Age of Onset    Coronary Art Dis Mother     COPD Mother     Seizures Father     Alcohol Abuse Father     Liver Disease Father  Diabetes Brother     Cancer Brother 64        leukemia    Diabetes Brother     Heart Disease Maternal Uncle     Heart Disease Maternal Uncle        SOCIAL HISTORY  Social History     Occupational History    Not on file   Tobacco Use    Smoking status: Current Every Day Smoker     Packs/day: 0.50     Years: 20.00     Pack years: 10.00     Types: Cigarettes    Smokeless tobacco: Current User     Types: Chew    Tobacco comment: cutting down   Vaping Use    Vaping Use: Never used   Substance and Sexual Activity    Alcohol use: No     Comment: Quit 2019.  Drug use: Not Currently     Types: Marijuana Nancy Coho)     Comment: Daily for pain.     Sexual activity: Yes     Partners: Female       CURRENT MEDICATIONS     Current Outpatient Medications:     dicyclomine (BENTYL) 10 MG capsule, take 1 capsule by mouth twice a day if needed, Disp: 120 capsule, Rfl: 3    metoprolol succinate (TOPROL XL) 25 MG extended release tablet, take 1 tablet by mouth once daily, Disp: 30 tablet, Rfl: 5    Cobalamin Combinations (K-88 + FOLIC ACID) 3020-752 MCG TBDP, Take 1 tablet by mouth daily, Disp: 90 tablet, Rfl: 1    azelastine (ASTELIN) 0.1 % nasal spray, 1 spray by Nasal route 2 times daily Use in each nostril as directed, Disp: 60 mL, Rfl: 1    ondansetron (ZOFRAN) 4 MG tablet, Take 1 tablet by mouth 3 times daily as needed for Nausea or Vomiting, Disp: 30 tablet, Rfl: 0    loratadine (CLARITIN) 10 MG tablet, Take 1 tablet by mouth daily, Disp: 30 tablet, Rfl: 0    levETIRAcetam (KEPPRA) 500 MG tablet, Take 3 tablets by mouth 2 times daily, Disp: 540 tablet, Rfl: 3    allopurinol (ZYLOPRIM) 100 MG tablet, Take 1 tablet by mouth daily, Disp: 30 tablet, Rfl: 3    atorvastatin (LIPITOR) 40 MG tablet, Take 1 tablet by mouth nightly, Disp: 30 tablet, Rfl: 5    mirtazapine (REMERON) 30 MG tablet, take 1 tablet by mouth at bedtime, Disp: 30 tablet, Rfl: 5    clopidogrel (PLAVIX) 75 MG tablet, take 1 tablet by mouth once daily, Disp: 30 tablet, Rfl: 5    vitamin D (ERGOCALCIFEROL) 1.25 MG (93261 UT) CAPS capsule, take 1 capsule by mouth every week, Disp: 4 capsule, Rfl: 5    hydrOXYzine (ATARAX) 10 MG tablet, Take 10 mg by mouth 2 times daily as needed for Itching, Disp: , Rfl:     traZODone (DESYREL) 100 MG tablet, Take 1.5 tablets by mouth nightly, Disp: 30 tablet, Rfl: 0    lansoprazole (PREVACID) 30 MG delayed release capsule, take 1 capsule by mouth every morning, Disp: , Rfl:     escitalopram (LEXAPRO) 20 MG tablet, take 2 tablets by mouth once daily, Disp: 60 tablet, Rfl: 5    hydroxyurea (HYDREA) 500 MG chemo capsule, Take 1 capsule by mouth 2 times daily, Disp: 60 capsule, Rfl: 1    ALLERGIES  Allergies   Allergen Reactions    Acyclovir And Related Hives and Nausea And Vomiting    Doxycycline Itching       Controlled Substances Monitoring:        REVIEW OF SYSTEMS:     Constitutional:  Negative for weight loss, fevers, chills, fatigue  Cardiovascular: Negative for chest pain, palpitations  Pulmonary: Negative for shortness of breath, labored breathing, cough  GI: negative for abdominal pain, nausea, vomitting   MSK: per HPI  Skin: negative for rash, open wounds    All other systems reviewed and are negative           PHYSICAL EXAM     There were no vitals filed for this visit. Height:    Weight: [unfilled]  BMI:  There is no height or weight on file to calculate BMI. General: The patient is alert and oriented x 3, appears to be stated age and in no distress. HEENT: head is normocephalic, atraumatic. EOMI. Neck: supple, trachea midline, no thyromegaly   Cardiovascular: peripheral pulses palpable. Normal Capillary refill   Respiratory: breathing unlabored, chest expansion symmetric   Skin: no rash, no open wounds, no erythema  Psych: normal affect; mood stable  Neurologic: gait normal, sensation grossly intact in extremities  MSK:    Cervical Spine:   There is no tenderness to palpation along the cervical spine.  Range of motion is normal.  Spurling's is negative    Shoulder Exam:   Right shoulder exam range of motion 160/60/T10. There is positive pain with impingement testing. There is mild anterior joint tenderness present with palpation. Mild tenderness over the biceps. Also mild tenderness [facility] the [facility] [place] [facility]. No swelling or deformity visualized on inspection. There is pain without weakness on Jobes exam. Speeds and [place]'s exams were intact. Belly press exam was intact. Mild pain with bearhug exam.    IMAGING:     No new imaging was obtained today. Previous x-rays of the right shoulder were reviewed showing maintained glenohumeral joint space negative for acute fracture dislocation or bony abnormality    MRI of the right shoulder from [facility] was reviewed showing partial articular sided tear of the supraspinatus, mild tendinosis as well as tendinosis of the upper border of the subscapularis. Degenerative changes to the labrum and biceps visualized suspect SLAP tear     Radiographic findings reviewed with patient    ASSESSMENT   Right shoulder partial-thickness rotator cuff tear of the supraspinatus/subscapularis, SLAP tear with biceps tendinosis      PLAN  We discussed his shoulder today. He has had 1 steroid injection which helped somewhat but pain returned to baseline. He has not done any therapy. We discussed continued conservative treatment versus surgery options. He is interested in trying a course of PT which I think is reasonable. He will follow-up in 6 weeks for reassessment. We did discuss potential surgery which would involve right shoulder arthroscopy, rotator cuff repair versus debridement, possible Regeneten augmentation, biceps tenodesis.       Justo Smith MD  [address]

## 2022-01-19 NOTE — PROGRESS NOTES
2022     Chief Complaint   Patient presents with    Hyperlipidemia     HPI  Flor Johnson (:  1970) is a 49 y.o. male, here for evaluation of the following medical concerns:    Patient is a 52-year-old male with a past medical history of anxiety/depression, family history of luekemia w thrombocytopenia, DDD, arthritis, ED, low vit d, GERD, hyperlipidemia, CAD w/ stent placement in , HTN, HLD, Seizure disorder who presents for a follow-up of his chronic medical issues. Patient also recently had COVID. Follows with cardiology, neurology, gastroenterology, hematology/oncology, and psychiatry.     Patient reports he has recovered fully from [place]. Still has some shortness of breath, which is more of a chronic issues due to his tobacco use history.      Interested in new blood work.      No break through seizures. Review of Systems   Constitutional: Negative for chills and fever. HENT: Positive for congestion and rhinorrhea. Negative for sore throat. Respiratory: Positive for shortness of breath. Negative for wheezing. Cardiovascular: Negative for chest pain and leg swelling. Gastrointestinal: Positive for abdominal pain and constipation. Negative for diarrhea, nausea and vomiting. Musculoskeletal: Positive for arthralgias and myalgias. Skin: Negative for rash. Neurological: Negative for light-headedness and headaches. Past Medical History:   Diagnosis Date    Anxiety and depression     Atheroembolism of left lower extremity ([address] 2014    Atrial flutter (HCC)     Coronary atherosclerosis due to calcified coronary lesion(414.4)     ED (erectile dysfunction)     Epilepsy ([facility] [address]     Hyperlipidemia     Hypertension     IBS (irritable bowel syndrome)     Ischemic toe 2014    MI, old     Thrombocytosis     Tinea     Tobacco dependence 2014       Prior to Visit Medications    Medication Sig Taking?  Authorizing Provider   dicyclomine (BENTYL) 10 MG capsule take 1 capsule by mouth twice a day if needed Yes Karina Keller MD   metoprolol succinate (TOPROL XL) 25 MG extended release tablet take 1 tablet by mouth once daily Yes Karina Keller MD   Cobalamin Combinations (A-10 + FOLIC ACID) 6037-343 MCG TBDP Take 1 tablet by mouth daily Yes Karina Keller MD   azelastine (ASTELIN) 0.1 % nasal spray 1 spray by Nasal route 2 times daily Use in each nostril as directed Yes Karina Keller MD   ondansetron (ZOFRAN) 4 MG tablet Take 1 tablet by mouth 3 times daily as needed for Nausea or Vomiting Yes Karina Keller MD   loratadine (CLARITIN) 10 MG tablet Take 1 tablet by mouth daily Yes Karina Keller MD   levETIRAcetam (KEPPRA) 500 MG tablet Take 3 tablets by mouth 2 times daily Yes TOMMY Nolan CNP   allopurinol (ZYLOPRIM) 100 MG tablet Take 1 tablet by mouth daily Yes Karina Keller MD   atorvastatin (LIPITOR) 40 MG tablet Take 1 tablet by mouth nightly Yes Karina Keller MD   mirtazapine (REMERON) 30 MG tablet take 1 tablet by mouth at bedtime Yes Karina Keller MD   clopidogrel (PLAVIX) 75 MG tablet take 1 tablet by mouth once daily Yes Karina Keller MD   vitamin D (ERGOCALCIFEROL) 1.25 MG (34582 UT) CAPS capsule take 1 capsule by mouth every week Yes Karina Keller MD   hydrOXYzine (ATARAX) 10 MG tablet Take 10 mg by mouth 2 times daily as needed for Itching Yes Historical Provider, MD   traZODone (DESYREL) 100 MG tablet Take 1.5 tablets by mouth nightly Yes Karina Keller MD   lansoprazole (PREVACID) 30 MG delayed release capsule take 1 capsule by mouth every morning Yes Historical Provider, MD   escitalopram (LEXAPRO) 20 MG tablet take 2 tablets by mouth once daily Yes Charissa Meyer MD   hydroxyurea (HYDREA) 500 MG chemo capsule Take 1 capsule by mouth 2 times daily Yes Charissa Meyer MD        Allergies   Allergen Reactions    Acyclovir And Related Hives and Nausea And Vomiting    Doxycycline Itching       Social History     Tobacco Use    Smoking status: Current Every Day Smoker     Packs/day: 0.50     Years: 20.00     Pack years: 10.00     Types: Cigarettes    Smokeless tobacco: Current User     Types: Chew    Tobacco comment: cutting down   Substance Use Topics    Alcohol use: No     Comment: Quit 2019. Vitals:    01/19/22 1039   BP: 106/70   Pulse: 73   Resp: 18   Temp: 98.2 °F (36.8 °C)   TempSrc: Temporal   SpO2: 98%   Weight: 165 lb (74.8 kg)   Height: 6' 1\" (1.854 m)     Estimated body mass index is 21.77 kg/m² as calculated from the following:    Height as of this encounter: 6' 1\" (1.854 m). Weight as of this encounter: 165 lb (74.8 kg). Physical Exam  Constitutional:       Appearance: He is well-developed. HENT:      Head: Normocephalic. Eyes:      Extraocular Movements: Extraocular movements intact. Conjunctiva/sclera: Conjunctivae normal.   Cardiovascular:      Rate and Rhythm: Normal rate and regular rhythm. Heart sounds: Normal heart sounds. No murmur heard. Pulmonary:      Effort: Pulmonary effort is normal.      Breath sounds: Normal breath sounds. No wheezing or rales. Abdominal:      General: Bowel sounds are normal.      Palpations: Abdomen is soft. Comments: Mild diffuse tenderness. Musculoskeletal:      Right lower leg: No edema. Left lower leg: No edema. Neurological:      General: No focal deficit present. Mental Status: He is alert. Comments: Cranial nerves grossly intact   Psychiatric:         Mood and Affect: Mood normal.         Judgment: Judgment normal.         ASSESSMENT/PLAN:  Belen Holloway was seen today for hyperlipidemia. Diagnoses and all orders for this visit:    Essential hypertension  -     metoprolol succinate (TOPROL XL) 25 MG extended release tablet; take 1 tablet by mouth once daily  Blood pressure today is controlled below his goal of 140/90. Tolerating medication well without issue.   Patient does see cardiology. Seizure disorder (HCC)  -     LEVETIRACETAM LEVEL; Future  -     Cobalamin Combinations (Y-34 + FOLIC ACID) 2596-810 MCG TBDP; Take 1 tablet by mouth daily  No breakthrough seizures. Patient following with neurology. Labs as noted. Vitamin D deficiency  -     Vitamin D 25 Hydroxy; Future    Coronary atherosclerosis due to calcified coronary lesion  -     CBC Auto Differential; Future  -     Comprehensive Metabolic Panel; Future  -     Lipid Panel; Future  Patient follow-up with cardiology in the spring. No new or worsening symptoms. Patient does have chronic shortness of breath like issues due to his tobacco use history most likely COPD. We will need to consider PFTs in the near future. With any new or worsening symptoms patient to proceed to urgent care or ER. Anxiety and depression  Stable. Follows with psychiatry. Gastroesophageal reflux disease, unspecified whether esophagitis present  Stable. Has seen GI. Hyperlipidemia, unspecified hyperlipidemia type  -     CBC Auto Differential; Future  -     Comprehensive Metabolic Panel; Future  -     Lipid Panel; Future  -     TSH without Reflex; Future  Check labs. Tolerating statin well without issue. Follow cardiology. History of COVID-19  -     Sedimentation Rate; Future  Essentially resolved. Consider PFTs. Irritable bowel syndrome with both constipation and diarrhea  -     dicyclomine (BENTYL) 10 MG capsule; take 1 capsule by mouth twice a day if needed  Stable. Having some constipation issues and generally diffuse abdominal pain. Has seen GI. No significant new or worsening symptoms. Myeloproliferative disease (HCC)/Essential thrombocythemia [facility])  Patient does follow with hematology oncology. Tobacco use  Precontemplative. Tobacco cessation advised. Return in about 4 months (around 5/19/2022) for Routine Follow-up of Chronic Conditions.     An [employer] was used to authenticate this note.    --Forrest Duane, MD on 1/19/22 at 10:40 AM EST

## 2022-01-22 LAB — KEPPRA: 67 UG/ML (ref 12–46)

## 2022-01-25 ENCOUNTER — TELEPHONE (OUTPATIENT)
Dept: ADMINISTRATIVE | Age: 52
End: 2022-01-25

## 2022-01-26 ENCOUNTER — TELEPHONE (OUTPATIENT)
Dept: NEUROLOGY | Age: 52
End: 2022-01-26

## 2022-01-26 NOTE — TELEPHONE ENCOUNTER
Cameron's wife called the office back and I spoke with her. She is concerned about recent Keppra level of 67, higher than listed range. He has no signs of toxicity and has been seizure-free. I explained that we typically do not check Keppra levels on a regular basis due to the low risk of toxicity. I reviewed the rare, potential signs of toxicity, and she states the patient has had none of these. Do not recommend rechecking Keppra levels unless there are concerns for noncompliance. Keep Keppra at 1500 mg twice daily.

## 2022-01-27 ENCOUNTER — EVALUATION (OUTPATIENT)
Dept: PHYSICAL THERAPY | Age: 52
End: 2022-01-27
Payer: COMMERCIAL

## 2022-01-27 DIAGNOSIS — M25.511 RIGHT SHOULDER PAIN, UNSPECIFIED CHRONICITY: Primary | ICD-10-CM

## 2022-01-27 PROCEDURE — 97110 THERAPEUTIC EXERCISES: CPT | Performed by: PHYSICAL THERAPIST

## 2022-01-27 PROCEDURE — 97162 PT EVAL MOD COMPLEX 30 MIN: CPT | Performed by: PHYSICAL THERAPIST

## 2022-01-27 NOTE — PROGRESS NOTES
[address] [facility]   Phone: [phone]   Fax: [phone]           Date:  2022   Patient: Amy Maurice  : 1970  MRN: [id number]  Referring Provider: Ady Yañez MD  [address]  [facility]     Medical Diagnosis:     R shoulder pain     SUBJECTIVE:     Onset date: 2022    Onset[de-identified] Insidious onset    Mechanism of Injury / History: gradual increased over time. Previous doctor and MRI diagnose partial tear of rotator cuff. Noted increase in pain c global movement. Patient is right handed. Previous PT: yes - did not help    Medical Management for Current Problem: Heat pack at home    Chief complaint: pain, decreased motion, weakness, inability / limited ability to use arm    Behavior: condition is staying the same    Pain: intermittent  Current: 4/10     Best: 2/10     Worst:7/10    Aggravated by: reaching overhead, reaching out    Relieved by: heat    Symptom Type/Quality: throbbing  Location[de-identified] anterior, A-C joint        Imaging results: No results found.     Past Medical History:  Past Medical History:   Diagnosis Date    Anxiety and depression     Atheroembolism of left lower extremity ([address] 2014    Atrial flutter (HCC)     Coronary atherosclerosis due to calcified coronary lesion(414.4)     ED (erectile dysfunction)     Epilepsy ([facility]     Hyperlipidemia     Hypertension     IBS (irritable bowel syndrome)     Ischemic toe 2014    MI, old     Thrombocytosis     Tinea     Tobacco dependence 2014     Past Surgical History:   Procedure Laterality Date    COLONOSCOPY  2014    CORONARY ANGIOPLASTY WITH STENT PLACEMENT      KNEE SURGERY Left     Skin graft    UPPER GASTROINTESTINAL ENDOSCOPY  2014       Medications:   Current Outpatient Medications   Medication Sig Dispense Refill    dicyclomine (BENTYL) 10 MG capsule take 1 capsule by mouth twice a day if needed 120 capsule 3    metoprolol succinate (TOPROL XL) 25 MG extended release tablet take 1 tablet by mouth once daily 30 tablet 5    Cobalamin Combinations (W-97 + FOLIC ACID) 3629-386 MCG TBDP Take 1 tablet by mouth daily 90 tablet 1    azelastine (ASTELIN) 0.1 % nasal spray 1 spray by Nasal route 2 times daily Use in each nostril as directed 60 mL 1    ondansetron (ZOFRAN) 4 MG tablet Take 1 tablet by mouth 3 times daily as needed for Nausea or Vomiting 30 tablet 0    loratadine (CLARITIN) 10 MG tablet Take 1 tablet by mouth daily 30 tablet 0    levETIRAcetam (KEPPRA) 500 MG tablet Take 3 tablets by mouth 2 times daily 540 tablet 3    allopurinol (ZYLOPRIM) 100 MG tablet Take 1 tablet by mouth daily 30 tablet 3    atorvastatin (LIPITOR) 40 MG tablet Take 1 tablet by mouth nightly 30 tablet 5    mirtazapine (REMERON) 30 MG tablet take 1 tablet by mouth at bedtime 30 tablet 5    clopidogrel (PLAVIX) 75 MG tablet take 1 tablet by mouth once daily 30 tablet 5    vitamin D (ERGOCALCIFEROL) 1.25 MG (55600 UT) CAPS capsule take 1 capsule by mouth every week 4 capsule 5    hydrOXYzine (ATARAX) 10 MG tablet Take 10 mg by mouth 2 times daily as needed for Itching      traZODone (DESYREL) 100 MG tablet Take 1.5 tablets by mouth nightly 30 tablet 0    lansoprazole (PREVACID) 30 MG delayed release capsule take 1 capsule by mouth every morning      escitalopram (LEXAPRO) 20 MG tablet take 2 tablets by mouth once daily 60 tablet 5    hydroxyurea (HYDREA) 500 MG chemo capsule Take 1 capsule by mouth 2 times daily 60 capsule 1     No current facility-administered medications for this visit. Occupation: [employer]. Physical demands include: lifting, carrying, pushing, pulling light weighted items (10 - 20 lbs Occasionally). Status: full time.     Exercise regimen: N/A    Hobbies: working around the house    Patient Goals: pain relief, return to prior activity, return to hobbies    Precautions/Contraindications: none    OBJECTIVE:     Observations: well nourished male    Inspection: demonstrates generalized pronated posture (forward head, protracted scapula, internally rotated shoulders, and flexed trunk and lower extremities). Palpation: Tender to palpation at [facility] joint, posterior GH joint, biceps tendon     Joint/Motion:  Right Shoulder:  AROM: 150° Forward elevation,  140° abduction, 5° ER,  IR to 90    Apley IR: high lumbar    Left Shoulder:  AROM: 165° Forward elevation, 155° abduction,  15° ER,  IR to 110    Apley IR: low thoracic    Strength:  Right Shoulder: Flexion 3/5,  Abduction 3/5, ER 3/5, IR 3/5    Right Elbow:Flexion 3/5,  Extension 3/5    Left Shoulder: Flexion 4/5,  Abduction 4/5, ER 4/5, IR 4/5   Left Elbow:Flexion 4/5,  Extension 4/5      Special Tests/Functional Screens:    [x] Catie [x]+ / [] -  [x] Yakima's [x]+ / [] -   [] AC Sheer []+ / [] -    [x] GH drawer []+ / [x] -    [x] Bicep Load []+ / [x] -   [] Crank []+ / [] -  [x] Clunk []+ / [x] -  [] Berenice Uriarte []+ / [] -   [] Shayna Lu []+ / [] -  [] Neer's []+ / [] -      [x] Speed's []+ / [x] -   [x] Ary's []+ / [x] -    [] Sulcus Sign []+ / [] -   [x] Apprehension []+ / [x] -   [] Bicep Load II []+ / [] -   [] Elbow Valgus []+ / [] -     [] Elbow Varus []+ / [] -   [] Opal's relocation []+ / [] -   [x] Empty Can []+ / [x] -  [] Drop arm []+ / [] -  [] ER lag []+ / [] -  [] Painful Arc []+ / [] -  [] Katie Michael []+ / [] -  [x] Belly Press/ lift off[x]+ / [] -  [] Other: []+ / [] -       Special Tests:  subscap involvement vs impingement    ASSESSMENT     Outcome Measure:   QuickDASH (Disorders of the Arm, Shoulder, and Hand) 43% disability    Problems:    Pain reported 4/10   ROM decreased    Strength decreased   Decreased functional ability with work, ADLs , use of right upper extremity    Reason for Skilled Care: Pt presents c symptoms correlating c R RTC tear and AC joint pain.  Given complexity of symptoms and reduced strength and ROM skilled care is required to return to work and PLOF. [x] There are no barriers affecting plan of care or recovery    [] Barriers to this patient's plan of care or recovery include. 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 o Perform IR of R shoulder to full ROM in front of and behind trunk c no resistance with no more pain than 2/10 for the purpose of cooking for work.   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 o Perform IR of R shoulder to full ROM in front of and behind trunk c no resistance with no more pain than 2/10 for the purpose of cooking for work.   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     QuickDASH 15% disability   Independent with Home Exercise Programs    Rehab Potential: [x] Good  [] Fair  [] Poor    PLAN       Treatment Plan:   [x] Therapeutic Exercise  [x] Therapeutic Activity  [x] Neuromuscular Re-education   [] Gait Training  [] Balance Training  [x] Aerobic conditioning  [x] Manual Therapy  [x] Massage/Fascial release   [] Work/Sport specific activities    [] Pain Neuroscience [x] Cold/hotpack  [x] Vasocompression  [x] Electrical Stimulation  [] Lumbar/Cervical Traction  [x] Ultrasound   [] Iontophoresis: 4 mg/mL Dexamethasone Sodium Phosphate 40-80 mAmin  [x] Dry Needling      [x] Instruction in HEP      []  Medication allergies reviewed for use of Dexamethasone Sodium Phosphate 4mg/ml  with iontophoresis treatments. Patient is not allergic. The following CPT codes are likely to be used in the care of this patient: 46408 PT Evaluation: Moderate Complexity   [address] Neuromuscular Re-Education   [phone] Therapeutic Activities   [address] [facility] [address], [id number], [id number], [address]      Suggested Professional Referral: [x] No  [] Yes:     Patient Education:  [x] Plans/Goals, Risks/Benefits discussed  [x] Home exercise program  Method of Education: [x] Verbal  [x] Demo  [x] Written  Comprehension of Education:  [x] Verbalizes understanding. [x] Demonstrates understanding. [] Needs Review. [] Demonstrates/verbalizes understanding of HEP/Ed previously given. Frequency:  1-2 days per week for 4-6 weeks    Patient understands diagnosis/prognosis and consents to treatment, plan and goals: [x] Yes    [] No     Thank you for the opportunity to work with your patient. If you have questions or comments, please contact me at numbers listed above. Electronically signed by:  Liss Henderson, PT PT , DPT [id number]    Medicare Patients Only     Please sign Physician's Certification and return to: [address] [place] [address]  Dept: [phone]  Dept Fax: [phone]  Loc: [phone] Certification / Comments     Frequency/Duration 1-2 days per week for 4-6 weeks. Certification period from 1/27/2022  to 3/27/2022 .     I have reviewed the Plan of Care established for skilled therapy services and certify that the services are required and that they will be provided while the patient is under my care.     Physician's Comments/Revisions:               Physician's Printed Name:                                           [de-identified] Signature:                                                               Date:

## 2022-01-27 NOTE — PROGRESS NOTES
[address] [facility]   Phone: [phone]   Fax: [phone]      Physical Therapy Daily Treatment Note    Date: 2022  Patient Name: Klever Loera  : 1970   MRN: [id number]  DOInjury: Tammy Graf  DOSx: NA   Referring Provider: Candie Gonzalez MD  [address] [facility]     Medical Diagnosis:     R shoulder pain    Outcome Measure: QuickDASH 43% limtiation    S: Pt chief complaint of R shoulder pain c motion and reduced ROM due to pain. O: Pt given written HEP  Time 10:00-10:40     Visit 1 Repeat outcome measure at mid point and end. Pain 3/10     Strength 3/5     Palpation Pain at [facility], post delt, biceps tendon     ROM Flex: 150 Abd: 140 ER: 0 IR:90     Modalities            Manual                  Stretch      Table slides      Wall Flexion      Wall ER stretch      Towel IR stretch      IR reaching behind back      Exercise      Shrugs AROM      Pendulum Ex      UBE      Pulleys - flex      Pulleys-IR      Supine wand chest press 10x5s hold  TE   Supine wand flex 10x5s hold  TE   Supine wand ER/IR 10x5s hold  TE   Supine flexion 10x5s hold  TE   S-lying ABD      S-lying ER      Standing wand flex      Standing flexion      Standing ABD            ROWS: H Functional activities  To aid in ROM and strength needed for reaching , lifting ,pushing and pulling at home/work    ROWS: M  \"    ROWS: L  \"    ER  \"    IR  \"                A:  Tolerated well. Above added to written HEP and will perform daily.   P: Continue with rehab plan  Gokul Voss, PT DPT, PT [id number]    Treatment Charges: Mins Units   Initial Evaluation 30 1   Re-Evaluation     Ther Exercise         TE 10 1   Manual Therapy     MT     Ther Activities        TA     Gait Training          GT     Neuro Re-education NR     Modalities     Non-Billable Service Time     Other     Total Time/Units 40 2

## 2022-02-10 ENCOUNTER — APPOINTMENT (OUTPATIENT)
Dept: CT IMAGING | Age: 52
End: 2022-02-10
Payer: COMMERCIAL

## 2022-02-10 ENCOUNTER — HOSPITAL ENCOUNTER (EMERGENCY)
Age: 52
Discharge: HOME | End: 2022-02-10
Attending: EMERGENCY MEDICINE
Payer: COMMERCIAL

## 2022-02-10 VITALS
SYSTOLIC BLOOD PRESSURE: 114 MMHG | HEIGHT: 73 IN | WEIGHT: 167 LBS | TEMPERATURE: 98 F | RESPIRATION RATE: 16 BRPM | HEART RATE: 70 BPM | OXYGEN SATURATION: 98 % | DIASTOLIC BLOOD PRESSURE: 68 MMHG | BODY MASS INDEX: 22.13 KG/M2

## 2022-02-10 DIAGNOSIS — R10.84 GENERALIZED ABDOMINAL PAIN: ICD-10-CM

## 2022-02-10 DIAGNOSIS — K59.00 CONSTIPATION, UNSPECIFIED CONSTIPATION TYPE: Primary | ICD-10-CM

## 2022-02-10 LAB
ALBUMIN SERPL-MCNC: 4.2 G/DL (ref 3.5–5.2)
ALP BLD-CCNC: 50 U/L (ref 40–129)
ALT SERPL-CCNC: 15 U/L (ref 0–40)
ANION GAP SERPL CALCULATED.3IONS-SCNC: 7 MMOL/L (ref 7–16)
AST SERPL-CCNC: 17 U/L (ref 0–39)
BILIRUB SERPL-MCNC: 0.6 MG/DL (ref 0–1.2)
BILIRUBIN DIRECT: <0.2 MG/DL (ref 0–0.3)
BILIRUBIN URINE: NEGATIVE
BILIRUBIN, INDIRECT: NORMAL MG/DL (ref 0–1)
BLOOD, URINE: NEGATIVE
BUN BLDV-MCNC: 11 MG/DL (ref 6–20)
CALCIUM SERPL-MCNC: 8.6 MG/DL (ref 8.6–10.2)
CHLORIDE BLD-SCNC: 103 MMOL/L (ref 98–107)
CLARITY: CLEAR
CO2: 28 MMOL/L (ref 22–29)
COLOR: YELLOW
CREAT SERPL-MCNC: 0.8 MG/DL (ref 0.7–1.2)
GFR AFRICAN AMERICAN: >60
GFR NON-AFRICAN AMERICAN: >60 ML/MIN/1.73
GLUCOSE BLD-MCNC: 100 MG/DL (ref 74–99)
GLUCOSE URINE: NEGATIVE MG/DL
HCT VFR BLD CALC: 33.5 % (ref 37–54)
HEMOGLOBIN: 12 G/DL (ref 12.5–16.5)
KETONES, URINE: NEGATIVE MG/DL
LACTIC ACID: 1 MMOL/L (ref 0.5–2.2)
LEUKOCYTE ESTERASE, URINE: NEGATIVE
LIPASE: 30 U/L (ref 13–60)
MCH RBC QN AUTO: 44.1 PG (ref 26–35)
MCHC RBC AUTO-ENTMCNC: 35.8 % (ref 32–34.5)
MCV RBC AUTO: 123.2 FL (ref 80–99.9)
NITRITE, URINE: NEGATIVE
PDW BLD-RTO: 12.9 FL (ref 11.5–15)
PH UA: 7.5 (ref 5–9)
PLATELET # BLD: 196 E9/L (ref 130–450)
PMV BLD AUTO: 8.9 FL (ref 7–12)
POTASSIUM SERPL-SCNC: 4.6 MMOL/L (ref 3.5–5)
PROTEIN UA: NEGATIVE MG/DL
RBC # BLD: 2.72 E12/L (ref 3.8–5.8)
SODIUM BLD-SCNC: 138 MMOL/L (ref 132–146)
SPECIFIC GRAVITY UA: 1.02 (ref 1–1.03)
TOTAL PROTEIN: 6.5 G/DL (ref 6.4–8.3)
TROPONIN, HIGH SENSITIVITY: <6 NG/L (ref 0–11)
UROBILINOGEN, URINE: 0.2 E.U./DL
WBC # BLD: 3.4 E9/L (ref 4.5–11.5)

## 2022-02-10 PROCEDURE — 6360000004 HC RX CONTRAST MEDICATION: Performed by: RADIOLOGY

## 2022-02-10 PROCEDURE — 80076 HEPATIC FUNCTION PANEL: CPT

## 2022-02-10 PROCEDURE — 99282 EMERGENCY DEPT VISIT SF MDM: CPT

## 2022-02-10 PROCEDURE — 85027 COMPLETE CBC AUTOMATED: CPT

## 2022-02-10 PROCEDURE — 83690 ASSAY OF LIPASE: CPT

## 2022-02-10 PROCEDURE — 93005 ELECTROCARDIOGRAM TRACING: CPT | Performed by: EMERGENCY MEDICINE

## 2022-02-10 PROCEDURE — 2580000003 HC RX 258: Performed by: EMERGENCY MEDICINE

## 2022-02-10 PROCEDURE — 96374 THER/PROPH/DIAG INJ IV PUSH: CPT

## 2022-02-10 PROCEDURE — 83605 ASSAY OF LACTIC ACID: CPT

## 2022-02-10 PROCEDURE — 6360000002 HC RX W HCPCS: Performed by: EMERGENCY MEDICINE

## 2022-02-10 PROCEDURE — 84484 ASSAY OF TROPONIN QUANT: CPT

## 2022-02-10 PROCEDURE — 74177 CT ABD & PELVIS W/CONTRAST: CPT

## 2022-02-10 PROCEDURE — 80048 BASIC METABOLIC PNL TOTAL CA: CPT

## 2022-02-10 PROCEDURE — 81003 URINALYSIS AUTO W/O SCOPE: CPT

## 2022-02-10 RX ORDER — ONDANSETRON 2 MG/ML
8 INJECTION INTRAMUSCULAR; INTRAVENOUS ONCE
Status: COMPLETED | OUTPATIENT
Start: 2022-02-10 | End: 2022-02-10

## 2022-02-10 RX ORDER — POLYETHYLENE GLYCOL 3350 17 G/17G
17 POWDER, FOR SOLUTION ORAL 2 TIMES DAILY
Qty: 116 G | Refills: 0 | Status: SHIPPED | OUTPATIENT
Start: 2022-02-10 | End: 2022-02-13

## 2022-02-10 RX ORDER — SODIUM CHLORIDE 0.9 % (FLUSH) 0.9 %
10 SYRINGE (ML) INJECTION PRN
Status: DISCONTINUED | OUTPATIENT
Start: 2022-02-10 | End: 2022-02-10 | Stop reason: HOSPADM

## 2022-02-10 RX ORDER — 0.9 % SODIUM CHLORIDE 0.9 %
1000 INTRAVENOUS SOLUTION INTRAVENOUS ONCE
Status: COMPLETED | OUTPATIENT
Start: 2022-02-10 | End: 2022-02-10

## 2022-02-10 RX ADMIN — ONDANSETRON 4 MG: 2 INJECTION INTRAMUSCULAR; INTRAVENOUS at 13:44

## 2022-02-10 RX ADMIN — IOPAMIDOL 75 ML: 755 INJECTION, SOLUTION INTRAVENOUS at 14:41

## 2022-02-10 RX ADMIN — SODIUM CHLORIDE 1000 ML: 9 INJECTION, SOLUTION INTRAVENOUS at 13:36

## 2022-02-10 ASSESSMENT — ENCOUNTER SYMPTOMS
SHORTNESS OF BREATH: 0
DIARRHEA: 0
HEMATOCHEZIA: 0
EYE REDNESS: 0
EYE PAIN: 0
HEMATEMESIS: 0
COUGH: 0
ABDOMINAL PAIN: 1
VOMITING: 1
WHEEZING: 0
BACK PAIN: 0
NAUSEA: 1
EYE DISCHARGE: 0

## 2022-02-10 ASSESSMENT — PAIN SCALES - GENERAL: PAINLEVEL_OUTOF10: 3

## 2022-02-10 ASSESSMENT — PAIN DESCRIPTION - LOCATION: LOCATION: ABDOMEN

## 2022-02-10 NOTE — ED NOTES
Bed:  HALL-09  Expected date:   Expected time:   Means of arrival:   Comments:  Kane Solorio RN  02/10/22 7569

## 2022-02-10 NOTE — ED PROVIDER NOTES
Patient reports 1 week of right lower quadrant abdominal pain associate with nausea. He did have some Zofran at home and tried that with no relief of nausea symptoms. States he had had diarrhea but, had a formed bowel movement today. Several episodes of emesis over the last 2 days with one being today. The history is provided by the patient. Abdominal Pain  Pain location:  RLQ  Pain quality: aching    Pain radiates to:  Does not radiate  Pain severity:  Mild  Onset quality:  Gradual  Duration:  1 week  Timing:  Constant  Progression:  Waxing and waning  Chronicity:  New  Context: not alcohol use    Relieved by:  None tried  Worsened by:  Nothing  Ineffective treatments:  None tried  Associated symptoms: anorexia, nausea and vomiting    Associated symptoms: no chest pain, no chills, no cough, no diarrhea, no dysuria, no fever, no hematemesis, no hematochezia and no shortness of breath         Review of Systems   Constitutional: Negative for chills and fever. Eyes: Negative for pain, discharge and redness. Respiratory: Negative for cough, shortness of breath and wheezing. Cardiovascular: Negative for chest pain. Gastrointestinal: Positive for abdominal pain, anorexia, nausea and vomiting. Negative for diarrhea, hematemesis and hematochezia. Genitourinary: Negative for dysuria and frequency. Musculoskeletal: Negative for arthralgias and back pain. Skin: Negative for rash and wound. Neurological: Negative for weakness and headaches. Hematological: Negative for adenopathy. All other systems reviewed and are negative. Physical Exam  Vitals and nursing note reviewed. Constitutional:       Appearance: He is well-developed. HENT:      Head: Normocephalic and atraumatic. Eyes:      Pupils: Pupils are equal, round, and reactive to light. Cardiovascular:      Rate and Rhythm: Normal rate and regular rhythm. Heart sounds: Normal heart sounds. No murmur heard.       Pulmonary: Effort: Pulmonary effort is normal. No respiratory distress. Breath sounds: Normal breath sounds. No wheezing or rales. Abdominal:      General: Bowel sounds are normal.      Palpations: Abdomen is soft. Tenderness: There is abdominal tenderness in the right lower quadrant. There is no guarding or rebound. Musculoskeletal:      Cervical back: Normal range of motion and neck supple. Skin:     General: Skin is warm and dry. Neurological:      Mental Status: He is alert and oriented to person, place, and time. Cranial Nerves: No cranial nerve deficit. Coordination: Coordination normal.          Procedures     MDM     EKG: This EKG is signed and interpreted by me. Rate: 53  Rhythm: Sinus  Interpretation: no acute changes and non-specific EKG  Comparison: stable as compared to patient's most recent EKG              --------------------------------------------- PAST HISTORY ---------------------------------------------  Past Medical History:  has a past medical history of Anxiety and depression, Atheroembolism of left lower extremity ([address] Atrial flutter ([address] Coronary atherosclerosis due to calcified coronary lesion(414.4), ED (erectile dysfunction), Epilepsy ([address] Hyperlipidemia, Hypertension, IBS (irritable bowel syndrome), Ischemic toe, MI, old, Thrombocytosis, Tinea, and Tobacco dependence. Past Surgical History:  has a past surgical history that includes Upper gastrointestinal endoscopy (08/2014); Colonoscopy (08/2014); Coronary angioplasty with stent; and knee surgery (Left). Social History:  reports that he has been smoking cigarettes. He has a 10.00 pack-year smoking history. His smokeless tobacco use includes chew. He reports previous drug use. Drug: Marijuana Fredick Copping). He reports that he does not drink alcohol.     Family History: family history includes Alcohol Abuse in his father; COPD in his mother; Cancer (age of onset: 64) in his brother; Coronary Art Dis in his mother; Diabetes in his brother and brother; Heart Disease in his maternal uncle and maternal uncle; Liver Disease in his father; Seizures in his father. The patients home medications have been reviewed.     Allergies: Acyclovir and related and Doxycycline    -------------------------------------------------- RESULTS -------------------------------------------------  Labs:  Results for orders placed or performed during the hospital encounter of 90/40/95   Basic metabolic panel   Result Value Ref Range    Sodium 138 132 - 146 mmol/L    Potassium 4.6 3.5 - 5.0 mmol/L    Chloride 103 98 - 107 mmol/L    CO2 28 22 - 29 mmol/L    Anion Gap 7 7 - 16 mmol/L    Glucose 100 (H) 74 - 99 mg/dL    BUN 11 6 - 20 mg/dL    CREATININE 0.8 0.7 - 1.2 mg/dL    GFR Non-African American >60 >=60 mL/min/1.73    GFR African American >60     Calcium 8.6 8.6 - 10.2 mg/dL   CBC   Result Value Ref Range    WBC 3.4 (L) 4.5 - 11.5 E9/L    RBC 2.72 (L) 3.80 - 5.80 E12/L    Hemoglobin 12.0 (L) 12.5 - 16.5 g/dL    Hematocrit 33.5 (L) 37.0 - 54.0 %    .2 (H) 80.0 - 99.9 fL    MCH 44.1 (H) 26.0 - 35.0 pg    MCHC 35.8 (H) 32.0 - 34.5 %    RDW 12.9 11.5 - 15.0 fL    Platelets 559 712 - 657 E9/L    MPV 8.9 7.0 - 12.0 fL   Troponin   Result Value Ref Range    Troponin, High Sensitivity <6 0 - 11 ng/L   Lipase   Result Value Ref Range    Lipase 30 13 - 60 U/L   Lactic Acid, Plasma   Result Value Ref Range    Lactic Acid 1.0 0.5 - 2.2 mmol/L   Urinalysis   Result Value Ref Range    Color, UA Yellow Straw/Yellow    Clarity, UA Clear Clear    Glucose, Ur Negative Negative mg/dL    Bilirubin Urine Negative Negative    Ketones, Urine Negative Negative mg/dL    Specific Gravity, UA 1.020 1.005 - 1.030    Blood, Urine Negative Negative    pH, UA 7.5 5.0 - 9.0    Protein, UA Negative Negative mg/dL    Urobilinogen, Urine 0.2 <2.0 E.U./dL    Nitrite, Urine Negative Negative    Leukocyte Esterase, Urine Negative Negative   Hepatic Function Panel   Result Value Ref Range    Total Protein 6.5 6.4 - 8.3 g/dL    Albumin 4.2 3.5 - 5.2 g/dL    Alkaline Phosphatase 50 40 - 129 U/L    ALT 15 0 - 40 U/L    AST 17 0 - 39 U/L    Total Bilirubin 0.6 0.0 - 1.2 mg/dL    Bilirubin, Direct <0.2 0.0 - 0.3 mg/dL    Bilirubin, Indirect see below 0.0 - 1.0 mg/dL   EKG 12 Lead   Result Value Ref Range    Ventricular Rate 53 BPM    Atrial Rate 53 BPM    P-R Interval 160 ms    QRS Duration 88 ms    Q-T Interval 456 ms    QTc Calculation (Bazett) 427 ms    P Axis 62 degrees    R Axis 33 degrees    T Axis 56 degrees       Radiology:  CT ABDOMEN PELVIS W IV CONTRAST Additional Contrast? None   Preliminary Result   No obstructing uropathy or hydronephrosis. No mechanical obstructive process   of bowel however large proximal colonic stool burden in the ascending colon   could represent stasis or constipation. No focal inflammatory findings. No   perforation or abscess. No appendicitis      RECOMMENDATIONS:   Unavailable             ------------------------- NURSING NOTES AND VITALS REVIEWED ---------------------------  Date / Time Roomed:  2/10/2022 12:14 PM  ED Bed Assignment:  11/11    The nursing notes within the ED encounter and vital signs as below have been reviewed. /69   Pulse 73   Temp 97.8 °F (36.6 °C)   Resp 16   Ht 6' 1\" (1.854 m)   Wt 167 lb (75.8 kg)   SpO2 98%   BMI 22.03 kg/m²   Oxygen Saturation Interpretation: Normal      ------------------------------------------ PROGRESS NOTES ------------------------------------------  3:25 PM EST  I have spoken with the patient and discussed todays results, in addition to providing specific details for the plan of care and counseling regarding the diagnosis and prognosis. Their questions are answered at this time and they are agreeable with the plan. I discussed at length with them reasons for immediate return here for re evaluation.  They will followup with their primary care physician by calling their office tomorrow. Discussed finding of constipation. Patient states he has had issues with this previously. He says on retrospect, the pain is similar to his previous bouts. --------------------------------- ADDITIONAL PROVIDER NOTES ---------------------------------  At this time the patient is without objective evidence of an acute process requiring hospitalization or inpatient management. They have remained hemodynamically stable throughout their entire ED visit and are stable for discharge with outpatient follow-up. The plan has been discussed in detail and they are aware of the specific conditions for emergent return, as well as the importance of follow-up. New Prescriptions    POLYETHYLENE GLYCOL (GLYCOLAX) 17 GM/SCOOP POWDER    Take 17 g by mouth 2 times daily for 3 days       Diagnosis:  1. Constipation, unspecified constipation type    2. Generalized abdominal pain        Disposition:  Patient's disposition: Discharge to home  Patient's condition is stable.        Viridiana Fraga DO  02/10/22 1525

## 2022-02-11 LAB
EKG ATRIAL RATE: 53 BPM
EKG P AXIS: 62 DEGREES
EKG P-R INTERVAL: 160 MS
EKG Q-T INTERVAL: 456 MS
EKG QRS DURATION: 88 MS
EKG QTC CALCULATION (BAZETT): 427 MS
EKG R AXIS: 33 DEGREES
EKG T AXIS: 56 DEGREES
EKG VENTRICULAR RATE: 53 BPM

## 2022-02-21 ENCOUNTER — PATIENT MESSAGE (OUTPATIENT)
Dept: PRIMARY CARE CLINIC | Age: 52
End: 2022-02-21

## 2022-02-21 DIAGNOSIS — I25.84 CORONARY ATHEROSCLEROSIS DUE TO CALCIFIED CORONARY LESION: ICD-10-CM

## 2022-02-21 DIAGNOSIS — R63.4 WEIGHT LOSS: ICD-10-CM

## 2022-02-21 DIAGNOSIS — E55.9 VITAMIN D DEFICIENCY: ICD-10-CM

## 2022-02-21 DIAGNOSIS — I25.10 CORONARY ATHEROSCLEROSIS DUE TO CALCIFIED CORONARY LESION: ICD-10-CM

## 2022-02-21 DIAGNOSIS — I10 ESSENTIAL HYPERTENSION: ICD-10-CM

## 2022-02-22 NOTE — TELEPHONE ENCOUNTER
From: Rain Islas  Sent: 2/22/2022 9:43 AM EST  To: Nicolle [facility] Clinical Staff  Subject: New pharmacy     Vitamin D 64439 units,clopidogrel 75 mg,metoprolol scc 25 mg,levetiractetam 500 mg. 3 pills 2times a day,[id number] mg,[id number]

## 2022-02-23 RX ORDER — ATORVASTATIN CALCIUM 40 MG/1
40 TABLET, FILM COATED ORAL NIGHTLY
Qty: 30 TABLET | Refills: 5 | Status: SHIPPED
Start: 2022-02-23 | End: 2022-08-03

## 2022-02-23 RX ORDER — CLOPIDOGREL BISULFATE 75 MG/1
TABLET ORAL
Qty: 30 TABLET | Refills: 5 | Status: SHIPPED
Start: 2022-02-23 | End: 2022-08-03

## 2022-02-23 RX ORDER — MIRTAZAPINE 30 MG/1
TABLET, FILM COATED ORAL
Qty: 30 TABLET | Refills: 5 | Status: SHIPPED
Start: 2022-02-23 | End: 2022-05-03

## 2022-02-23 RX ORDER — METOPROLOL SUCCINATE 25 MG/1
TABLET, EXTENDED RELEASE ORAL
Qty: 30 TABLET | Refills: 5 | Status: SHIPPED
Start: 2022-02-23 | End: 2022-08-03

## 2022-02-23 RX ORDER — ERGOCALCIFEROL 1.25 MG/1
CAPSULE ORAL
Qty: 4 CAPSULE | Refills: 5 | Status: SHIPPED
Start: 2022-02-23 | End: 2022-07-27

## 2022-02-23 RX ORDER — LEVETIRACETAM 500 MG/1
1500 TABLET ORAL 2 TIMES DAILY
Qty: 540 TABLET | Refills: 3 | Status: SHIPPED | OUTPATIENT
Start: 2022-02-23

## 2022-02-24 ENCOUNTER — TREATMENT (OUTPATIENT)
Dept: PHYSICAL THERAPY | Age: 52
End: 2022-02-24
Payer: COMMERCIAL

## 2022-02-24 DIAGNOSIS — M25.511 RIGHT SHOULDER PAIN, UNSPECIFIED CHRONICITY: Primary | ICD-10-CM

## 2022-02-24 PROCEDURE — 97110 THERAPEUTIC EXERCISES: CPT

## 2022-02-24 NOTE — PROGRESS NOTES
[address] [facility]   Phone: [phone]   Fax: [phone]      Physical Therapy Daily Treatment Note    Date: 2022  Patient Name: Adriana Ahumada  : 1970   MRN: [id number]  DOInjury: Daisha Haney  DOSx: NA   Referring Provider: No referring provider defined for this encounter. Medical Diagnosis:   R shoulder pain    Outcome Measure: QuickDASH 43% limtiation    S: Pt reports to therapy after not attending for a month. He states that he feels his shoulder is getting worse d/t work. O: Pt given written HEP  Time [id number]     Visit 2 Repeat outcome measure at mid point and end. Pain 3/10     Strength 3/5     Palpation Pain at [facility], post delt, biceps tendon     ROM Flex: 150 Abd: 140 ER: 0 IR:90     Modalities            Manual                  Stretch      Table slides      Wall Flexion      Wall ER stretch      Towel IR stretch      IR reaching behind back            Exercise      Shrugs AROM      Pendulum Ex      UBE 3 min fwd/3 min bwd  TE         Pulleys - flex/scap/abd x30  TE         Supine wand chest press 20x  TE   Supine wand flex 20x10s hold  TE   Supine wand ER/IR 20x10s hold  TE   Supine wand abd 20x10s hold  TE         SB roll outs x30 Flex/scap TE               S-lying ABD      S-lying ER      Standing wand flex      Standing flexion      Standing ABD            ROWS: H Functional activities  To aid in ROM and strength needed for reaching , lifting ,pushing and pulling at home/work    ROWS: M  \"    ROWS: L  \"    ER  \"    IR  \"                A:  Tolerated well. Above added to written HEP and will perform daily. Performed ROM activity's in order to further improve mobility/function in the shoulder.   Session shortened d/t patient arriving late to PT.    P: Continue with rehab plan  SHAYNA Arias [id number]    Treatment Charges: Mins Units   Initial Evaluation     Re-Evaluation     Ther Exercise         TE 34 2   Manual Therapy     MT     Ther Activities TA     Gait Training          GT     Neuro Re-education NR     Modalities     Non-Billable Service Time     Other     Total Time/Units 34 2

## 2022-03-01 ENCOUNTER — TREATMENT (OUTPATIENT)
Dept: PHYSICAL THERAPY | Age: 52
End: 2022-03-01
Payer: COMMERCIAL

## 2022-03-01 DIAGNOSIS — M25.511 RIGHT SHOULDER PAIN, UNSPECIFIED CHRONICITY: Primary | ICD-10-CM

## 2022-03-01 PROCEDURE — 97110 THERAPEUTIC EXERCISES: CPT | Performed by: PHYSICAL THERAPIST

## 2022-03-01 PROCEDURE — 97112 NEUROMUSCULAR REEDUCATION: CPT | Performed by: PHYSICAL THERAPIST

## 2022-03-01 NOTE — PROGRESS NOTES
[address] [facility]   Phone: [phone]   Fax: [phone]      Physical Therapy Daily Treatment Note    Date: 3/1/2022  Patient Name: Niki Moore  : 1970   MRN: [id number]  DOInjury: Theadora Osler  DOSx: NA   Referring Provider: Sahra Beach MD  [address] [facility]     Medical Diagnosis:   R shoulder pain    Outcome Measure: QuickDASH 43% limtiation    S: Pt reports to therapy after not attending for a month. He states that he feels his shoulder is getting worse d/t work. O: Pt given written HEP  Time 8140-0772     Visit 3 Repeat outcome measure at mid point and end. Pain 3/10     Strength 3/5     Palpation Pain at [facility], post delt, biceps tendon     ROM Flex: 150 Abd: 140 ER: 0 IR:90     Modalities            Manual                  Stretch      Table slides      Wall Flexion      Wall ER stretch      Towel IR stretch      Doorway stretch low, mid, high 2x30s each  TE         Exercise      Shrugs AROM      Pendulum Ex      UBE 3 min fwd/3 min bwd  TE         Pulleys - abd x3min  TE   Standing wand ABD 10x10s  NR   Supine wand chest press + 20x10s hold  NR   Supine wand flex 15x5s hold  NR   Supine wand ER/IR 20x10s hold  NR   Supine wand abd 20x10s hold  NR                     S-lying ABD      S-lying ER      Standing wand flex      Standing flexion      Standing ABD            ROWS: H Functional activities  To aid in ROM and strength needed for reaching , lifting ,pushing and pulling at home/work    ROWS: M  \"    ROWS: L  \"    ER  \"    IR  \"                A:  Tolerated well. Performed ROM activity's in order to further improve mobility/function in the shoulder and weighted to add overpressure and improve muscle activation. Progress pt to increased stability next session to return to work.     P: Continue with rehab plan  [facility] ALYSSIA Morgan PT DPT    Treatment Charges: Mins Units   Initial Evaluation     Re-Evaluation     Ther Exercise TE 15 1   Manual Therapy     MT     Ther Activities        TA     Gait Training          GT     Neuro Re-education NR 25 2   Modalities     Non-Billable Service Time     Other     Total Time/Units 40 3

## 2022-03-03 ENCOUNTER — TREATMENT (OUTPATIENT)
Dept: PHYSICAL THERAPY | Age: 52
End: 2022-03-03
Payer: COMMERCIAL

## 2022-03-03 DIAGNOSIS — M25.511 RIGHT SHOULDER PAIN, UNSPECIFIED CHRONICITY: Primary | ICD-10-CM

## 2022-03-03 PROCEDURE — 97110 THERAPEUTIC EXERCISES: CPT | Performed by: PHYSICAL THERAPIST

## 2022-03-03 PROCEDURE — 97112 NEUROMUSCULAR REEDUCATION: CPT | Performed by: PHYSICAL THERAPIST

## 2022-03-03 NOTE — PROGRESS NOTES
[address]   Phone: [phone]   Fax: [phone]      Physical Therapy Daily Treatment Note    Date: 3/3/2022  Patient Name: Melissa Rodriguez  : 1970   MRN: [id number]  DOInjury: Tanya Eckert  DOSx: NA   Referring Provider: Caren Santiago MD  [address] [facility]     Medical Diagnosis:   R shoulder pain    Outcome Measure: QuickDASH 43% limtiation    S: The pt reports that he has been experiencing constant & severe right sh pain which has made sleeping difficult. O: Pt given written HEP  Time 0056-4308     Visit 4 Repeat outcome measure at mid point and end. Pain 3/10     Strength 3/5     Palpation Pain at [facility] [place], post delt, biceps tendon     ROM Flex: 150 Abd: 140 ER: 0 IR:90     Modalities            Manual                  Stretch      Table slides      Wall Flexion      Wall ER stretch      Towel IR stretch            Exercise      Shrugs AROM      Pendulum Ex                        Supine wand sh flex, abd, & ER x30 each  TE   Band RUE lat pull-down x20 Yellow band NR   Band scap rows high, mid, & low x20 each Yellow band NR   Band sh ext high & mid x20 each Yellow band NR   Band reverse flys sh T x10  Yellow band NR   Band sh horizontal abd x20 Yellow band NR   Band seated sh add to side x20 yellow band Yellow band NR   Band sh ER at side x20 Yellow band NR                                       A:  The pt progressed to perform yellow band resistive training for strength & stability right sh. Pt had difficulty with exercises due to right sh pain. P: Continue with rehab plan & progress to include dumbbell resistive training.      Jeri Martinez, PT OHPT [id number]    Treatment Charges: Mins Units   Initial Evaluation     Re-Evaluation     Ther Exercise         TE 9 1   Manual Therapy     MT     Ther Activities        TA     Gait Training          GT     Neuro Re-education NR 31 2   Modalities     Non-Billable Service Time     Other     Total Time/Units 40 3

## 2022-03-08 ENCOUNTER — TREATMENT (OUTPATIENT)
Dept: PHYSICAL THERAPY | Age: 52
End: 2022-03-08
Payer: COMMERCIAL

## 2022-03-08 DIAGNOSIS — M25.511 RIGHT SHOULDER PAIN, UNSPECIFIED CHRONICITY: Primary | ICD-10-CM

## 2022-03-08 PROCEDURE — 97112 NEUROMUSCULAR REEDUCATION: CPT | Performed by: PHYSICAL THERAPIST

## 2022-03-08 PROCEDURE — 97110 THERAPEUTIC EXERCISES: CPT | Performed by: PHYSICAL THERAPIST

## 2022-03-08 NOTE — PROGRESS NOTES
[address] [facility] [address] [facility]   Phone: [phone]   Fax: [phone]      Physical Therapy Daily Treatment Note    Date: 3/8/2022  Patient Name: Silvana Gonzalez  : 1970   MRN: [id number]  DOInjury: Lucía Spears  DOSx: NA   Referring Provider: Don Franks MD  [address] [facility]     Medical Diagnosis:   R shoulder pain    Outcome Measure: QuickDASH 43% limtiation    S: Pt reports to therapy c c/o tightness in R shoulder joint    O: Pt given written HEP  Time 0811-3118     Visit 5 Repeat outcome measure at mid point and end. Pain 3/10     Strength 3/5     Palpation Pain at [facility], post delt, biceps tendon     ROM Flex: 150 Abd: 140 ER: 0 IR:90     Modalities            Manual                  Stretch      Table slides      Wall Flexion      Wall ER stretch      Towel IR stretch      Anterior and posterior capsule stretch 4x30s  TE         Exercise      Shrugs AROM      Pendulum Ex            Pulleys - abd x3min  TE   Standing wand ABD 20x5s  NR   Seated wand flex 20x5s hold  NR   Seated wand ER/IR 20x5s hold  NR   Rows L/M/H 15x5s  NR   Lat pulldown 15x5  NR                     S-lying ABD      S-lying ER      Standing wand flex      Standing flexion      Standing ABD                                                      A:  Tolerated well. Performed ROM activity's in order to further improve mobility/function in the shoulder in a gravity resisted position. Continue to focus on scapular motion and stability.      P: Continue with rehab plan  Mann CADE  Bridget Slovenian, PT DPT    Treatment Charges: Mins Units   Initial Evaluation     Re-Evaluation     Ther Exercise         TE 15 1   Manual Therapy     MT     Ther Activities        TA     Gait Training          GT     Neuro Re-education NR 25 2   Modalities     Non-Billable Service Time     Other     Total Time/Units 40 3

## 2022-03-09 ENCOUNTER — OFFICE VISIT (OUTPATIENT)
Dept: ORTHOPEDIC SURGERY | Age: 52
End: 2022-03-09
Payer: COMMERCIAL

## 2022-03-09 VITALS — BODY MASS INDEX: 22.13 KG/M2 | WEIGHT: 167 LBS | HEIGHT: 73 IN

## 2022-03-09 DIAGNOSIS — M25.511 RIGHT SHOULDER PAIN, UNSPECIFIED CHRONICITY: Primary | ICD-10-CM

## 2022-03-09 PROCEDURE — 4004F PT TOBACCO SCREEN RCVD TLK: CPT | Performed by: ORTHOPAEDIC SURGERY

## 2022-03-09 PROCEDURE — 99213 OFFICE O/P EST LOW 20 MIN: CPT | Performed by: ORTHOPAEDIC SURGERY

## 2022-03-09 PROCEDURE — G8420 CALC BMI NORM PARAMETERS: HCPCS | Performed by: ORTHOPAEDIC SURGERY

## 2022-03-09 PROCEDURE — G8482 FLU IMMUNIZE ORDER/ADMIN: HCPCS | Performed by: ORTHOPAEDIC SURGERY

## 2022-03-09 PROCEDURE — G8427 DOCREV CUR MEDS BY ELIG CLIN: HCPCS | Performed by: ORTHOPAEDIC SURGERY

## 2022-03-09 PROCEDURE — 3017F COLORECTAL CA SCREEN DOC REV: CPT | Performed by: ORTHOPAEDIC SURGERY

## 2022-03-09 RX ORDER — TRAMADOL HYDROCHLORIDE 50 MG/1
TABLET ORAL
COMMUNITY
Start: 2021-12-09 | End: 2022-05-03 | Stop reason: CLARIF

## 2022-03-09 RX ORDER — TADALAFIL 5 MG/1
5 TABLET ORAL DAILY
COMMUNITY
Start: 2022-02-14

## 2022-03-09 RX ORDER — HYDROXYZINE 50 MG/1
50 TABLET, FILM COATED ORAL 2 TIMES DAILY
COMMUNITY
Start: 2022-02-01

## 2022-03-09 NOTE — PROGRESS NOTES
Follow Up Visit     Eris Nguyen returns today for follow up visit regarding Right shoulder partial-thickness rotator cuff tear of the supraspinatus/subscapularis, SLAP tear with biceps tendinosis. Treatment thus far has included Therapy x 6 weeks . He reports symptoms are improved slightly but still painful. Patient states that he is still working in [employer] and he often has pain towards the end of his workday. States he still having problems lifting any significant with right hand pain in his shoulder. Physical Exam:     Height: 6' 1\" (1.854 m), Weight: 167 lb (75.8 kg) (per pt)    General: Alert and oriented x3, no acute distress  Cardiovascular/pulmonary: No labored breathing, peripheral perfusion intact  Musculoskeletal:    Shoulder Exam:   On evaluation, the right shoulder has no deformity. There is no evidence of atrophy or bony abnormality. There is no ecchymosis or swelling. Range of motion is 170 /60/T7 flexion/external rotation/internal rotation in the seated position, and 170/60 external rotation/internal rotation in the supine position. Tenderness to palpation: Biceps Tendon    Cuff testing:  Opal test (supraspinatus): negative for pain and/or weakness  Supine external rotation (infraspinatus): negative for pain and/or weakness  Belly press test and lift off test (subscapularis): Positive for mild pain and with no weakness  Hornblower's sign (teres minor): negative for pain and weakness      Special tests:  Osmin Croak' test: mild  Cross-chest abduction: negative  Yergason's test: positive  Speed's test: negative  Napa's test: pain greater with pronation           Controlled Substances Monitoring:      Imaging: Outside MRI reviewed and showed some tendinosis of the supraspinatus and infraspinatus, subscapularis appears intact.   There is some degenerative signal of the biceps labral complex but no obvious tear    Assessment: Right rotator cuff tendinosis, suspect SLAP tear and bicipital tendinosis. Plan:   Treatment the patient is not interested in operative treatment at this time. He will continue physical therapy as an outpatient and return to clinic in 3 months for follow-up appointment. All questions and concerns were answered during the encounter patient is agreeable to plan. Humberto Munoz DO  Orthopaedic Surgery   3/9/22  10:11 AM    Staff Addendum    I have seen and evaluated the patient and agree with the assessment and plan as documented by the orthopaedic surgery resident. I have performed the key components of the history and physical examination and concur with the findings and plan, and have made changes where appropriate/necessary. Agree with above. Overall he is improved with PT. He will continue with PT transition to home exercises. We discussed surgery in the event that he does not continue to improve although there is nothing urgent on his imaging or exam which would make me pursue this sooner if he is continue to do well. He is in agreement with this plan.   Follow-up in 3 months      Serena Stockton MD  [facility]

## 2022-03-11 ENCOUNTER — TREATMENT (OUTPATIENT)
Dept: PHYSICAL THERAPY | Age: 52
End: 2022-03-11
Payer: COMMERCIAL

## 2022-03-11 DIAGNOSIS — M25.511 RIGHT SHOULDER PAIN, UNSPECIFIED CHRONICITY: Primary | ICD-10-CM

## 2022-03-11 PROCEDURE — 97112 NEUROMUSCULAR REEDUCATION: CPT | Performed by: PHYSICAL THERAPIST

## 2022-03-11 PROCEDURE — 97110 THERAPEUTIC EXERCISES: CPT | Performed by: PHYSICAL THERAPIST

## 2022-03-11 PROCEDURE — 97140 MANUAL THERAPY 1/> REGIONS: CPT | Performed by: PHYSICAL THERAPIST

## 2022-03-11 NOTE — PROGRESS NOTES
[address]   Phone: [phone]   Fax: [phone]      Physical Therapy Daily Treatment Note    Date: 3/11/2022  Patient Name: Esperanza Lazo  : 1970   MRN: [id number]  DOInjury: Mark Neely  DOSx: NA   Referring Provider: Collins Doherty MD  [address] [facility]     Medical Diagnosis:   R shoulder pain    Outcome Measure: QuickDASH 43% limtiation    S: Pt reports to therapy c c/o tightness in R shoulder joint. Reports feeling as though he slept on R arm wrong and pain into triceps    O: Pt given written HEP  Time 3819-9216     Visit 6 Repeat outcome measure at mid point and end. Pain 3/10     Strength 3/5     Palpation Pain at [facility], post delt, biceps tendon     ROM Flex: 150 Abd: 140 ER: 0 IR:90     Modalities            Manual      AP PA long axis distraction 10 min  MT         Stretch      Table slides      Wall Flexion      Wall ER stretch      pec stretch  4x30s  TE   Anterior and posterior capsule stretch 4x30s  TE         Exercise      Shrugs AROM      Pendulum Ex      Hand bike 4 mins x2 Forward/backwar TE   Standing wand ABD 20x5s  NR   Supine flex 15x5s   NR   Wall washers 20x10s 5s at full flex 5s at 90 abd NR               S-lying ABD      S-lying ER 15x5s  NR   Standing wand flex      Standing flexion      Standing ABD                                                      A:  Tolerated well. Performed ROM activity's in order to further improve mobility/function in the shoulder in a gravity resisted position. Manual perfomed to improve ROM c reduced pain. Continue to focus on scapular motion and stability.      P: Continue with rehab plan  Eduarda Noel SPT  Patricia Fails, PT DPT    Treatment Charges: Mins Units   Initial Evaluation     Re-Evaluation     Ther Exercise         TE 15 1   Manual Therapy     MT 10 1   Ther Activities        TA     Gait Training          GT     Neuro Re-education NR 30 2   Modalities     Non-Billable Service Time Other     Total Time/Units 55 4

## 2022-03-15 ENCOUNTER — NURSE TRIAGE (OUTPATIENT)
Dept: OTHER | Facility: CLINIC | Age: 52
End: 2022-03-15

## 2022-03-15 NOTE — TELEPHONE ENCOUNTER
Received call from Phani at [employer] with [employer]. Subjective: Caller states \"lightheaded especially when stressed, feels anxious\"     Current Symptoms: denies    Onset: 2 weeks ago; gradual    Associated Symptoms: irritability     Pain Severity: 0/10; N/A; none    Temperature: denies    What has been tried: got a new mattress    LMP: NA Pregnant: NA    Recommended disposition: See PCP within 3 Days     Care advice provided, patient verbalizes understanding; denies any other questions or concerns; instructed to call back for any new or worsening symptoms. Patient/Caller agrees with recommended disposition; writer provided warm transfer to Nick Garcia at [facility] for appointment scheduling     Attention Provider: Thank you for allowing me to participate in the care of your patient. The patient was connected to triage in response to information provided to the ECC/PSC. Please do not respond through this encounter as the response is not directed to a shared pool.           Reason for Disposition   Symptoms of anxiety or panic and has not been evaluated for this by physician    Protocols used: ANXIETY AND PANIC ATTACK-ADULT-OH

## 2022-03-16 ENCOUNTER — OFFICE VISIT (OUTPATIENT)
Dept: PRIMARY CARE CLINIC | Age: 52
End: 2022-03-16
Payer: COMMERCIAL

## 2022-03-16 VITALS
WEIGHT: 167 LBS | HEIGHT: 73 IN | HEART RATE: 66 BPM | SYSTOLIC BLOOD PRESSURE: 106 MMHG | DIASTOLIC BLOOD PRESSURE: 70 MMHG | OXYGEN SATURATION: 98 % | BODY MASS INDEX: 22.13 KG/M2 | TEMPERATURE: 98.2 F | RESPIRATION RATE: 18 BRPM

## 2022-03-16 DIAGNOSIS — I25.10 CORONARY ATHEROSCLEROSIS DUE TO CALCIFIED CORONARY LESION: ICD-10-CM

## 2022-03-16 DIAGNOSIS — F41.9 ANXIETY AND DEPRESSION: Primary | ICD-10-CM

## 2022-03-16 DIAGNOSIS — F32.A ANXIETY AND DEPRESSION: Primary | ICD-10-CM

## 2022-03-16 DIAGNOSIS — I25.84 CORONARY ATHEROSCLEROSIS DUE TO CALCIFIED CORONARY LESION: ICD-10-CM

## 2022-03-16 DIAGNOSIS — R42 DIZZINESS: ICD-10-CM

## 2022-03-16 PROCEDURE — G8482 FLU IMMUNIZE ORDER/ADMIN: HCPCS | Performed by: FAMILY MEDICINE

## 2022-03-16 PROCEDURE — G8420 CALC BMI NORM PARAMETERS: HCPCS | Performed by: FAMILY MEDICINE

## 2022-03-16 PROCEDURE — 3017F COLORECTAL CA SCREEN DOC REV: CPT | Performed by: FAMILY MEDICINE

## 2022-03-16 PROCEDURE — 4004F PT TOBACCO SCREEN RCVD TLK: CPT | Performed by: FAMILY MEDICINE

## 2022-03-16 PROCEDURE — 99213 OFFICE O/P EST LOW 20 MIN: CPT | Performed by: FAMILY MEDICINE

## 2022-03-16 PROCEDURE — G8427 DOCREV CUR MEDS BY ELIG CLIN: HCPCS | Performed by: FAMILY MEDICINE

## 2022-03-16 ASSESSMENT — PATIENT HEALTH QUESTIONNAIRE - PHQ9
8. MOVING OR SPEAKING SO SLOWLY THAT OTHER PEOPLE COULD HAVE NOTICED. OR THE OPPOSITE, BEING SO FIGETY OR RESTLESS THAT YOU HAVE BEEN MOVING AROUND A LOT MORE THAN USUAL: 0
10. IF YOU CHECKED OFF ANY PROBLEMS, HOW DIFFICULT HAVE THESE PROBLEMS MADE IT FOR YOU TO DO YOUR WORK, TAKE CARE OF THINGS AT HOME, OR GET ALONG WITH OTHER PEOPLE: 1
1. LITTLE INTEREST OR PLEASURE IN DOING THINGS: 0
6. FEELING BAD ABOUT YOURSELF - OR THAT YOU ARE A FAILURE OR HAVE LET YOURSELF OR YOUR FAMILY DOWN: 0
9. THOUGHTS THAT YOU WOULD BE BETTER OFF DEAD, OR OF HURTING YOURSELF: 0
2. FEELING DOWN, DEPRESSED OR HOPELESS: 2
SUM OF ALL RESPONSES TO PHQ9 QUESTIONS 1 & 2: 2
5. POOR APPETITE OR OVEREATING: 0
SUM OF ALL RESPONSES TO PHQ QUESTIONS 1-9: 2
7. TROUBLE CONCENTRATING ON THINGS, SUCH AS READING THE NEWSPAPER OR WATCHING TELEVISION: 0
4. FEELING TIRED OR HAVING LITTLE ENERGY: 0
3. TROUBLE FALLING OR STAYING ASLEEP: 0
SUM OF ALL RESPONSES TO PHQ QUESTIONS 1-9: 2

## 2022-03-16 ASSESSMENT — ENCOUNTER SYMPTOMS
VOMITING: 0
RHINORRHEA: 0
DIARRHEA: 0
WHEEZING: 0
ABDOMINAL PAIN: 0
NAUSEA: 0
SHORTNESS OF BREATH: 0
CONSTIPATION: 0
SORE THROAT: 0

## 2022-03-16 NOTE — PROGRESS NOTES
3/16/2022     Chief Complaint   Patient presents with    Anxiety     under a lot of stress at work these past 2 weeks    Dizziness     thinks due to anxiety, worse while at work     HPI  [employer] (:  1970) is a 49 y.o. male, here for evaluation of the following medical concerns: The patient states that they have been dizzy for the last few weeks. He has been under a lot of stress recently due to work. Dizziness seems to be worse when he is at work and under stress. The dizziness is worse with movement. Hx of anxiety/depression along with CAD s/p stent. No nausea and vomiting. No numbness, tingling, or weakness to the extremities. No headache or visual disturbances. No recent head injury. Bowel movements have been normal color and consistency. No diarrhea. No black or bloody stools. The patient denies dysuria, urinary frequency, urgency and or gross hematuria. No flank pain. No fevers or chills. No chest pain or dyspnea. Review of Systems   Constitutional: Negative for chills and fever. HENT: Negative for congestion, rhinorrhea and sore throat. Respiratory: Negative for shortness of breath and wheezing. Cardiovascular: Positive for palpitations. Negative for chest pain and leg swelling. Gastrointestinal: Negative for abdominal pain, constipation, diarrhea, nausea and vomiting. Skin: Negative for rash. Neurological: Positive for light-headedness. Negative for headaches. Psychiatric/Behavioral: The patient is nervous/anxious.         Past Medical History:   Diagnosis Date    Anxiety and depression     Atheroembolism of left lower extremity ([facility] [address] 2014    Atrial flutter (HCC)     Coronary atherosclerosis due to calcified coronary lesion(414.4)     ED (erectile dysfunction)     Epilepsy ([facility] [address] [facility]     Hyperlipidemia     Hypertension     IBS (irritable bowel syndrome)     Ischemic toe 2014    MI, old     Thrombocytosis     Tinea     Tobacco dependence 09/16/2014       Prior to Visit Medications    Medication Sig Taking?  Authorizing Provider   hydrOXYzine (ATARAX) 50 MG tablet  Yes Historical Provider, MD   tadalafil (CIALIS) 5 MG tablet  Yes Historical Provider, MD   traMADol (ULTRAM) 50 MG tablet  Yes Historical Provider, MD   vitamin D (ERGOCALCIFEROL) 1.25 MG (81988 UT) CAPS capsule take 1 capsule by mouth every week Yes Pinky Nova MD   clopidogrel (PLAVIX) 75 MG tablet take 1 tablet by mouth once daily Yes Pinky Nova MD   metoprolol succinate (TOPROL XL) 25 MG extended release tablet take 1 tablet by mouth once daily Yes Pinky Nova MD   levETIRAcetam (KEPPRA) 500 MG tablet Take 3 tablets by mouth 2 times daily Yes Pinky Nova MD   mirtazapine (REMERON) 30 MG tablet take 1 tablet by mouth at bedtime Yes Pinky Nova MD   atorvastatin (LIPITOR) 40 MG tablet Take 1 tablet by mouth nightly Yes Pinky Nova MD   dicyclomine (BENTYL) 10 MG capsule take 1 capsule by mouth twice a day if needed Yes Pinky Nova MD   Cobalamin Combinations (V-96 + FOLIC ACID) 1759-556 MCG TBDP Take 1 tablet by mouth daily Yes Pinky Nova MD   azelastine (ASTELIN) 0.1 % nasal spray 1 spray by Nasal route 2 times daily Use in each nostril as directed Yes Pinky Nova MD   ondansetron (ZOFRAN) 4 MG tablet Take 1 tablet by mouth 3 times daily as needed for Nausea or Vomiting Yes Pinky Nova MD   allopurinol (ZYLOPRIM) 100 MG tablet Take 1 tablet by mouth daily Yes Pinky Nova MD   traZODone (DESYREL) 100 MG tablet Take 1.5 tablets by mouth nightly Yes Pinky Nova MD   lansoprazole (PREVACID) 30 MG delayed release capsule take 1 capsule by mouth every morning Yes Historical Provider, MD   escitalopram (LEXAPRO) 20 MG tablet take 2 tablets by mouth once daily Yes Abhishek aHnkins MD   hydroxyurea (HYDREA) 500 MG chemo capsule Take 1 capsule by mouth 2 times daily  Patient taking differently: Take 500 mg by mouth 2 times daily 1 tablet by mouth every other day alternating with 1 tablet BID every other day Yes Rubio Bear MD        Allergies   Allergen Reactions    Acyclovir And Related Hives and Nausea And Vomiting    Doxycycline Itching       Social History     Tobacco Use    Smoking status: Current Every Day Smoker     Packs/day: 0.50     Years: 20.00     Pack years: 10.00     Types: Cigarettes    Smokeless tobacco: Current User     Types: Chew    Tobacco comment: cutting down   Substance Use Topics    Alcohol use: No     Comment: Quit 2019. Vitals:    03/16/22 1345   BP: 106/70   Pulse: 66   Resp: 18   Temp: 98.2 °F (36.8 °C)   TempSrc: Temporal   SpO2: 98%   Weight: 167 lb (75.8 kg)   Height: 6' 1\" (1.854 m)     Estimated body mass index is 22.03 kg/m² as calculated from the following:    Height as of this encounter: 6' 1\" (1.854 m). Weight as of this encounter: 167 lb (75.8 kg). Physical Exam  Constitutional:       Appearance: He is well-developed. HENT:      Head: Normocephalic. Eyes:      Extraocular Movements: Extraocular movements intact. Conjunctiva/sclera: Conjunctivae normal.   Cardiovascular:      Rate and Rhythm: Normal rate and regular rhythm. Heart sounds: Normal heart sounds. No murmur heard. Pulmonary:      Effort: Pulmonary effort is normal.      Breath sounds: Normal breath sounds. No wheezing or rales. Abdominal:      General: Bowel sounds are normal.      Palpations: Abdomen is soft. Tenderness: There is no abdominal tenderness. Musculoskeletal:      Right lower leg: No edema. Left lower leg: No edema. Neurological:      General: No focal deficit present. Mental Status: He is alert. Comments: Cranial nerves grossly intact   Psychiatric:         Mood and Affect: Mood normal.         Judgment: Judgment normal.         ASSESSMENT/PLAN:  He Joel was seen today for anxiety and dizziness.     Diagnoses and all orders for this visit:    Anxiety and depression    Coronary atherosclerosis due to calcified coronary lesion    Dizziness  -     Cancel: EKG 12 lead; Future  -     Cancel: CBC with Auto Differential; Future  -     Cancel: Basic Metabolic Panel; Future    Discussed the patient's symptoms and issues at length. Patient was offered an EKG, CBC, BMP. Patient reports he just had blood work performed and was cleared by his hematologist/oncologist.  Patient declined EKG as he has no other associated symptoms besides his anxiety. Patient was advised on discussing the following modifications with his psychiatrist tomorrow: Increasing his Remeron to 45 mg daily or adding BuSpar. Patient denies any SI/HI. Patient reports that all of his stress does stem from his job. Patient was advised on stress management at his work. We also did briefly talk about the patient finding a new job where he may not be under as much stress. 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 Return if symptoms worsen or fail to improve. An electronicsignature was used to authenticate this note. --Lalita Underwood MD on 3/16/22 at 9:58 AM EDT      The above treatment regimen was discussed with the patient at length and all questions in regards to such were answered. Patient was advised to proceed to the emergency department with any worsening symptoms. Patient should follow up with her family doctor within 3-5 days.

## 2022-03-17 ENCOUNTER — TREATMENT (OUTPATIENT)
Dept: PHYSICAL THERAPY | Age: 52
End: 2022-03-17
Payer: COMMERCIAL

## 2022-03-17 DIAGNOSIS — M25.511 RIGHT SHOULDER PAIN, UNSPECIFIED CHRONICITY: Primary | ICD-10-CM

## 2022-03-17 PROCEDURE — 97112 NEUROMUSCULAR REEDUCATION: CPT | Performed by: PHYSICAL THERAPIST

## 2022-03-17 NOTE — PROGRESS NOTES
[address]   Phone: [phone]   Fax: [phone]      Physical Therapy Daily Treatment Note    Date: 3/17/2022  Patient Name: Yusuf Matson  : 1970   MRN: [id number]  DOInjury: Jono Pritchard  DOSx: NA   Referring Provider: Trinidad Cottrell MD  [address]  [facility]     Medical Diagnosis:   R shoulder pain    Outcome Measure: QuickDASH 43% limtiation    S: The pt c/o worsened right sh pain & tightness this wk. O: Pt given written HEP  Time 8478-4048     Visit 7 Repeat outcome measure at mid point and end. Pain 3/10     Strength 3/5     Palpation Pain at [facility], post delt, biceps tendon     ROM Flex: 150 Abd: 140 ER: 0 IR:90     Modalities            Manual      AP PA long axis distraction 10 min  MT         Stretch      Table slides      Wall Flexion      Wall ER stretch      pec stretch  4x30s  TE   Anterior and posterior capsule stretch 4x30s  TE         Exercise      Shrugs AROM      Pendulum Ex      Hand bike 4 mins x2 Forward/backwar TE   Standing wand ABD 20x5s  NR   Supine flex 15x5s   NR   Wall washers 20x10s 5s at full flex 5s at 90 abd NR               Band reverse high, mid, & low x20 each orange NR   Band sh ext high, mid, & low x20 each orange NR   Band sh ER at side x20  orange NR   Band sh horizontal abd x20 orange NR   Band lat pull down x20 orange NR   Band seated sh add to side x20 orange NR   Band scap row high, mid, & low x20 each orange NR   OHP sh abd x30  TE                                 A:  Tolerated well. The pt progressed from yellow to orange elastic band exercises today. The pt c/o increase tightness at right sh with resistive training. P: Continue with rehab plan & progress to include quadruped exercises.      [place], PT OHPT 12638    Treatment Charges: Mins Units   Initial Evaluation     Re-Evaluation     Ther Exercise         TE 2    Manual Therapy     MT     Ther Activities        TA     Gait Training GT     Neuro Re-education NR 38 3   Modalities     Non-Billable Service Time     Other     Total Time/Units 40 3

## 2022-03-22 ENCOUNTER — TREATMENT (OUTPATIENT)
Dept: PHYSICAL THERAPY | Age: 52
End: 2022-03-22
Payer: COMMERCIAL

## 2022-03-22 DIAGNOSIS — M25.511 RIGHT SHOULDER PAIN, UNSPECIFIED CHRONICITY: Primary | ICD-10-CM

## 2022-03-22 PROCEDURE — 97112 NEUROMUSCULAR REEDUCATION: CPT | Performed by: PHYSICAL THERAPIST

## 2022-03-22 NOTE — PROGRESS NOTES
[address]   Phone: [phone]   Fax: [phone]      Physical Therapy Daily Treatment Note    Date: 3/22/2022  Patient Name: Dagmar Sidhu  : 1970   MRN: [id number]  DOInjury: Phil Medrano  DOSx: NA   Referring Provider: Nery Romano MD  [address] [facility]     Medical Diagnosis:   R shoulder pain    Outcome Measure: QuickDASH 43% limtiation    S: The pt c/o back pain from fibromyalgia. O: Pt given written HEP  Time 5498-4427     Visit 7 Repeat outcome measure at mid point and end. Pain 3/10     Strength 3/5     Palpation Pain at [facility], post delt, biceps tendon     ROM Flex: 150 Abd: 140 ER: 0 IR:90     Modalities            Manual      AP PA long axis distraction 10 min  MT         Stretch      Table slides      Wall Flexion      Wall ER stretch      pec stretch  4x30s  TE   Anterior and posterior capsule stretch 4x30s  TE         Exercise      Shrugs AROM      Pendulum Ex      Hand bike 4 mins x2 Forward/backwar TE   Standing wand ABD 20x5s  NR   Supine flex 15x5s   NR               Supine wand flex 15x10s 4lbs NR   Supine wand abd 15x10s 4lbs NR   Supine wand IR/ER 15x5s 4lbs NR   Supine wand steering wheel 15x5s 4lbs NR   Supine DB flex 15x10s 2lbs NR   Supine DB pec fly 15x10s 2lbs NR   Supine DB PLUS 15x5s 2lbs NR                                       A:  Tolerated well. Supine exercise performed this date due to pt's fibromyalgia progressed stability c increased ROM in order to return to full duty s pain. P: Continue with rehab plan & progress to include quadruped exercises.      Penny Moore SPT  Dois Jazmin PT    Treatment Charges: Mins Units   Initial Evaluation     Re-Evaluation     Ther Exercise         TE     Manual Therapy     MT     Ther Activities        TA     Gait Training          GT     Neuro Re-education NR 40 3   Modalities     Non-Billable Service Time     Other     Total Time/Units 40 3

## 2022-03-24 ENCOUNTER — TREATMENT (OUTPATIENT)
Dept: PHYSICAL THERAPY | Age: 52
End: 2022-03-24
Payer: COMMERCIAL

## 2022-03-24 DIAGNOSIS — M25.511 RIGHT SHOULDER PAIN, UNSPECIFIED CHRONICITY: Primary | ICD-10-CM

## 2022-03-24 PROCEDURE — 97112 NEUROMUSCULAR REEDUCATION: CPT

## 2022-03-24 NOTE — PROGRESS NOTES
[address] [facility]   Phone: [phone]   Fax: [phone]      Physical Therapy Daily Treatment Note    Date: 3/24/2022  Patient Name: Santos Lambert  : 1970   MRN: [id number]  DOInjury: Porfirio Dorman  DOSx: NA   Referring Provider: Cam Jiménez MD  [address] [facility]     Medical Diagnosis:   R shoulder pain    Outcome Measure: QuickDASH 43% limtiation    S: The pt c/o back pain. O: Pt given written HEP  Time 1235-1294     Visit 8 Repeat outcome measure at mid point and end. Pain 3/10     Strength 3/5     Palpation Pain at [facility], post delt, biceps tendon     ROM Flex: 150 Abd: 140 ER: 0 IR:90     Modalities            Manual       MT         Stretch      Table slides      Wall Flexion      Wall ER stretch      pec stretch  4x30s  TE   Anterior and posterior capsule stretch 4x30s  TE         Exercise      Shrugs AROM      Pendulum Ex      Hand bike 4 mins x2 Forward/backwar TE   Standing wand ABD 20x5s  NR   Supine flex 15x5s   NR               Supine wand flex 15x10s 4lbs NR   Supine wand abd 15x10s 4lbs NR   Supine wand IR/ER 15x5s 4lbs NR   Supine wand steering wheel 15x5s 4lbs NR   Supine chest press 15x5s  4lbs  NR         Supine DB flex 15x10s 3lbs NR   Supine DB pec fly 15x10s 3lbs NR   Supine DB PLUS 15x5s 3lbs NR   Supine ABC 2x thru 3lbs  NR                                 A:  Tolerated well. Continued to perform supine exercises d/t patient having back pain. P: Continue with rehab plan & progress to include quadruped exercises.    Margy Beverage PTA [id number]    Treatment Charges: Mins Units   Initial Evaluation     Re-Evaluation     Ther Exercise         TE     Manual Therapy     MT     Ther Activities        TA     Gait Training          GT     Neuro Re-education NR 40 3   Modalities     Non-Billable Service Time     Other     Total Time/Units 40 3

## 2022-04-05 ENCOUNTER — TREATMENT (OUTPATIENT)
Dept: PHYSICAL THERAPY | Age: 52
End: 2022-04-05
Payer: COMMERCIAL

## 2022-04-05 DIAGNOSIS — M25.511 RIGHT SHOULDER PAIN, UNSPECIFIED CHRONICITY: Primary | ICD-10-CM

## 2022-04-05 PROCEDURE — 97112 NEUROMUSCULAR REEDUCATION: CPT | Performed by: PHYSICAL THERAPIST

## 2022-04-05 NOTE — PROGRESS NOTES
[address] [facility]   Phone: [phone]   Fax: [phone]      Physical Therapy Daily Treatment Note    Date: 2022  Patient Name: Jeri Sands  : 1970   MRN: [id number]  DOInjury: Jeannine Marquis  DOSx: NA   Referring Provider: Hugo Carrillo MD  [address] [facility]     Medical Diagnosis:   R shoulder pain    Outcome Measure: QuickDASH 43% limtiation    S: The pt reports good compliance with HEP & reports that he has three different color elastic bands at home. O: Pt given written HEP  Time 9088-6705     Visit 9 Repeat outcome measure at mid point and end. Pain 3/10     Strength 3/5     Palpation Pain at [facility], post delt, biceps tendon     ROM Flex: 150 Abd: 140 ER: 0 IR:90                 Band scap rows high, mid, & low x40 each Blue band NR   Band sh ext high & mid x40 each Blue band NR   Band sh ER at side 1x 15  1x 13  1x 2 Blue band NR   Band RUE lat pull-down x40 Blue band NR   Band seated sh add to side x40 Blue band NR   Band sh horizontal abd 3x 10 Blue band NR   Band reverse flys x40 Blue band NR   Band loop bow & arrow 3x 10 R & L blue NR   Band loop sh horizontal abd 3x 10 blue NR         Exercise      Shrugs AROM      Pendulum Ex      Standing wand ABD 20x5s  NR   Supine flex 15x5s   NR                                             A:  Tolerated well. Pt progressed with today's Tx session to perform new blue elastic band resistive training for right sh strength, stability, muscle balance, & facilitate proper muscle activation. P: Continue with rehab plan & progress to include quadruped exercises.      Pablo Espinoza, PT OHPT [id number]    Treatment Charges: Mins Units   Initial Evaluation     Re-Evaluation     Ther Exercise         TE     Manual Therapy     MT     Ther Activities        TA     Gait Training          GT     Neuro Re-education NR 40 3   Modalities     Non-Billable Service Time     Other     Total Time/Units 40 3

## 2022-04-07 ENCOUNTER — TREATMENT (OUTPATIENT)
Dept: PHYSICAL THERAPY | Age: 52
End: 2022-04-07
Payer: COMMERCIAL

## 2022-04-07 DIAGNOSIS — M25.511 RIGHT SHOULDER PAIN, UNSPECIFIED CHRONICITY: Primary | ICD-10-CM

## 2022-04-07 PROCEDURE — 97110 THERAPEUTIC EXERCISES: CPT | Performed by: PHYSICAL THERAPIST

## 2022-04-07 PROCEDURE — 97112 NEUROMUSCULAR REEDUCATION: CPT | Performed by: PHYSICAL THERAPIST

## 2022-04-07 NOTE — PROGRESS NOTES
[address] [facility]   Phone: [phone]   Fax: [phone]      Physical Therapy Daily Treatment Note    Date: 2022  Patient Name: Xander Anglin  : 1970   MRN: [id number]  DOInjury: Juan M Black  DOSx: NA   Referring Provider: No referring provider defined for this encounter. Medical Diagnosis:   R shoulder pain    Outcome Measure: QuickDASH 43% limtiation    S: The pt reports reduced symptoms and \"feeling good\". O: Pt given written HEP  Time [id number]     Visit 10 Repeat outcome measure at mid point and end. Pain 3/10     Strength 3/5     Palpation Pain at [facility], post delt, biceps tendon     ROM Flex: 150 Abd: 140 ER: 0 IR:90                 Band scap rows high, mid, & low 15x5s Blue band NR   Band sh ext high  15x5s Blue band NR   Band sh IR/ER at side 15x5s Blue band NR   Band RUE lat pull-down x40 PTB NR   Weighted shrug 15x5s 10lbs NR   Behind the back IR stretch [id number] Yoga strap TE                           Exercise      Shrugs AROM      Pendulum Ex      Standing wand ABD 20x5s  NR   Supine flex 15x5s   NR                                             A:  Tolerated well. Pt progressed with today's Tx session to perform new band resistive training for right sh strength, stability, muscle balance, & facilitate proper muscle activation for return to full duty. P: Continue with rehab plan & progress to include quadruped exercises.      Sandy Zeng PT  Vanessa Killian SPT    Treatment Charges: Mins Units   Initial Evaluation     Re-Evaluation     Ther Exercise         TE 10 1   Manual Therapy     MT     Ther Activities        TA     Gait Training          GT     Neuro Re-education NR 30 2   Modalities     Non-Billable Service Time     Other     Total Time/Units 40 3

## 2022-04-19 ENCOUNTER — TREATMENT (OUTPATIENT)
Dept: PHYSICAL THERAPY | Age: 52
End: 2022-04-19
Payer: COMMERCIAL

## 2022-04-19 DIAGNOSIS — M25.511 RIGHT SHOULDER PAIN, UNSPECIFIED CHRONICITY: Primary | ICD-10-CM

## 2022-04-19 PROCEDURE — 97112 NEUROMUSCULAR REEDUCATION: CPT | Performed by: PHYSICAL THERAPIST

## 2022-04-19 PROCEDURE — 97110 THERAPEUTIC EXERCISES: CPT | Performed by: PHYSICAL THERAPIST

## 2022-04-19 NOTE — PROGRESS NOTES
[address] [facility]   Phone: [phone]   Fax: [phone]      Physical Therapy Daily Treatment Note    Date: 2022  Patient Name: Phillip Lew  : 1970   MRN: [id number]  DOInjury: Vivi Callaway  DOSx: NA   Referring Provider: Julia Lopes MD  [address] [facility]     Medical Diagnosis:   R shoulder pain    Outcome Measure: QuickDASH 43% limtiation    S: The pt reports increase in full body pain due to fibromyalgia    O: Pt given written HEP  Time 5515-8633     Visit 10 Repeat outcome measure at mid point and end. Pain 3/10     Strength 3/5     Palpation Pain at [facility], post delt, biceps tendon     ROM Flex: 150 Abd: 140 ER: 0 IR:90                 Band scap rows high, mid, & low 15x5s Blue band NR   Band sh ext high  15x5s Blue band NR   Band sh IR/ER at side 15x5s Blue band NR   Pole slides 15x10s  TE   Ant/psot capsule stretch 4x30s  TE   Wall washers 20x5s  NR         Exercise      Shrugs AROM      Pendulum Ex      Standing wand ABD 20x5s  NR                                             A:  Tolerated well. Pt reported increased fatigue c all activities today citing his fibromyalgia, focus was to restore stability and ROM as tolerated for return to work. P: Continue with rehab plan & progress to include quadruped exercises.      Marni Lockwood PT  Barrie Killian SPT    Treatment Charges: Mins Units   Initial Evaluation     Re-Evaluation     Ther Exercise         TE 10 1   Manual Therapy     MT     Ther Activities        TA     Gait Training          GT     Neuro Re-education NR 30 2   Modalities     Non-Billable Service Time     Other     Total Time/Units 40 3

## 2022-04-27 ENCOUNTER — TREATMENT (OUTPATIENT)
Dept: PHYSICAL THERAPY | Age: 52
End: 2022-04-27
Payer: COMMERCIAL

## 2022-04-27 DIAGNOSIS — M25.511 RIGHT SHOULDER PAIN, UNSPECIFIED CHRONICITY: Primary | ICD-10-CM

## 2022-04-27 PROCEDURE — 97164 PT RE-EVAL EST PLAN CARE: CPT | Performed by: PHYSICAL THERAPIST

## 2022-04-27 PROCEDURE — 97110 THERAPEUTIC EXERCISES: CPT | Performed by: PHYSICAL THERAPIST

## 2022-04-27 PROCEDURE — 97112 NEUROMUSCULAR REEDUCATION: CPT | Performed by: PHYSICAL THERAPIST

## 2022-04-27 NOTE — PROGRESS NOTES
[address] [facility]   Phone: [phone]   Fax: [phone]    Discharge Summary      ATTENDANCE:  Patient attended 15 of 13 scheduled treatments from 1/27/2022  to 4/27/2022. TREATMENTS RECEIVED:  Therapeutic activity, therapeutic exercise, neuromuscular reeducation, HEP, manual    INITIAL STATUS:  Observations: well nourished male     Inspection: demonstrates generalized pronated posture (forward head, protracted scapula, internally rotated shoulders, and flexed trunk and lower extremities). Palpation: Tender to palpation at [facility] joint, posterior GH joint, biceps tendon      Joint/Motion:  Right Shoulder:  AROM: 150° Forward elevation,  140° abduction, 5° ER,  IR to 90     Apley IR: high lumbar     Left Shoulder:  AROM: 165° Forward elevation, 155° abduction,  15° ER,  IR to 110     Apley IR: low thoracic     Strength:  Right Shoulder: Flexion 3/5,  Abduction 3/5, ER 3/5, IR 3/5    Right Elbow:Flexion 3/5,  Extension 3/5     Left Shoulder: Flexion 4/5,  Abduction 4/5, ER 4/5, IR 4/5   Left Elbow:Flexion 4/5,  Extension 4/5        Special Tests/Functional Screens:    [x]? Quiroz-Carmelo [x]?+ / []? -  [x]? Albion's [x]?+ / []? -   []? AC Sheer []?+ / []? -    [x]? [facility] drawer []?+ / [x]? -    [x]? Bicep Load []?+ / [x]? -   []? Crank []?+ / []? -  [x]? Clunk []?+ / [x]? -  []? Marybelle Running []?+ / []? -   []? Jonny Ricci []?+ / []? -  []? Neer's []?+ / []? -      [x]? Speed's []?+ / [x]? -   [x]? Yrnson's []?+ / [x]? -    []? Sulcus Sign []?+ / []? -   [x]? Apprehension []?+ / [x]? -   []? Bicep Load II []?+ / []? -   []? Elbow Valgus []?+ / []? -     []? Elbow Varus []?+ / []? -   []? Opal's relocation []?+ / []? -   [x]? Empty Can []?+ / [x]? -  []? Drop arm []?+ / []? -  []? ER lag []?+ / []? -  []? Painful Arc []?+ / []? -  []? Bear Hug []?+ / []? -  [x]? Belly Press/ lift off[x]? + / []? -  []?  Other: []?+ / []? -                Special Tests: subscap involvement vs impingement      FINAL STATUS:  Observations: well nourished male     Inspection: demonstrates generalized pronated posture (forward head, protracted scapula, internally rotated shoulders, and flexed trunk and lower extremities). Palpation: tenderness noted bicep tendon over groove     Joint/Motion:  Right Shoulder:  AROM: 165° Forward elevation,  155° abduction, 90° ER,  IR to 90     Apley IR: high lumbar     Left Shoulder:  AROM: 165° Forward elevation, 155° abduction,  90° ER,  IR to 110     Apley IR: low thoracic     Strength:  Right Shoulder: Flexion 5-/5,  Abduction 5-/5, ER 5-/5, IR 5-/5    Right Elbow:Flexion 5-/5,  Extension 5-/5     Left Shoulder: Flexion 5-/5,  Abduction 5-/5, ER 5-/5, IR 5-/5   Left Elbow:Flexion 5-/5,  Extension 5-/5        Special Tests/Functional Screens:    [x]? Catie [x]?+ / []? -  [x]? Petroleum's [x]?+ / []? -   []? AC Sheer []?+ / []? -    [x]? [address] drawer []?+ / [x]? -    [x]? Bicep Load []?+ / [x]? -   []? Crank []?+ / []? -  [x]? Clunk []?+ / [x]? -  []? Jovannihua Garrisone []?+ / []? -   []? Gordon Hinton []?+ / []? -  []? Neer's []?+ / []? -      [x]? Speed's []?+ / [x]? -   [x]? Yerganson's []?+ / [x]? -    []? Sulcus Sign []?+ / []? -   [x]? Apprehension []?+ / [x]? -   []? Bicep Load II []?+ / []? -   []? Elbow Valgus []?+ / []? -     []? Elbow Varus []?+ / []? -   []? Opal's relocation []?+ / []? -   [x]? Empty Can []?+ / [x]? -  []? Drop arm []?+ / []? -  []? ER lag []?+ / []? -  []? Painful Arc []?+ / []? -  []? Bear Hug []?+ / []? -  [x]? Belly Press/ lift off[x]? + / []? -  []?  Other: []?+ / []? -                Special Tests:             resolved impingement      OUTCOME MEASURE: QuickDASH 5% limitation    GOALS:      Long Term goals (6 weeks) (goals met)  · Decrease reported pain to 1/10  · Increase ROM to ROM to EXT: 165, ,   · Increase Strength to 5-/5  · Able to perform/complete the following functions/tasks:   ? 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 ? Perform IR of R shoulder to full ROM in front of and behind trunk c no resistance with no more pain than 2/10 for the purpose of cooking for work. ? 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 · QuickDASH 15% disability  · Independent with Home Exercise Programs      REASON FOR DISCHARGE: Pt has progressed well over their PT POC. Pt is reporting considerable improvements in all ADL and rec activity, but notes some difficulty c minor anterior shoulder pain. Objectively, they have made considerable improvements in regards to ROM, strength, stability, and functional mobility. He reports no pain c all daily activity and reports no restrictions daily. Most STG and LTG have been met at this time and pt no longer requires skilled care. They will be d/c from PT services at this time to I HEP. PATIENT EDUCATION/INSTRUCTIONS: continue HEP as instructed    RECOMMENDATIONS: call c questions or if additional services are warranted. Thank you for the opportunity to work with your patient. If you have questions or comments, please contact me at numbers listed above.       Sandy Zeng, PT PT , DPT PT [id number] 4/27/2022

## 2022-04-27 NOTE — PROGRESS NOTES
[address] [facility]   Phone: [phone]   Fax: [phone]      Physical Therapy Daily Treatment Note    Date: 2022  Patient Name: oJdi Mane  : 1970   MRN: [id number]  DOInjury: Luc Smallwood  DOSx: NA   Referring Provider: Micheline Baxter MD  [address] [facility]     Medical Diagnosis:   R shoulder pain    Outcome Measure: QuickDASH 43% limtiation    S: The pt reports that he has been pain free the last couple days. O: Pt given written HEP  Time 145-225     Visit 12 Repeat outcome measure at mid point and end. Pain 3/10     Strength 3/5     Palpation Pain at [facility], post delt, biceps tendon     ROM Flex: 150 Abd: 140 ER: 0 IR:90                 Band scap rows high, mid, & low 15x5s Blue band NR   Band sh ext high  15x5s Blue band NR   Band sh IR/ER at side 15x5s Blue band NR   Pole slides 15x10s  TE   Ant/psot capsule stretch 4x30s  TE   Wall washers 20x5s  NR         Exercise      Shrugs AROM      Pendulum Ex      Standing wand ABD 20x5s  NR                                             A:  Tolerated well. Pt was reevaluated today and was found to no longer require skilled care (see note for details). Pts HEP was reviewed c good understanding and will d/c from PT services are this time. P: Continue with rehab plan & progress to include quadruped exercises.      Leticia Barker PT DPT [id number]    Treatment Charges: Mins Units   Initial Evaluation     Re-Evaluation 10 1   Ther Exercise         TE 10 1   Manual Therapy     MT     Ther Activities        TA     Gait Training          GT     Neuro Re-education NR 20 1   Modalities     Non-Billable Service Time     Other     Total Time/Units 40 3

## 2022-05-03 ENCOUNTER — OFFICE VISIT (OUTPATIENT)
Dept: CARDIOLOGY CLINIC | Age: 52
End: 2022-05-03
Payer: COMMERCIAL

## 2022-05-03 VITALS
HEART RATE: 55 BPM | BODY MASS INDEX: 22.53 KG/M2 | SYSTOLIC BLOOD PRESSURE: 105 MMHG | WEIGHT: 170 LBS | RESPIRATION RATE: 16 BRPM | OXYGEN SATURATION: 98 % | HEIGHT: 73 IN | DIASTOLIC BLOOD PRESSURE: 65 MMHG

## 2022-05-03 DIAGNOSIS — Z95.5 STENTED CORONARY ARTERY: ICD-10-CM

## 2022-05-03 DIAGNOSIS — I25.84 CORONARY ATHEROSCLEROSIS DUE TO CALCIFIED CORONARY LESION: Primary | ICD-10-CM

## 2022-05-03 DIAGNOSIS — I25.2 MYOCARDIAL INFARCTION, OLD: ICD-10-CM

## 2022-05-03 DIAGNOSIS — I25.10 CORONARY ATHEROSCLEROSIS DUE TO CALCIFIED CORONARY LESION: Primary | ICD-10-CM

## 2022-05-03 DIAGNOSIS — E78.00 PURE HYPERCHOLESTEROLEMIA: ICD-10-CM

## 2022-05-03 PROCEDURE — G8420 CALC BMI NORM PARAMETERS: HCPCS | Performed by: INTERNAL MEDICINE

## 2022-05-03 PROCEDURE — G8427 DOCREV CUR MEDS BY ELIG CLIN: HCPCS | Performed by: INTERNAL MEDICINE

## 2022-05-03 PROCEDURE — 99213 OFFICE O/P EST LOW 20 MIN: CPT | Performed by: INTERNAL MEDICINE

## 2022-05-03 PROCEDURE — 3017F COLORECTAL CA SCREEN DOC REV: CPT | Performed by: INTERNAL MEDICINE

## 2022-05-03 PROCEDURE — 4004F PT TOBACCO SCREEN RCVD TLK: CPT | Performed by: INTERNAL MEDICINE

## 2022-05-03 PROCEDURE — 93000 ELECTROCARDIOGRAM COMPLETE: CPT | Performed by: INTERNAL MEDICINE

## 2022-05-03 RX ORDER — GABAPENTIN 600 MG/1
600 TABLET ORAL NIGHTLY
COMMUNITY
Start: 2022-05-02

## 2022-05-03 RX ORDER — MIRTAZAPINE 45 MG/1
45 TABLET, FILM COATED ORAL NIGHTLY
COMMUNITY
Start: 2022-04-27

## 2022-05-03 RX ORDER — GABAPENTIN 300 MG/1
300 CAPSULE ORAL 3 TIMES DAILY
COMMUNITY
Start: 2022-03-30 | End: 2022-08-31

## 2022-05-03 NOTE — PROGRESS NOTES
Patient Active Problem List   Diagnosis    Tobacco dependence    Hyperlipidemia    Coronary atherosclerosis due to calcified coronary lesion    IBS (irritable bowel syndrome)    Seizure disorder (HCC)    [facility]    Myeloproliferative disease ([facility]    Stented coronary artery    Anxiety and depression    Gastroesophageal reflux disease    Essential hypertension    Vitamin D deficiency    Insomnia       Current Outpatient Medications   Medication Sig Dispense Refill    gabapentin (NEURONTIN) 600 MG tablet Take 600 mg by mouth at bedtime.       mirtazapine (REMERON) 45 MG tablet Take 45 mg by mouth at bedtime      hydrOXYzine (ATARAX) 50 MG tablet Take 50 mg by mouth in the morning and at bedtime       tadalafil (CIALIS) 5 MG tablet Take 5 mg by mouth daily       vitamin D (ERGOCALCIFEROL) 1.25 MG (75372 UT) CAPS capsule take 1 capsule by mouth every week 4 capsule 5    clopidogrel (PLAVIX) 75 MG tablet take 1 tablet by mouth once daily 30 tablet 5    metoprolol succinate (TOPROL XL) 25 MG extended release tablet take 1 tablet by mouth once daily 30 tablet 5    levETIRAcetam (KEPPRA) 500 MG tablet Take 3 tablets by mouth 2 times daily 540 tablet 3    atorvastatin (LIPITOR) 40 MG tablet Take 1 tablet by mouth nightly 30 tablet 5    Cobalamin Combinations (J-03 + FOLIC ACID) 6682-079 MCG TBDP Take 1 tablet by mouth daily 90 tablet 1    azelastine (ASTELIN) 0.1 % nasal spray 1 spray by Nasal route 2 times daily Use in each nostril as directed 60 mL 1    ondansetron (ZOFRAN) 4 MG tablet Take 1 tablet by mouth 3 times daily as needed for Nausea or Vomiting 30 tablet 0    allopurinol (ZYLOPRIM) 100 MG tablet Take 1 tablet by mouth daily 30 tablet 3    traZODone (DESYREL) 100 MG tablet Take 1.5 tablets by mouth nightly (Patient taking differently: Take 200 mg by mouth nightly ) 30 tablet 0    lansoprazole (PREVACID) 30 MG delayed release capsule take 1 capsule by mouth every morning      escitalopram (LEXAPRO) 20 MG tablet take 2 tablets by mouth once daily 60 tablet 5    hydroxyurea (HYDREA) 500 MG chemo capsule Take 1 capsule by mouth 2 times daily (Patient taking differently: Take 500 mg by mouth 2 times daily 1 tablet by mouth every other day alternating with 1 tablet BID every other day) 60 capsule 1    gabapentin (NEURONTIN) 300 MG capsule Take 300 mg by mouth in the morning and at bedtime. No current facility-administered medications for this visit. CC:    Patient is seen in follow-up for:  1. Coronary atherosclerosis due to calcified coronary lesion    2. Myocardial infarction, old    3. Pure hypercholesterolemia    4. Stented coronary artery        HPI:  Nancy Maurice is seen in routine follow-up. He has done well overall since he was last seen. Denies any chest pain shortness of breath or excessive fatigue. He still has some issues with bradycardia and is on very low-dose beta-blocker therapy. Tolerating current medications well without side effects.       ROS:  General: No unusual weight gain, no change in exercise tolerance  Skin: No rash or itching  EENT: No vision changes or nosebleeds  Cardiovascular: No orthopnea or paroxysmal nocturnal dyspnea  Respiratory: No cough or hemoptysis  Gastrointestinal: No hematemesis or recent changes in bowel habits  Genitourinary: No hematuria, urgency or frequency  Musculoskeletal: No muscular weakness or joint swelling   Neurologic / Psychiatric: No incoordination or convulsions  Allergic / Immunologic/ Lymphatic / Endocrine: No anemia or bleeding tendency    Social History     Socioeconomic History    Marital status:      Spouse name: Not on file    Number of children: Not on file    Years of education: Not on file    Highest education level: Not on file   Occupational History    Not on file   Tobacco Use    Smoking status: Current Every Day Smoker     Packs/day: 0.50     Years: 20.00     Pack years: 10.00 Types: Cigarettes    Smokeless tobacco: Current User     Types: Chew    Tobacco comment: cutting down   Vaping Use    Vaping Use: Never used   Substance and Sexual Activity    Alcohol use: No     Comment: Quit 2019.  Drug use: Not Currently     Types: Marijuana [place])     Comment: Daily for pain.  Sexual activity: Yes     Partners: Female   Other Topics Concern    Not on file   Social History Narrative    Not on file     Social Determinants of Health     Financial Resource Strain: Low Risk     Difficulty of Paying Living Expenses: Not hard at all   Food Insecurity: No Food Insecurity    Worried About Running Out of Food in the Last Year: Never true    [address] in the Last Year: Never true   Transportation Needs:     Lack of Transportation (Medical): Not on file    Lack of Transportation (Non-Medical):  Not on file   Physical Activity:     Days of Exercise per Week: Not on file    Minutes of Exercise per Session: Not on file   Stress:     Feeling of Stress : Not on file   Social Connections:     Frequency of Communication with Friends and Family: Not on file    Frequency of Social Gatherings with Friends and Family: Not on file    Attends Sabianism Services: Not on file    Active Member of [address] [place] [address] [employer] or Organizations: Not on file    Attends Club or Organization Meetings: Not on file    Marital Status: Not on file   Intimate Partner Violence:     Fear of Current or Ex-Partner: Not on file    Emotionally Abused: Not on file    Physically Abused: Not on file    Sexually Abused: Not on file   Housing Stability:     Unable to Pay for Housing in the Last Year: Not on file    Number of [place] in the Last Year: Not on file    Unstable Housing in the Last Year: Not on file       Family History   Problem Relation Age of Onset    Coronary Art Dis Mother     COPD Mother     Seizures Father     Alcohol Abuse Father     Liver Disease Father     Diabetes Brother     Cancer Brother 64 leukemia    Diabetes Brother     Heart Disease Maternal Uncle     Heart Disease Maternal Uncle        Past Medical History:   Diagnosis Date    Anxiety and depression     Atheroembolism of left lower extremity ([facility] [address] 09/11/2014    Atrial flutter (HCC)     Coronary atherosclerosis due to calcified coronary lesion(414.4)     ED (erectile dysfunction)     Epilepsy ([facility]     Hyperlipidemia     Hypertension     IBS (irritable bowel syndrome)     Ischemic toe 09/11/2014    MI, old     Thrombocytosis     Tinea     Tobacco dependence 09/16/2014       PHYSICAL EXAM:  CONSTITUTIONAL:  Well developed, well nourished    Vitals:    05/03/22 1102   BP: 105/65   Pulse: 55   Resp: 16   SpO2: 98%   Weight: 170 lb (77.1 kg)   Height: 6' 1\" (1.854 m)     HEAD & FACE: Normocephalic. Symmetric. EYES: No xanthelasma. Conjunctivae not injected. EARS, NOSE, MOUTH & THROAT: Good dentition. No oral pallor or cyanosis. NECK: No JVD at 30 degrees. No thyromegaly. RESPIRATORY: Clear to auscultation and percussion in all fields. No use of accessory muscle or intercostal retractions. CARDIOVASCULAR: Regular rate and rhythm. No lifts or thrills on palpitation. Auscultation with normal S1-S2 in intensity and splitting. No carotid bruits. Abdominal aorta not enlarged. Femoral arteries without bruits. Pedal pulses 2+. No edema. ABDOMEN: Soft without hepatic or splenic enlargement. No tenderness. MUSCULOSKELETAL: No kyphosis or scoliosis of the back. Good muscle strength and tone. No muscle atrophy. Normal gait and ability to undergo exercise stress testing. EXTREMITIES: No clubbing or cyanosis. SKIN: No Xanthomas or ulcerations. NEUROLOGIC: Oriented to time, place and person. Normal mood and affect. LYMPHATIC:  No palpable neck or supraclavicular nodes. No splenomegaly. EKG: the EKG tracing was reviewed and found to reveal: Alireza sinus rhythm.  ms.     ASSESSMENT: ORDERS:       Diagnosis Orders   1. Coronary atherosclerosis due to calcified coronary lesion  EKG 12 Lead   2. Myocardial infarction, old  EKG 12 Lead   3. Pure hypercholesterolemia  EKG 12 Lead   4. Stented coronary artery  EKG 12 Lead     PLAN:   See above orders. Old records were reviewed and found to reveal:   Encouraged medication compliance. Counseled in cigarette smoking cessation. Discussed issues that would prompt earlier evaluation. Same cardiac medications. Follow-up office visit -1 year.

## 2022-05-06 ENCOUNTER — APPOINTMENT (OUTPATIENT)
Dept: CT IMAGING | Age: 52
DRG: 230 | End: 2022-05-06
Payer: COMMERCIAL

## 2022-05-06 ENCOUNTER — APPOINTMENT (OUTPATIENT)
Dept: GENERAL RADIOLOGY | Age: 52
DRG: 230 | End: 2022-05-06
Payer: COMMERCIAL

## 2022-05-06 ENCOUNTER — ANESTHESIA (OUTPATIENT)
Dept: OPERATING ROOM | Age: 52
DRG: 230 | End: 2022-05-06
Payer: COMMERCIAL

## 2022-05-06 ENCOUNTER — ANESTHESIA EVENT (OUTPATIENT)
Dept: OPERATING ROOM | Age: 52
DRG: 230 | End: 2022-05-06
Payer: COMMERCIAL

## 2022-05-06 ENCOUNTER — HOSPITAL ENCOUNTER (INPATIENT)
Age: 52
LOS: 4 days | Discharge: HOME | DRG: 230 | End: 2022-05-10
Attending: EMERGENCY MEDICINE | Admitting: SURGERY
Payer: COMMERCIAL

## 2022-05-06 VITALS — SYSTOLIC BLOOD PRESSURE: 147 MMHG | TEMPERATURE: 98.8 F | DIASTOLIC BLOOD PRESSURE: 75 MMHG | OXYGEN SATURATION: 100 %

## 2022-05-06 DIAGNOSIS — F17.200 TOBACCO DEPENDENCE: ICD-10-CM

## 2022-05-06 DIAGNOSIS — E55.9 VITAMIN D DEFICIENCY: ICD-10-CM

## 2022-05-06 DIAGNOSIS — I10 ESSENTIAL HYPERTENSION: ICD-10-CM

## 2022-05-06 DIAGNOSIS — D47.3 ESSENTIAL THROMBOCYTHEMIA (HCC): ICD-10-CM

## 2022-05-06 DIAGNOSIS — D47.1 MYELOPROLIFERATIVE DISEASE (HCC): ICD-10-CM

## 2022-05-06 DIAGNOSIS — K63.1 BOWEL PERFORATION (HCC): ICD-10-CM

## 2022-05-06 DIAGNOSIS — F41.9 ANXIETY AND DEPRESSION: ICD-10-CM

## 2022-05-06 DIAGNOSIS — G40.909 SEIZURE DISORDER (HCC): ICD-10-CM

## 2022-05-06 DIAGNOSIS — F32.A ANXIETY AND DEPRESSION: ICD-10-CM

## 2022-05-06 DIAGNOSIS — I25.10 CORONARY ATHEROSCLEROSIS DUE TO CALCIFIED CORONARY LESION: ICD-10-CM

## 2022-05-06 DIAGNOSIS — K63.1 SMALL BOWEL PERFORATION (HCC): ICD-10-CM

## 2022-05-06 DIAGNOSIS — Z95.5 STENTED CORONARY ARTERY: ICD-10-CM

## 2022-05-06 DIAGNOSIS — I25.84 CORONARY ATHEROSCLEROSIS DUE TO CALCIFIED CORONARY LESION: ICD-10-CM

## 2022-05-06 DIAGNOSIS — K63.1 PERFORATION BOWEL (HCC): Primary | ICD-10-CM

## 2022-05-06 LAB
ABO/RH: NORMAL
ALBUMIN SERPL-MCNC: 4.1 G/DL (ref 3.5–5.2)
ALP BLD-CCNC: 60 U/L (ref 40–129)
ALT SERPL-CCNC: 18 U/L (ref 0–40)
ANION GAP SERPL CALCULATED.3IONS-SCNC: 10 MMOL/L (ref 7–16)
ANTIBODY SCREEN: NORMAL
AST SERPL-CCNC: 17 U/L (ref 0–39)
ATYPICAL LYMPHOCYTE RELATIVE PERCENT: 1 % (ref 0–4)
BACTERIA: ABNORMAL /HPF
BASOPHILS ABSOLUTE: 0 E9/L (ref 0–0.2)
BASOPHILS RELATIVE PERCENT: 0 % (ref 0–2)
BILIRUB SERPL-MCNC: 1.6 MG/DL (ref 0–1.2)
BILIRUBIN URINE: ABNORMAL
BLOOD BANK DISPENSE STATUS: NORMAL
BLOOD BANK PRODUCT CODE: NORMAL
BLOOD, URINE: ABNORMAL
BPU ID: NORMAL
BUN BLDV-MCNC: 13 MG/DL (ref 6–20)
CALCIUM SERPL-MCNC: 9.4 MG/DL (ref 8.6–10.2)
CHLORIDE BLD-SCNC: 100 MMOL/L (ref 98–107)
CLARITY: ABNORMAL
CO2: 26 MMOL/L (ref 22–29)
COLOR: ABNORMAL
CREAT SERPL-MCNC: 1 MG/DL (ref 0.7–1.2)
DESCRIPTION BLOOD BANK: NORMAL
EKG ATRIAL RATE: 68 BPM
EKG P AXIS: 81 DEGREES
EKG P-R INTERVAL: 142 MS
EKG Q-T INTERVAL: 396 MS
EKG QRS DURATION: 90 MS
EKG QTC CALCULATION (BAZETT): 421 MS
EKG R AXIS: 42 DEGREES
EKG T AXIS: 53 DEGREES
EKG VENTRICULAR RATE: 68 BPM
EOSINOPHILS ABSOLUTE: 0 E9/L (ref 0.05–0.5)
EOSINOPHILS RELATIVE PERCENT: 0 % (ref 0–6)
GFR AFRICAN AMERICAN: >60
GFR NON-AFRICAN AMERICAN: >60 ML/MIN/1.73
GLUCOSE BLD-MCNC: 125 MG/DL (ref 74–99)
GLUCOSE URINE: NEGATIVE MG/DL
HCT VFR BLD CALC: 34.2 % (ref 37–54)
HCT VFR BLD CALC: 38 % (ref 37–54)
HEMOGLOBIN: 12 G/DL (ref 12.5–16.5)
HEMOGLOBIN: 13.2 G/DL (ref 12.5–16.5)
INFLUENZA A BY PCR: NOT DETECTED
INFLUENZA B BY PCR: NOT DETECTED
KETONES, URINE: ABNORMAL MG/DL
LACTIC ACID: 1.1 MMOL/L (ref 0.5–2.2)
LEUKOCYTE ESTERASE, URINE: NEGATIVE
LIPASE: 15 U/L (ref 13–60)
LYMPHOCYTES ABSOLUTE: 0.74 E9/L (ref 1.5–4)
LYMPHOCYTES RELATIVE PERCENT: 6 % (ref 20–42)
MCH RBC QN AUTO: 40.6 PG (ref 26–35)
MCH RBC QN AUTO: 41.2 PG (ref 26–35)
MCHC RBC AUTO-ENTMCNC: 34.7 % (ref 32–34.5)
MCHC RBC AUTO-ENTMCNC: 35.1 % (ref 32–34.5)
MCV RBC AUTO: 116.9 FL (ref 80–99.9)
MCV RBC AUTO: 117.5 FL (ref 80–99.9)
MONOCYTES ABSOLUTE: 0.52 E9/L (ref 0.1–0.95)
MONOCYTES RELATIVE PERCENT: 5 % (ref 2–12)
MUCUS: PRESENT /LPF
NEUTROPHILS ABSOLUTE: 9.24 E9/L (ref 1.8–7.3)
NEUTROPHILS RELATIVE PERCENT: 88 % (ref 43–80)
NITRITE, URINE: NEGATIVE
OVALOCYTES: ABNORMAL
PDW BLD-RTO: 11.1 FL (ref 11.5–15)
PDW BLD-RTO: 11.2 FL (ref 11.5–15)
PH UA: 6 (ref 5–9)
PLATELET # BLD: 219 E9/L (ref 130–450)
PLATELET # BLD: 228 E9/L (ref 130–450)
PMV BLD AUTO: 8.9 FL (ref 7–12)
PMV BLD AUTO: 9 FL (ref 7–12)
POIKILOCYTES: ABNORMAL
POTASSIUM REFLEX MAGNESIUM: 3.7 MMOL/L (ref 3.5–5)
PRO-BNP: 689 PG/ML (ref 0–125)
PROTEIN UA: ABNORMAL MG/DL
RBC # BLD: 2.91 E12/L (ref 3.8–5.8)
RBC # BLD: 3.25 E12/L (ref 3.8–5.8)
RBC UA: ABNORMAL /HPF (ref 0–2)
SODIUM BLD-SCNC: 136 MMOL/L (ref 132–146)
SPECIFIC GRAVITY UA: 1.02 (ref 1–1.03)
TOTAL PROTEIN: 7 G/DL (ref 6.4–8.3)
TROPONIN, HIGH SENSITIVITY: 7 NG/L (ref 0–11)
UROBILINOGEN, URINE: 0.2 E.U./DL
WBC # BLD: 10.5 E9/L (ref 4.5–11.5)
WBC # BLD: 9.1 E9/L (ref 4.5–11.5)
WBC UA: ABNORMAL /HPF (ref 0–5)

## 2022-05-06 PROCEDURE — 6360000002 HC RX W HCPCS: Performed by: STUDENT IN AN ORGANIZED HEALTH CARE EDUCATION/TRAINING PROGRAM

## 2022-05-06 PROCEDURE — 6360000002 HC RX W HCPCS

## 2022-05-06 PROCEDURE — 2580000003 HC RX 258: Performed by: INTERNAL MEDICINE

## 2022-05-06 PROCEDURE — 2580000003 HC RX 258: Performed by: STUDENT IN AN ORGANIZED HEALTH CARE EDUCATION/TRAINING PROGRAM

## 2022-05-06 PROCEDURE — 3700000000 HC ANESTHESIA ATTENDED CARE: Performed by: SURGERY

## 2022-05-06 PROCEDURE — 99285 EMERGENCY DEPT VISIT HI MDM: CPT

## 2022-05-06 PROCEDURE — 83880 ASSAY OF NATRIURETIC PEPTIDE: CPT

## 2022-05-06 PROCEDURE — 83605 ASSAY OF LACTIC ACID: CPT

## 2022-05-06 PROCEDURE — 2500000003 HC RX 250 WO HCPCS: Performed by: STUDENT IN AN ORGANIZED HEALTH CARE EDUCATION/TRAINING PROGRAM

## 2022-05-06 PROCEDURE — 74177 CT ABD & PELVIS W/CONTRAST: CPT

## 2022-05-06 PROCEDURE — 85025 COMPLETE CBC W/AUTO DIFF WBC: CPT

## 2022-05-06 PROCEDURE — 86850 RBC ANTIBODY SCREEN: CPT

## 2022-05-06 PROCEDURE — 71045 X-RAY EXAM CHEST 1 VIEW: CPT

## 2022-05-06 PROCEDURE — 7100000000 HC PACU RECOVERY - FIRST 15 MIN: Performed by: SURGERY

## 2022-05-06 PROCEDURE — 6360000002 HC RX W HCPCS: Performed by: EMERGENCY MEDICINE

## 2022-05-06 PROCEDURE — 36415 COLL VENOUS BLD VENIPUNCTURE: CPT

## 2022-05-06 PROCEDURE — 86900 BLOOD TYPING SEROLOGIC ABO: CPT

## 2022-05-06 PROCEDURE — 3600000013 HC SURGERY LEVEL 3 ADDTL 15MIN: Performed by: SURGERY

## 2022-05-06 PROCEDURE — 2580000003 HC RX 258: Performed by: RADIOLOGY

## 2022-05-06 PROCEDURE — 93005 ELECTROCARDIOGRAM TRACING: CPT | Performed by: EMERGENCY MEDICINE

## 2022-05-06 PROCEDURE — 87075 CULTR BACTERIA EXCEPT BLOOD: CPT

## 2022-05-06 PROCEDURE — 87502 INFLUENZA DNA AMP PROBE: CPT

## 2022-05-06 PROCEDURE — 87081 CULTURE SCREEN ONLY: CPT

## 2022-05-06 PROCEDURE — 0DT80ZZ RESECTION OF SMALL INTESTINE, OPEN APPROACH: ICD-10-PCS | Performed by: SURGERY

## 2022-05-06 PROCEDURE — 74018 RADEX ABDOMEN 1 VIEW: CPT

## 2022-05-06 PROCEDURE — 88307 TISSUE EXAM BY PATHOLOGIST: CPT

## 2022-05-06 PROCEDURE — 96376 TX/PRO/DX INJ SAME DRUG ADON: CPT

## 2022-05-06 PROCEDURE — 2709999900 HC NON-CHARGEABLE SUPPLY: Performed by: SURGERY

## 2022-05-06 PROCEDURE — 6360000004 HC RX CONTRAST MEDICATION: Performed by: RADIOLOGY

## 2022-05-06 PROCEDURE — 7100000001 HC PACU RECOVERY - ADDTL 15 MIN: Performed by: SURGERY

## 2022-05-06 PROCEDURE — 6360000002 HC RX W HCPCS: Performed by: INTERNAL MEDICINE

## 2022-05-06 PROCEDURE — 96375 TX/PRO/DX INJ NEW DRUG ADDON: CPT

## 2022-05-06 PROCEDURE — 2720000010 HC SURG SUPPLY STERILE: Performed by: SURGERY

## 2022-05-06 PROCEDURE — C9113 INJ PANTOPRAZOLE SODIUM, VIA: HCPCS | Performed by: INTERNAL MEDICINE

## 2022-05-06 PROCEDURE — 2580000003 HC RX 258

## 2022-05-06 PROCEDURE — 36430 TRANSFUSION BLD/BLD COMPNT: CPT

## 2022-05-06 PROCEDURE — 96374 THER/PROPH/DIAG INJ IV PUSH: CPT

## 2022-05-06 PROCEDURE — 2500000003 HC RX 250 WO HCPCS

## 2022-05-06 PROCEDURE — 85027 COMPLETE CBC AUTOMATED: CPT

## 2022-05-06 PROCEDURE — 3600000003 HC SURGERY LEVEL 3 BASE: Performed by: SURGERY

## 2022-05-06 PROCEDURE — 80053 COMPREHEN METABOLIC PANEL: CPT

## 2022-05-06 PROCEDURE — 86901 BLOOD TYPING SEROLOGIC RH(D): CPT

## 2022-05-06 PROCEDURE — 87205 SMEAR GRAM STAIN: CPT

## 2022-05-06 PROCEDURE — 2500000003 HC RX 250 WO HCPCS: Performed by: EMERGENCY MEDICINE

## 2022-05-06 PROCEDURE — 81001 URINALYSIS AUTO W/SCOPE: CPT

## 2022-05-06 PROCEDURE — 6360000002 HC RX W HCPCS: Performed by: ANESTHESIOLOGY

## 2022-05-06 PROCEDURE — 83690 ASSAY OF LIPASE: CPT

## 2022-05-06 PROCEDURE — P9035 PLATELET PHERES LEUKOREDUCED: HCPCS

## 2022-05-06 PROCEDURE — 84484 ASSAY OF TROPONIN QUANT: CPT

## 2022-05-06 PROCEDURE — 1200000000 HC SEMI PRIVATE

## 2022-05-06 PROCEDURE — 87070 CULTURE OTHR SPECIMN AEROBIC: CPT

## 2022-05-06 PROCEDURE — A4216 STERILE WATER/SALINE, 10 ML: HCPCS | Performed by: INTERNAL MEDICINE

## 2022-05-06 PROCEDURE — 99254 IP/OBS CNSLTJ NEW/EST MOD 60: CPT | Performed by: INTERNAL MEDICINE

## 2022-05-06 PROCEDURE — 3700000001 HC ADD 15 MINUTES (ANESTHESIA): Performed by: SURGERY

## 2022-05-06 PROCEDURE — 2580000003 HC RX 258: Performed by: EMERGENCY MEDICINE

## 2022-05-06 RX ORDER — DIPHENHYDRAMINE HYDROCHLORIDE 50 MG/ML
12.5 INJECTION INTRAMUSCULAR; INTRAVENOUS
Status: DISCONTINUED | OUTPATIENT
Start: 2022-05-06 | End: 2022-05-06 | Stop reason: HOSPADM

## 2022-05-06 RX ORDER — FENTANYL CITRATE 50 UG/ML
INJECTION, SOLUTION INTRAMUSCULAR; INTRAVENOUS PRN
Status: DISCONTINUED | OUTPATIENT
Start: 2022-05-06 | End: 2022-05-06 | Stop reason: SDUPTHER

## 2022-05-06 RX ORDER — SODIUM CHLORIDE 0.9 % (FLUSH) 0.9 %
5-40 SYRINGE (ML) INJECTION PRN
Status: DISCONTINUED | OUTPATIENT
Start: 2022-05-06 | End: 2022-05-06 | Stop reason: SDUPTHER

## 2022-05-06 RX ORDER — ATORVASTATIN CALCIUM 40 MG/1
40 TABLET, FILM COATED ORAL NIGHTLY
Status: DISCONTINUED | OUTPATIENT
Start: 2022-05-06 | End: 2022-05-10 | Stop reason: HOSPADM

## 2022-05-06 RX ORDER — ONDANSETRON 4 MG/1
4 TABLET, ORALLY DISINTEGRATING ORAL EVERY 8 HOURS PRN
Status: DISCONTINUED | OUTPATIENT
Start: 2022-05-06 | End: 2022-05-10 | Stop reason: HOSPADM

## 2022-05-06 RX ORDER — GABAPENTIN 300 MG/1
600 CAPSULE ORAL NIGHTLY
Status: DISCONTINUED | OUTPATIENT
Start: 2022-05-06 | End: 2022-05-10 | Stop reason: HOSPADM

## 2022-05-06 RX ORDER — SODIUM CHLORIDE 0.9 % (FLUSH) 0.9 %
5-40 SYRINGE (ML) INJECTION PRN
Status: DISCONTINUED | OUTPATIENT
Start: 2022-05-06 | End: 2022-05-06 | Stop reason: HOSPADM

## 2022-05-06 RX ORDER — SODIUM CHLORIDE 9 MG/ML
INJECTION, SOLUTION INTRAVENOUS PRN
Status: DISCONTINUED | OUTPATIENT
Start: 2022-05-06 | End: 2022-05-10 | Stop reason: HOSPADM

## 2022-05-06 RX ORDER — HYDROXYUREA 500 MG/1
500 CAPSULE ORAL EVERY OTHER DAY
Status: DISCONTINUED | OUTPATIENT
Start: 2022-05-07 | End: 2022-05-10 | Stop reason: HOSPADM

## 2022-05-06 RX ORDER — IPRATROPIUM BROMIDE AND ALBUTEROL SULFATE 2.5; .5 MG/3ML; MG/3ML
1 SOLUTION RESPIRATORY (INHALATION)
Status: DISCONTINUED | OUTPATIENT
Start: 2022-05-06 | End: 2022-05-06 | Stop reason: HOSPADM

## 2022-05-06 RX ORDER — SODIUM CHLORIDE 0.9 % (FLUSH) 0.9 %
10 SYRINGE (ML) INJECTION EVERY 12 HOURS SCHEDULED
Status: DISCONTINUED | OUTPATIENT
Start: 2022-05-06 | End: 2022-05-10 | Stop reason: HOSPADM

## 2022-05-06 RX ORDER — SODIUM CHLORIDE 0.9 % (FLUSH) 0.9 %
10 SYRINGE (ML) INJECTION PRN
Status: DISCONTINUED | OUTPATIENT
Start: 2022-05-06 | End: 2022-05-10 | Stop reason: HOSPADM

## 2022-05-06 RX ORDER — LIDOCAINE HYDROCHLORIDE 20 MG/ML
INJECTION, SOLUTION EPIDURAL; INFILTRATION; INTRACAUDAL; PERINEURAL PRN
Status: DISCONTINUED | OUTPATIENT
Start: 2022-05-06 | End: 2022-05-06 | Stop reason: SDUPTHER

## 2022-05-06 RX ORDER — LEVETIRACETAM 15 MG/ML
1500 INJECTION INTRAVASCULAR EVERY 12 HOURS
Status: DISCONTINUED | OUTPATIENT
Start: 2022-05-06 | End: 2022-05-06 | Stop reason: SDUPTHER

## 2022-05-06 RX ORDER — ESCITALOPRAM OXALATE 10 MG/1
40 TABLET ORAL DAILY
Status: DISCONTINUED | OUTPATIENT
Start: 2022-05-07 | End: 2022-05-10 | Stop reason: HOSPADM

## 2022-05-06 RX ORDER — SODIUM CHLORIDE 0.9 % (FLUSH) 0.9 %
10 SYRINGE (ML) INJECTION PRN
Status: COMPLETED | OUTPATIENT
Start: 2022-05-06 | End: 2022-05-06

## 2022-05-06 RX ORDER — MIDAZOLAM HYDROCHLORIDE 1 MG/ML
INJECTION INTRAMUSCULAR; INTRAVENOUS PRN
Status: DISCONTINUED | OUTPATIENT
Start: 2022-05-06 | End: 2022-05-06 | Stop reason: SDUPTHER

## 2022-05-06 RX ORDER — OXYCODONE HYDROCHLORIDE 5 MG/1
5 TABLET ORAL EVERY 4 HOURS PRN
Status: DISCONTINUED | OUTPATIENT
Start: 2022-05-06 | End: 2022-05-10 | Stop reason: HOSPADM

## 2022-05-06 RX ORDER — CLOPIDOGREL BISULFATE 75 MG/1
75 TABLET ORAL DAILY
Status: DISCONTINUED | OUTPATIENT
Start: 2022-05-06 | End: 2022-05-10 | Stop reason: HOSPADM

## 2022-05-06 RX ORDER — DEXAMETHASONE SODIUM PHOSPHATE 4 MG/ML
INJECTION, SOLUTION INTRA-ARTICULAR; INTRALESIONAL; INTRAMUSCULAR; INTRAVENOUS; SOFT TISSUE PRN
Status: DISCONTINUED | OUTPATIENT
Start: 2022-05-06 | End: 2022-05-06 | Stop reason: SDUPTHER

## 2022-05-06 RX ORDER — ENOXAPARIN SODIUM 100 MG/ML
40 INJECTION SUBCUTANEOUS DAILY
Status: DISCONTINUED | OUTPATIENT
Start: 2022-05-07 | End: 2022-05-10 | Stop reason: HOSPADM

## 2022-05-06 RX ORDER — OXYCODONE HYDROCHLORIDE 5 MG/1
10 TABLET ORAL EVERY 4 HOURS PRN
Status: DISCONTINUED | OUTPATIENT
Start: 2022-05-06 | End: 2022-05-10 | Stop reason: HOSPADM

## 2022-05-06 RX ORDER — ONDANSETRON 2 MG/ML
4 INJECTION INTRAMUSCULAR; INTRAVENOUS EVERY 6 HOURS PRN
Status: DISCONTINUED | OUTPATIENT
Start: 2022-05-06 | End: 2022-05-07 | Stop reason: SDUPTHER

## 2022-05-06 RX ORDER — MORPHINE SULFATE 4 MG/ML
4 INJECTION, SOLUTION INTRAMUSCULAR; INTRAVENOUS ONCE
Status: COMPLETED | OUTPATIENT
Start: 2022-05-06 | End: 2022-05-06

## 2022-05-06 RX ORDER — MIRTAZAPINE 15 MG/1
45 TABLET, FILM COATED ORAL NIGHTLY
Status: DISCONTINUED | OUTPATIENT
Start: 2022-05-06 | End: 2022-05-10 | Stop reason: HOSPADM

## 2022-05-06 RX ORDER — ONDANSETRON 2 MG/ML
INJECTION INTRAMUSCULAR; INTRAVENOUS PRN
Status: DISCONTINUED | OUTPATIENT
Start: 2022-05-06 | End: 2022-05-06 | Stop reason: SDUPTHER

## 2022-05-06 RX ORDER — SODIUM CHLORIDE 9 MG/ML
INJECTION, SOLUTION INTRAVENOUS CONTINUOUS PRN
Status: DISCONTINUED | OUTPATIENT
Start: 2022-05-06 | End: 2022-05-06 | Stop reason: SDUPTHER

## 2022-05-06 RX ORDER — PROPOFOL 10 MG/ML
INJECTION, EMULSION INTRAVENOUS PRN
Status: DISCONTINUED | OUTPATIENT
Start: 2022-05-06 | End: 2022-05-06 | Stop reason: SDUPTHER

## 2022-05-06 RX ORDER — ROCURONIUM BROMIDE 10 MG/ML
INJECTION, SOLUTION INTRAVENOUS PRN
Status: DISCONTINUED | OUTPATIENT
Start: 2022-05-06 | End: 2022-05-06 | Stop reason: SDUPTHER

## 2022-05-06 RX ORDER — HYDRALAZINE HYDROCHLORIDE 20 MG/ML
10 INJECTION INTRAMUSCULAR; INTRAVENOUS
Status: DISCONTINUED | OUTPATIENT
Start: 2022-05-06 | End: 2022-05-06 | Stop reason: HOSPADM

## 2022-05-06 RX ORDER — TRAZODONE HYDROCHLORIDE 150 MG/1
150 TABLET ORAL NIGHTLY
Status: DISCONTINUED | OUTPATIENT
Start: 2022-05-06 | End: 2022-05-10 | Stop reason: HOSPADM

## 2022-05-06 RX ORDER — GABAPENTIN 300 MG/1
300 CAPSULE ORAL
Status: DISCONTINUED | OUTPATIENT
Start: 2022-05-07 | End: 2022-05-10 | Stop reason: HOSPADM

## 2022-05-06 RX ORDER — SODIUM CHLORIDE, SODIUM LACTATE, POTASSIUM CHLORIDE, CALCIUM CHLORIDE 600; 310; 30; 20 MG/100ML; MG/100ML; MG/100ML; MG/100ML
INJECTION, SOLUTION INTRAVENOUS CONTINUOUS
Status: DISCONTINUED | OUTPATIENT
Start: 2022-05-06 | End: 2022-05-06

## 2022-05-06 RX ORDER — 0.9 % SODIUM CHLORIDE 0.9 %
1000 INTRAVENOUS SOLUTION INTRAVENOUS ONCE
Status: COMPLETED | OUTPATIENT
Start: 2022-05-06 | End: 2022-05-06

## 2022-05-06 RX ORDER — METRONIDAZOLE 500 MG/100ML
500 INJECTION, SOLUTION INTRAVENOUS EVERY 8 HOURS
Status: DISCONTINUED | OUTPATIENT
Start: 2022-05-06 | End: 2022-05-10 | Stop reason: HOSPADM

## 2022-05-06 RX ORDER — ALLOPURINOL 100 MG/1
100 TABLET ORAL DAILY
Status: DISCONTINUED | OUTPATIENT
Start: 2022-05-06 | End: 2022-05-10 | Stop reason: HOSPADM

## 2022-05-06 RX ORDER — SODIUM CHLORIDE 0.9 % (FLUSH) 0.9 %
5-40 SYRINGE (ML) INJECTION EVERY 12 HOURS SCHEDULED
Status: DISCONTINUED | OUTPATIENT
Start: 2022-05-06 | End: 2022-05-06 | Stop reason: HOSPADM

## 2022-05-06 RX ORDER — ONDANSETRON 2 MG/ML
4 INJECTION INTRAMUSCULAR; INTRAVENOUS ONCE
Status: COMPLETED | OUTPATIENT
Start: 2022-05-06 | End: 2022-05-06

## 2022-05-06 RX ORDER — SODIUM CHLORIDE 9 MG/ML
INJECTION, SOLUTION INTRAVENOUS PRN
Status: DISCONTINUED | OUTPATIENT
Start: 2022-05-06 | End: 2022-05-06 | Stop reason: HOSPADM

## 2022-05-06 RX ORDER — MEPERIDINE HYDROCHLORIDE 25 MG/ML
12.5 INJECTION INTRAMUSCULAR; INTRAVENOUS; SUBCUTANEOUS EVERY 5 MIN PRN
Status: DISCONTINUED | OUTPATIENT
Start: 2022-05-06 | End: 2022-05-06 | Stop reason: HOSPADM

## 2022-05-06 RX ORDER — HALOPERIDOL 5 MG/ML
1 INJECTION INTRAMUSCULAR
Status: DISCONTINUED | OUTPATIENT
Start: 2022-05-06 | End: 2022-05-06 | Stop reason: HOSPADM

## 2022-05-06 RX ORDER — SODIUM CHLORIDE 0.9 % (FLUSH) 0.9 %
5-40 SYRINGE (ML) INJECTION EVERY 12 HOURS SCHEDULED
Status: DISCONTINUED | OUTPATIENT
Start: 2022-05-06 | End: 2022-05-06 | Stop reason: SDUPTHER

## 2022-05-06 RX ORDER — ONDANSETRON 4 MG/1
4 TABLET, ORALLY DISINTEGRATING ORAL EVERY 8 HOURS PRN
Status: DISCONTINUED | OUTPATIENT
Start: 2022-05-06 | End: 2022-05-07 | Stop reason: SDUPTHER

## 2022-05-06 RX ORDER — METOPROLOL SUCCINATE 25 MG/1
25 TABLET, EXTENDED RELEASE ORAL DAILY
Status: DISCONTINUED | OUTPATIENT
Start: 2022-05-07 | End: 2022-05-10 | Stop reason: HOSPADM

## 2022-05-06 RX ORDER — HYDROXYUREA 500 MG/1
500 CAPSULE ORAL 2 TIMES DAILY
Status: DISCONTINUED | OUTPATIENT
Start: 2022-05-06 | End: 2022-05-06

## 2022-05-06 RX ORDER — SODIUM CHLORIDE, SODIUM LACTATE, POTASSIUM CHLORIDE, CALCIUM CHLORIDE 600; 310; 30; 20 MG/100ML; MG/100ML; MG/100ML; MG/100ML
INJECTION, SOLUTION INTRAVENOUS CONTINUOUS
Status: DISCONTINUED | OUTPATIENT
Start: 2022-05-06 | End: 2022-05-10

## 2022-05-06 RX ORDER — SUCCINYLCHOLINE/SOD CL,ISO/PF 200MG/10ML
SYRINGE (ML) INTRAVENOUS PRN
Status: DISCONTINUED | OUTPATIENT
Start: 2022-05-06 | End: 2022-05-06 | Stop reason: SDUPTHER

## 2022-05-06 RX ORDER — ONDANSETRON 2 MG/ML
4 INJECTION INTRAMUSCULAR; INTRAVENOUS EVERY 6 HOURS PRN
Status: DISCONTINUED | OUTPATIENT
Start: 2022-05-06 | End: 2022-05-10 | Stop reason: HOSPADM

## 2022-05-06 RX ORDER — LABETALOL HYDROCHLORIDE 5 MG/ML
10 INJECTION, SOLUTION INTRAVENOUS
Status: DISCONTINUED | OUTPATIENT
Start: 2022-05-06 | End: 2022-05-06 | Stop reason: HOSPADM

## 2022-05-06 RX ORDER — LEVETIRACETAM 500 MG/1
1500 TABLET ORAL 2 TIMES DAILY
Status: DISCONTINUED | OUTPATIENT
Start: 2022-05-06 | End: 2022-05-10 | Stop reason: HOSPADM

## 2022-05-06 RX ORDER — SODIUM CHLORIDE 9 MG/ML
INJECTION, SOLUTION INTRAVENOUS PRN
Status: DISCONTINUED | OUTPATIENT
Start: 2022-05-06 | End: 2022-05-06 | Stop reason: SDUPTHER

## 2022-05-06 RX ORDER — ACETAMINOPHEN 325 MG/1
650 TABLET ORAL EVERY 6 HOURS
Status: DISCONTINUED | OUTPATIENT
Start: 2022-05-06 | End: 2022-05-10 | Stop reason: HOSPADM

## 2022-05-06 RX ORDER — LORAZEPAM 2 MG/ML
0.5 INJECTION INTRAMUSCULAR
Status: DISCONTINUED | OUTPATIENT
Start: 2022-05-06 | End: 2022-05-06 | Stop reason: HOSPADM

## 2022-05-06 RX ORDER — GABAPENTIN 600 MG/1
600 TABLET ORAL NIGHTLY
Status: DISCONTINUED | OUTPATIENT
Start: 2022-05-06 | End: 2022-05-06 | Stop reason: SDUPTHER

## 2022-05-06 RX ORDER — TADALAFIL 5 MG/1
5 TABLET ORAL DAILY
Status: DISCONTINUED | OUTPATIENT
Start: 2022-05-06 | End: 2022-05-10 | Stop reason: HOSPADM

## 2022-05-06 RX ADMIN — IOPAMIDOL 75 ML: 755 INJECTION, SOLUTION INTRAVENOUS at 08:54

## 2022-05-06 RX ADMIN — MORPHINE SULFATE 4 MG: 4 INJECTION, SOLUTION INTRAMUSCULAR; INTRAVENOUS at 10:16

## 2022-05-06 RX ADMIN — MORPHINE SULFATE 4 MG: 4 INJECTION, SOLUTION INTRAMUSCULAR; INTRAVENOUS at 08:08

## 2022-05-06 RX ADMIN — PIPERACILLIN AND TAZOBACTAM 3375 MG: 3; .375 INJECTION, POWDER, LYOPHILIZED, FOR SOLUTION INTRAVENOUS at 12:16

## 2022-05-06 RX ADMIN — HYDROMORPHONE HYDROCHLORIDE 0.25 MG: 1 INJECTION, SOLUTION INTRAMUSCULAR; INTRAVENOUS; SUBCUTANEOUS at 14:08

## 2022-05-06 RX ADMIN — HYDROMORPHONE HYDROCHLORIDE 0.5 MG: 1 INJECTION, SOLUTION INTRAMUSCULAR; INTRAVENOUS; SUBCUTANEOUS at 13:50

## 2022-05-06 RX ADMIN — HYDROMORPHONE HYDROCHLORIDE 0.5 MG: 1 INJECTION, SOLUTION INTRAMUSCULAR; INTRAVENOUS; SUBCUTANEOUS at 16:53

## 2022-05-06 RX ADMIN — METRONIDAZOLE 500 MG: 500 INJECTION, SOLUTION INTRAVENOUS at 10:26

## 2022-05-06 RX ADMIN — LIDOCAINE HYDROCHLORIDE 100 MG: 20 INJECTION, SOLUTION EPIDURAL; INFILTRATION; INTRACAUDAL; PERINEURAL at 12:11

## 2022-05-06 RX ADMIN — ONDANSETRON 4 MG: 2 INJECTION INTRAMUSCULAR; INTRAVENOUS at 08:07

## 2022-05-06 RX ADMIN — PIPERACILLIN AND TAZOBACTAM 3375 MG: 3; .375 INJECTION, POWDER, LYOPHILIZED, FOR SOLUTION INTRAVENOUS at 20:11

## 2022-05-06 RX ADMIN — CEFTRIAXONE 2000 MG: 2 INJECTION, POWDER, FOR SOLUTION INTRAMUSCULAR; INTRAVENOUS at 10:20

## 2022-05-06 RX ADMIN — MIDAZOLAM 2 MG: 1 INJECTION INTRAMUSCULAR; INTRAVENOUS at 12:04

## 2022-05-06 RX ADMIN — METRONIDAZOLE 500 MG: 500 INJECTION, SOLUTION INTRAVENOUS at 18:11

## 2022-05-06 RX ADMIN — SODIUM CHLORIDE 1000 ML: 9 INJECTION, SOLUTION INTRAVENOUS at 08:06

## 2022-05-06 RX ADMIN — SODIUM CHLORIDE, POTASSIUM CHLORIDE, SODIUM LACTATE AND CALCIUM CHLORIDE: 600; 310; 30; 20 INJECTION, SOLUTION INTRAVENOUS at 16:49

## 2022-05-06 RX ADMIN — LEVETIRACETAM 1500 MG: 100 INJECTION, SOLUTION INTRAVENOUS at 19:33

## 2022-05-06 RX ADMIN — FENTANYL CITRATE 50 MCG: 50 INJECTION, SOLUTION INTRAMUSCULAR; INTRAVENOUS at 12:40

## 2022-05-06 RX ADMIN — HYDROMORPHONE HYDROCHLORIDE 0.5 MG: 1 INJECTION, SOLUTION INTRAMUSCULAR; INTRAVENOUS; SUBCUTANEOUS at 23:05

## 2022-05-06 RX ADMIN — FENTANYL CITRATE 100 MCG: 50 INJECTION, SOLUTION INTRAMUSCULAR; INTRAVENOUS at 12:11

## 2022-05-06 RX ADMIN — PROPOFOL 200 MG: 10 INJECTION, EMULSION INTRAVENOUS at 12:11

## 2022-05-06 RX ADMIN — SODIUM CHLORIDE, PRESERVATIVE FREE 10 ML: 5 INJECTION INTRAVENOUS at 19:55

## 2022-05-06 RX ADMIN — HYDROMORPHONE HYDROCHLORIDE 0.5 MG: 1 INJECTION, SOLUTION INTRAMUSCULAR; INTRAVENOUS; SUBCUTANEOUS at 19:55

## 2022-05-06 RX ADMIN — ROCURONIUM BROMIDE 40 MG: 50 INJECTION, SOLUTION INTRAVENOUS at 12:24

## 2022-05-06 RX ADMIN — SODIUM CHLORIDE: 9 INJECTION, SOLUTION INTRAVENOUS at 12:04

## 2022-05-06 RX ADMIN — ROCURONIUM BROMIDE 10 MG: 50 INJECTION, SOLUTION INTRAVENOUS at 12:11

## 2022-05-06 RX ADMIN — HYDROMORPHONE HYDROCHLORIDE 0.5 MG: 1 INJECTION, SOLUTION INTRAMUSCULAR; INTRAVENOUS; SUBCUTANEOUS at 13:37

## 2022-05-06 RX ADMIN — Medication 200 MG: at 12:11

## 2022-05-06 RX ADMIN — HYDROMORPHONE HYDROCHLORIDE 0.25 MG: 1 INJECTION, SOLUTION INTRAMUSCULAR; INTRAVENOUS; SUBCUTANEOUS at 14:00

## 2022-05-06 RX ADMIN — DEXAMETHASONE SODIUM PHOSPHATE 10 MG: 4 INJECTION, SOLUTION INTRAMUSCULAR; INTRAVENOUS at 12:27

## 2022-05-06 RX ADMIN — SUGAMMADEX 200 MG: 100 INJECTION, SOLUTION INTRAVENOUS at 13:11

## 2022-05-06 RX ADMIN — SODIUM CHLORIDE 40 MG: 9 INJECTION INTRAMUSCULAR; INTRAVENOUS; SUBCUTANEOUS at 19:40

## 2022-05-06 RX ADMIN — SODIUM CHLORIDE, PRESERVATIVE FREE 10 ML: 5 INJECTION INTRAVENOUS at 08:52

## 2022-05-06 RX ADMIN — ONDANSETRON 4 MG: 2 INJECTION INTRAMUSCULAR; INTRAVENOUS at 13:09

## 2022-05-06 RX ADMIN — PIPERACILLIN AND TAZOBACTAM 3375 MG: 3; .375 INJECTION, POWDER, LYOPHILIZED, FOR SOLUTION INTRAVENOUS at 12:04

## 2022-05-06 ASSESSMENT — ENCOUNTER SYMPTOMS
WHEEZING: 0
ABDOMINAL PAIN: 1
SHORTNESS OF BREATH: 0
DIARRHEA: 0
EYE PAIN: 0
EYE REDNESS: 0
BACK PAIN: 0
VOMITING: 1
SINUS PAIN: 0
SORE THROAT: 0
NAUSEA: 1
COUGH: 0

## 2022-05-06 ASSESSMENT — PULMONARY FUNCTION TESTS
PIF_VALUE: 4
PIF_VALUE: 20
PIF_VALUE: 18
PIF_VALUE: 13
PIF_VALUE: 13
PIF_VALUE: 17
PIF_VALUE: 17
PIF_VALUE: 20
PIF_VALUE: 20
PIF_VALUE: 1
PIF_VALUE: 13
PIF_VALUE: 19
PIF_VALUE: 15
PIF_VALUE: 18
PIF_VALUE: 1
PIF_VALUE: 19
PIF_VALUE: 18
PIF_VALUE: 18
PIF_VALUE: 17
PIF_VALUE: 18
PIF_VALUE: 14
PIF_VALUE: 14
PIF_VALUE: 13
PIF_VALUE: 17
PIF_VALUE: 2
PIF_VALUE: 16
PIF_VALUE: 16
PIF_VALUE: 20
PIF_VALUE: 35
PIF_VALUE: 16
PIF_VALUE: 20
PIF_VALUE: 2
PIF_VALUE: 17
PIF_VALUE: 16
PIF_VALUE: 20
PIF_VALUE: 17
PIF_VALUE: 18
PIF_VALUE: 15
PIF_VALUE: 3
PIF_VALUE: 19
PIF_VALUE: 19
PIF_VALUE: 15
PIF_VALUE: 2
PIF_VALUE: 18
PIF_VALUE: 20
PIF_VALUE: 18
PIF_VALUE: 16
PIF_VALUE: 14
PIF_VALUE: 20
PIF_VALUE: 20
PIF_VALUE: 3
PIF_VALUE: 2
PIF_VALUE: 19
PIF_VALUE: 17
PIF_VALUE: 5
PIF_VALUE: 3
PIF_VALUE: 19
PIF_VALUE: 19
PIF_VALUE: 18
PIF_VALUE: 19
PIF_VALUE: 15
PIF_VALUE: 17
PIF_VALUE: 19
PIF_VALUE: 2
PIF_VALUE: 13
PIF_VALUE: 16
PIF_VALUE: 3
PIF_VALUE: 18
PIF_VALUE: 13

## 2022-05-06 ASSESSMENT — PAIN SCALES - GENERAL
PAINLEVEL_OUTOF10: 7
PAINLEVEL_OUTOF10: 8
PAINLEVEL_OUTOF10: 9
PAINLEVEL_OUTOF10: 8
PAINLEVEL_OUTOF10: 5
PAINLEVEL_OUTOF10: 9
PAINLEVEL_OUTOF10: 9

## 2022-05-06 ASSESSMENT — PAIN - FUNCTIONAL ASSESSMENT
PAIN_FUNCTIONAL_ASSESSMENT: 0-10
PAIN_FUNCTIONAL_ASSESSMENT: ACTIVITIES ARE NOT PREVENTED

## 2022-05-06 ASSESSMENT — PAIN DESCRIPTION - DESCRIPTORS
DESCRIPTORS: CRAMPING
DESCRIPTORS: SHARP;SORE;SHOOTING
DESCRIPTORS: CRAMPING
DESCRIPTORS: CRAMPING

## 2022-05-06 ASSESSMENT — LIFESTYLE VARIABLES: SMOKING_STATUS: 1

## 2022-05-06 ASSESSMENT — PAIN DESCRIPTION - LOCATION
LOCATION: ABDOMEN

## 2022-05-06 ASSESSMENT — PAIN DESCRIPTION - ORIENTATION: ORIENTATION: MID

## 2022-05-06 NOTE — BRIEF OP NOTE
Brief Postoperative Note      Patient: Guille Mesa  YOB: 1970  MRN: [id number]    Date of Procedure: 5/6/2022    Pre-Op Diagnosis: small bowel perforation, metallic foreign body     Post-Op Diagnosis: Same, fat creeping (possible chron's), interloop abscess, generalized peritonitis       Procedure(s):  EXPLORATORY LAPAROTOMY, SMALL BOWEL RESECTION    Surgeon(s):  Elias Betancur MD    Assistant:  Resident: Renata Adame DO    Anesthesia: General    Estimated Blood Loss (mL): less than 50     Complications: None    Specimens:   ID Type Source Tests Collected by Time Destination   1 : PERITONEAL FLUID Specimen Abdomen CULTURE, ANAEROBIC, CULTURE, FUNGUS, GRAM STAIN, CULTURE, SURGICAL, CULTURE WITH SMEAR, ACID FAST Niki Lam MD 5/6/2022 1245    A : SMALL BOWEL Specimen Abdomen SURGICAL PATHOLOGY Elias Betancur MD 5/6/2022 1246        Implants:  * No implants in log *      Drains:   Urinary Catheter Sr (Active)       Findings: large amount of purulent peritonitis, small piece of metal in specimen likely cause of perforation   Dictated [id number]    Electronically signed by Renata Adame DO on 5/6/2022 at 1:28 PM

## 2022-05-06 NOTE — PROGRESS NOTES
Type and screen drawn and sent at 1150. Blood bank called and said after type and screen is done that it would be another 1-2 hours [facility] resident updated. CRNA updated.

## 2022-05-06 NOTE — CONSULTS
[address] [facility]      Reason for Consult:  Medical management    History of Present Illness: This is a 44-year-old male with past medical history of hyperlipidemia, irritable bowel syndrome, seizures, atrial flutter, hypertension, and gout. Patient presents with symptoms of abdominal pain, nausea, vomiting. He states that he is also having groin pain. CT abdomen and pelvis was done. Imaging study suspicious for perforation due to an ingested foreign body. Patient states that he was grilling on Wednesday and one of the pieces of the brush was digested. Informant(s) for H&P: Patient, EMR    REVIEW OF SYSTEMS:  Complete ROS performed with patient, pertinent positives and negatives are listed in the HPI. PMH:  Past Medical History:   Diagnosis Date    Anxiety and depression     Atheroembolism of left lower extremity ([facility] [address] 09/11/2014    Atrial flutter (HCC)     Coronary atherosclerosis due to calcified coronary lesion(414.4)     ED (erectile dysfunction)     Epilepsy ([facility] [address]     Hyperlipidemia     Hypertension     IBS (irritable bowel syndrome)     Ischemic toe 09/11/2014    MI, old     Thrombocytosis     Tinea     Tobacco dependence 09/16/2014       Surgical History:  Past Surgical History:   Procedure Laterality Date    COLONOSCOPY  08/2014    CORONARY ANGIOPLASTY WITH STENT PLACEMENT      KNEE SURGERY Left     Skin graft    UPPER GASTROINTESTINAL ENDOSCOPY  08/2014       Medications Prior to Admission:    Prior to Admission medications    Medication Sig Start Date End Date Taking? Authorizing Provider   gabapentin (NEURONTIN) 600 MG tablet Take 600 mg by mouth at bedtime. Only take on wednesday 5/2/22   Historical Provider, MD   gabapentin (NEURONTIN) 300 MG capsule Take 300 mg by mouth 3 times daily.  Breakfast lunch dinner 3/30/22   Historical Provider, MD   mirtazapine (REMERON) 45 MG tablet Take 45 mg by mouth at bedtime 4/27/22   Historical Provider, MD   hydrOXYzine (ATARAX) 50 MG tablet Take 50 mg by mouth in the morning and at bedtime  2/1/22   Historical Provider, MD   tadalafil (CIALIS) 5 MG tablet Take 5 mg by mouth daily  2/14/22   Historical Provider, MD   vitamin D (ERGOCALCIFEROL) 1.25 MG (23941 UT) CAPS capsule take 1 capsule by mouth every week 2/23/22   Nalini Frame, MD   clopidogrel (PLAVIX) 75 MG tablet take 1 tablet by mouth once daily 2/23/22   Nalini Frame, MD   metoprolol succinate (TOPROL XL) 25 MG extended release tablet take 1 tablet by mouth once daily 2/23/22   Nalini Frame, MD   levETIRAcetam (KEPPRA) 500 MG tablet Take 3 tablets by mouth 2 times daily 2/23/22   Nalini Frame, MD   atorvastatin (LIPITOR) 40 MG tablet Take 1 tablet by mouth nightly 2/23/22   Nalini Frame, MD   Cobalamin Combinations (O-13 + FOLIC ACID) 6349-749 MCG TBDP Take 1 tablet by mouth daily 1/19/22   Nalini Frame, MD   azelastine (ASTELIN) 0.1 % nasal spray 1 spray by Nasal route 2 times daily Use in each nostril as directed 1/5/22   Nalini Frame, MD   ondansetron (ZOFRAN) 4 MG tablet Take 1 tablet by mouth 3 times daily as needed for Nausea or Vomiting 1/5/22   Naliin Frame, MD   allopurinol (ZYLOPRIM) 100 MG tablet Take 1 tablet by mouth daily 10/11/21   Nalini Frame, MD   traZODone (DESYREL) 100 MG tablet Take 1.5 tablets by mouth nightly  Patient taking differently: Take 200 mg by mouth nightly  7/28/21   Nalini Frame, MD   lansoprazole (PREVACID) 30 MG delayed release capsule take 1 capsule by mouth every morning 4/19/21   Historical Provider, MD   escitalopram (LEXAPRO) 20 MG tablet take 2 tablets by mouth once daily 3/29/21   Mora Connell MD   hydroxyurea (HYDREA) 500 MG chemo capsule Take 1 capsule by mouth 2 times daily  Patient taking differently: Take 500 mg by mouth 2 times daily 1 tablet by mouth every other day alternating with 1 tablet BID every other day 9/14/16   Otoniel Espinoza MD       Allergies:    Acyclovir and related and Doxycycline    Social History:    reports that he has been smoking cigarettes. He has a 10.00 pack-year smoking history. His smokeless tobacco use includes chew. He reports previous drug use. Drug: Marijuana [place]). He reports that he does not drink alcohol.     Family History:       Problem Relation Age of Onset    Coronary Art Dis Mother     COPD Mother     Seizures Father     Alcohol Abuse Father     Liver Disease Father     Diabetes Brother     Cancer Brother 64        leukemia    Diabetes Brother     Heart Disease Maternal Uncle     Heart Disease Maternal Uncle          PHYSICAL EXAM:  Vitals:  /71   Pulse 90   Temp 98.4 °F (36.9 °C) (Oral)   Resp 16   Ht 6' 1\" (1.854 m)   Wt 170 lb (77.1 kg)   SpO2 93%   BMI 22.43 kg/m²   General Appearance: alert and oriented to person, place and time, well developed and well- nourished, in no acute distress  Skin: warm and dry, no rash or erythema  Head: normocephalic and atraumatic  Eyes: pupils equal, round, and reactive to light, extraocular eye movements intact, conjunctivae normal  ENT: tympanic membrane, external ear and ear canal normal bilaterally, nose without deformity, nasal mucosa and turbinates normal without polyps  Neck: supple and non-tender without mass, no thyromegaly or thyroid nodules, no cervical lymphadenopathy  Pulmonary/Chest: clear to auscultation bilaterally- no wheezes, rales or rhonchi, normal air movement, no respiratory distress  Cardiovascular: normal rate, regular rhythm, normal S1 and S2, no murmurs, rubs, clicks, or gallops, distal pulses intact, no carotid bruits  Abdomen: soft, non-tender, non-distended, normal bowel sounds, no masses or organomegaly  Extremities: no cyanosis, clubbing or edema  Musculoskeletal: normal range of motion, no joint swelling, deformity or tenderness  Neurologic: reflexes normal and symmetric, no cranial nerve deficit, gait, coordination and speech normal      LABS:  Recent Labs     05/06/22  0755      K 3.7      CO2 26   BUN 13   CREATININE 1.0   GLUCOSE 125*   CALCIUM 9.4       Recent Labs     05/06/22  0755   WBC 10.5   RBC 3.25*   HGB 13.2   HCT 38.0   .9*   MCH 40.6*   MCHC 34.7*   RDW 11.1*      MPV 8.9       No results for input(s): POCGLU in the last 72 hours. Radiology:   XR ABDOMEN FOR NG/OG/NE TUBE PLACEMENT   Final Result   Enteric tube tip in the fundus of the stomach. CT ABDOMEN PELVIS W IV CONTRAST Additional Contrast? None   Final Result   1. Findings suspicious for focal ileal perforation as result of an ingested   foreign body, as described above. Necrotizing enteritis with small intestine   perforation, which can be seen in Crohn's disease, is also in the   differential diagnosis, but less likely. Surgical consultation is   recommended. 2.  Trace amount of free fluid in the pelvis. The findings were discussed with Dr. Lázaro Shaw at 9:48 a.m.         XR CHEST PORTABLE   Final Result   No acute process. EKG:     Normal sinus rhythm, rate 68, normal axis/intervals, no ST or T wave changes noted    ASSESSMENT:      Principal Problem: Bowel perforation ([address]  Active Problems:    Small bowel perforation (HCC)  Resolved Problems:    * No resolved hospital problems. *      PLAN:    1. Perforated small bowel 2/2 metal foreign body S/P ex lap with small bowel resection - Patient is NPO with meds, zosyn, as well as IVF  2. HTN - Continue toprol  3. Gout - Continue allopurinol  4. ED - Continue Cialis  5. H/O Seizure -continue Keppra IV  6. Irritable bowel syndrome  7. Atrial flutter  8. CAD -continue Toprol, Plavix, Lipitor  9. DVT prophylaxis -as per general surgery      Thank you very much for this interesting consult and we will continue to follow along. Feel free to call with any questions at [phone].       Electronically signed by Camila Basilio DO on 5/6/2022 at 5:59 PM    NOTE: This report was transcribed using voice recognition software.  Every effort was made to ensure accuracy; however, inadvertent computerized transcription errors may be present.

## 2022-05-06 NOTE — ED PROVIDER NOTES
Chief Complaint   Patient presents with    Abdominal Pain     since Wednesday    Nausea    Emesis       52-year-old gentleman with past medical history of hyperlipidemia, IBS, seizures, atrial flutter and acid reflux presenting today with abdominal pain, nausea, vomiting, groin pain for the past 2 days. He has tried Tylenol to make it better to no avail, he had never experienced this before, he said his symptoms worse whenever he stands up. Not associated with hematemesis or melena, the groin pain has been present about this duration as well and he has never experienced that before either. He does not have any history of STIs, no history of testicular cancer, denies scrotal swelling. No rashes noted. No numbness, weakness, tingling. He says that the pain is not worse in his abdomen after he eats, it is just mainly when he stands up. He does admit to smoking marijuana and tobacco regularly until Monday. Denies history of alcohol use. Review of Systems   Constitutional: Negative for chills and fever. HENT: Negative for ear pain, sinus pain and sore throat. Eyes: Negative for pain and redness. Respiratory: Negative for cough, shortness of breath and wheezing. Cardiovascular: Negative for chest pain. Gastrointestinal: Positive for abdominal pain, nausea and vomiting. Negative for diarrhea. Genitourinary: Negative for dysuria and flank pain. Groin pain   Musculoskeletal: Negative for back pain and neck pain. Skin: Negative for rash. Neurological: Negative for seizures and headaches. Hematological: Negative for adenopathy. All other systems reviewed and are negative. Physical Exam  Vitals and nursing note reviewed. Constitutional:       General: He is not in acute distress. Appearance: He is well-developed. HENT:      Head: Normocephalic and atraumatic.       Right Ear: External ear normal.      Left Ear: External ear normal.      Mouth/Throat:      Mouth: Mucous membranes are moist.      Pharynx: Oropharynx is clear. Eyes:      Pupils: Pupils are equal, round, and reactive to light. Cardiovascular:      Rate and Rhythm: Normal rate and regular rhythm. Heart sounds: Normal heart sounds. No murmur heard. Pulmonary:      Effort: Pulmonary effort is normal.      Breath sounds: Normal breath sounds. Abdominal:      General: Bowel sounds are normal. There is distension. Palpations: Abdomen is soft. Tenderness: There is generalized abdominal tenderness. There is guarding. There is no rebound. Comments: peritonitic abdomen   Musculoskeletal:         General: No tenderness. Cervical back: Normal range of motion and neck supple. Skin:     General: Skin is warm and dry. Coloration: Skin is not jaundiced or pale. Neurological:      General: No focal deficit present. Mental Status: He is alert and oriented to person, place, and time. Procedures     EKG: This EKG is signed and interpreted by me. Rate: 68  Rhythm: Sinus rhythm  Interpretation: no acute changes no ST elevations, intervals normal  Comparison: stable as compared to patient's most recent EKG      MDM  Number of Diagnoses or Management Options  Perforation bowel ([address]  Diagnosis management comments: 56-year-old gentleman with past medical history of hyperlipidemia, IBS, seizures, atrial flutter and acid reflux presenting today with abdominal pain, nausea, vomiting, groin pain for the past 2 days. He is hemodynamically stable on arrival to emergency department. Abdomen was peritoneal on examination, patient treated with morphine and Zofran and fluid bolus. Lab work was relatively unremarkable, CMP without evidence of kidney dysfunction or electrolyte abnormalities, no evidence of leukocytosis or anemias. Lactic acid and lipase within normal limits. EKG with no evidence of ST elevations or other changes, chest x-ray did not demonstrate acute process.   Was called by the radiologist for results of CT abdomen pelvis, he was concerned for small intestine perforation secondary to an ingested foreign body. Results were discussed with patient we started him on ceftriaxone and Flagyl. Surgical resident came to evaluate the patient at bedside, they will be taking the patient to the OR with Dr. Ekaterina Hill. Patient verbalizing understanding of the plan. Surgery will admit for further management. ED Course as of 05/06/22 1103   Fri May 06, 2022   4208 Radiology called, there is concern for microperforation due to an ingested metallic object. [MM]   4488 Discussed results with patient he verbalized understanding, will be starting him on ceftriaxone and Flagyl. [MM]   26 Spoke with Dr. Patrice Paulson. Surgery will see the patient. [MM]   [phone] with Dr. Tyler Bah, surgery will admit. [MM]      ED Course User Index  [MM] Billy Cross, DO        ED Course as of 05/06/22 1103   Fri May 06, 2022   6089 Radiology called, there is concern for microperforation due to an ingested metallic object. [MM]   3609 Discussed results with patient he verbalized understanding, will be starting him on ceftriaxone and Flagyl. [MM]   26 Spoke with Dr. Patrice Paulson. Surgery will see the patient. [MM]   [phone] with Dr. Tyler Bah, surgery will admit. [MM]      ED Course User Index  [MM] Billy Cross, DO       --------------------------------------------- PAST HISTORY ---------------------------------------------  Past Medical History:  has a past medical history of Anxiety and depression, Atheroembolism of left lower extremity ([address] Atrial flutter ([address] Coronary atherosclerosis due to calcified coronary lesion(414.4), ED (erectile dysfunction), Epilepsy ([address] Hyperlipidemia, Hypertension, IBS (irritable bowel syndrome), Ischemic toe, MI, old, Thrombocytosis, Tinea, and Tobacco dependence.     Past Surgical History:  has a past surgical history that includes Upper gastrointestinal endoscopy (08/2014); Colonoscopy (08/2014); Coronary angioplasty with stent; and knee surgery (Left). Social History:  reports that he has been smoking cigarettes. He has a 10.00 pack-year smoking history. His smokeless tobacco use includes chew. He reports previous drug use. Drug: Marijuana Mary Dasen). He reports that he does not drink alcohol. Family History: family history includes Alcohol Abuse in his father; COPD in his mother; Cancer (age of onset: 64) in his brother; Coronary Art Dis in his mother; Diabetes in his brother and brother; Heart Disease in his maternal uncle and maternal uncle; Liver Disease in his father; Seizures in his father. The patients home medications have been reviewed.     Allergies: Acyclovir and related and Doxycycline    -------------------------------------------------- RESULTS -------------------------------------------------    LABS:  Results for orders placed or performed during the hospital encounter of 05/06/22   RAPID INFLUENZA A/B ANTIGENS    Specimen: Nasopharyngeal   Result Value Ref Range    Influenza A by PCR Not Detected Not Detected    Influenza B by PCR Not Detected Not Detected   CBC with Auto Differential   Result Value Ref Range    WBC 10.5 4.5 - 11.5 E9/L    RBC 3.25 (L) 3.80 - 5.80 E12/L    Hemoglobin 13.2 12.5 - 16.5 g/dL    Hematocrit 38.0 37.0 - 54.0 %    .9 (H) 80.0 - 99.9 fL    MCH 40.6 (H) 26.0 - 35.0 pg    MCHC 34.7 (H) 32.0 - 34.5 %    RDW 11.1 (L) 11.5 - 15.0 fL    Platelets 708 720 - 011 E9/L    MPV 8.9 7.0 - 12.0 fL    Neutrophils % 88.0 (H) 43.0 - 80.0 %    Lymphocytes % 6.0 (L) 20.0 - 42.0 %    Monocytes % 5.0 2.0 - 12.0 %    Eosinophils % 0.0 0.0 - 6.0 %    Basophils % 0.0 0.0 - 2.0 %    Neutrophils Absolute 9.24 (H) 1.80 - 7.30 E9/L    Lymphocytes Absolute 0.74 (L) 1.50 - 4.00 E9/L    Monocytes Absolute 0.52 0.10 - 0.95 E9/L    Eosinophils Absolute 0.00 (L) 0.05 - 0.50 E9/L    Basophils Absolute 0.00 0.00 - 0.20 E9/L    Atypical Lymphocytes Relative 1.0 0.0 - 4.0 %    Poikilocytes 1+     Ovalocytes 1+    Comprehensive Metabolic Panel w/ Reflex to MG   Result Value Ref Range    Sodium 136 132 - 146 mmol/L    Potassium reflex Magnesium 3.7 3.5 - 5.0 mmol/L    Chloride 100 98 - 107 mmol/L    CO2 26 22 - 29 mmol/L    Anion Gap 10 7 - 16 mmol/L    Glucose 125 (H) 74 - 99 mg/dL    BUN 13 6 - 20 mg/dL    CREATININE 1.0 0.7 - 1.2 mg/dL    GFR Non-African American >60 >=60 mL/min/1.73    GFR African American >60     Calcium 9.4 8.6 - 10.2 mg/dL    Total Protein 7.0 6.4 - 8.3 g/dL    Albumin 4.1 3.5 - 5.2 g/dL    Total Bilirubin 1.6 (H) 0.0 - 1.2 mg/dL    Alkaline Phosphatase 60 40 - 129 U/L    ALT 18 0 - 40 U/L    AST 17 0 - 39 U/L   Lipase   Result Value Ref Range    Lipase 15 13 - 60 U/L   Troponin   Result Value Ref Range    Troponin, High Sensitivity 7 0 - 11 ng/L   Brain Natriuretic Peptide   Result Value Ref Range    Pro- (H) 0 - 125 pg/mL   Urinalysis with Microscopic   Result Value Ref Range    Color, UA DARK YELLOW (A) Straw/Yellow    Clarity, UA SL CLOUDY Clear    Glucose, Ur Negative Negative mg/dL    Bilirubin Urine SMALL (A) Negative    Ketones, Urine TRACE (A) Negative mg/dL    Specific Gravity, UA 1.025 1.005 - 1.030    Blood, Urine MODERATE (A) Negative    pH, UA 6.0 5.0 - 9.0    Protein, UA TRACE Negative mg/dL    Urobilinogen, Urine 0.2 <2.0 E.U./dL    Nitrite, Urine Negative Negative    Leukocyte Esterase, Urine Negative Negative    Mucus, UA Present (A) None Seen /LPF    WBC, UA NONE 0 - 5 /HPF    RBC, UA 5-10 (A) 0 - 2 /HPF    Bacteria, UA RARE (A) None Seen /HPF   Lactic Acid   Result Value Ref Range    Lactic Acid 1.1 0.5 - 2.2 mmol/L   EKG 12 Lead   Result Value Ref Range    Ventricular Rate 68 BPM    Atrial Rate 68 BPM    P-R Interval 142 ms    QRS Duration 90 ms    Q-T Interval 396 ms    QTc Calculation (Bazett) 421 ms    P Axis 81 degrees    R Axis 42 degrees    T Axis 53 degrees       RADIOLOGY:  CT ABDOMEN PELVIS W IV CONTRAST Additional Contrast? None   Final Result   1. Findings suspicious for focal ileal perforation as result of an ingested   foreign body, as described above. Necrotizing enteritis with small intestine   perforation, which can be seen in Crohn's disease, is also in the   differential diagnosis, but less likely. Surgical consultation is   recommended. 2.  Trace amount of free fluid in the pelvis. The findings were discussed with Dr. Klaus Reagan at 9:48 a.m.         XR CHEST PORTABLE   Final Result   No acute process. ------------------------- NURSING NOTES AND VITALS REVIEWED ---------------------------  Date / Time Roomed:  5/6/2022  7:18 AM  ED Bed Assignment:  04/04    The nursing notes within the ED encounter and vital signs as below have been reviewed. Patient Vitals for the past 24 hrs:   BP Temp Temp src Pulse Resp SpO2 Height Weight   05/06/22 1046 111/69 -- -- 68 16 94 % -- --   05/06/22 0932 119/76 -- -- 75 18 96 % -- --   05/06/22 0723 120/76 99.2 °F (37.3 °C) Oral 68 16 97 % 6' 1\" (1.854 m) 170 lb (77.1 kg)       Oxygen Saturation Interpretation: Normal    ------------------------------------------ PROGRESS NOTES ------------------------------------------  Re-evaluation(s):  Time: 1000  Patients symptoms show no change  Repeat physical examination is not changed    Counseling:  I have spoken with the patient and discussed todays results, in addition to providing specific details for the plan of care and counseling regarding the diagnosis and prognosis. Their questions are answered at this time and they are agreeable with the plan of admission.    --------------------------------- ADDITIONAL PROVIDER NOTES ---------------------------------  Consultations:  Time: 1030. Spoke with Dr. Tony West, with Dr. Shiela Garcia. Discussed case. They will admit the patient.   This patient's ED course included: a personal history and physicial examination, re-evaluation prior to disposition, multiple bedside re-evaluations, IV medications, cardiac monitoring, continuous pulse oximetry and complex medical decision making and emergency management    This patient has remained hemodynamically stable during their ED course. Diagnosis:  1. Perforation bowel ([address]        Disposition:  Patient's disposition: Admit to operating room  Patient's condition is stable.            Jonas Prince DO  Resident  05/06/22 9392

## 2022-05-06 NOTE — PROGRESS NOTES
Admission database completed to best of this RN's ability. Pharmacy tech to verify medication list. Care plan and education initiated. Pt independent from home with wife. Denies any DME or HHC prior to admission.

## 2022-05-06 NOTE — ANESTHESIA POSTPROCEDURE EVALUATION
Department of Anesthesiology  Postprocedure Note    Patient: Boby Cortez  MRN: [id number]  YOB: 1970  Date of evaluation: 5/6/2022  Time:  1:22 PM     Procedure Summary     Date: 05/06/22 Room / Location: [facility] OR [address] [place] [address]    Anesthesia Start: 9357 Anesthesia Stop: 1182    Procedure: EXPLORATORY LAPAROTOMY, SMALL BOWEL RESECTION (N/A Abdomen) Diagnosis: (/)    Surgeons: Cal Adam MD Responsible Provider: Dixie Grider MD    Anesthesia Type: general ASA Status: 3 - Emergent          Anesthesia Type: No value filed. Taz Phase I: Taz Score: 10    Taz Phase II:      Last vitals: Reviewed and per EMR flowsheets.        Anesthesia Post Evaluation    Patient location during evaluation: PACU  Patient participation: complete - patient participated  Level of consciousness: awake and alert  Pain score: 3  Airway patency: patent  Nausea & Vomiting: no nausea and no vomiting  Complications: no  Cardiovascular status: hemodynamically stable  Respiratory status: acceptable

## 2022-05-06 NOTE — H&P
GENERAL SURGERY  H&P  5/6/2022    [facility]  Boby Cortez is a 49 y.o. male who presents for evaluation of abdominal pain for the past couple of days. He reports that he was grilling this weekend. Initially the pain was tolerable and intermittent, but now the pain is constant and unbearable. He has not had a bowel movement in 3 days. He is on Plavix for history of cardiac stents. He has a history of irritable bowel disease and follows with Dr. Lobo Alston. He has had an EGD/colonoscopy before but thinks that they were a long time ago and that they were normal. He smokes 1 ppd. He denies any past abdominal surgical history. CT AP demonstrates metal piece in ileum with surrounding microperforation. He has been afebrile, hemodynamically stable, and without leukocytosis or lactic acidosis. Past Medical History:   Diagnosis Date    Anxiety and depression     Atheroembolism of left lower extremity ([facility] [address] 09/11/2014    Atrial flutter (HCC)     Coronary atherosclerosis due to calcified coronary lesion(414.4)     ED (erectile dysfunction)     Epilepsy ([facility] [address]     Hyperlipidemia     Hypertension     IBS (irritable bowel syndrome)     Ischemic toe 09/11/2014    MI, old     Thrombocytosis     Tinea     Tobacco dependence 09/16/2014       Past Surgical History:   Procedure Laterality Date    COLONOSCOPY  08/2014    CORONARY ANGIOPLASTY WITH STENT PLACEMENT      KNEE SURGERY Left     Skin graft    UPPER GASTROINTESTINAL ENDOSCOPY  08/2014       Medications Prior to Admission:    Prior to Admission medications    Medication Sig Start Date End Date Taking? Authorizing Provider   gabapentin (NEURONTIN) 600 MG tablet Take 600 mg by mouth at bedtime. 5/2/22   Historical Provider, MD   gabapentin (NEURONTIN) 300 MG capsule Take 300 mg by mouth in the morning and at bedtime.  3/30/22   Historical Provider, MD   mirtazapine (REMERON) 45 MG tablet Take 45 mg by mouth at bedtime 4/27/22   Historical Provider, MD hydrOXYzine (ATARAX) 50 MG tablet Take 50 mg by mouth in the morning and at bedtime  2/1/22   Historical Provider, MD   tadalafil (CIALIS) 5 MG tablet Take 5 mg by mouth daily  2/14/22   Historical Provider, MD   vitamin D (ERGOCALCIFEROL) 1.25 MG (72509 UT) CAPS capsule take 1 capsule by mouth every week 2/23/22   Jocelynn Dillon MD   clopidogrel (PLAVIX) 75 MG tablet take 1 tablet by mouth once daily 2/23/22   Jocelynn Dillon MD   metoprolol succinate (TOPROL XL) 25 MG extended release tablet take 1 tablet by mouth once daily 2/23/22   Jocelynn Dillon MD   levETIRAcetam (KEPPRA) 500 MG tablet Take 3 tablets by mouth 2 times daily 2/23/22   Jocelynn Dillon MD   atorvastatin (LIPITOR) 40 MG tablet Take 1 tablet by mouth nightly 2/23/22   Jocelynn Dillon MD   Cobalamin Combinations (V-13 + FOLIC ACID) 4392-806 MCG TBDP Take 1 tablet by mouth daily 1/19/22   Jocelynn Dillon MD   azelastine (ASTELIN) 0.1 % nasal spray 1 spray by Nasal route 2 times daily Use in each nostril as directed 1/5/22   Jocelynn Dillon MD   ondansetron (ZOFRAN) 4 MG tablet Take 1 tablet by mouth 3 times daily as needed for Nausea or Vomiting 1/5/22   Jocelynn Dillon MD   allopurinol (ZYLOPRIM) 100 MG tablet Take 1 tablet by mouth daily 10/11/21   Jocelynn Dillon MD   traZODone (DESYREL) 100 MG tablet Take 1.5 tablets by mouth nightly  Patient taking differently: Take 200 mg by mouth nightly  7/28/21   Jocelynn Dillon MD   lansoprazole (PREVACID) 30 MG delayed release capsule take 1 capsule by mouth every morning 4/19/21   Historical Provider, MD   escitalopram (LEXAPRO) 20 MG tablet take 2 tablets by mouth once daily 3/29/21   Frederik Goldmann, MD   hydroxyurea (HYDREA) 500 MG chemo capsule Take 1 capsule by mouth 2 times daily  Patient taking differently: Take 500 mg by mouth 2 times daily 1 tablet by mouth every other day alternating with 1 tablet BID every other day 9/14/16   Frederik Goldmann, MD Allergies   Allergen Reactions    Acyclovir And Related Hives and Nausea And Vomiting    Doxycycline Itching       Family History   Problem Relation Age of Onset    Coronary Art Dis Mother     COPD Mother     Seizures Father     Alcohol Abuse Father     Liver Disease Father     Diabetes Brother     Cancer Brother 64        leukemia    Diabetes Brother     Heart Disease Maternal Uncle     Heart Disease Maternal Uncle        Social History     Tobacco Use    Smoking status: Current Every Day Smoker     Packs/day: 0.50     Years: 20.00     Pack years: 10.00     Types: Cigarettes    Smokeless tobacco: Current User     Types: Chew    Tobacco comment: cutting down   Vaping Use    Vaping Use: Never used   Substance Use Topics    Alcohol use: No     Comment: Quit 2019.  Drug use: Not Currently     Types: Marijuana Nisa Aver)     Comment: Daily for pain. Review of Systems   General ROS: negative for - chills or fever  Hematological and Lymphatic ROS: negative for - bleeding problems or blood clots  Respiratory ROS: no cough, shortness of breath, or wheezing  Cardiovascular ROS: no chest pain or dyspnea on exertion  Gastrointestinal ROS: positive for abdominal pain and constipation. Negative for nausea/vomiting. Genito-Urinary ROS: no dysuria, trouble voiding, or hematuria  Musculoskeletal ROS: negative for - muscle pain or muscular weakness      PHYSICAL EXAM:    Vitals:    05/06/22 0932   BP: 119/76   Pulse: 75   Resp: 18   Temp:    SpO2: 96%       General Appearance:  awake, alert, oriented, in no acute distress  Skin:  Skin color, texture, turgor normal. No rashes or lesions. Head/face:  NCAT  Eyes:  No gross abnormalities. Lungs:  Normal work of breathing on room air  Heart:  Regular rate, normotensive  Abdomen:  Soft, non-distended, peritoneal with guarding and rebound tenderness  Extremities: Extremities warm to touch, pink, with no edema.     LABS:    CBC  Recent Labs     05/06/22  0755 WBC 10.5   HGB 13.2   HCT 38.0        BMP  Recent Labs     05/06/22  0755      K 3.7      CO2 26   BUN 13   CREATININE 1.0   CALCIUM 9.4     Liver Function  Recent Labs     05/06/22  0755   LIPASE 15   BILITOT 1.6*   AST 17   ALT 18   ALKPHOS 60   PROT 7.0   LABALBU 4.1     No results for input(s): LACTATE in the last 72 hours. No results for input(s): INR, PTT in the last 72 hours. Invalid input(s): PT    RADIOLOGY    CT ABDOMEN PELVIS W IV CONTRAST Additional Contrast? None    Result Date: 5/6/2022  EXAMINATION: CT OF THE ABDOMEN AND PELVIS WITH CONTRAST 5/6/2022 8:52 am TECHNIQUE: CT of the abdomen and pelvis was performed with the administration of intravenous contrast. Multiplanar reformatted images are provided for review. Dose modulation, iterative reconstruction, and/or weight based adjustment of the mA/kV was utilized to reduce the radiation dose to as low as reasonably achievable. COMPARISON: The previous study performed 02/10/2022. HISTORY: ORDERING SYSTEM PROVIDED HISTORY: abdominal pain and nausea for two days, groin pain, evaluate for ischemia TECHNOLOGIST PROVIDED HISTORY: Additional Contrast?->None Reason for exam:->abdominal pain and nausea for two days, groin pain, evaluate for ischemia Decision Support Exception - unselect if not a suspected or confirmed emergency medical condition->Emergency Medical Condition (MA) FINDINGS: Lower Chest: The lung bases are clear. Organs: The liver, spleen, gallbladder, biliary tree, pancreas, adrenal glands, and kidneys are unremarkable. GI/Bowel: The stomach is suboptimally distended, but grossly unremarkable. There are a couple of loops of diffusely thick walled and edematous small bowel loops in the pelvis, indicating affected ileum. There is surrounding perienteric linear inflammatory stranding. There also tiny droplets of air noted adjacent to one of the affected segments of ileum, indicating micro perforation.   There is mild proliferation of surrounding fat, creating separation of bowel loops, which is indicative of creeping fat. There is a tiny density noted adjacent to a portion of the affected ileum. The density is very bright in could represent metal or bone. It is not seen on the prior study. Given all of these findings this is suspicious for focal perforation as result of a an ingested foreign body. Necrotizing enteritis with small intestine perforation, which can be seen in Crohn's disease, is also in the differential diagnosis, but less likely. The large bowel is unremarkable for a non oral contrast study. The appendix is visualized and normal in appearance. Pelvis: The urinary bladder is suboptimally distended and grossly unremarkable. The seminal vesicles are symmetric in size. The prostate gland is upper limits of normal in size and contains tiny calcifications. Peritoneum/Retroperitoneum: Small (up to 1.0 cm diameter) retroperitoneal lymph nodes are identified, which are probably reactive. No pelvic lymphadenopathy is seen. A trace amount of free fluid is identified deep within the pelvis. No abdominal or pelvic soft tissue mass is otherwise noted. The abdominal aorta is again minimally atherosclerotic. Bones/Soft Tissues: Mild osteo-degenerative changes are noted involving the visualized thoracolumbar spine and sacroiliac joints. 1.  Findings suspicious for focal ileal perforation as result of an ingested foreign body, as described above. Necrotizing enteritis with small intestine perforation, which can be seen in Crohn's disease, is also in the differential diagnosis, but less likely. Surgical consultation is recommended. 2.  Trace amount of free fluid in the pelvis.  The findings were discussed with Dr. Jere Larkin at 9:48 a.m.     XR CHEST PORTABLE    Result Date: 5/6/2022  EXAMINATION: ONE XRAY VIEW OF THE CHEST 5/6/2022 7:55 am COMPARISON: 01/17/2021 HISTORY: ORDERING SYSTEM PROVIDED HISTORY: emesis x 2 days, chest pain TECHNOLOGIST PROVIDED HISTORY: Reason for exam:->emesis x 2 days, chest pain FINDINGS: The lungs are without acute focal process. There is no effusion or pneumothorax. The cardiomediastinal silhouette is without acute process. The osseous structures are without acute process. No acute process. ASSESSMENT:  49 y.o. male with perforated small bowel likely secondary to metal foreign body. PLAN:  STAT OR exploratory laparotomy, possible bowel resection. NPO  IVF  Zosyn  Type and screen  TEG  1 unit of platelets    Patient findings and plan discussed with Dr. Avinash French.     Electronically signed by Wade Ocampo MD on 5/6/22 at 10:36 AM EDT

## 2022-05-06 NOTE — ANESTHESIA PRE PROCEDURE
Department of Anesthesiology  Preprocedure Note       Name:  Sergo Jaffe   Age:  49 y.o.  :  1970                                          MRN:  [id number]         Date:  2022      Surgeon: Brielle Lambert):  Guss Goldmann, MD    Procedure: Procedure(s):  EXPLORATORY LAPAROTOMY    Medications prior to admission:   Prior to Admission medications    Medication Sig Start Date End Date Taking? Authorizing Provider   gabapentin (NEURONTIN) 600 MG tablet Take 600 mg by mouth at bedtime. 22   Historical Provider, MD   gabapentin (NEURONTIN) 300 MG capsule Take 300 mg by mouth in the morning and at bedtime.  3/30/22   Historical Provider, MD   mirtazapine (REMERON) 45 MG tablet Take 45 mg by mouth at bedtime 22   Historical Provider, MD   hydrOXYzine (ATARAX) 50 MG tablet Take 50 mg by mouth in the morning and at bedtime  22   Historical Provider, MD   tadalafil (CIALIS) 5 MG tablet Take 5 mg by mouth daily  22   Historical Provider, MD   vitamin D (ERGOCALCIFEROL) 1.25 MG (97409 UT) CAPS capsule take 1 capsule by mouth every week 22   Selina Garcia MD   clopidogrel (PLAVIX) 75 MG tablet take 1 tablet by mouth once daily 22   Selina Garcia MD   metoprolol succinate (TOPROL XL) 25 MG extended release tablet take 1 tablet by mouth once daily 22   Selina Garcia MD   levETIRAcetam (KEPPRA) 500 MG tablet Take 3 tablets by mouth 2 times daily 22   Selina Garcia MD   atorvastatin (LIPITOR) 40 MG tablet Take 1 tablet by mouth nightly 22   Selina Garcia MD   Cobalamin Combinations (F-36 + FOLIC ACID) 1768-000 MCG TBDP Take 1 tablet by mouth daily 22   Selina Garcia MD   azelastine (ASTELIN) 0.1 % nasal spray 1 spray by Nasal route 2 times daily Use in each nostril as directed 22   Selina Garcia MD   ondansetron (ZOFRAN) 4 MG tablet Take 1 tablet by mouth 3 times daily as needed for Nausea or Vomiting 22   Selina Garcia MD   allopurinol (ZYLOPRIM) 100 MG tablet Take 1 tablet by mouth daily 10/11/21   Jocelynn Dillon MD   traZODone (DESYREL) 100 MG tablet Take 1.5 tablets by mouth nightly  Patient taking differently: Take 200 mg by mouth nightly  7/28/21   Jocelynn Dillon MD   lansoprazole (PREVACID) 30 MG delayed release capsule take 1 capsule by mouth every morning 4/19/21   Historical Provider, MD   escitalopram (LEXAPRO) 20 MG tablet take 2 tablets by mouth once daily 3/29/21   Frederik Goldmann, MD   hydroxyurea (HYDREA) 500 MG chemo capsule Take 1 capsule by mouth 2 times daily  Patient taking differently: Take 500 mg by mouth 2 times daily 1 tablet by mouth every other day alternating with 1 tablet BID every other day 9/14/16   Frederik Goldmann, MD       Current medications:    Current Facility-Administered Medications   Medication Dose Route Frequency Provider Last Rate Last Admin    metronidazole (FLAGYL) 500 mg in 0.9% NaCl 100 mL IVPB premix  500 mg IntraVENous Q8H [facility],  mL/hr at 05/06/22 1026 500 mg at 05/06/22 1026    piperacillin-tazobactam (ZOSYN) 3,375 mg in dextrose 5 % 50 mL IVPB extended infusion (mini-bag)  3,375 mg IntraVENous Q8H Reza Cole MD        0.9 % sodium chloride infusion   IntraVENous PRN Reaz Cole MD         Current Outpatient Medications   Medication Sig Dispense Refill    gabapentin (NEURONTIN) 600 MG tablet Take 600 mg by mouth at bedtime.  gabapentin (NEURONTIN) 300 MG capsule Take 300 mg by mouth in the morning and at bedtime.       mirtazapine (REMERON) 45 MG tablet Take 45 mg by mouth at bedtime      hydrOXYzine (ATARAX) 50 MG tablet Take 50 mg by mouth in the morning and at bedtime       tadalafil (CIALIS) 5 MG tablet Take 5 mg by mouth daily       vitamin D (ERGOCALCIFEROL) 1.25 MG (68939 UT) CAPS capsule take 1 capsule by mouth every week 4 capsule 5    clopidogrel (PLAVIX) 75 MG tablet take 1 tablet by mouth once daily 30 tablet 5    metoprolol succinate (TOPROL XL) 25 MG extended release tablet take 1 tablet by mouth once daily 30 tablet 5    levETIRAcetam (KEPPRA) 500 MG tablet Take 3 tablets by mouth 2 times daily 540 tablet 3    atorvastatin (LIPITOR) 40 MG tablet Take 1 tablet by mouth nightly 30 tablet 5    Cobalamin Combinations (K-91 + FOLIC ACID) 7910-323 MCG TBDP Take 1 tablet by mouth daily 90 tablet 1    azelastine (ASTELIN) 0.1 % nasal spray 1 spray by Nasal route 2 times daily Use in each nostril as directed 60 mL 1    ondansetron (ZOFRAN) 4 MG tablet Take 1 tablet by mouth 3 times daily as needed for Nausea or Vomiting 30 tablet 0    allopurinol (ZYLOPRIM) 100 MG tablet Take 1 tablet by mouth daily 30 tablet 3    traZODone (DESYREL) 100 MG tablet Take 1.5 tablets by mouth nightly (Patient taking differently: Take 200 mg by mouth nightly ) 30 tablet 0    lansoprazole (PREVACID) 30 MG delayed release capsule take 1 capsule by mouth every morning      escitalopram (LEXAPRO) 20 MG tablet take 2 tablets by mouth once daily 60 tablet 5    hydroxyurea (HYDREA) 500 MG chemo capsule Take 1 capsule by mouth 2 times daily (Patient taking differently: Take 500 mg by mouth 2 times daily 1 tablet by mouth every other day alternating with 1 tablet BID every other day) 60 capsule 1       Allergies: Allergies   Allergen Reactions    Acyclovir And Related Hives and Nausea And Vomiting    Doxycycline Itching       Problem List:    Patient Active Problem List   Diagnosis Code    Tobacco dependence F17.200    Hyperlipidemia E78.5    Coronary atherosclerosis due to calcified coronary lesion I25.10, I25.84    IBS (irritable bowel syndrome) K58.9    Seizure disorder ([facility] [address] G40.909    Essential thrombocythemia ([facility] [address] D47.3    Myeloproliferative disease ([facility] [address] D47.1    Stented coronary artery Z95.5    Anxiety and depression F41.9, F32. A    Gastroesophageal reflux disease K21.9    Essential hypertension I10    Vitamin D deficiency E55.9    Insomnia G47.00    Bowel perforation (HCC) K63.1       Past Medical History:        Diagnosis Date    Anxiety and depression     Atheroembolism of left lower extremity ([facility] [address] 09/11/2014    Atrial flutter (HCC)     Coronary atherosclerosis due to calcified coronary lesion(414.4)     ED (erectile dysfunction)     Epilepsy ([facility] [address]     Hyperlipidemia     Hypertension     IBS (irritable bowel syndrome)     Ischemic toe 09/11/2014    MI, old     Thrombocytosis     Tinea     Tobacco dependence 09/16/2014       Past Surgical History:        Procedure Laterality Date    COLONOSCOPY  08/2014    CORONARY ANGIOPLASTY WITH STENT PLACEMENT      KNEE SURGERY Left     Skin graft    UPPER GASTROINTESTINAL ENDOSCOPY  08/2014       Social History:    Social History     Tobacco Use    Smoking status: Current Every Day Smoker     Packs/day: 0.50     Years: 20.00     Pack years: 10.00     Types: Cigarettes    Smokeless tobacco: Current User     Types: Chew    Tobacco comment: cutting down   Substance Use Topics    Alcohol use: No     Comment: Quit 2019. Ready to quit: Not Answered  Counseling given: Not Answered  Comment: cutting down      Vital Signs (Current):   Vitals:    05/06/22 0723 05/06/22 0932 05/06/22 1046   BP: 120/76 119/76 111/69   Pulse: 68 75 68   Resp: 16 18 16   Temp: 99.2 °F (37.3 °C)     TempSrc: Oral     SpO2: 97% 96% 94%   Weight: 170 lb (77.1 kg)     Height: 6' 1\" (1.854 m)                                                BP Readings from Last 3 Encounters:   05/06/22 111/69   05/03/22 105/65   03/16/22 106/70       NPO Status:                                                                                 BMI:   Wt Readings from Last 3 Encounters:   05/06/22 170 lb (77.1 kg)   05/03/22 170 lb (77.1 kg)   03/16/22 167 lb (75.8 kg)     Body mass index is 22.43 kg/m².     CBC:   Lab Results   Component Value Date    WBC 10.5 05/06/2022    RBC 3.25 05/06/2022 HGB 13.2 05/06/2022    HCT 38.0 05/06/2022    .9 05/06/2022    RDW 11.1 05/06/2022     05/06/2022       CMP:   Lab Results   Component Value Date     05/06/2022    K 3.7 05/06/2022     05/06/2022    CO2 26 05/06/2022    BUN 13 05/06/2022    CREATININE 1.0 05/06/2022    GFRAA >60 05/06/2022    LABGLOM >60 05/06/2022    GLUCOSE 125 05/06/2022    PROT 7.0 05/06/2022    CALCIUM 9.4 05/06/2022    BILITOT 1.6 05/06/2022    ALKPHOS 60 05/06/2022    AST 17 05/06/2022    ALT 18 05/06/2022       POC Tests: No results for input(s): POCGLU, POCNA, POCK, POCCL, POCBUN, POCHEMO, POCHCT in the last 72 hours. Coags:   Lab Results   Component Value Date    PROTIME 12.4 08/09/2019    INR 1.1 08/09/2019    APTT 30.7 05/10/2019       HCG (If Applicable): No results found for: PREGTESTUR, PREGSERUM, HCG, HCGQUANT     ABGs: No results found for: PHART, PO2ART, AAF9GBU, FOF6FFT, BEART, X7ZFTQNY     Type & Screen (If Applicable):  No results found for: LABABO, LABRH    Drug/Infectious Status (If Applicable):  No results found for: HIV, HEPCAB    COVID-19 Screening (If Applicable):   Lab Results   Component Value Date    COVID19 DETECTED 01/01/2022           Anesthesia Evaluation  Patient summary reviewed  Airway: Mallampati: III  TM distance: >3 FB   Neck ROM: full  Mouth opening: > = 3 FB Dental:          Pulmonary: breath sounds clear to auscultation  (+) decreased breath sounds,  current smoker ( 1 PPD x 30 years)          Patient did not smoke on day of surgery.                  Cardiovascular:    (+) hypertension:, past MI:, CAD:, dysrhythmias: atrial flutter, hyperlipidemia        Rhythm: regular  Rate: normal                 ROS comment: ECG:  Normal sinus rhythm  Normal ECG  When compared with ECG of 10-FEB-2022 14:01,  Criteria for Septal infarct are no longer present    ECHO: LVEF: 55%     Neuro/Psych:   (+) seizures:, psychiatric history:depression/anxiety             GI/Hepatic/Renal:   (+) GERD:, ROS comment: H/O IBS. Endo/Other:    (+) blood dyscrasia ( Myeloproliferative disease ): arthritis: OA., .                 Abdominal:             Vascular:   + PVD, aortic or cerebral ( Atheroembolism of left lower extremity with Ischemic toe ), .       Other Findings:           Anesthesia Plan      general     ASA 3 - emergent     (RSI)  Induction: intravenous. MIPS: Postoperative opioids intended and Prophylactic antiemetics administered. Anesthetic plan and risks discussed with patient. Plan discussed with CRNA. Dixie Grider MD   5/6/2022        Patient seen and chart reviewed. No interval change in history or exam.   Anesthesia plan discussed, risk/benefits addressed. Patient's concerns and questions answered.      TOMMY Grover - CRNA  May 6, 2022  11:25 AM

## 2022-05-06 NOTE — PLAN OF CARE
Problem: ABCDS Injury Assessment  Goal: Absence of physical injury  Outcome: Progressing     Problem: Pain  Goal: Verbalizes/displays adequate comfort level or baseline comfort level  Outcome: Progressing     Problem: Discharge Planning  Goal: Discharge to home or other facility with appropriate resources  Outcome: Progressing     Problem: Safety - Adult  Goal: Free from fall injury  Outcome: Progressing

## 2022-05-07 LAB
ALBUMIN SERPL-MCNC: 3.5 G/DL (ref 3.5–5.2)
ALP BLD-CCNC: 59 U/L (ref 40–129)
ALT SERPL-CCNC: 16 U/L (ref 0–40)
ANION GAP SERPL CALCULATED.3IONS-SCNC: 9 MMOL/L (ref 7–16)
ANISOCYTOSIS: ABNORMAL
AST SERPL-CCNC: 21 U/L (ref 0–39)
BASOPHILS ABSOLUTE: 0.02 E9/L (ref 0–0.2)
BASOPHILS RELATIVE PERCENT: 0.2 % (ref 0–2)
BILIRUB SERPL-MCNC: 1 MG/DL (ref 0–1.2)
BUN BLDV-MCNC: 8 MG/DL (ref 6–20)
CALCIUM SERPL-MCNC: 8.4 MG/DL (ref 8.6–10.2)
CHLORIDE BLD-SCNC: 102 MMOL/L (ref 98–107)
CO2: 27 MMOL/L (ref 22–29)
CREAT SERPL-MCNC: 0.8 MG/DL (ref 0.7–1.2)
EOSINOPHILS ABSOLUTE: 0 E9/L (ref 0.05–0.5)
EOSINOPHILS RELATIVE PERCENT: 0 % (ref 0–6)
GFR AFRICAN AMERICAN: >60
GFR NON-AFRICAN AMERICAN: >60 ML/MIN/1.73
GLUCOSE BLD-MCNC: 138 MG/DL (ref 74–99)
GRAM STAIN ORDERABLE: NORMAL
HCT VFR BLD CALC: 31.8 % (ref 37–54)
HCT VFR BLD CALC: 33.8 % (ref 37–54)
HCT VFR BLD CALC: 34.9 % (ref 37–54)
HEMOGLOBIN: 11.5 G/DL (ref 12.5–16.5)
HEMOGLOBIN: 11.9 G/DL (ref 12.5–16.5)
HEMOGLOBIN: 12.5 G/DL (ref 12.5–16.5)
IMMATURE GRANULOCYTES #: 0.12 E9/L
IMMATURE GRANULOCYTES %: 1.4 % (ref 0–5)
LYMPHOCYTES ABSOLUTE: 0.8 E9/L (ref 1.5–4)
LYMPHOCYTES RELATIVE PERCENT: 9.6 % (ref 20–42)
MCH RBC QN AUTO: 41.3 PG (ref 26–35)
MCH RBC QN AUTO: 41.7 PG (ref 26–35)
MCH RBC QN AUTO: 42 PG (ref 26–35)
MCHC RBC AUTO-ENTMCNC: 35.2 % (ref 32–34.5)
MCHC RBC AUTO-ENTMCNC: 35.8 % (ref 32–34.5)
MCHC RBC AUTO-ENTMCNC: 36.2 % (ref 32–34.5)
MCV RBC AUTO: 116.1 FL (ref 80–99.9)
MCV RBC AUTO: 116.3 FL (ref 80–99.9)
MCV RBC AUTO: 117.4 FL (ref 80–99.9)
MONOCYTES ABSOLUTE: 0.64 E9/L (ref 0.1–0.95)
MONOCYTES RELATIVE PERCENT: 7.7 % (ref 2–12)
NEUTROPHILS ABSOLUTE: 6.78 E9/L (ref 1.8–7.3)
NEUTROPHILS RELATIVE PERCENT: 81.1 % (ref 43–80)
PDW BLD-RTO: 10.8 FL (ref 11.5–15)
PDW BLD-RTO: 10.8 FL (ref 11.5–15)
PDW BLD-RTO: 10.9 FL (ref 11.5–15)
PLATELET # BLD: 222 E9/L (ref 130–450)
PLATELET # BLD: 233 E9/L (ref 130–450)
PLATELET # BLD: 234 E9/L (ref 130–450)
PMV BLD AUTO: 9 FL (ref 7–12)
PMV BLD AUTO: 9.1 FL (ref 7–12)
PMV BLD AUTO: 9.2 FL (ref 7–12)
POTASSIUM REFLEX MAGNESIUM: 3.6 MMOL/L (ref 3.5–5)
RBC # BLD: 2.74 E12/L (ref 3.8–5.8)
RBC # BLD: 2.88 E12/L (ref 3.8–5.8)
RBC # BLD: 3 E12/L (ref 3.8–5.8)
SODIUM BLD-SCNC: 138 MMOL/L (ref 132–146)
TOTAL PROTEIN: 6.4 G/DL (ref 6.4–8.3)
WBC # BLD: 7.6 E9/L (ref 4.5–11.5)
WBC # BLD: 8.4 E9/L (ref 4.5–11.5)
WBC # BLD: 8.4 E9/L (ref 4.5–11.5)

## 2022-05-07 PROCEDURE — 99233 SBSQ HOSP IP/OBS HIGH 50: CPT | Performed by: INTERNAL MEDICINE

## 2022-05-07 PROCEDURE — C9113 INJ PANTOPRAZOLE SODIUM, VIA: HCPCS | Performed by: INTERNAL MEDICINE

## 2022-05-07 PROCEDURE — 6360000002 HC RX W HCPCS: Performed by: STUDENT IN AN ORGANIZED HEALTH CARE EDUCATION/TRAINING PROGRAM

## 2022-05-07 PROCEDURE — 2500000003 HC RX 250 WO HCPCS: Performed by: STUDENT IN AN ORGANIZED HEALTH CARE EDUCATION/TRAINING PROGRAM

## 2022-05-07 PROCEDURE — 80053 COMPREHEN METABOLIC PANEL: CPT

## 2022-05-07 PROCEDURE — 85025 COMPLETE CBC W/AUTO DIFF WBC: CPT

## 2022-05-07 PROCEDURE — 36415 COLL VENOUS BLD VENIPUNCTURE: CPT

## 2022-05-07 PROCEDURE — 6360000002 HC RX W HCPCS: Performed by: INTERNAL MEDICINE

## 2022-05-07 PROCEDURE — 85027 COMPLETE CBC AUTOMATED: CPT

## 2022-05-07 PROCEDURE — A4216 STERILE WATER/SALINE, 10 ML: HCPCS | Performed by: INTERNAL MEDICINE

## 2022-05-07 PROCEDURE — 2580000003 HC RX 258: Performed by: INTERNAL MEDICINE

## 2022-05-07 PROCEDURE — 2580000003 HC RX 258: Performed by: STUDENT IN AN ORGANIZED HEALTH CARE EDUCATION/TRAINING PROGRAM

## 2022-05-07 PROCEDURE — 6370000000 HC RX 637 (ALT 250 FOR IP): Performed by: NURSE PRACTITIONER

## 2022-05-07 PROCEDURE — 1200000000 HC SEMI PRIVATE

## 2022-05-07 RX ORDER — LABETALOL HYDROCHLORIDE 5 MG/ML
10 INJECTION, SOLUTION INTRAVENOUS EVERY 6 HOURS PRN
Status: DISCONTINUED | OUTPATIENT
Start: 2022-05-07 | End: 2022-05-10 | Stop reason: HOSPADM

## 2022-05-07 RX ADMIN — SODIUM CHLORIDE, POTASSIUM CHLORIDE, SODIUM LACTATE AND CALCIUM CHLORIDE: 600; 310; 30; 20 INJECTION, SOLUTION INTRAVENOUS at 19:30

## 2022-05-07 RX ADMIN — SODIUM CHLORIDE, PRESERVATIVE FREE 10 ML: 5 INJECTION INTRAVENOUS at 20:06

## 2022-05-07 RX ADMIN — HYDROMORPHONE HYDROCHLORIDE 0.5 MG: 1 INJECTION, SOLUTION INTRAMUSCULAR; INTRAVENOUS; SUBCUTANEOUS at 02:05

## 2022-05-07 RX ADMIN — LEVETIRACETAM 1500 MG: 100 INJECTION, SOLUTION INTRAVENOUS at 09:34

## 2022-05-07 RX ADMIN — HYDROMORPHONE HYDROCHLORIDE 0.5 MG: 1 INJECTION, SOLUTION INTRAMUSCULAR; INTRAVENOUS; SUBCUTANEOUS at 09:34

## 2022-05-07 RX ADMIN — METRONIDAZOLE 500 MG: 500 INJECTION, SOLUTION INTRAVENOUS at 18:18

## 2022-05-07 RX ADMIN — SODIUM CHLORIDE, POTASSIUM CHLORIDE, SODIUM LACTATE AND CALCIUM CHLORIDE: 600; 310; 30; 20 INJECTION, SOLUTION INTRAVENOUS at 02:05

## 2022-05-07 RX ADMIN — HYDROMORPHONE HYDROCHLORIDE 0.5 MG: 1 INJECTION, SOLUTION INTRAMUSCULAR; INTRAVENOUS; SUBCUTANEOUS at 13:13

## 2022-05-07 RX ADMIN — PIPERACILLIN AND TAZOBACTAM 3375 MG: 3; .375 INJECTION, POWDER, LYOPHILIZED, FOR SOLUTION INTRAVENOUS at 13:10

## 2022-05-07 RX ADMIN — METRONIDAZOLE 500 MG: 500 INJECTION, SOLUTION INTRAVENOUS at 11:24

## 2022-05-07 RX ADMIN — HYDROMORPHONE HYDROCHLORIDE 0.5 MG: 1 INJECTION, SOLUTION INTRAMUSCULAR; INTRAVENOUS; SUBCUTANEOUS at 05:04

## 2022-05-07 RX ADMIN — METRONIDAZOLE 500 MG: 500 INJECTION, SOLUTION INTRAVENOUS at 02:11

## 2022-05-07 RX ADMIN — HYDROMORPHONE HYDROCHLORIDE 0.5 MG: 1 INJECTION, SOLUTION INTRAMUSCULAR; INTRAVENOUS; SUBCUTANEOUS at 23:02

## 2022-05-07 RX ADMIN — ENOXAPARIN SODIUM 40 MG: 100 INJECTION SUBCUTANEOUS at 20:06

## 2022-05-07 RX ADMIN — BENZOCAINE, BUTAMBEN, AND TETRACAINE HYDROCHLORIDE 1 SPRAY: .028; .004; .004 AEROSOL, SPRAY TOPICAL at 05:09

## 2022-05-07 RX ADMIN — LEVETIRACETAM 1500 MG: 100 INJECTION, SOLUTION INTRAVENOUS at 20:06

## 2022-05-07 RX ADMIN — HYDROMORPHONE HYDROCHLORIDE 0.5 MG: 1 INJECTION, SOLUTION INTRAMUSCULAR; INTRAVENOUS; SUBCUTANEOUS at 16:11

## 2022-05-07 RX ADMIN — PIPERACILLIN AND TAZOBACTAM 3375 MG: 3; .375 INJECTION, POWDER, LYOPHILIZED, FOR SOLUTION INTRAVENOUS at 20:23

## 2022-05-07 RX ADMIN — PIPERACILLIN AND TAZOBACTAM 3375 MG: 3; .375 INJECTION, POWDER, LYOPHILIZED, FOR SOLUTION INTRAVENOUS at 05:06

## 2022-05-07 RX ADMIN — SODIUM CHLORIDE 40 MG: 9 INJECTION INTRAMUSCULAR; INTRAVENOUS; SUBCUTANEOUS at 07:03

## 2022-05-07 RX ADMIN — HYDROMORPHONE HYDROCHLORIDE 0.5 MG: 1 INJECTION, SOLUTION INTRAMUSCULAR; INTRAVENOUS; SUBCUTANEOUS at 20:04

## 2022-05-07 RX ADMIN — SODIUM CHLORIDE, POTASSIUM CHLORIDE, SODIUM LACTATE AND CALCIUM CHLORIDE: 600; 310; 30; 20 INJECTION, SOLUTION INTRAVENOUS at 11:28

## 2022-05-07 ASSESSMENT — PAIN SCALES - GENERAL
PAINLEVEL_OUTOF10: 4
PAINLEVEL_OUTOF10: 0
PAINLEVEL_OUTOF10: 8
PAINLEVEL_OUTOF10: 4
PAINLEVEL_OUTOF10: 10
PAINLEVEL_OUTOF10: 9
PAINLEVEL_OUTOF10: 7
PAINLEVEL_OUTOF10: 4
PAINLEVEL_OUTOF10: 8
PAINLEVEL_OUTOF10: 9
PAINLEVEL_OUTOF10: 7
PAINLEVEL_OUTOF10: 7

## 2022-05-07 ASSESSMENT — PAIN DESCRIPTION - DESCRIPTORS
DESCRIPTORS: ACHING;DISCOMFORT;DULL
DESCRIPTORS: ACHING;DISCOMFORT
DESCRIPTORS: ACHING;BURNING

## 2022-05-07 ASSESSMENT — PAIN DESCRIPTION - LOCATION
LOCATION: ABDOMEN

## 2022-05-07 ASSESSMENT — PAIN DESCRIPTION - ORIENTATION: ORIENTATION: LEFT

## 2022-05-07 ASSESSMENT — PAIN - FUNCTIONAL ASSESSMENT: PAIN_FUNCTIONAL_ASSESSMENT: PREVENTS OR INTERFERES SOME ACTIVE ACTIVITIES AND ADLS

## 2022-05-07 NOTE — OP NOTE
[address] [address]                                OPERATIVE REPORT    PATIENT NAME: Janet Gutiérrez                  :        1970  MED REC NO:   [id number]                            ROOM:       0505  ACCOUNT NO:   [de-identified]                           ADMIT DATE: 2022  PROVIDER:     Pinky Ohara DO    DATE OF PROCEDURE:  2022        PREOPERATIVE DIAGNOSES:  Small bowel perforation, metallic foreign body. POSTOPERATIVE DIAGNOSES:  Small bowel perforation, metallic foreign  Body. Suspicious for Crohns Dz with fat wrapping;  Generalized Peritonitis    PROCEDURES PERFORMED:  Exploratory laparotomy, small bowel resection. Drainage of interloop abscess with Abdominal washout    SURGEON:  Gaye Villalpando MD.    ASSISTANT:  Pinky Ohara DO, PGY-5. ANESTHESIA:  General.    ESTIMATED BLOOD LOSS:  Less than 50 mL. COMPLICATIONS:  None. FINDINGS:  A large amount of generalized purulent peritonitis, small piece of metal  in specimen, likely the cause of perforation. Fat wrapping in small bowel suspicious for Crohns disease, defer to pathology; Interloop abscess noted. HISTORY OF PROCEDURE:  The patient is a 80-year-old male who comes in  with abdominal pain since Wednesday. It acutely worsened this morning  and he had a CT scan, which showed an area of small bowel perforation  and a radio-opaque piece of material near the perforation concerning for  a metallic foreign body. Risks, benefits, and alternatives of an  exploratory laparotomy and possible bowel resection were discussed with  him and he was agreeable to the procedure. DESCRIPTION OF PROCEDURE:  The patient was brought to the operating room  and placed supine on the OR table. Sequential compression devices were  placed on the patient's lower extremities and functioning.   The patient  received Zosyn as a preoperative antibiotic. General anesthesia was  obtained as per the Anesthesia record. The patient was prepped with  DuraPrep and draped in the usual sterile fashion. A surgical time-out  was performed and agreed upon by all parties prior to incision. Initially, a midline laparotomy incision was made through the skin down  through the subcutaneous tissue until the fascia was encountered. Fascia was scored and opened with cautery until the peritoneum was  exposed. Peritoneum was grasped with two straight clamps and it was  entered sharply with scissors and opened the entire length of the  incision as well. Initially, upon entering into the abdomen, there was  foul smelling purulent drainage consistent with a bowel perforation. The drainage was cultured and sent off to micro. The small bowel was  eviscerated from the abdomen. There was an area of interloop abscess  and phlegmon in the area of small bowel that was down in the pelvis. This was brought up and exposed. The area of perforation was noted on  the mesenteric side of the small bowel. At this point, decision was  made to perform a small bowel resection. Two mesenteric windows were  made about the area of perforation and purulence. The proximal and  distal transection points were made with a SUKUMAR-75 blue load stapler. The small bowel mesentery was then transected with a LigaSure device and  small bowel was brought onto the back table. The specimen was opened. The specimen was examined. There was a small metallic piece found near  the area of perforation and abscess that was noted on the  CT scan. This was likely the cause of perforation. Next, the abdomen  was then irrigated with copious amounts of saline and suctioned until  clear. A side-to-side functional end-to-end small bowel anastomosis was  then performed. The two ends of the small bowel were lined up and the  staple corners were cut.   A 75-mm SUKUMAR blue load stapler was used to  create the small bowel anastomosis. The common channel was then stapled  off with a TA-60 stapler. There was some bleeding from the mesentery  and the staple line. These were suture ligated with a 3-0 Vicryl  suture. The mesenteric defects were then closed with a running 3-0  Vicryl suture as well. The area was examined meticulously to ensure  there was no bleeding due to the fact that he was on Plavix. Satisfied  with the hemostasis, midline fascia was then closed with 0 looped PDS  x2. The skin was then closed with staples and then an [address]  was placed over the top. Needle, sponge, and instrument count was  correct x2. Dr. Mireya Rasheed was present and scrubbed for the entire  procedure. DISPOSITION:  The patient was extubated and then brought to PACU in  stable condition. The patient will be brought to the floor after  appropriate PACU recovery.         Serenity Long DO    D: 05/06/2022 13:35:26       T: 05/06/2022 13:41:14     [email or web address]  Job#: [id number]     Doc#: [id number]    CC:

## 2022-05-07 NOTE — PROGRESS NOTES
Patient has multiple complaints but focuses on the NG tube which has produced over 900 cc over the last 20 hours!;  He wants it out now! Nurses provided him with cetacaine spray which is not working. He is unable to lay on his right side because of the NG tube; Pain control is ok with Refugia Tim reporting 5/10; No flatus  VSS Afebriloe  Lung- poor effort, no SMI in room  Cor- RRR  Abdomen- some strike through on gauze, distended, soft  Labs- reviewed  POD 1: SB perforation/ PERITONITIS- generalized in all 4 quadrants// fat creeping/ foreign body/ possibly chrons  Plan:  Continue IV fluid/ IV antibiotics, NG tube; Add SMI;  Await bowel function. Encourage OOB.

## 2022-05-07 NOTE — PROGRESS NOTES
Patient stated he is having great discomfort with NGT. Spoke with Lisseth Alfaro regarding, orders received.   Electronically signed by Isrrael Pratt RN on 5/7/2022 at 4:58 AM

## 2022-05-07 NOTE — PROGRESS NOTES
[facility] Progress Note    Admitting Date and Time: 5/6/2022  7:18 AM  Admit Dx: Bowel perforation ([facility] [address] [K63.1]  Perforation bowel ([address] [K63.1]  Small bowel perforation ([address] [K63.1]    Subjective:  Patient is being followed for Bowel perforation ([address] [K63.1]  Perforation bowel ([address] [K63.1]  Small bowel perforation ([address] [K63.1]     Patient's only complaint is the discomfort with NGT.     ROS: denies fever, chills, cp, sob, n/v, HA unless stated above.      metroNIDAZOLE  500 mg IntraVENous Q8H    piperacillin-tazobactam  3,375 mg IntraVENous Q8H    sodium chloride flush  10 mL IntraVENous 2 times per day    enoxaparin  40 mg SubCUTAneous Daily    [Held by provider] levETIRAcetam  1,500 mg Oral BID    [Held by provider] metoprolol succinate  25 mg Oral Daily    [Held by provider] traZODone  150 mg Oral Nightly    [Held by provider] escitalopram  40 mg Oral Daily    acetaminophen  650 mg Oral Q6H    [Held by provider] allopurinol  100 mg Oral Daily    [Held by provider] atorvastatin  40 mg Oral Nightly    [Held by provider] clopidogrel  75 mg Oral Daily    [Held by provider] gabapentin  300 mg Oral TID WC    [Held by provider] mirtazapine  45 mg Oral Nightly    [Held by provider] tadalafil  5 mg Oral Daily    [Held by provider] gabapentin  600 mg Oral Nightly    levetiracetam  1,500 mg IntraVENous Q12H    pantoprazole (PROTONIX) 40 mg injection  40 mg IntraVENous Daily    [Held by provider] hydroxyurea  500 mg Oral Every Other Day    [Held by provider] hydroxyurea  500 mg Oral Every Other Day     butamben-tetracaine-benzocaine, 1 spray, PRN  sodium chloride, , PRN  sodium chloride flush, 10 mL, PRN  sodium chloride, , PRN  ondansetron, 4 mg, Q8H PRN   Or  ondansetron, 4 mg, Q6H PRN  oxyCODONE, 5 mg, Q4H PRN   Or  oxyCODONE, 10 mg, Q4H PRN  HYDROmorphone, 0.25 mg, Q3H PRN   Or  HYDROmorphone, 0.5 mg, Q3H PRN         Objective:    /62   Pulse 76   Temp 98 °F (36.7 °C) (Oral)   Resp 16   Ht 6' 1\" (1.854 m)   Wt 170 lb (77.1 kg)   SpO2 95%   BMI 22.43 kg/m²     General Appearance: alert and oriented to person, place and time and in no acute distress, NGT  Skin: warm and dry  Head: normocephalic and atraumatic  Eyes: pupils equal, round, and reactive to light, extraocular eye movements intact, conjunctivae normal  Neck: neck supple and non tender without mass   Pulmonary/Chest: clear to auscultation bilaterally- no wheezes, rales or rhonchi, normal air movement, no respiratory distress  Cardiovascular: normal rate, normal S1 and S2 and no carotid bruits  Abdomen: soft, non-tender, non-distended, no bowel sounds, no masses or organomegaly  Extremities: no cyanosis, no clubbing and no edema  Neurologic: no cranial nerve deficit and speech normal        Recent Labs     05/06/22  0755 05/07/22  0540    138   K 3.7 3.6    102   CO2 26 27   BUN 13 8   CREATININE 1.0 0.8   GLUCOSE 125* 138*   CALCIUM 9.4 8.4*       Recent Labs     05/06/22  0755 05/06/22  1930 05/07/22  0540   WBC 10.5 9.1 8.4   RBC 3.25* 2.91* 2.88*   HGB 13.2 12.0* 11.9*   HCT 38.0 34.2* 33.8*   .9* 117.5* 117.4*   MCH 40.6* 41.2* 41.3*   MCHC 34.7* 35.1* 35.2*   RDW 11.1* 11.2* 10.8*    228 222   MPV 8.9 9.0 9.2       Radiology:     ABD XRAY  Enteric tube tip in the fundus of the stomach. Assessment:    Principal Problem: Bowel perforation ([address]  Active Problems:    Small bowel perforation (HCC)  Resolved Problems:    * No resolved hospital problems. *      Plan:    1. Perforated small bowel 2/2 metal foreign body S/P ex lap with small bowel resection - Patient is NPO with meds, zosyn, as well as IVF. NGT management should be done by surgery. 2. HTN - Continue toprol when okay to resume. For now will order labetalol PRN. Vital signs are stable. 3. Gout - Continue allopurinol when okay to resume  4. ED - Continue Cialis when okay to resume  5.  H/O Seizure - Continue Keppra IV and then transition to oral  6. Irritable bowel syndrome  7. Atrial flutter  8. CAD - Continue Toprol, Plavix, Lipitor when okay to transition to oral  9. DVT prophylaxis - Lovenox    Only home medication that we can transition to IV is keppra and so will hold off on all other oral medications. Okay to resume oral medications when okay with surgery and so they have already been ordered but should not be administered unless okay with surgery. Will sign off at this time as there are no acute issues. NOTE: This report was transcribed using voice recognition software. Every effort was made to ensure accuracy; however, inadvertent computerized transcription errors may be present.     Electronically signed by Karina Rodrigues DO on 5/7/2022 at 9:24 AM

## 2022-05-07 NOTE — PLAN OF CARE
Problem: ABCDS Injury Assessment  Goal: Absence of physical injury  Outcome: Progressing     Problem: Pain  Goal: Verbalizes/displays adequate comfort level or baseline comfort level  Outcome: Progressing     Problem: Discharge Planning  Goal: Discharge to home or other facility with appropriate resources  Outcome: Progressing

## 2022-05-08 LAB
ALBUMIN SERPL-MCNC: 3.4 G/DL (ref 3.5–5.2)
ALP BLD-CCNC: 80 U/L (ref 40–129)
ALT SERPL-CCNC: 17 U/L (ref 0–40)
ANION GAP SERPL CALCULATED.3IONS-SCNC: 11 MMOL/L (ref 7–16)
ANISOCYTOSIS: ABNORMAL
AST SERPL-CCNC: 23 U/L (ref 0–39)
BASOPHILS ABSOLUTE: 0.05 E9/L (ref 0–0.2)
BASOPHILS RELATIVE PERCENT: 0.7 % (ref 0–2)
BILIRUB SERPL-MCNC: 0.7 MG/DL (ref 0–1.2)
BUN BLDV-MCNC: 9 MG/DL (ref 6–20)
CALCIUM SERPL-MCNC: 8.5 MG/DL (ref 8.6–10.2)
CHLORIDE BLD-SCNC: 103 MMOL/L (ref 98–107)
CO2: 26 MMOL/L (ref 22–29)
CREAT SERPL-MCNC: 0.7 MG/DL (ref 0.7–1.2)
CULTURE SURGICAL: NORMAL
EOSINOPHILS ABSOLUTE: 0.08 E9/L (ref 0.05–0.5)
EOSINOPHILS RELATIVE PERCENT: 1.2 % (ref 0–6)
GFR AFRICAN AMERICAN: >60
GFR NON-AFRICAN AMERICAN: >60 ML/MIN/1.73
GLUCOSE BLD-MCNC: 110 MG/DL (ref 74–99)
HCT VFR BLD CALC: 32.4 % (ref 37–54)
HCT VFR BLD CALC: 32.7 % (ref 37–54)
HEMOGLOBIN: 11.3 G/DL (ref 12.5–16.5)
HEMOGLOBIN: 11.4 G/DL (ref 12.5–16.5)
IMMATURE GRANULOCYTES #: 0.05 E9/L
IMMATURE GRANULOCYTES %: 0.7 % (ref 0–5)
LYMPHOCYTES ABSOLUTE: 1.1 E9/L (ref 1.5–4)
LYMPHOCYTES RELATIVE PERCENT: 15.9 % (ref 20–42)
MAGNESIUM: 1.8 MG/DL (ref 1.6–2.6)
MCH RBC QN AUTO: 40.5 PG (ref 26–35)
MCH RBC QN AUTO: 40.6 PG (ref 26–35)
MCHC RBC AUTO-ENTMCNC: 34.9 % (ref 32–34.5)
MCHC RBC AUTO-ENTMCNC: 34.9 % (ref 32–34.5)
MCV RBC AUTO: 116.1 FL (ref 80–99.9)
MCV RBC AUTO: 116.4 FL (ref 80–99.9)
MONOCYTES ABSOLUTE: 0.65 E9/L (ref 0.1–0.95)
MONOCYTES RELATIVE PERCENT: 9.4 % (ref 2–12)
MRSA CULTURE ONLY: NORMAL
NEUTROPHILS ABSOLUTE: 4.99 E9/L (ref 1.8–7.3)
NEUTROPHILS RELATIVE PERCENT: 72.1 % (ref 43–80)
PDW BLD-RTO: 10.8 FL (ref 11.5–15)
PDW BLD-RTO: 10.9 FL (ref 11.5–15)
PLATELET # BLD: 235 E9/L (ref 130–450)
PLATELET # BLD: 240 E9/L (ref 130–450)
PMV BLD AUTO: 9.2 FL (ref 7–12)
PMV BLD AUTO: 9.2 FL (ref 7–12)
POLYCHROMASIA: ABNORMAL
POTASSIUM REFLEX MAGNESIUM: 3.5 MMOL/L (ref 3.5–5)
RBC # BLD: 2.79 E12/L (ref 3.8–5.8)
RBC # BLD: 2.81 E12/L (ref 3.8–5.8)
SODIUM BLD-SCNC: 140 MMOL/L (ref 132–146)
TOTAL PROTEIN: 6.3 G/DL (ref 6.4–8.3)
WBC # BLD: 6.9 E9/L (ref 4.5–11.5)
WBC # BLD: 7 E9/L (ref 4.5–11.5)

## 2022-05-08 PROCEDURE — A4216 STERILE WATER/SALINE, 10 ML: HCPCS | Performed by: INTERNAL MEDICINE

## 2022-05-08 PROCEDURE — 83735 ASSAY OF MAGNESIUM: CPT

## 2022-05-08 PROCEDURE — 85027 COMPLETE CBC AUTOMATED: CPT

## 2022-05-08 PROCEDURE — 2500000003 HC RX 250 WO HCPCS: Performed by: STUDENT IN AN ORGANIZED HEALTH CARE EDUCATION/TRAINING PROGRAM

## 2022-05-08 PROCEDURE — 1200000000 HC SEMI PRIVATE

## 2022-05-08 PROCEDURE — 6360000002 HC RX W HCPCS: Performed by: STUDENT IN AN ORGANIZED HEALTH CARE EDUCATION/TRAINING PROGRAM

## 2022-05-08 PROCEDURE — 2580000003 HC RX 258: Performed by: STUDENT IN AN ORGANIZED HEALTH CARE EDUCATION/TRAINING PROGRAM

## 2022-05-08 PROCEDURE — 80053 COMPREHEN METABOLIC PANEL: CPT

## 2022-05-08 PROCEDURE — 6360000002 HC RX W HCPCS: Performed by: INTERNAL MEDICINE

## 2022-05-08 PROCEDURE — 85025 COMPLETE CBC W/AUTO DIFF WBC: CPT

## 2022-05-08 PROCEDURE — C9113 INJ PANTOPRAZOLE SODIUM, VIA: HCPCS | Performed by: INTERNAL MEDICINE

## 2022-05-08 PROCEDURE — 36415 COLL VENOUS BLD VENIPUNCTURE: CPT

## 2022-05-08 PROCEDURE — 2580000003 HC RX 258: Performed by: INTERNAL MEDICINE

## 2022-05-08 PROCEDURE — 6370000000 HC RX 637 (ALT 250 FOR IP): Performed by: NURSE PRACTITIONER

## 2022-05-08 RX ADMIN — PIPERACILLIN AND TAZOBACTAM 3375 MG: 3; .375 INJECTION, POWDER, LYOPHILIZED, FOR SOLUTION INTRAVENOUS at 04:30

## 2022-05-08 RX ADMIN — METRONIDAZOLE 500 MG: 500 INJECTION, SOLUTION INTRAVENOUS at 09:57

## 2022-05-08 RX ADMIN — BENZOCAINE, BUTAMBEN, AND TETRACAINE HYDROCHLORIDE 1 SPRAY: .028; .004; .004 AEROSOL, SPRAY TOPICAL at 09:45

## 2022-05-08 RX ADMIN — PIPERACILLIN AND TAZOBACTAM 3375 MG: 3; .375 INJECTION, POWDER, LYOPHILIZED, FOR SOLUTION INTRAVENOUS at 18:21

## 2022-05-08 RX ADMIN — ENOXAPARIN SODIUM 40 MG: 100 INJECTION SUBCUTANEOUS at 08:12

## 2022-05-08 RX ADMIN — SODIUM CHLORIDE, POTASSIUM CHLORIDE, SODIUM LACTATE AND CALCIUM CHLORIDE: 600; 310; 30; 20 INJECTION, SOLUTION INTRAVENOUS at 13:24

## 2022-05-08 RX ADMIN — METRONIDAZOLE 500 MG: 500 INJECTION, SOLUTION INTRAVENOUS at 18:20

## 2022-05-08 RX ADMIN — SODIUM CHLORIDE, POTASSIUM CHLORIDE, SODIUM LACTATE AND CALCIUM CHLORIDE: 600; 310; 30; 20 INJECTION, SOLUTION INTRAVENOUS at 22:20

## 2022-05-08 RX ADMIN — HYDROMORPHONE HYDROCHLORIDE 0.5 MG: 1 INJECTION, SOLUTION INTRAMUSCULAR; INTRAVENOUS; SUBCUTANEOUS at 09:46

## 2022-05-08 RX ADMIN — ONDANSETRON 4 MG: 2 INJECTION INTRAMUSCULAR; INTRAVENOUS at 18:58

## 2022-05-08 RX ADMIN — HYDROMORPHONE HYDROCHLORIDE 0.5 MG: 1 INJECTION, SOLUTION INTRAMUSCULAR; INTRAVENOUS; SUBCUTANEOUS at 02:43

## 2022-05-08 RX ADMIN — HYDROMORPHONE HYDROCHLORIDE 0.5 MG: 1 INJECTION, SOLUTION INTRAMUSCULAR; INTRAVENOUS; SUBCUTANEOUS at 20:10

## 2022-05-08 RX ADMIN — METRONIDAZOLE 500 MG: 500 INJECTION, SOLUTION INTRAVENOUS at 02:30

## 2022-05-08 RX ADMIN — LEVETIRACETAM 1500 MG: 100 INJECTION, SOLUTION INTRAVENOUS at 19:00

## 2022-05-08 RX ADMIN — SODIUM CHLORIDE, PRESERVATIVE FREE 10 ML: 5 INJECTION INTRAVENOUS at 09:12

## 2022-05-08 RX ADMIN — SODIUM CHLORIDE 40 MG: 9 INJECTION INTRAMUSCULAR; INTRAVENOUS; SUBCUTANEOUS at 06:13

## 2022-05-08 RX ADMIN — HYDROMORPHONE HYDROCHLORIDE 0.5 MG: 1 INJECTION, SOLUTION INTRAMUSCULAR; INTRAVENOUS; SUBCUTANEOUS at 22:47

## 2022-05-08 RX ADMIN — HYDROMORPHONE HYDROCHLORIDE 0.5 MG: 1 INJECTION, SOLUTION INTRAMUSCULAR; INTRAVENOUS; SUBCUTANEOUS at 06:14

## 2022-05-08 RX ADMIN — HYDROMORPHONE HYDROCHLORIDE 0.5 MG: 1 INJECTION, SOLUTION INTRAMUSCULAR; INTRAVENOUS; SUBCUTANEOUS at 12:46

## 2022-05-08 RX ADMIN — HYDROMORPHONE HYDROCHLORIDE 0.5 MG: 1 INJECTION, SOLUTION INTRAMUSCULAR; INTRAVENOUS; SUBCUTANEOUS at 16:49

## 2022-05-08 RX ADMIN — LEVETIRACETAM 1500 MG: 100 INJECTION, SOLUTION INTRAVENOUS at 07:17

## 2022-05-08 RX ADMIN — SODIUM CHLORIDE, POTASSIUM CHLORIDE, SODIUM LACTATE AND CALCIUM CHLORIDE: 600; 310; 30; 20 INJECTION, SOLUTION INTRAVENOUS at 02:40

## 2022-05-08 ASSESSMENT — PAIN SCALES - GENERAL
PAINLEVEL_OUTOF10: 8
PAINLEVEL_OUTOF10: 8
PAINLEVEL_OUTOF10: 7
PAINLEVEL_OUTOF10: 7
PAINLEVEL_OUTOF10: 8
PAINLEVEL_OUTOF10: 6

## 2022-05-08 ASSESSMENT — PAIN DESCRIPTION - LOCATION: LOCATION: ABDOMEN

## 2022-05-08 NOTE — PLAN OF CARE
Problem: ABCDS Injury Assessment  Goal: Absence of physical injury  5/8/2022 0001 by Mulu Bravo  Outcome: Progressing  5/7/2022 1038 by Collins Clark RN  Outcome: Progressing     Problem: Pain  Goal: Verbalizes/displays adequate comfort level or baseline comfort level  5/8/2022 0001 by Mulu Bravo  Outcome: Progressing  5/7/2022 1038 by Collins Clark RN  Outcome: Progressing     Problem: Discharge Planning  Goal: Discharge to home or other facility with appropriate resources  5/8/2022 0001 by Mulu Bravo  Outcome: Progressing  5/7/2022 1038 by Collins Clark RN  Outcome: Progressing     Problem: Safety - Adult  Goal: Free from fall injury  Outcome: Progressing

## 2022-05-08 NOTE — PROGRESS NOTES
Dr. Tristen Key instructed bedside nurse to remove arthur catheter and NG tube.      Electronically signed by Chavez Hurtado RN on 5/8/2022 at 8:57 AM

## 2022-05-08 NOTE — PROGRESS NOTES
Pt still complaining of problems with the NG tube; He is now using [facility] and has >1500 cc;   NG output decreased;   Flatus X1 reported by patient; Nio fevers/chills  VSS Afebrile  Lungs- CTA  Cor- RRR  Abdomen- Wound C/D/I                     Soft, BS hypoactive  Labs- Reviewed  POOD 2; SB perforation/ possible Chron's/ generalized peritonitis  Plan: D/c NG and arthur;  keep NPO at present  Encourage [facility] use;  Patient voices understanding.

## 2022-05-08 NOTE — PROGRESS NOTES
Sr catheter and NG tube removed. No complications. Due to void at 1500.      Electronically signed by Chad Evans RN on 5/8/2022 at 9:21 AM

## 2022-05-09 LAB
ALBUMIN SERPL-MCNC: 3.3 G/DL (ref 3.5–5.2)
ALP BLD-CCNC: 59 U/L (ref 40–129)
ALT SERPL-CCNC: 14 U/L (ref 0–40)
ANION GAP SERPL CALCULATED.3IONS-SCNC: 11 MMOL/L (ref 7–16)
AST SERPL-CCNC: 19 U/L (ref 0–39)
BASOPHILS ABSOLUTE: 0.04 E9/L (ref 0–0.2)
BASOPHILS RELATIVE PERCENT: 0.9 % (ref 0–2)
BILIRUB SERPL-MCNC: 0.8 MG/DL (ref 0–1.2)
BUN BLDV-MCNC: 10 MG/DL (ref 6–20)
CALCIUM SERPL-MCNC: 8.7 MG/DL (ref 8.6–10.2)
CHLORIDE BLD-SCNC: 103 MMOL/L (ref 98–107)
CO2: 27 MMOL/L (ref 22–29)
CREAT SERPL-MCNC: 0.7 MG/DL (ref 0.7–1.2)
EOSINOPHILS ABSOLUTE: 0.15 E9/L (ref 0.05–0.5)
EOSINOPHILS RELATIVE PERCENT: 3.5 % (ref 0–6)
GFR AFRICAN AMERICAN: >60
GFR NON-AFRICAN AMERICAN: >60 ML/MIN/1.73
GLUCOSE BLD-MCNC: 104 MG/DL (ref 74–99)
HCT VFR BLD CALC: 33.2 % (ref 37–54)
HEMOGLOBIN: 11.5 G/DL (ref 12.5–16.5)
IMMATURE GRANULOCYTES #: 0.01 E9/L
IMMATURE GRANULOCYTES %: 0.2 % (ref 0–5)
LYMPHOCYTES ABSOLUTE: 1.03 E9/L (ref 1.5–4)
LYMPHOCYTES RELATIVE PERCENT: 24.3 % (ref 20–42)
MAGNESIUM: 1.9 MG/DL (ref 1.6–2.6)
MCH RBC QN AUTO: 40.8 PG (ref 26–35)
MCHC RBC AUTO-ENTMCNC: 34.6 % (ref 32–34.5)
MCV RBC AUTO: 117.7 FL (ref 80–99.9)
MONOCYTES ABSOLUTE: 0.52 E9/L (ref 0.1–0.95)
MONOCYTES RELATIVE PERCENT: 12.3 % (ref 2–12)
NEUTROPHILS ABSOLUTE: 2.48 E9/L (ref 1.8–7.3)
NEUTROPHILS RELATIVE PERCENT: 58.8 % (ref 43–80)
PDW BLD-RTO: 11.2 FL (ref 11.5–15)
PLATELET # BLD: 272 E9/L (ref 130–450)
PMV BLD AUTO: 9.1 FL (ref 7–12)
POLYCHROMASIA: ABNORMAL
POTASSIUM REFLEX MAGNESIUM: 3.5 MMOL/L (ref 3.5–5)
RBC # BLD: 2.82 E12/L (ref 3.8–5.8)
SODIUM BLD-SCNC: 141 MMOL/L (ref 132–146)
TOTAL PROTEIN: 6.1 G/DL (ref 6.4–8.3)
WBC # BLD: 4.2 E9/L (ref 4.5–11.5)

## 2022-05-09 PROCEDURE — 6360000002 HC RX W HCPCS: Performed by: STUDENT IN AN ORGANIZED HEALTH CARE EDUCATION/TRAINING PROGRAM

## 2022-05-09 PROCEDURE — 80053 COMPREHEN METABOLIC PANEL: CPT

## 2022-05-09 PROCEDURE — 2500000003 HC RX 250 WO HCPCS: Performed by: STUDENT IN AN ORGANIZED HEALTH CARE EDUCATION/TRAINING PROGRAM

## 2022-05-09 PROCEDURE — 97161 PT EVAL LOW COMPLEX 20 MIN: CPT

## 2022-05-09 PROCEDURE — 6360000002 HC RX W HCPCS: Performed by: INTERNAL MEDICINE

## 2022-05-09 PROCEDURE — 36415 COLL VENOUS BLD VENIPUNCTURE: CPT

## 2022-05-09 PROCEDURE — 2580000003 HC RX 258: Performed by: STUDENT IN AN ORGANIZED HEALTH CARE EDUCATION/TRAINING PROGRAM

## 2022-05-09 PROCEDURE — 1200000000 HC SEMI PRIVATE

## 2022-05-09 PROCEDURE — 83735 ASSAY OF MAGNESIUM: CPT

## 2022-05-09 PROCEDURE — 85025 COMPLETE CBC W/AUTO DIFF WBC: CPT

## 2022-05-09 PROCEDURE — 97165 OT EVAL LOW COMPLEX 30 MIN: CPT

## 2022-05-09 PROCEDURE — C9113 INJ PANTOPRAZOLE SODIUM, VIA: HCPCS | Performed by: INTERNAL MEDICINE

## 2022-05-09 PROCEDURE — 2580000003 HC RX 258: Performed by: INTERNAL MEDICINE

## 2022-05-09 PROCEDURE — A4216 STERILE WATER/SALINE, 10 ML: HCPCS | Performed by: INTERNAL MEDICINE

## 2022-05-09 RX ORDER — OXYCODONE HYDROCHLORIDE 5 MG/1
5 TABLET ORAL EVERY 6 HOURS PRN
Qty: 20 TABLET | Refills: 0 | Status: SHIPPED | OUTPATIENT
Start: 2022-05-09 | End: 2022-05-18 | Stop reason: SDUPTHER

## 2022-05-09 RX ADMIN — LEVETIRACETAM 1500 MG: 100 INJECTION, SOLUTION INTRAVENOUS at 21:09

## 2022-05-09 RX ADMIN — HYDROMORPHONE HYDROCHLORIDE 0.5 MG: 1 INJECTION, SOLUTION INTRAMUSCULAR; INTRAVENOUS; SUBCUTANEOUS at 06:01

## 2022-05-09 RX ADMIN — ONDANSETRON 4 MG: 2 INJECTION INTRAMUSCULAR; INTRAVENOUS at 16:36

## 2022-05-09 RX ADMIN — PIPERACILLIN AND TAZOBACTAM 3375 MG: 3; .375 INJECTION, POWDER, LYOPHILIZED, FOR SOLUTION INTRAVENOUS at 02:26

## 2022-05-09 RX ADMIN — HYDROMORPHONE HYDROCHLORIDE 0.5 MG: 1 INJECTION, SOLUTION INTRAMUSCULAR; INTRAVENOUS; SUBCUTANEOUS at 12:48

## 2022-05-09 RX ADMIN — HYDROMORPHONE HYDROCHLORIDE 0.5 MG: 1 INJECTION, SOLUTION INTRAMUSCULAR; INTRAVENOUS; SUBCUTANEOUS at 18:36

## 2022-05-09 RX ADMIN — HYDROMORPHONE HYDROCHLORIDE 0.5 MG: 1 INJECTION, SOLUTION INTRAMUSCULAR; INTRAVENOUS; SUBCUTANEOUS at 09:06

## 2022-05-09 RX ADMIN — METRONIDAZOLE 500 MG: 500 INJECTION, SOLUTION INTRAVENOUS at 02:27

## 2022-05-09 RX ADMIN — HYDROMORPHONE HYDROCHLORIDE 0.5 MG: 1 INJECTION, SOLUTION INTRAMUSCULAR; INTRAVENOUS; SUBCUTANEOUS at 02:28

## 2022-05-09 RX ADMIN — ENOXAPARIN SODIUM 40 MG: 100 INJECTION SUBCUTANEOUS at 08:29

## 2022-05-09 RX ADMIN — PIPERACILLIN AND TAZOBACTAM 3375 MG: 3; .375 INJECTION, POWDER, LYOPHILIZED, FOR SOLUTION INTRAVENOUS at 10:09

## 2022-05-09 RX ADMIN — SODIUM CHLORIDE, POTASSIUM CHLORIDE, SODIUM LACTATE AND CALCIUM CHLORIDE: 600; 310; 30; 20 INJECTION, SOLUTION INTRAVENOUS at 06:01

## 2022-05-09 RX ADMIN — SODIUM CHLORIDE 40 MG: 9 INJECTION INTRAMUSCULAR; INTRAVENOUS; SUBCUTANEOUS at 06:01

## 2022-05-09 RX ADMIN — METRONIDAZOLE 500 MG: 500 INJECTION, SOLUTION INTRAVENOUS at 17:45

## 2022-05-09 RX ADMIN — METRONIDAZOLE 500 MG: 500 INJECTION, SOLUTION INTRAVENOUS at 10:07

## 2022-05-09 RX ADMIN — SODIUM CHLORIDE, POTASSIUM CHLORIDE, SODIUM LACTATE AND CALCIUM CHLORIDE: 600; 310; 30; 20 INJECTION, SOLUTION INTRAVENOUS at 21:05

## 2022-05-09 RX ADMIN — HYDROMORPHONE HYDROCHLORIDE 0.5 MG: 1 INJECTION, SOLUTION INTRAMUSCULAR; INTRAVENOUS; SUBCUTANEOUS at 15:41

## 2022-05-09 RX ADMIN — HYDROMORPHONE HYDROCHLORIDE 0.5 MG: 1 INJECTION, SOLUTION INTRAMUSCULAR; INTRAVENOUS; SUBCUTANEOUS at 21:51

## 2022-05-09 RX ADMIN — LEVETIRACETAM 1500 MG: 100 INJECTION, SOLUTION INTRAVENOUS at 07:50

## 2022-05-09 RX ADMIN — PIPERACILLIN AND TAZOBACTAM 3375 MG: 3; .375 INJECTION, POWDER, LYOPHILIZED, FOR SOLUTION INTRAVENOUS at 17:46

## 2022-05-09 ASSESSMENT — PAIN DESCRIPTION - LOCATION
LOCATION: ABDOMEN;HEAD
LOCATION: ABDOMEN;HEAD

## 2022-05-09 ASSESSMENT — PAIN SCALES - GENERAL
PAINLEVEL_OUTOF10: 5
PAINLEVEL_OUTOF10: 10
PAINLEVEL_OUTOF10: 8
PAINLEVEL_OUTOF10: 7
PAINLEVEL_OUTOF10: 7
PAINLEVEL_OUTOF10: 5
PAINLEVEL_OUTOF10: 7
PAINLEVEL_OUTOF10: 8
PAINLEVEL_OUTOF10: 7

## 2022-05-09 ASSESSMENT — PAIN DESCRIPTION - ORIENTATION: ORIENTATION: MID

## 2022-05-09 ASSESSMENT — PAIN - FUNCTIONAL ASSESSMENT: PAIN_FUNCTIONAL_ASSESSMENT: ACTIVITIES ARE NOT PREVENTED

## 2022-05-09 ASSESSMENT — PAIN DESCRIPTION - DESCRIPTORS
DESCRIPTORS: DISCOMFORT;PRESSURE;SHARP
DESCRIPTORS: PRESSURE

## 2022-05-09 ASSESSMENT — PAIN DESCRIPTION - PAIN TYPE: TYPE: ACUTE PAIN;SURGICAL PAIN

## 2022-05-09 NOTE — PROGRESS NOTES
Discharge order noted, if patient to tolerate diet ok to leave. After eating patient felt nauseous, zofran given, let patient sit for about an hour to see if feeling better. Patient still feeling nauseous and would like to stay one more night. Charge RN notified. Patient discharge paperwork placed into soft chart. Patient states to feel better, message sent to surgeon not to discharge if he needed zofran for nausea. Patient aware and will stay here another night.

## 2022-05-09 NOTE — PROGRESS NOTES
GENERAL SURGERY  DAILY PROGRESS NOTE  5/9/2022    Chief Complaint   Patient presents with    Abdominal Pain     since Wednesday    Nausea    Emesis       Subjective:  Pt states he is doing well. Denies any abdominal pain, nausea, or vomiting. Reports passing flatus. Objective:  BP (!) 167/81   Pulse (!) 49   Temp 98.9 °F (37.2 °C) (Oral)   Resp 16   Ht 6' 1\" (1.854 m)   Wt 178 lb (80.7 kg)   SpO2 97%   BMI 23.48 kg/m²     GENERAL:  Laying in bed, awake, alert, cooperative, no apparent distress  HEAD: Normocephalic, atraumatic  EYES: No sclera icterus, pupils equal  LUNGS:  No increased work of breathing  CARDIOVASCULAR: Bradycardic and normotensive  ABDOMEN:  Soft, appropriate TTP around incision, non-distended  EXTREMITIES: No edema or swelling  SKIN: Warm and dry    Assessment/Plan:  49 y.o. male with small bowel perforation s/p exploratory laparotomy with SBR 5/6    - Okay for CLD  - Decrease IVFs  - Pain control PRN  - Ambulate, OOB  - Incentive spirometry  - Monitor bowel function    Electronically signed by Marquis Chiu MD on 5/9/2022 at 6:19 AM    Patient seen and examined at bedside agree with above. Will advance to a regular diet. If he tolerates regular diet today he can be discharged home.   We discussed follow-up with Dr. Pavithra King

## 2022-05-09 NOTE — PLAN OF CARE
Problem: ABCDS Injury Assessment  Goal: Absence of physical injury  5/8/2022 2152 by Thong Henderson  Outcome: Progressing  5/8/2022 1028 by Yamilex Ventura RN  Outcome: Progressing     Problem: Pain  Goal: Verbalizes/displays adequate comfort level or baseline comfort level  5/8/2022 2152 by Thong Henderson  Outcome: Progressing  5/8/2022 1028 by Yamilex Ventura RN  Outcome: Progressing     Problem: Discharge Planning  Goal: Discharge to home or other facility with appropriate resources  5/8/2022 2152 by Thong Henderson  Outcome: Progressing  5/8/2022 1028 by Yamilex Ventura RN  Outcome: Progressing     Problem: Safety - Adult  Goal: Free from fall injury  5/8/2022 2152 by Thong Henderson  Outcome: Progressing  5/8/2022 1028 by Yamilex Ventura RN  Outcome: Progressing

## 2022-05-09 NOTE — PROGRESS NOTES
Physical Therapy  Facility/Department: [facility] MED SURG  Physical Therapy Initial Assessment    Name: Alivia Pradhan  : 1970  MRN: [id number]  Date of Service: 2022      Attending Provider:  Chago Austin MD    Evaluating PT:  Gokul Ramsey. Nisa Zambrano P.T. Room #:  0505/0505-A  Diagnosis:  Bowel perforation ([facility] [address] [K63.1]  Perforation bowel ([facility] [address] [K63.1]  Small bowel perforation ([facility] [address] [K63.1]  Procedure/Surgery:  22 x lap with SBR  Precautions:  falls    SUBJECTIVE:    Pt lives with wife at his in-laws in a 2 story home with 3 stairs and 1 rail to enter. His bed and bath are on the first floor. Pt ambulated with no AD PTA. OBJECTIVE:   Initial Evaluation  Date: 22 Treatment Short Term/ Long Term   Goals   Was pt agreeable to Eval/treatment? yes     Does pt have pain? No c/o pain at rest, but c/o incisional pain with standing, walking, and moving in/out of bed     Bed Mobility  Rolling: Independent  Supine to sit: Independent  Sit to supine: Independent  Scooting: Independent  Independent   Transfers Sit to stand: Independent  Stand to sit: Independent  Stand pivot: Independent  Independent   Ambulation   350 feet with no AD supervision  500 feet with no AD Independent    Stair negotiation: ascended and descended NA  4 steps with 1 rail Independent    AM-PAC 6 Clicks 27/80       BLE ROM is WFL. BLE strength is grossly 4/5 to 4+/5.    Sensation:  Pt denies numbness and tingling to extremities  Edema:  None noted  Balance: sitting is Independent and standing with no AD is Independent   Endurance: fair+    Patient education  Pt educated on amb 3-5x/day    Patient response to education:   Pt verbalized understanding Pt demonstrated skill Pt requires further education in this area   yes yes yes     ASSESSMENT:    Conditions Requiring Skilled Therapeutic Intervention:    [x]Decreased strength     []Decreased ROM  [x]Decreased functional mobility  [x]Decreased balance   [x]Decreased endurance []Decreased posture  []Decreased sensation  []Decreased coordination   []Decreased vision  []Decreased safety awareness   [x]Increased pain   Comments:  Pt walked in the smith with slow gait speed and mild occasional unsteadiness, but had no LOB. He c/o increased incisional pain with standing upright and with amb, but states it is tolerable. Pt was left supine in bed with call light left by patient and wife present in the room with pt.    Pt's/ family goals   1. To go home. Patient and or family understand(s) diagnosis, prognosis, and plan of care. PHYSICAL THERAPY PLAN OF CARE:    PT POC is established based on physician order and patient diagnosis     Referring provider/PT Order:  PT eval and treat  Diagnosis:  Bowel perforation ([address] [K63.1]  Perforation bowel ([address] [K63.1]  Small bowel perforation ([address] [K63.1]  Specific instructions for next treatment: To increase amb distance. Current Treatment Recommendations:     [x] Strengthening to improve independence with functional mobility   [] ROM to improve ROM and decrease spasm and pain which will help promote independence with functional mobility   [x] Balance Training to improve static/dynamic balance and to reduce fall risk  [x] Endurance Training to improve activity tolerance during functional mobility   [x] Transfer Training to improve safety and independence with all functional transfers   [x] Gait Training to improve gait mechanics, endurance and assess need for appropriate assistive device  [x] Stair Training in preparation for safe discharge home and/or into the community   [] Positioning to prevent skin breakdown and contractures  [] Safety and Education Training   [x] Patient/Caregiver Education   [] HEP  [] Other     PT long term treatment goals are located in above grid    Frequency of treatments: 2-5x/week x 1-2 weeks.     Time in  09:00  Time out  09:20    Evaluation Time includes thorough review of current medical information, gathering information on past medical history/social history and prior level of function, completion of standardized testing/informal observation of tasks, assessment of data and education on plan of care and goals. CPT codes:  [x] Low Complexity PT evaluation 91818  [] Moderate Complexity PT evaluation 85700  [] High Complexity PT evaluation 69982  [] PT Re-evaluation 21614  [] Gait training 62588 ** minutes  [] Manual therapy 10745 ** minutes  [] Therapeutic activities 82544 ** minutes  [] Therapeutic exercises 04799 ** minutes  [] Neuromuscular reeducation 90190 ** minutes     Ok Casper Loss., P.T.   License Number: [id number]

## 2022-05-09 NOTE — PROGRESS NOTES
Occupational Therapy  OCCUPATIONAL THERAPY INITIAL EVALUATION  [facility] [address] [facility]    Date: [facility]     Patient Name: Zheng Haywood  MRN: [id number]  : 1970  Room: [address] [place] [address]    Evaluating OT: Luma Calero, OTR/L - [id number]    Referring Provider: Kimani Echeverria MD  Specific Provider Orders/Date: \"OT eval and treat\" - 2022    Diagnosis: Bowel perforation ([address] [K63.1], Perforation bowel ([address] [K63.1], Small bowel perforation ([facility] [K63.1]   Surgery: Patient underwent exploratory laparotomy, small bowel resection on 2022. Pertinent Medical History: epilepsy, anxiety and depression, atrial flutter, IBS, HTN    Precautions: fall risk, abdominal binder, abdominal splinting    Assessment of Current Deficits:    [x] Functional mobility   [x]ADLs  [x] Strength               [x]Cognition   [x] Functional transfers   [x] IADLs         [x] Safety Awareness   [x]Endurance   [] Fine Coordination              [x] Balance      [] Vision/perception   [x]Sensation    []Gross Motor Coordination  [] ROM  [] Delirium                   [] Motor Control     OT PLAN OF CARE   OT POC is based on physician orders, patient diagnosis, and results of clinical assessment.   Frequency/Duration 2-5 days/week for 2 weeks PRN   Specific OT Treatment Interventions to Include:   * Instruction/training on adapted ADL techniques and AE recommendations to increase functional independence within precautions       * Training on energy conservation strategies, correct breathing pattern and techniques to improve independence/tolerance for self-care routine  * Functional transfer/mobility training/DME recommendations for increased independence, safety, and fall prevention  * Patient/Family education to increase follow through with safety techniques and functional independence  * Recommendation of environmental modifications for increased safety with functional transfers/mobility and ADLs  * Therapeutic exercise to improve motor endurance, ROM, and functional strength for ADLs/functional transfers  * Therapeutic activities to facilitate/challenge dynamic balance, stand tolerance for increased safety and independence with ADLs  * Neuro-muscular re-education: facilitation of righting/equilibrium reactions, midline orientation, scapular stability/mobility, normalization of muscle tone, and facilitation of volitional active controled movement    Recommended Adaptive Equipment: TBD     Home Living: Patient reported that he lives with his wife (who has MS) and her parents in a one-floor setup. Bathroom Setup: walk-in shower    Prior Level of Function (PLOF): Patient reported that he is usually independent with ADLs, IADLs, and functional transfers/mobility (without device). Occupation: Patient works part-time as a cook at [employer]. Pain Level: Patient reported experiencing abdominal pain and a headache, which he rated 8 out of 10; nursing notified of patient's request for pain medication. Cognition: Patient alert and oriented x3. WFL command follow demonstrated. Fair+ insight to current abilities/limitations demonstrated. Memory: WFL  Sequencing: WFL  Problem Solving: Fair+  Judgement/Safety: Fair+    Functional Assessment:  -PAC Daily Activity Raw Score: 19/24   Initial Eval Status  Date: 5/9/2022 Treatment Status  Date:  Short Term Goals = Long Term Goals   Feeding Independent  N/A   Grooming SBA  Mod I / Independent  (seated/standing at sink)   UB Dressing SBA  Mod I / Independent  (including item retrieval)   LB Dressing SBA to doff/don socks while seated at EOB. Mod I / Independent - with use of AE, as needed/appropriate   Bathing SBA  Mod I / Independent - with use of AE/DME, as needed/appropriate   Toileting SBA  Evidence of recent episode of urinary incontinence/urgency apparent upon this OTR's arrival to patient's room.   Mod I / Alana Alegria Bed Mobility  Supine-to-Sit: Independent  Sit-to-Supine: Independent   N/A   Functional Transfers Sit-to-Stand: Supervision  from EOB  Independent   Functional Mobility Supervision   (without device) within patient's room. Mod I / Independent with functional mobility (with device, as needed/appropriate) in order to maximize independence with ADLs/IADLs and other functional tasks. Balance Sitting: Good  (at EOB)  Standing: Fair+  (without device)  Good dynamic standing balance during completion of ADLs/IADLs and other functional tasks. Activity Tolerance Fair-  Limited by pain; nursing aware. Patient will demonstrate Good understanding and consistent implementation of energy conservation techniques and work simplification techniques into ADL/IADL routines. Visual/  Perceptual WFL     N/A     Additional Long-Term Goal: Patient will increase functional independence to PLOF in order to allow patient to live in least restrictive environment. Strength: ROM: Additional Information:    R UE  WFL WFL    L UE WFL  WFL      Hearing: WFL  Sensation: No complaints of numbness/tingling in B UEs. Tone: WFL  Edema: No    Comments: RN approved patient's participation in [address] [place] [address] activities. Upon arrival, patient's call light was activated and patient was standing (without device) by the computer near the head of the bed. At end of session, patient supine in bed with call light and phone within reach, patient's RN present, and all lines and tubes intact. Patient would benefit from continued skilled OT to increase safety and independence with completion of ADL/IADL tasks for functional independence and quality of life. Treatment: OT treatment provided this date included:    Instruction/training on safety and adapted techniques for completion of ADLs.   Instruction/training on safe functional mobility/transfer techniques.   Instruction/training on energy conservation/work simplification for completion of ADLs. Patient education provided regardin) potential benefits of having staff assistance with ADLs and other OOB activities to prevent falls/injury during hospitalization. Patient indicated understanding. Further skilled OT treatment indicated to increase patient's safety and independence with completion of ADL/IADL tasks in order to maximize patient's functional independence and quality of life. Rehab Potential: Good for established goals. Patient / Family Goal: Patient anticipates returning home. Patient and/or family were instructed on functional diagnosis, prognosis/goals, and OT plan of care. Demonstrated Good understanding. Eval Complexity: Low    Time In: 1145  Time Out: 1200  Total Treatment Time: 0 minutes      Minutes Units   OT Eval Low 76006 15 1   OT Eval Medium 03808     OT Eval High 99960     OT Re-Eval [id number]     Therapeutic Ex 27124     Therapeutic Activities 21988     ADL/Self Care 46599     Orthotic Management 82928     Neuro Re-Ed 44436     Non-Billable Time N/A ---     Evaluation time includes thorough review of current medical information, gathering information on past medical history/social history and prior level of function, completion of standardized testing/informal observation of tasks, assessment of data, and education on plan of care and goals. KAYKAY Azar/L  License Number: [id number]

## 2022-05-10 VITALS
DIASTOLIC BLOOD PRESSURE: 88 MMHG | TEMPERATURE: 98.5 F | SYSTOLIC BLOOD PRESSURE: 138 MMHG | RESPIRATION RATE: 16 BRPM | WEIGHT: 179 LBS | HEART RATE: 66 BPM | HEIGHT: 73 IN | BODY MASS INDEX: 23.72 KG/M2 | OXYGEN SATURATION: 97 %

## 2022-05-10 LAB
ALBUMIN SERPL-MCNC: 3.4 G/DL (ref 3.5–5.2)
ALP BLD-CCNC: 48 U/L (ref 40–129)
ALT SERPL-CCNC: 19 U/L (ref 0–40)
ANION GAP SERPL CALCULATED.3IONS-SCNC: 13 MMOL/L (ref 7–16)
ANISOCYTOSIS: ABNORMAL
AST SERPL-CCNC: 32 U/L (ref 0–39)
BASOPHILS ABSOLUTE: 0.07 E9/L (ref 0–0.2)
BASOPHILS RELATIVE PERCENT: 1.3 % (ref 0–2)
BILIRUB SERPL-MCNC: 0.9 MG/DL (ref 0–1.2)
BUN BLDV-MCNC: 6 MG/DL (ref 6–20)
CALCIUM SERPL-MCNC: 8.8 MG/DL (ref 8.6–10.2)
CHLORIDE BLD-SCNC: 99 MMOL/L (ref 98–107)
CO2: 26 MMOL/L (ref 22–29)
CREAT SERPL-MCNC: 0.6 MG/DL (ref 0.7–1.2)
EOSINOPHILS ABSOLUTE: 0.2 E9/L (ref 0.05–0.5)
EOSINOPHILS RELATIVE PERCENT: 3.7 % (ref 0–6)
GFR AFRICAN AMERICAN: >60
GFR NON-AFRICAN AMERICAN: >60 ML/MIN/1.73
GLUCOSE BLD-MCNC: 102 MG/DL (ref 74–99)
HCT VFR BLD CALC: 36.3 % (ref 37–54)
HEMOGLOBIN: 13.1 G/DL (ref 12.5–16.5)
IMMATURE GRANULOCYTES #: 0.04 E9/L
IMMATURE GRANULOCYTES %: 0.7 % (ref 0–5)
LYMPHOCYTES ABSOLUTE: 1.24 E9/L (ref 1.5–4)
LYMPHOCYTES RELATIVE PERCENT: 22.7 % (ref 20–42)
MCH RBC QN AUTO: 42.5 PG (ref 26–35)
MCHC RBC AUTO-ENTMCNC: 36.1 % (ref 32–34.5)
MCV RBC AUTO: 117.9 FL (ref 80–99.9)
MONOCYTES ABSOLUTE: 0.63 E9/L (ref 0.1–0.95)
MONOCYTES RELATIVE PERCENT: 11.5 % (ref 2–12)
NEUTROPHILS ABSOLUTE: 3.29 E9/L (ref 1.8–7.3)
NEUTROPHILS RELATIVE PERCENT: 60.1 % (ref 43–80)
OVALOCYTES: ABNORMAL
PDW BLD-RTO: 10.9 FL (ref 11.5–15)
PLATELET # BLD: 317 E9/L (ref 130–450)
PMV BLD AUTO: 9.7 FL (ref 7–12)
POIKILOCYTES: ABNORMAL
POLYCHROMASIA: ABNORMAL
POTASSIUM REFLEX MAGNESIUM: 3.8 MMOL/L (ref 3.5–5)
RBC # BLD: 3.08 E12/L (ref 3.8–5.8)
SODIUM BLD-SCNC: 138 MMOL/L (ref 132–146)
TOTAL PROTEIN: 6.4 G/DL (ref 6.4–8.3)
WBC # BLD: 5.5 E9/L (ref 4.5–11.5)

## 2022-05-10 PROCEDURE — 80053 COMPREHEN METABOLIC PANEL: CPT

## 2022-05-10 PROCEDURE — 6370000000 HC RX 637 (ALT 250 FOR IP): Performed by: INTERNAL MEDICINE

## 2022-05-10 PROCEDURE — 6370000000 HC RX 637 (ALT 250 FOR IP): Performed by: STUDENT IN AN ORGANIZED HEALTH CARE EDUCATION/TRAINING PROGRAM

## 2022-05-10 PROCEDURE — 6360000002 HC RX W HCPCS: Performed by: INTERNAL MEDICINE

## 2022-05-10 PROCEDURE — C9113 INJ PANTOPRAZOLE SODIUM, VIA: HCPCS | Performed by: INTERNAL MEDICINE

## 2022-05-10 PROCEDURE — 6360000002 HC RX W HCPCS: Performed by: STUDENT IN AN ORGANIZED HEALTH CARE EDUCATION/TRAINING PROGRAM

## 2022-05-10 PROCEDURE — 85025 COMPLETE CBC W/AUTO DIFF WBC: CPT

## 2022-05-10 PROCEDURE — 2500000003 HC RX 250 WO HCPCS: Performed by: STUDENT IN AN ORGANIZED HEALTH CARE EDUCATION/TRAINING PROGRAM

## 2022-05-10 PROCEDURE — A4216 STERILE WATER/SALINE, 10 ML: HCPCS | Performed by: INTERNAL MEDICINE

## 2022-05-10 PROCEDURE — 2580000003 HC RX 258: Performed by: STUDENT IN AN ORGANIZED HEALTH CARE EDUCATION/TRAINING PROGRAM

## 2022-05-10 PROCEDURE — 36415 COLL VENOUS BLD VENIPUNCTURE: CPT

## 2022-05-10 PROCEDURE — 2580000003 HC RX 258: Performed by: INTERNAL MEDICINE

## 2022-05-10 RX ADMIN — HYDROMORPHONE HYDROCHLORIDE 0.5 MG: 1 INJECTION, SOLUTION INTRAMUSCULAR; INTRAVENOUS; SUBCUTANEOUS at 04:33

## 2022-05-10 RX ADMIN — GABAPENTIN 300 MG: 300 CAPSULE ORAL at 11:16

## 2022-05-10 RX ADMIN — ESCITALOPRAM OXALATE 40 MG: 10 TABLET ORAL at 08:18

## 2022-05-10 RX ADMIN — METOPROLOL SUCCINATE 25 MG: 25 TABLET, EXTENDED RELEASE ORAL at 08:17

## 2022-05-10 RX ADMIN — LEVETIRACETAM 1500 MG: 500 TABLET, FILM COATED ORAL at 08:18

## 2022-05-10 RX ADMIN — SODIUM CHLORIDE 40 MG: 9 INJECTION INTRAMUSCULAR; INTRAVENOUS; SUBCUTANEOUS at 06:13

## 2022-05-10 RX ADMIN — GABAPENTIN 300 MG: 300 CAPSULE ORAL at 08:17

## 2022-05-10 RX ADMIN — ACETAMINOPHEN 650 MG: 325 TABLET ORAL at 11:16

## 2022-05-10 RX ADMIN — METRONIDAZOLE 500 MG: 500 INJECTION, SOLUTION INTRAVENOUS at 01:28

## 2022-05-10 RX ADMIN — OXYCODONE 10 MG: 5 TABLET ORAL at 08:19

## 2022-05-10 RX ADMIN — ALLOPURINOL 100 MG: 100 TABLET ORAL at 08:18

## 2022-05-10 RX ADMIN — PIPERACILLIN AND TAZOBACTAM 3375 MG: 3; .375 INJECTION, POWDER, LYOPHILIZED, FOR SOLUTION INTRAVENOUS at 02:39

## 2022-05-10 RX ADMIN — CLOPIDOGREL BISULFATE 75 MG: 75 TABLET ORAL at 08:17

## 2022-05-10 ASSESSMENT — PAIN SCALES - GENERAL
PAINLEVEL_OUTOF10: 9
PAINLEVEL_OUTOF10: 7
PAINLEVEL_OUTOF10: 6

## 2022-05-10 ASSESSMENT — PAIN DESCRIPTION - DESCRIPTORS: DESCRIPTORS: SORE

## 2022-05-10 ASSESSMENT — PAIN DESCRIPTION - LOCATION: LOCATION: ABDOMEN

## 2022-05-10 NOTE — PROGRESS NOTES
P Quality Flow/Interdisciplinary Rounds Progress Note        Quality Flow Rounds held on May 10, 2022    Disciplines Attending:  Bedside Nurse, ,  and Nursing Unit Leadership    Richie Staton was admitted on 5/6/2022  7:18 AM    Anticipated Discharge Date:       Disposition:    Reno Score:  Reno Scale Score: 21    Readmission Risk              Risk of Unplanned Readmission:  14           Discussed patient goal for the day, patient clinical progression, and barriers to discharge.   The following Goal(s) of the Day/Commitment(s) have been identified:  Discharge - Obtain Order      Sam Rivers RN  May 10, 2022

## 2022-05-10 NOTE — CARE COORDINATION
Social Work discharge planning   SW met with pt for discharge planning. He said he is independent and plan to see his PCP, Dr Luis Enrique Martinez at discharge. He said \"I'm depressed I haven't seen my wife. I talked to her on the phone and it made it worse\". Pt said they only have one car, and his wife can not drive. Pt denied thoughts of SI/HI at this time. He said he is already active with counselor outpt, and already has a follow up appt scheduled. Pt said he will have a ride home at discharge. Pt denied needs at this time.   Electronically signed by Joey Little [facility] on 5/10/2022 at 11:03 AM

## 2022-05-10 NOTE — PROGRESS NOTES
GENERAL SURGERY  DAILY PROGRESS NOTE  5/10/2022    Chief Complaint   Patient presents with    Abdominal Pain     since Wednesday    Nausea    Emesis       Subjective:  Patient had some nausea with regular diet yesterday and wanted to stay one more day. Feels better this morning. Objective:  BP (!) 176/83   Pulse 51   Temp 98.4 °F (36.9 °C) (Oral)   Resp 16   Ht 6' 1\" (1.854 m)   Wt 179 lb (81.2 kg)   SpO2 94%   BMI 23.62 kg/m²     GENERAL:  Laying in bed, awake, alert, cooperative, no apparent distress  HEAD: Normocephalic, atraumatic  EYES: No sclera icterus, pupils equal  LUNGS:  No increased work of breathing  CARDIOVASCULAR: Bradycardic and normotensive  ABDOMEN:  Soft, appropriate TTP around incision, non-distended  EXTREMITIES: No edema or swelling  SKIN: Warm and dry    Assessment/Plan:  49 y.o. male with small bowel perforation s/p exploratory laparotomy with SBR 5/6    - Okay for diet as tolerated  - Pain control PRN  - Ambulate, OOB  - Incentive spirometry  - Monitor bowel function  - Ok for discharge today.     Electronically signed by Jose Iqbal MD on 5/10/2022 at 6:27 AM

## 2022-05-11 ENCOUNTER — TELEPHONE (OUTPATIENT)
Dept: PRIMARY CARE CLINIC | Age: 52
End: 2022-05-11

## 2022-05-11 LAB — ANAEROBIC CULTURE: NORMAL

## 2022-05-11 NOTE — TELEPHONE ENCOUNTER
[address] Transitions Initial Follow Up Call    Outreach made within 2 business days of discharge: Yes    Patient: Margot Ng Patient : 1970   MRN: [id number]  Reason for Admission: Bowel perforation  Discharge Date: 5/10/22       Spoke with: Owen Gasca    Discharge department/facility: [facility] Interactive Patient Contact:  Was patient able to fill all prescriptions: Yes  Was patient instructed to bring all medications to the follow-up visit: Yes  Is patient taking all medications as directed in the discharge summary?  Yes  Does patient understand their discharge instructions: Yes  Does patient have questions or concerns that need addressed prior to 7-14 day follow up office visit: no    Scheduled appointment with PCP within 7-14 days    Follow Up  Future Appointments   Date Time Provider Gilma Pires   2022 10:30 AM MD SAILAJA Williamson [facility]   6/10/2022 10:10 AM Eulalio Navarrete MD [facility]   5/3/2023 11:00 AM Elisa June MD [address] [place] [address], [address] [place] [address] [place]

## 2022-05-11 NOTE — DISCHARGE SUMMARY
Physician Discharge Summary     Patient ID:  Seda Persaud  [id number]  49 y.o.  1970    Admit date: 5/6/2022    Discharge date and time: 5/10/2022 12:37 PM     Admitting Physician: Areli Hilliard MD     Admission Diagnoses: Bowel perforation ([address] [K63.1]  Perforation bowel ([address] [K63.1]  Small bowel perforation ([address] [K63.1]    Discharge Diagnoses: Principal Problem: Bowel perforation ([address]  Active Problems:    Small bowel perforation (HCC)  Resolved Problems:    * No resolved hospital problems. *      Admission Condition: poor    Discharged Condition: stable      Hospital Course:  Seda Persaud is a 49 y.o. male who presented with abdominal pain. Work up revealed small bowel perforation. The patient underwent exploratory laparotomy with small bowel resection on 5/6/22. The patient's course was otherwise uneventful. He progressed well, pain was controlled on PO medications. He was tolerating a regular diet with no nausea or vomiting, and was in a suitable condition for discharge to home in stable condition. Consults:   IP CONSULT TO GENERAL SURGERY  IP CONSULT TO INTERNAL MEDICINE  IP CONSULT TO IV TEAM    Significant Diagnostic Studies:   CT ABDOMEN PELVIS W IV CONTRAST Additional Contrast? None    Result Date: 5/6/2022  EXAMINATION: CT OF THE ABDOMEN AND PELVIS WITH CONTRAST 5/6/2022 8:52 am TECHNIQUE: CT of the abdomen and pelvis was performed with the administration of intravenous contrast. Multiplanar reformatted images are provided for review. Dose modulation, iterative reconstruction, and/or weight based adjustment of the mA/kV was utilized to reduce the radiation dose to as low as reasonably achievable. COMPARISON: The previous study performed 02/10/2022.  HISTORY: ORDERING SYSTEM PROVIDED HISTORY: abdominal pain and nausea for two days, groin pain, evaluate for ischemia TECHNOLOGIST PROVIDED HISTORY: Additional Contrast?->None Reason for exam:->abdominal pain and nausea for two days, groin pain, evaluate for ischemia Decision Support Exception - unselect if not a suspected or confirmed emergency medical condition->Emergency Medical Condition ([place]) FINDINGS: Lower Chest: The lung bases are clear. Organs: The liver, spleen, gallbladder, biliary tree, pancreas, adrenal glands, and kidneys are unremarkable. GI/Bowel: The stomach is suboptimally distended, but grossly unremarkable. There are a couple of loops of diffusely thick walled and edematous small bowel loops in the pelvis, indicating affected ileum. There is surrounding perienteric linear inflammatory stranding. There also tiny droplets of air noted adjacent to one of the affected segments of ileum, indicating micro perforation. There is mild proliferation of surrounding fat, creating separation of bowel loops, which is indicative of creeping fat. There is a tiny density noted adjacent to a portion of the affected ileum. The density is very bright in could represent metal or bone. It is not seen on the prior study. Given all of these findings this is suspicious for focal perforation as result of a an ingested foreign body. Necrotizing enteritis with small intestine perforation, which can be seen in Crohn's disease, is also in the differential diagnosis, but less likely. The large bowel is unremarkable for a non oral contrast study. The appendix is visualized and normal in appearance. Pelvis: The urinary bladder is suboptimally distended and grossly unremarkable. The seminal vesicles are symmetric in size. The prostate gland is upper limits of normal in size and contains tiny calcifications. Peritoneum/Retroperitoneum: Small (up to 1.0 cm diameter) retroperitoneal lymph nodes are identified, which are probably reactive. No pelvic lymphadenopathy is seen. A trace amount of free fluid is identified deep within the pelvis. No abdominal or pelvic soft tissue mass is otherwise noted.   The abdominal aorta is again minimally atherosclerotic. Bones/Soft Tissues: Mild osteo-degenerative changes are noted involving the visualized thoracolumbar spine and sacroiliac joints. 1.  Findings suspicious for focal ileal perforation as result of an ingested foreign body, as described above. Necrotizing enteritis with small intestine perforation, which can be seen in Crohn's disease, is also in the differential diagnosis, but less likely. Surgical consultation is recommended. 2.  Trace amount of free fluid in the pelvis. The findings were discussed with Dr. Kj Maxwell at 9:48 a.m.     XR CHEST PORTABLE    Result Date: 5/6/2022  EXAMINATION: ONE XRAY VIEW OF THE CHEST 5/6/2022 7:55 am COMPARISON: 01/17/2021 HISTORY: ORDERING SYSTEM PROVIDED HISTORY: emesis x 2 days, chest pain TECHNOLOGIST PROVIDED HISTORY: Reason for exam:->emesis x 2 days, chest pain FINDINGS: The lungs are without acute focal process. There is no effusion or pneumothorax. The cardiomediastinal silhouette is without acute process. The osseous structures are without acute process. No acute process. XR ABDOMEN FOR NG/OG/NE TUBE PLACEMENT    Result Date: 5/6/2022  EXAMINATION: ONE SUPINE XRAY VIEW(S) OF THE ABDOMEN 5/6/2022 2:19 pm COMPARISON: None. HISTORY: ORDERING SYSTEM PROVIDED HISTORY: Confirmation of course of NG/OG/NE tube and location of tip of tube TECHNOLOGIST PROVIDED HISTORY: Reason for exam:->Confirmation of course of NG/OG/NE tube and location of tip of tube Portable? ->Yes FINDINGS: Nonspecific bowel gas pattern without evidence of obstruction. No abnormal calcifications. No acute osseous abnormality. Enteric tube tip in the fundus of the stomach. Colonic fecal retention. Enteric tube tip in the fundus of the stomach.        Discharge Exam:  BP (!) 176/83   Pulse 51   Temp 98.4 °F (36.9 °C) (Oral)   Resp 16   Ht 6' 1\" (1.854 m)   Wt 179 lb (81.2 kg)   SpO2 94%   BMI 23.62 kg/m²      GENERAL:  Laying in bed, awake, alert, cooperative, no apparent distress  HEAD: Normocephalic, atraumatic  EYES: No sclera icterus, pupils equal  LUNGS:  No increased work of breathing  CARDIOVASCULAR: Bradycardic and normotensive  ABDOMEN:  Soft, appropriate TTP around incision, non-distended  EXTREMITIES: No edema or swelling  SKIN: Warm and dry       Disposition: home    In process/preliminary results:  Outstanding Order Results     Date and Time Order Name Status Description    5/6/2022 12:45 PM Culture, Anaerobic Preliminary     5/6/2022 12:00 AM Surgical Pathology In process           Patient Instructions:   Discharge Medication List as of 5/10/2022 11:37 AM      START taking these medications    Details   oxyCODONE (ROXICODONE) 5 MG immediate release tablet Take 1 tablet by mouth every 6 hours as needed for Pain for up to 5 days. , Disp-20 tablet, R-0Normal         CONTINUE these medications which have NOT CHANGED    Details   gabapentin (NEURONTIN) 600 MG tablet Take 600 mg by mouth at bedtime. Only take on wednesdayHistorical Med      gabapentin (NEURONTIN) 300 MG capsule Take 300 mg by mouth 3 times daily.  Breakfast lunch dinnerHistorical Med      mirtazapine (REMERON) 45 MG tablet Take 45 mg by mouth at bedtimeHistorical Med      hydrOXYzine (ATARAX) 50 MG tablet Take 50 mg by mouth in the morning and at bedtime Historical Med      tadalafil (CIALIS) 5 MG tablet Take 5 mg by mouth daily Historical Med      vitamin D (ERGOCALCIFEROL) 1.25 MG (12079 UT) CAPS capsule take 1 capsule by mouth every week, Disp-4 capsule, R-5Normal      clopidogrel (PLAVIX) 75 MG tablet take 1 tablet by mouth once daily, Disp-30 tablet, R-5Normal      metoprolol succinate (TOPROL XL) 25 MG extended release tablet take 1 tablet by mouth once daily, Disp-30 tablet, R-5Normal      levETIRAcetam (KEPPRA) 500 MG tablet Take 3 tablets by mouth 2 times daily, Disp-540 tablet, R-3Normal      atorvastatin (LIPITOR) 40 MG tablet Take 1 tablet by mouth nightly, Disp-30 tablet, R-5Normal Cobalamin Combinations (D-87 + FOLIC ACID) 2441-203 MCG TBDP Take 1 tablet by mouth daily, Disp-90 tablet, R-1Normal      azelastine (ASTELIN) 0.1 % nasal spray 1 spray by Nasal route 2 times daily Use in each nostril as directed, Disp-60 mL, R-1Normal      ondansetron (ZOFRAN) 4 MG tablet Take 1 tablet by mouth 3 times daily as needed for Nausea or Vomiting, Disp-30 tablet, R-0Normal      allopurinol (ZYLOPRIM) 100 MG tablet Take 1 tablet by mouth daily, Disp-30 tablet, R-3Normal      traZODone (DESYREL) 100 MG tablet Take 1.5 tablets by mouth nightly, Disp-30 tablet, R-0Adjust Sig      lansoprazole (PREVACID) 30 MG delayed release capsule take 1 capsule by mouth every morningHistorical Med      escitalopram (LEXAPRO) 20 MG tablet take 2 tablets by mouth once daily, Disp-60 tablet, R-5Normal      hydroxyurea (HYDREA) 500 MG chemo capsule Take 1 capsule by mouth 2 times daily, Disp-60 capsule, R-1           Post op Abdominal Surgery Instructions    1. May shower daily with Dial soap and water. Pat incisions dry. No lotions, creams or powders. No tub baths/hot tubs or pools. 2. No lifting [id number]; no pushing/pulling. No driving until after seen by surgeon in the office. May go up and down a flight of stairs 1-2X daily. Walk at least 3x daily. No sexual activity until after seen in the office. 3.  Okay for Diet    4. Take all medications as listed on your Discharge Instruction sheet    5. Call Your Doctor If Any of the Following Occurs     · Signs of infection, including fever and chills   · Redness, swelling, increasing pain, excessive bleeding, or discharge at the incision site   · Cough, shortness of breath, chest pain   · Increased abdominal pain   · Nausea and/or vomiting that does not resolve off narcotics.   · Pain, burning, urgency or frequency of urination, or blood in the urine   · Pain and/or swelling in your feet, calves, or legs   · Dark urine, light stools, or evidence of jaundice (yellowing of the skin or eyes). In case of an emergency,    CALL 911  immediately. 6. Follow up with Dr. Lana Leon in 2 weeks, please call his office upon discharge to schedule an appointment.          Follow up:   Betty Olszewski, MD  [address] [place] [address] [phone]    Schedule an appointment as soon as possible for a visit in 2 weeks  For wound re-check    Randell Glover MD  [facility] [address]  [phone]    Schedule an appointment as soon as possible for a visit  Follow up       Signed:  Nilson Dillard MD  5/11/2022  8:43 AM

## 2022-05-18 ENCOUNTER — OFFICE VISIT (OUTPATIENT)
Dept: PRIMARY CARE CLINIC | Age: 52
End: 2022-05-18
Payer: COMMERCIAL

## 2022-05-18 VITALS
OXYGEN SATURATION: 98 % | TEMPERATURE: 97.5 F | WEIGHT: 163 LBS | DIASTOLIC BLOOD PRESSURE: 66 MMHG | BODY MASS INDEX: 21.6 KG/M2 | SYSTOLIC BLOOD PRESSURE: 92 MMHG | HEIGHT: 73 IN | HEART RATE: 76 BPM

## 2022-05-18 DIAGNOSIS — K63.1 PERFORATION BOWEL (HCC): ICD-10-CM

## 2022-05-18 DIAGNOSIS — K63.1 BOWEL PERFORATION (HCC): ICD-10-CM

## 2022-05-18 DIAGNOSIS — F32.A ANXIETY AND DEPRESSION: ICD-10-CM

## 2022-05-18 DIAGNOSIS — E55.9 VITAMIN D DEFICIENCY: ICD-10-CM

## 2022-05-18 DIAGNOSIS — D47.1 MYELOPROLIFERATIVE DISEASE (HCC): ICD-10-CM

## 2022-05-18 DIAGNOSIS — I25.10 CORONARY ATHEROSCLEROSIS DUE TO CALCIFIED CORONARY LESION: ICD-10-CM

## 2022-05-18 DIAGNOSIS — D47.3 ESSENTIAL THROMBOCYTHEMIA (HCC): ICD-10-CM

## 2022-05-18 DIAGNOSIS — Z95.5 STENTED CORONARY ARTERY: ICD-10-CM

## 2022-05-18 DIAGNOSIS — K63.1 SMALL BOWEL PERFORATION (HCC): ICD-10-CM

## 2022-05-18 DIAGNOSIS — I10 ESSENTIAL HYPERTENSION: ICD-10-CM

## 2022-05-18 DIAGNOSIS — Z09 HOSPITAL DISCHARGE FOLLOW-UP: Primary | ICD-10-CM

## 2022-05-18 DIAGNOSIS — I25.84 CORONARY ATHEROSCLEROSIS DUE TO CALCIFIED CORONARY LESION: ICD-10-CM

## 2022-05-18 DIAGNOSIS — G40.909 SEIZURE DISORDER (HCC): ICD-10-CM

## 2022-05-18 DIAGNOSIS — F41.9 ANXIETY AND DEPRESSION: ICD-10-CM

## 2022-05-18 DIAGNOSIS — F17.200 TOBACCO DEPENDENCE: ICD-10-CM

## 2022-05-18 PROCEDURE — 99215 OFFICE O/P EST HI 40 MIN: CPT | Performed by: FAMILY MEDICINE

## 2022-05-18 PROCEDURE — 1111F DSCHRG MED/CURRENT MED MERGE: CPT | Performed by: FAMILY MEDICINE

## 2022-05-18 RX ORDER — OXYCODONE HYDROCHLORIDE 5 MG/1
5 TABLET ORAL EVERY 6 HOURS PRN
Qty: 12 TABLET | Refills: 0 | Status: SHIPPED | OUTPATIENT
Start: 2022-05-18 | End: 2022-05-21

## 2022-05-18 ASSESSMENT — ENCOUNTER SYMPTOMS
WHEEZING: 0
NAUSEA: 0
SORE THROAT: 0
SHORTNESS OF BREATH: 0
VOMITING: 0
CONSTIPATION: 0
DIARRHEA: 1
RHINORRHEA: 0
ABDOMINAL PAIN: 1

## 2022-05-18 NOTE — PROGRESS NOTES
Post-Discharge Transitional Care Follow Up      Zheng Haywood   YOB: 1970    Date of Office Visit:  5/18/2022  Date of Hospital Admission: 5/6/22  Date of Hospital Discharge: 5/10/22  Readmission Risk Score (high >=14%. Medium >=10%):Readmission Risk Score: 13.2 ( )      Care management risk score Rising risk (score 2-5) and Complex Care (Scores >=6): 4     Non face to face  following discharge, date last encounter closed (first attempt may have been earlier): 5/11/2022 10:45 AM     Call initiated 2 business days of discharge: Yes     Hospital discharge follow-up  -     ME DISCHARGE MEDS RECONCILED W/ CURRENT OUTPATIENT MED LIST  Perforation bowel ([facility] [address]  -     oxyCODONE (ROXICODONE) 5 MG immediate release tablet; Take 1 tablet by mouth every 6 hours as needed for Pain for up to 3 days. , Disp-12 tablet, R-0Normal  Small bowel perforation (HCC)  -     oxyCODONE (ROXICODONE) 5 MG immediate release tablet; Take 1 tablet by mouth every 6 hours as needed for Pain for up to 3 days. , Disp-12 tablet, R-0Normal  Bowel perforation (HCC)  -     oxyCODONE (ROXICODONE) 5 MG immediate release tablet; Take 1 tablet by mouth every 6 hours as needed for Pain for up to 3 days. , Disp-12 tablet, R-0Normal    Hospital follow-up appointment was completed today. A prescription for pain medications was provided to use in a very limited aspect. Patient to continue Tylenol and ibuprofen. Patient to follow-up with GI today and with general surgery in approximately 1 week. Patient does have some slight induration and abdominal pain on exam.  Patient will discuss this with GI today. He may need to have additional imaging performed based on this finding and persistent pain. Patient is to continue MiraLAX and may add Colace as needed for his constipation/diarrhea issues. Concerns that the patient may have overflow diarrhea. Patient to follow-up with GI and general surgery in regards to this issue.   To ER with any new or worsening symptoms. Medication list was reviewed and updated. Vitamin D deficiency  Essential hypertension  Anxiety and depression  Stented coronary artery  Myeloproliferative disease (HCC)  Essential thrombocythemia ([facility] [address]  Seizure disorder ([facility] [address]  Coronary atherosclerosis due to calcified coronary lesion  Tobacco dependence  Patient's above issues are essentially stable. We will review these further at his upcoming appointment. Patient has been following with his specialist as scheduled. Patient did recently see cardiology. No changes. Following yearly with such. No recent breakthrough seizures. Patient reports that he has had multiple changes in regards to his psychiatric medications. These are updated in his chart. Patient is following with psychiatry. No SI/HI. Medical Decision Making: high complexity  Return in 6 weeks (on 6/29/2022). On this date 5/18/2022 I have spent 40 minutes reviewing previous notes, test results and face to face with the patient discussing the diagnosis and importance of compliance with the treatment plan as well as documenting on the day of the visit. Subjective:   HPI      Patient is a 60-year-old male with a past medical history of anxiety/depression, family history of luekemia w personal diagnosis of myeloproliferative disease and thrombocytopenia, DDD, arthritis, ED, low vit d, GERD, hyperlipidemia, CAD w/ stent placement in 2009, HTN, HLD, tobacco use disorder, Seizure disorder who presents for a follow-up of his chronic medical issues and recent hospital follow-up. Patient also recently had COVID. Follows with cardiology, neurology, gastroenterology, hematology/oncology, and psychiatry    Inpatient course: Discharge summary reviewed- see chart. Patient was recently admitted for abdominal pain and was found to have a small bowel perforation. Patient did undergo exploratory laparotomy with small bowel resection on the sixth of this month.   Patient was advanced back to a normal diet and was discharged in what is reported as stable condition. All the patient's home medications were continued. Patient was given a temporary prescription for oxycodone for pain. Patient was advised that he may shower daily. No lifting over 10 pounds. No driving. May walk up and down stairs occasionally. No sexual activity. Patient is to follow-up with his general surgeon in approximately 1 week from today. Recent cardiology apt. No changes. Labs in the hospital: CBC consistent with the patient's above diagnosis. Metabolic panel essentially within normal limits. Interval history/Current status: Patient reports that in general he has been slowly feeling better. Patient is tolerating a slight amount of food. However he reports that he still having moderate amount of pain along his incisional site. Some swelling superior to such. Patient has had watery stools but has not been passing much. Patient has not been taking regular stool softeners. Does feel slightly tired and fatigued. Patient did run out of his pain medications. Patient Active Problem List   Diagnosis    Tobacco dependence    Hyperlipidemia    Coronary atherosclerosis due to calcified coronary lesion    IBS (irritable bowel syndrome)    Seizure disorder (HCC)    Essential thrombocythemia ([address]    Myeloproliferative disease ([address]    Stented coronary artery    Anxiety and depression    Gastroesophageal reflux disease    Essential hypertension    Vitamin D deficiency    Insomnia    Bowel perforation ([address]    Small bowel perforation ([address]       Medications listed as ordered at the time of discharge from hospital     Medication List          Accurate as of May 18, 2022 11:07 AM. If you have any questions, ask your nurse or doctor.             CHANGE how you take these medications    hydroxyurea 500 MG chemo capsule  Commonly known as: HYDREA  Take 1 capsule by mouth 2 times daily  What changed: additional instructions     traZODone 100 MG tablet  Commonly known as: DESYREL  Take 1.5 tablets by mouth nightly  What changed: how much to take        CONTINUE taking these medications    allopurinol 100 MG tablet  Commonly known as: ZYLOPRIM  Take 1 tablet by mouth daily     atorvastatin 40 MG tablet  Commonly known as: LIPITOR  Take 1 tablet by mouth nightly     azelastine 0.1 % nasal spray  Commonly known as: ASTELIN  1 spray by Nasal route 2 times daily Use in each nostril as directed     D-17 + Folic Acid 7511-196 MCG Tbdp  Take 1 tablet by mouth daily     CENTRUM SILVER PO     clopidogrel 75 MG tablet  Commonly known as: PLAVIX  take 1 tablet by mouth once daily     escitalopram 20 MG tablet  Commonly known as: LEXAPRO  take 2 tablets by mouth once daily     * gabapentin 300 MG capsule  Commonly known as: NEURONTIN     * gabapentin 600 MG tablet  Commonly known as: NEURONTIN     hydrOXYzine 50 MG tablet  Commonly known as: ATARAX     lansoprazole 30 MG delayed release capsule  Commonly known as: PREVACID     levETIRAcetam 500 MG tablet  Commonly known as: Keppra  Take 3 tablets by mouth 2 times daily     metoprolol succinate 25 MG extended release tablet  Commonly known as: TOPROL XL  take 1 tablet by mouth once daily     mirtazapine 45 MG tablet  Commonly known as: REMERON     ondansetron 4 MG tablet  Commonly known as: ZOFRAN  Take 1 tablet by mouth 3 times daily as needed for Nausea or Vomiting     oxyCODONE 5 MG immediate release tablet  Commonly known as: ROXICODONE  Take 1 tablet by mouth every 6 hours as needed for Pain for up to 3 days. tadalafil 5 MG tablet  Commonly known as: CIALIS     vitamin D 1.25 MG (56860 UT) Caps capsule  Commonly known as: ERGOCALCIFEROL  take 1 capsule by mouth every week         * This list has 2 medication(s) that are the same as other medications prescribed for you.  Read the directions carefully, and ask your doctor or other care provider to review them with you. Where to Get Your Medications      These medications were sent to [address] [place] [address], [address]  [facility] [address]    Phone: [phone]   · oxyCODONE 5 MG immediate release tablet          Medications marked \"taking\" at this time  Outpatient Medications Marked as Taking for the 5/18/22 encounter (Office Visit) with Hoa Guzman MD   Medication Sig Dispense Refill    Multiple Vitamins-Minerals (CENTRUM SILVER PO) Take by mouth      oxyCODONE (ROXICODONE) 5 MG immediate release tablet Take 1 tablet by mouth every 6 hours as needed for Pain for up to 3 days. 12 tablet 0    gabapentin (NEURONTIN) 600 MG tablet Take 600 mg by mouth at bedtime. Only take on wednesday      gabapentin (NEURONTIN) 300 MG capsule Take 300 mg by mouth 3 times daily.  Breakfast lunch dinner      mirtazapine (REMERON) 45 MG tablet Take 45 mg by mouth at bedtime      hydrOXYzine (ATARAX) 50 MG tablet Take 50 mg by mouth in the morning and at bedtime       tadalafil (CIALIS) 5 MG tablet Take 5 mg by mouth daily       vitamin D (ERGOCALCIFEROL) 1.25 MG (93276 UT) CAPS capsule take 1 capsule by mouth every week 4 capsule 5    clopidogrel (PLAVIX) 75 MG tablet take 1 tablet by mouth once daily 30 tablet 5    metoprolol succinate (TOPROL XL) 25 MG extended release tablet take 1 tablet by mouth once daily 30 tablet 5    levETIRAcetam (KEPPRA) 500 MG tablet Take 3 tablets by mouth 2 times daily 540 tablet 3    atorvastatin (LIPITOR) 40 MG tablet Take 1 tablet by mouth nightly 30 tablet 5    Cobalamin Combinations (J-91 + FOLIC ACID) 9870-831 MCG TBDP Take 1 tablet by mouth daily 90 tablet 1    azelastine (ASTELIN) 0.1 % nasal spray 1 spray by Nasal route 2 times daily Use in each nostril as directed 60 mL 1    ondansetron (ZOFRAN) 4 MG tablet Take 1 tablet by mouth 3 times daily as needed for Nausea or Vomiting 30 tablet 0    allopurinol (ZYLOPRIM) 100 MG tablet Take 1 tablet by mouth daily 30 tablet 3    traZODone (DESYREL) 100 MG tablet Take 1.5 tablets by mouth nightly (Patient taking differently: Take 200 mg by mouth nightly ) 30 tablet 0    lansoprazole (PREVACID) 30 MG delayed release capsule take 1 capsule by mouth every morning      escitalopram (LEXAPRO) 20 MG tablet take 2 tablets by mouth once daily 60 tablet 5    hydroxyurea (HYDREA) 500 MG chemo capsule Take 1 capsule by mouth 2 times daily (Patient taking differently: Take 500 mg by mouth 2 times daily 1 tablet by mouth every other day alternating with 1 tablet BID every other day) 60 capsule 1        Medications patient taking as of now reconciled against medications ordered at time of hospital discharge: Yes    Review of Systems   Constitutional: Positive for fatigue. Negative for chills and fever. HENT: Negative for congestion, rhinorrhea and sore throat. Respiratory: Negative for shortness of breath and wheezing. Cardiovascular: Negative for chest pain and leg swelling. Gastrointestinal: Positive for abdominal pain and diarrhea. Negative for constipation, nausea and vomiting. Musculoskeletal: Positive for arthralgias. Skin: Negative for rash. Neurological: Negative for light-headedness and headaches. Objective:    BP 92/66   Pulse 76   Temp 97.5 °F (36.4 °C)   Ht 6' 1\" (1.854 m)   Wt 163 lb (73.9 kg)   SpO2 98%   BMI 21.51 kg/m²   Physical Exam  Constitutional:       Appearance: He is well-developed. HENT:      Head: Normocephalic. Eyes:      Extraocular Movements: Extraocular movements intact. Conjunctiva/sclera: Conjunctivae normal.   Cardiovascular:      Rate and Rhythm: Normal rate and regular rhythm. Heart sounds: Normal heart sounds. No murmur heard. Pulmonary:      Effort: Pulmonary effort is normal.      Breath sounds: Normal breath sounds. No wheezing or rales.    Abdominal:      General: Bowel sounds are normal.      Comments: Incisional site in the medial aspect of the patient's stomach is clean dry and intact. Patient does have some induration superior to the incisional site. Some tenderness on palpation around the incisional site. Musculoskeletal:      Right lower leg: No edema. Left lower leg: No edema. Neurological:      General: No focal deficit present. Mental Status: He is alert. Comments: Cranial nerves grossly intact   Psychiatric:         Mood and Affect: Mood normal.         Judgment: Judgment normal.         An electronic signature was used to authenticate this note.   --Tiff Hughes MD

## 2022-07-27 DIAGNOSIS — E55.9 VITAMIN D DEFICIENCY: ICD-10-CM

## 2022-07-27 RX ORDER — ERGOCALCIFEROL 1.25 MG/1
CAPSULE ORAL
Qty: 5 CAPSULE | Refills: 4 | Status: SHIPPED | OUTPATIENT
Start: 2022-07-27

## 2022-08-03 DIAGNOSIS — I10 ESSENTIAL HYPERTENSION: ICD-10-CM

## 2022-08-03 DIAGNOSIS — I25.84 CORONARY ATHEROSCLEROSIS DUE TO CALCIFIED CORONARY LESION: ICD-10-CM

## 2022-08-03 DIAGNOSIS — I25.10 CORONARY ATHEROSCLEROSIS DUE TO CALCIFIED CORONARY LESION: ICD-10-CM

## 2022-08-03 RX ORDER — CLOPIDOGREL BISULFATE 75 MG/1
TABLET ORAL
Qty: 30 TABLET | Refills: 2 | Status: SHIPPED
Start: 2022-08-03 | End: 2022-11-04

## 2022-08-03 RX ORDER — ATORVASTATIN CALCIUM 40 MG/1
40 TABLET, FILM COATED ORAL NIGHTLY
Qty: 30 TABLET | Refills: 2 | Status: SHIPPED
Start: 2022-08-03 | End: 2022-11-04

## 2022-08-03 RX ORDER — METOPROLOL SUCCINATE 25 MG/1
TABLET, EXTENDED RELEASE ORAL
Qty: 30 TABLET | Refills: 2 | Status: SHIPPED
Start: 2022-08-03 | End: 2022-11-04

## 2022-08-10 ENCOUNTER — HOSPITAL ENCOUNTER (OUTPATIENT)
Age: 52
Discharge: HOME | End: 2022-08-10
Payer: COMMERCIAL

## 2022-08-10 LAB — PROLACTIN: 8.52 NG/ML

## 2022-08-10 PROCEDURE — 36415 COLL VENOUS BLD VENIPUNCTURE: CPT

## 2022-08-10 PROCEDURE — 84270 ASSAY OF SEX HORMONE GLOBUL: CPT

## 2022-08-10 PROCEDURE — 84146 ASSAY OF PROLACTIN: CPT

## 2022-08-10 PROCEDURE — 84403 ASSAY OF TOTAL TESTOSTERONE: CPT

## 2022-08-12 LAB
SEX HORMONE BINDING GLOBULIN: 44 NMOL/L (ref 11–80)
TESTOSTERONE FREE-NONMALE: 55.9 PG/ML (ref 47–244)
TESTOSTERONE TOTAL: 337 NG/DL (ref 220–1000)

## 2022-08-31 ENCOUNTER — OFFICE VISIT (OUTPATIENT)
Dept: PRIMARY CARE CLINIC | Age: 52
End: 2022-08-31
Payer: COMMERCIAL

## 2022-08-31 VITALS
TEMPERATURE: 98.4 F | SYSTOLIC BLOOD PRESSURE: 112 MMHG | HEART RATE: 57 BPM | WEIGHT: 169 LBS | BODY MASS INDEX: 22.4 KG/M2 | HEIGHT: 73 IN | RESPIRATION RATE: 16 BRPM | DIASTOLIC BLOOD PRESSURE: 70 MMHG | OXYGEN SATURATION: 98 %

## 2022-08-31 DIAGNOSIS — M79.642 LEFT HAND PAIN: Primary | ICD-10-CM

## 2022-08-31 PROCEDURE — 99213 OFFICE O/P EST LOW 20 MIN: CPT | Performed by: FAMILY MEDICINE

## 2022-08-31 PROCEDURE — 3017F COLORECTAL CA SCREEN DOC REV: CPT | Performed by: FAMILY MEDICINE

## 2022-08-31 PROCEDURE — G8428 CUR MEDS NOT DOCUMENT: HCPCS | Performed by: FAMILY MEDICINE

## 2022-08-31 PROCEDURE — G8420 CALC BMI NORM PARAMETERS: HCPCS | Performed by: FAMILY MEDICINE

## 2022-08-31 PROCEDURE — 4004F PT TOBACCO SCREEN RCVD TLK: CPT | Performed by: FAMILY MEDICINE

## 2022-08-31 NOTE — PROGRESS NOTES
2022     Chief Complaint   Patient presents with    Hand Pain     Left No known injury, difficulty with , thumb and middle finger cramp started a couple days ago, feels bump       HPI  Winifred Bronson (:  1970) is a 47 y.o. male, here for evaluation of the following medical concerns:    Patient presents today primarily in regards to left-sided hand pain. Patient has no known injuries. Patient reports worsening issues with his . Patient does have cramping along his thumb and first few fingers at times. Patient has a abnormal mass located along the dorsal aspect of his left hand. The PIP MCP joint. Patient feels that he may have injured this during a previous seizure. Review of Systems  ROS negative besides otherwise noted above. Past Medical History:   Diagnosis Date    Anxiety and depression     Atheroembolism of left lower extremity ([facility] [address] 2014    Atrial flutter (HCC)     Coronary atherosclerosis due to calcified coronary lesion(414.4)     ED (erectile dysfunction)     Epilepsy ([facility] [address]     Hyperlipidemia     Hypertension     IBS (irritable bowel syndrome)     Ischemic toe 2014    MI, old     Thrombocytosis     Tinea     Tobacco dependence 2014       Prior to Visit Medications    Medication Sig Taking? Authorizing Provider   metoprolol succinate (TOPROL XL) 25 MG extended release tablet TAKE 1 TABLET BY MOUTH EVERY DAY Yes Nico Rice MD   clopidogrel (PLAVIX) 75 MG tablet TAKE 1 TABLET BY Aster Lawson Yes Nico Rice MD   atorvastatin (LIPITOR) 40 MG tablet TAKE 1 TABLET BY MOUTH NIGHTLY Yes Nico Rice MD   vitamin D (ERGOCALCIFEROL) 1.25 MG (47247 UT) CAPS capsule TAKE 1 CAPSULE BY MOUTH ONE TIME PER WEEK Yes Nico Rice MD   Multiple Vitamins-Minerals (CENTRUM SILVER PO) Take by mouth Yes Historical Provider, MD   gabapentin (NEURONTIN) 600 MG tablet Take 600 mg by mouth at bedtime.  Only take on wednesday Yes Historical Provider, MD mirtazapine (REMERON) 45 MG tablet Take 45 mg by mouth at bedtime Yes Historical Provider, MD   hydrOXYzine (ATARAX) 50 MG tablet Take 50 mg by mouth in the morning and at bedtime  Yes Historical Provider, MD   tadalafil (CIALIS) 5 MG tablet Take 5 mg by mouth daily  Yes Historical Provider, MD   levETIRAcetam (KEPPRA) 500 MG tablet Take 3 tablets by mouth 2 times daily Yes Alyson Adrian MD   Cobalamin Combinations (S-64 + FOLIC ACID) 3716-555 MCG TBDP Take 1 tablet by mouth daily Yes Alyson Adrian MD   azelastine (ASTELIN) 0.1 % nasal spray 1 spray by Nasal route 2 times daily Use in each nostril as directed Yes Alyson Adrian MD   ondansetron (ZOFRAN) 4 MG tablet Take 1 tablet by mouth 3 times daily as needed for Nausea or Vomiting Yes Alyson Adrian MD   allopurinol (ZYLOPRIM) 100 MG tablet Take 1 tablet by mouth daily Yes Alyson Adrian MD   traZODone (DESYREL) 100 MG tablet Take 1.5 tablets by mouth nightly  Patient taking differently: Take 200 mg by mouth nightly Yes Alyson Adrian MD   lansoprazole (PREVACID) 30 MG delayed release capsule take 1 capsule by mouth every morning Yes Historical Provider, MD   escitalopram (LEXAPRO) 20 MG tablet take 2 tablets by mouth once daily Yes Osvaldo Mcqueen MD   hydroxyurea (HYDREA) 500 MG chemo capsule Take 1 capsule by mouth 2 times daily  Patient taking differently: Take 500 mg by mouth 2 times daily 1 tablet by mouth every other day alternating with 1 tablet BID every other day Yes Osvaldo Mcqueen MD        Allergies   Allergen Reactions    Acyclovir And Related Hives and Nausea And Vomiting    Doxycycline Itching       Social History     Tobacco Use    Smoking status: Every Day     Packs/day: 0.50     Years: 20.00     Pack years: 10.00     Types: Cigarettes    Smokeless tobacco: Current     Types: Chew    Tobacco comments:     cutting down   Substance Use Topics    Alcohol use: No     Comment: Quit 2019.            Vitals: 08/31/22 1626   BP: 112/70   Pulse: 57   Resp: 16   Temp: 98.4 °F (36.9 °C)   TempSrc: Temporal   SpO2: 98%   Weight: 169 lb (76.7 kg)   Height: 6' 1\" (1.854 m)     Estimated body mass index is 22.3 kg/m² as calculated from the following:    Height as of this encounter: 6' 1\" (1.854 m). Weight as of this encounter: 169 lb (76.7 kg). Physical Exam  Constitutional:       Appearance: He is well-developed. HENT:      Head: Normocephalic. Eyes:      Extraocular Movements: Extraocular movements intact. Conjunctiva/sclera: Conjunctivae normal.   Cardiovascular:      Rate and Rhythm: Normal rate and regular rhythm. Heart sounds: Normal heart sounds. No murmur heard. Pulmonary:      Effort: Pulmonary effort is normal.      Breath sounds: Normal breath sounds. No wheezing or rales. Musculoskeletal:      Comments: 2+ radial pulses bilaterally. Patient's  strength is symmetric and equal bilaterally. Patient does have a abnormal solid mass along the MCP/PIP joint area. Patient does have some mild tenderness along this area. Patient has some abnormal tendon movement along this area due to the mass. This is his main area of pain. Some numbness and tingling in the fingers at times. Neurological:      General: No focal deficit present. Mental Status: He is alert. Comments: Cranial nerves grossly intact   Psychiatric:         Mood and Affect: Mood normal.         Judgment: Judgment normal.       ASSESSMENT/PLAN:  Owen Rizo was seen today for hand pain. Diagnoses and all orders for this visit:    Left hand pain  -     XR HAND LEFT (MIN 3 VIEWS); Future  Given the abnormal structure we will check an x-ray. Concerns at the patient may have some tendinitis like issues and may need a steroid injection given his reported symptoms. Patient does report some catching and locking of his joints. We will call patient with results. Advised on bracing as needed.     Return if symptoms worsen or fail to improve. An electronicsignature was used to authenticate this note.     --Bijan Spence MD on 8/31/22 at 4:35 PM EDT

## 2022-11-04 DIAGNOSIS — I10 ESSENTIAL HYPERTENSION: ICD-10-CM

## 2022-11-04 DIAGNOSIS — I25.10 CORONARY ATHEROSCLEROSIS DUE TO CALCIFIED CORONARY LESION: ICD-10-CM

## 2022-11-04 DIAGNOSIS — I25.84 CORONARY ATHEROSCLEROSIS DUE TO CALCIFIED CORONARY LESION: ICD-10-CM

## 2022-11-04 RX ORDER — CLOPIDOGREL BISULFATE 75 MG/1
TABLET ORAL
Qty: 30 TABLET | Refills: 2 | Status: SHIPPED | OUTPATIENT
Start: 2022-11-04

## 2022-11-04 RX ORDER — ATORVASTATIN CALCIUM 40 MG/1
40 TABLET, FILM COATED ORAL NIGHTLY
Qty: 30 TABLET | Refills: 2 | Status: SHIPPED | OUTPATIENT
Start: 2022-11-04

## 2022-11-04 RX ORDER — METOPROLOL SUCCINATE 25 MG/1
TABLET, EXTENDED RELEASE ORAL
Qty: 30 TABLET | Refills: 2 | Status: SHIPPED | OUTPATIENT
Start: 2022-11-04

## 2022-12-10 ENCOUNTER — HOSPITAL ENCOUNTER (OUTPATIENT)
Age: 52
Discharge: HOME | End: 2022-12-10
Payer: COMMERCIAL

## 2022-12-10 LAB — PROSTATE SPECIFIC ANTIGEN: 1.32 NG/ML (ref 0–4)

## 2022-12-10 PROCEDURE — 36415 COLL VENOUS BLD VENIPUNCTURE: CPT

## 2022-12-10 PROCEDURE — 84153 ASSAY OF PSA TOTAL: CPT

## 2022-12-19 ENCOUNTER — OFFICE VISIT (OUTPATIENT)
Dept: FAMILY MEDICINE CLINIC | Age: 52
End: 2022-12-19
Payer: COMMERCIAL

## 2022-12-19 VITALS
OXYGEN SATURATION: 97 % | HEIGHT: 73 IN | BODY MASS INDEX: 22.4 KG/M2 | TEMPERATURE: 98.7 F | WEIGHT: 169 LBS | HEART RATE: 82 BPM | RESPIRATION RATE: 18 BRPM | DIASTOLIC BLOOD PRESSURE: 62 MMHG | SYSTOLIC BLOOD PRESSURE: 104 MMHG

## 2022-12-19 DIAGNOSIS — R53.83 FATIGUE, UNSPECIFIED TYPE: ICD-10-CM

## 2022-12-19 DIAGNOSIS — J06.9 ACUTE UPPER RESPIRATORY INFECTION, UNSPECIFIED: ICD-10-CM

## 2022-12-19 DIAGNOSIS — J10.1 INFLUENZA A: Primary | ICD-10-CM

## 2022-12-19 LAB
INFLUENZA A ANTIGEN, POC: POSITIVE
INFLUENZA B ANTIGEN, POC: NEGATIVE
Lab: NORMAL
PERFORMING INSTRUMENT: NORMAL
QC PASS/FAIL: NORMAL
SARS-COV-2, POC: NORMAL

## 2022-12-19 PROCEDURE — 87804 INFLUENZA ASSAY W/OPTIC: CPT | Performed by: PHYSICIAN ASSISTANT

## 2022-12-19 PROCEDURE — 4004F PT TOBACCO SCREEN RCVD TLK: CPT | Performed by: PHYSICIAN ASSISTANT

## 2022-12-19 PROCEDURE — 87426 SARSCOV CORONAVIRUS AG IA: CPT | Performed by: PHYSICIAN ASSISTANT

## 2022-12-19 PROCEDURE — 3017F COLORECTAL CA SCREEN DOC REV: CPT | Performed by: PHYSICIAN ASSISTANT

## 2022-12-19 PROCEDURE — G8484 FLU IMMUNIZE NO ADMIN: HCPCS | Performed by: PHYSICIAN ASSISTANT

## 2022-12-19 PROCEDURE — G8427 DOCREV CUR MEDS BY ELIG CLIN: HCPCS | Performed by: PHYSICIAN ASSISTANT

## 2022-12-19 PROCEDURE — 3074F SYST BP LT 130 MM HG: CPT | Performed by: PHYSICIAN ASSISTANT

## 2022-12-19 PROCEDURE — G8420 CALC BMI NORM PARAMETERS: HCPCS | Performed by: PHYSICIAN ASSISTANT

## 2022-12-19 PROCEDURE — 3078F DIAST BP <80 MM HG: CPT | Performed by: PHYSICIAN ASSISTANT

## 2022-12-19 PROCEDURE — 99213 OFFICE O/P EST LOW 20 MIN: CPT | Performed by: PHYSICIAN ASSISTANT

## 2022-12-19 RX ORDER — OSELTAMIVIR PHOSPHATE 75 MG/1
75 CAPSULE ORAL 2 TIMES DAILY
Qty: 10 CAPSULE | Refills: 0 | Status: SHIPPED | OUTPATIENT
Start: 2022-12-19 | End: 2022-12-24

## 2022-12-19 NOTE — PROGRESS NOTES
22  Kameron Latham : 1970 Sex: male  Age 47 y.o. Subjective:  Chief Complaint   Patient presents with    Fatigue    Cough         HPI:   Kameron Latham , 47 y.o. male presents to express care for evaluation of fatigue, cough, congestion    HPI  64-year-old male presents to express care for evaluation of cough, congestion, drainage, fatigue. The patient has had the symptoms ongoing since Friday night. Patient started off with just a little bit of congestion at that time. Seem to worsen on Saturday. The patient is not having any fever, chills. The patient has had COVID-vaccine. The patient has had boosters. The patient has not had a flu shot this season yet. Not currently on any antibiotics. ROS:   Unless otherwise stated in this report the patient's positive and negative responses for review of systems for constitutional, eyes, ENT, cardiovascular, respiratory, gastrointestinal, neurological, , musculoskeletal, and integument systems and related systems to the presenting problem are either stated in the history of present illness or were not pertinent or were negative for the symptoms and/or complaints related to the presenting medical problem. Positives and pertinent negatives as per HPI. All others reviewed and are negative.       PMH:     Past Medical History:   Diagnosis Date    Anxiety and depression     Atheroembolism of left lower extremity ([address] 2014    Atrial flutter (HCC)     Coronary atherosclerosis due to calcified coronary lesion(414.4)     ED (erectile dysfunction)     Epilepsy ([facility]     Hyperlipidemia     Hypertension     IBS (irritable bowel syndrome)     Ischemic toe 2014    MI, old     Thrombocytosis     Tinea     Tobacco dependence 2014       Past Surgical History:   Procedure Laterality Date    COLONOSCOPY  2014    CORONARY ANGIOPLASTY WITH STENT PLACEMENT      KNEE SURGERY Left     Skin graft    LAPAROTOMY N/A 2022    EXPLORATORY LAPAROTOMY, SMALL BOWEL RESECTION performed by David Cisneros MD at [address] [place] [address]  08/2014       Family History   Problem Relation Age of Onset    Coronary Art Dis Mother     COPD Mother     Seizures Father     Alcohol Abuse Father     Liver Disease Father     Diabetes Brother     Cancer Brother 64        leukemia    Diabetes Brother     Heart Disease Maternal Uncle     Heart Disease Maternal Uncle        Medications:     Current Outpatient Medications:     oseltamivir (TAMIFLU) 75 MG capsule, Take 1 capsule by mouth 2 times daily for 5 days, Disp: 10 capsule, Rfl: 0    metoprolol succinate (TOPROL XL) 25 MG extended release tablet, TAKE 1 TABLET BY MOUTH EVERY DAY, Disp: 30 tablet, Rfl: 2    clopidogrel (PLAVIX) 75 MG tablet, TAKE 1 TABLET BY MOUTH EVERY DAY, Disp: 30 tablet, Rfl: 2    atorvastatin (LIPITOR) 40 MG tablet, TAKE 1 TABLET BY MOUTH NIGHTLY, Disp: 30 tablet, Rfl: 2    vitamin D (ERGOCALCIFEROL) 1.25 MG (91584 UT) CAPS capsule, TAKE 1 CAPSULE BY MOUTH ONE TIME PER WEEK, Disp: 5 capsule, Rfl: 4    Multiple Vitamins-Minerals (CENTRUM SILVER PO), Take by mouth, Disp: , Rfl:     gabapentin (NEURONTIN) 600 MG tablet, Take 600 mg by mouth at bedtime.  Only take on wednesday, Disp: , Rfl:     mirtazapine (REMERON) 45 MG tablet, Take 45 mg by mouth at bedtime, Disp: , Rfl:     hydrOXYzine (ATARAX) 50 MG tablet, Take 50 mg by mouth in the morning and at bedtime , Disp: , Rfl:     tadalafil (CIALIS) 5 MG tablet, Take 5 mg by mouth daily , Disp: , Rfl:     levETIRAcetam (KEPPRA) 500 MG tablet, Take 3 tablets by mouth 2 times daily, Disp: 540 tablet, Rfl: 3    Cobalamin Combinations (B-46 + FOLIC ACID) 4231-062 MCG TBDP, Take 1 tablet by mouth daily, Disp: 90 tablet, Rfl: 1    azelastine (ASTELIN) 0.1 % nasal spray, 1 spray by Nasal route 2 times daily Use in each nostril as directed, Disp: 60 mL, Rfl: 1    ondansetron (ZOFRAN) 4 MG tablet, Take 1 tablet by mouth 3 times daily as needed for Nausea or Vomiting, Disp: 30 tablet, Rfl: 0    allopurinol (ZYLOPRIM) 100 MG tablet, Take 1 tablet by mouth daily, Disp: 30 tablet, Rfl: 3    traZODone (DESYREL) 100 MG tablet, Take 1.5 tablets by mouth nightly (Patient taking differently: Take 200 mg by mouth nightly), Disp: 30 tablet, Rfl: 0    lansoprazole (PREVACID) 30 MG delayed release capsule, take 1 capsule by mouth every morning, Disp: , Rfl:     escitalopram (LEXAPRO) 20 MG tablet, take 2 tablets by mouth once daily, Disp: 60 tablet, Rfl: 5    hydroxyurea (HYDREA) 500 MG chemo capsule, Take 1 capsule by mouth 2 times daily (Patient taking differently: Take 500 mg by mouth 2 times daily 1 tablet by mouth every other day alternating with 1 tablet BID every other day), Disp: 60 capsule, Rfl: 1    Allergies: Allergies   Allergen Reactions    Acyclovir And Related Hives and Nausea And Vomiting    Doxycycline Itching       Social History:     Social History     Tobacco Use    Smoking status: Every Day     Packs/day: 0.50     Years: 20.00     Pack years: 10.00     Types: Cigarettes    Smokeless tobacco: Current     Types: Chew    Tobacco comments:     cutting down   Vaping Use    Vaping Use: Never used   Substance Use Topics    Alcohol use: No     Comment: Quit 2019. Drug use: Not Currently     Types: Marijuana Sable Mingle)     Comment: Daily for pain. Patient lives at home. Physical Exam:     Vitals:    12/19/22 1250   BP: 104/62   Site: Right Upper Arm   Position: Sitting   Cuff Size: Medium Adult   Pulse: 82   Resp: 18   Temp: 98.7 °F (37.1 °C)   TempSrc: Temporal   SpO2: 97%   Weight: 169 lb (76.7 kg)   Height: 6' 1\" (1.854 m)       Exam:  Physical Exam  Nurse's notes and vital signs reviewed. The patient is not hypoxic. ? General: Alert, no acute distress, patient resting comfortably Patient is not toxic or lethargic. Skin: Warm, intact, no pallor noted. There is no evidence of rash at this time.   Head: Normocephalic, atraumatic  Eye: Normal conjunctiva  Ears, Nose, Throat: Right tympanic membrane clear, left tympanic membrane clear. No drainage or discharge noted. No pre- or post-auricular tenderness, erythema, or swelling noted. Nasal congestion, rhinorrhea, no epistaxis  Posterior oropharynx shows erythema and cobblestoning but no evidence of tonsillar hypertrophy, or exudate. the uvula is midline. No trismus or drooling is noted. Moist mucous membranes. Neck: No anterior/posterior lymphadenopathy noted. No erythema, no masses, no fluctuance or induration noted. No meningeal signs. Cardiovascular: Regular Rate and Rhythm  Respiratory: No acute distress, no rhonchi, wheezing or crackles noted. No stridor or retractions are noted. Neurological: A&O x4, normal speech  Psychiatric: Cooperative       Testing:     Results for orders placed or performed in visit on 12/19/22   POCT COVID-19, Antigen   Result Value Ref Range    SARS-COV-2, POC Not-Detected Not Detected    Lot Number [id number]     QC Pass/Fail Pass     Performing Instrument BD Veritor    POCT Influenza A/B Antigen   Result Value Ref Range    Inflenza A Ag Positive     Influenza B Ag Negative            Medical Decision Making:     Vital signs reviewed    Past medical history reviewed. Allergies reviewed. Medications reviewed. Patient on arrival does not appear to be in any apparent distress or discomfort. The patient has been seen and evaluated. The patient does not appear to be toxic or lethargic. The patient had COVID and influenza obtained. Influenza positive for influenza A. Influenza B negative    We will start the patient on Tamiflu. COVID-negative    The patient was educated on the proper dosage of motrin and tylenol and the appropriate intervals of each. The patient is to increase fluid intake over the next several days. The patient is to use OTC decongestant as needed.      The patient is to return to express care or go directly to the emergency department should any of the signs or symptoms worsen. The patient is to followup with primary care physician in 2-3 days for repeat evaluation. The patient has no other questions or concerns at this time the patient will be discharged home. Clinical Impression:   Jesus Manuel Merino was seen today for fatigue and cough. Diagnoses and all orders for this visit:    Influenza A    Fatigue, unspecified type  -     POCT COVID-19, Antigen  -     POCT Influenza A/B Antigen    Acute upper respiratory infection, unspecified    Other orders  -     oseltamivir (TAMIFLU) 75 MG capsule; Take 1 capsule by mouth 2 times daily for 5 days      The patient is to call for any concerns or return if any of the signs or symptoms worsen. The patient is to follow-up with PCP in the next 2-3 days for repeat evaluation repeat assessment or go directly to the emergency department.      SIGNATURE: Paula Clarke III, PA-C

## 2022-12-22 ENCOUNTER — TELEPHONE (OUTPATIENT)
Dept: PRIMARY CARE CLINIC | Age: 52
End: 2022-12-22

## 2022-12-22 NOTE — TELEPHONE ENCOUNTER
Patient was seen in office 12/19. He missed work Tuesday and wednesday wanting to know if work note could be given for those days? Will pick note up if agreeable.

## 2023-01-04 DIAGNOSIS — E55.9 VITAMIN D DEFICIENCY: ICD-10-CM

## 2023-01-04 RX ORDER — ERGOCALCIFEROL 1.25 MG/1
CAPSULE ORAL
Qty: 5 CAPSULE | Refills: 4 | Status: SHIPPED | OUTPATIENT
Start: 2023-01-04

## 2023-02-02 ENCOUNTER — OFFICE VISIT (OUTPATIENT)
Dept: PRIMARY CARE CLINIC | Age: 53
End: 2023-02-02
Payer: COMMERCIAL

## 2023-02-02 VITALS
BODY MASS INDEX: 22.13 KG/M2 | WEIGHT: 167 LBS | DIASTOLIC BLOOD PRESSURE: 70 MMHG | TEMPERATURE: 99.7 F | RESPIRATION RATE: 20 BRPM | SYSTOLIC BLOOD PRESSURE: 112 MMHG | HEART RATE: 66 BPM | HEIGHT: 73 IN | OXYGEN SATURATION: 99 %

## 2023-02-02 DIAGNOSIS — R53.83 FATIGUE, UNSPECIFIED TYPE: ICD-10-CM

## 2023-02-02 DIAGNOSIS — J02.9 SORE THROAT: Primary | ICD-10-CM

## 2023-02-02 LAB
INFLUENZA A ANTIBODY: NEGATIVE
INFLUENZA B ANTIBODY: NEGATIVE
Lab: NORMAL
PERFORMING INSTRUMENT: NORMAL
QC PASS/FAIL: NORMAL
SARS-COV-2, POC: NORMAL

## 2023-02-02 PROCEDURE — 87426 SARSCOV CORONAVIRUS AG IA: CPT | Performed by: FAMILY MEDICINE

## 2023-02-02 PROCEDURE — 87804 INFLUENZA ASSAY W/OPTIC: CPT | Performed by: FAMILY MEDICINE

## 2023-02-02 PROCEDURE — 3074F SYST BP LT 130 MM HG: CPT | Performed by: FAMILY MEDICINE

## 2023-02-02 PROCEDURE — 3017F COLORECTAL CA SCREEN DOC REV: CPT | Performed by: FAMILY MEDICINE

## 2023-02-02 PROCEDURE — 99213 OFFICE O/P EST LOW 20 MIN: CPT | Performed by: FAMILY MEDICINE

## 2023-02-02 PROCEDURE — G8427 DOCREV CUR MEDS BY ELIG CLIN: HCPCS | Performed by: FAMILY MEDICINE

## 2023-02-02 PROCEDURE — 3078F DIAST BP <80 MM HG: CPT | Performed by: FAMILY MEDICINE

## 2023-02-02 PROCEDURE — G8484 FLU IMMUNIZE NO ADMIN: HCPCS | Performed by: FAMILY MEDICINE

## 2023-02-02 PROCEDURE — G8420 CALC BMI NORM PARAMETERS: HCPCS | Performed by: FAMILY MEDICINE

## 2023-02-02 PROCEDURE — 4004F PT TOBACCO SCREEN RCVD TLK: CPT | Performed by: FAMILY MEDICINE

## 2023-02-02 RX ORDER — AMOXICILLIN AND CLAVULANATE POTASSIUM 875; 125 MG/1; MG/1
1 TABLET, FILM COATED ORAL 2 TIMES DAILY
Qty: 20 TABLET | Refills: 0 | Status: SHIPPED | OUTPATIENT
Start: 2023-02-02 | End: 2023-02-12

## 2023-02-02 NOTE — PROGRESS NOTES
23  Cehri Rodas : 1970 Sex: male  Age: 47 y.o. Chief Complaint   Patient presents with    URI     Sore throat, right sided, weak, fatigue x couple days       HPI: : URI  The patient states that they have had rhinorrhea, min cough, congestion for the last 3 days. The patient also reports nasal congestion and moderate sore throat. Positive for nasal discharge. The patient has had general malaise. No fever. Reports chills and has felt warm at times. No CP. No dyspnea. No vomiting. Reports diarrhea. The patient presents for evaluation. ROS:  Const: Positives and pertinent negatives as per HPI. All others reviewed and are negative. Current Outpatient Medications:     amoxicillin-clavulanate (AUGMENTIN) 875-125 MG per tablet, Take 1 tablet by mouth 2 times daily for 10 days, Disp: 20 tablet, Rfl: 0    vitamin D (ERGOCALCIFEROL) 1.25 MG (31756 UT) CAPS capsule, TAKE 1 CAPSULE BY MOUTH ONE TIME PER WEEK, Disp: 5 capsule, Rfl: 4    metoprolol succinate (TOPROL XL) 25 MG extended release tablet, TAKE 1 TABLET BY MOUTH EVERY DAY, Disp: 30 tablet, Rfl: 2    clopidogrel (PLAVIX) 75 MG tablet, TAKE 1 TABLET BY MOUTH EVERY DAY, Disp: 30 tablet, Rfl: 2    atorvastatin (LIPITOR) 40 MG tablet, TAKE 1 TABLET BY MOUTH NIGHTLY, Disp: 30 tablet, Rfl: 2    Multiple Vitamins-Minerals (CENTRUM SILVER PO), Take by mouth, Disp: , Rfl:     gabapentin (NEURONTIN) 600 MG tablet, Take 600 mg by mouth at bedtime.  Only take on wednesday, Disp: , Rfl:     mirtazapine (REMERON) 45 MG tablet, Take 45 mg by mouth at bedtime, Disp: , Rfl:     hydrOXYzine (ATARAX) 50 MG tablet, Take 50 mg by mouth in the morning and at bedtime , Disp: , Rfl:     tadalafil (CIALIS) 5 MG tablet, Take 5 mg by mouth daily , Disp: , Rfl:     levETIRAcetam (KEPPRA) 500 MG tablet, Take 3 tablets by mouth 2 times daily, Disp: 540 tablet, Rfl: 3    Cobalamin Combinations (V-84 + FOLIC ACID) 1251-866 MCG TBDP, Take 1 tablet by mouth daily, Disp: 90 tablet, Rfl: 1    azelastine (ASTELIN) 0.1 % nasal spray, 1 spray by Nasal route 2 times daily Use in each nostril as directed, Disp: 60 mL, Rfl: 1    ondansetron (ZOFRAN) 4 MG tablet, Take 1 tablet by mouth 3 times daily as needed for Nausea or Vomiting, Disp: 30 tablet, Rfl: 0    allopurinol (ZYLOPRIM) 100 MG tablet, Take 1 tablet by mouth daily, Disp: 30 tablet, Rfl: 3    traZODone (DESYREL) 100 MG tablet, Take 1.5 tablets by mouth nightly (Patient taking differently: Take 200 mg by mouth nightly), Disp: 30 tablet, Rfl: 0    lansoprazole (PREVACID) 30 MG delayed release capsule, take 1 capsule by mouth every morning, Disp: , Rfl:     escitalopram (LEXAPRO) 20 MG tablet, take 2 tablets by mouth once daily, Disp: 60 tablet, Rfl: 5    hydroxyurea (HYDREA) 500 MG chemo capsule, Take 1 capsule by mouth 2 times daily (Patient taking differently: Take 500 mg by mouth 2 times daily 1 tablet by mouth every other day alternating with 1 tablet BID every other day), Disp: 60 capsule, Rfl: 1  Allergies   Allergen Reactions    Acyclovir And Related Hives and Nausea And Vomiting    Doxycycline Itching       Past Medical History:   Diagnosis Date    Anxiety and depression     Atheroembolism of left lower extremity ([address] 09/11/2014    Atrial flutter (HCC)     Coronary atherosclerosis due to calcified coronary lesion(414.4)     ED (erectile dysfunction)     Epilepsy (HCC)     Hyperlipidemia     Hypertension     IBS (irritable bowel syndrome)     Ischemic toe 09/11/2014    MI, old     Thrombocytosis     Tinea     Tobacco dependence 09/16/2014     Past Surgical History:   Procedure Laterality Date    COLONOSCOPY  08/2014    CORONARY ANGIOPLASTY WITH STENT PLACEMENT      KNEE SURGERY Left     Skin graft    LAPAROTOMY N/A 5/6/2022    EXPLORATORY LAPAROTOMY, SMALL BOWEL RESECTION performed by Lillie Casarez MD at [address] ENDOSCOPY  08/2014     Family History   Problem Relation Age of Onset Coronary Art Dis Mother     COPD Mother     Seizures Father     Alcohol Abuse Father     Liver Disease Father     Diabetes Brother     Cancer Brother 64        leukemia    Diabetes Brother     Heart Disease Maternal Uncle     Heart Disease Maternal Uncle      Social History     Socioeconomic History    Marital status:      Spouse name: Not on file    Number of children: Not on file    Years of education: Not on file    Highest education level: Not on file   Occupational History    Not on file   Tobacco Use    Smoking status: Every Day     Packs/day: 0.50     Years: 20.00     Pack years: 10.00     Types: Cigarettes    Smokeless tobacco: Current     Types: Chew    Tobacco comments:     cutting down   Vaping Use    Vaping Use: Never used   Substance and Sexual Activity    Alcohol use: No     Comment: Quit 2019. Drug use: Not Currently     Types: Marijuana Velora Fuad)     Comment: Daily for pain. Sexual activity: Yes     Partners: Female   Other Topics Concern    Not on file   Social History Narrative    Not on file     Social Determinants of Health     Financial Resource Strain: Not on file   Food Insecurity: Not on file   Transportation Needs: Not on file   Physical Activity: Not on file   Stress: Not on file   Social Connections: Not on file   Intimate Partner Violence: Not on file   Housing Stability: Not on file         Vitals:    02/02/23 1019   BP: 112/70   Pulse: 66   Resp: 20   Temp: 99.7 °F (37.6 °C)   TempSrc: Temporal   SpO2: 99%   Weight: 167 lb (75.8 kg)   Height: 6' 1\" (1.854 m)       Exam:  Physical Exam  Constitutional:       Appearance: He is well-developed. HENT:      Head: Normocephalic. Right Ear: Tympanic membrane normal.      Left Ear: Tympanic membrane normal.      Nose: Rhinorrhea present. Mouth/Throat:      Pharynx: Posterior oropharyngeal erythema present. Eyes:      Extraocular Movements: Extraocular movements intact.       Conjunctiva/sclera: Conjunctivae normal. Cardiovascular:      Rate and Rhythm: Normal rate and regular rhythm. Heart sounds: Normal heart sounds. No murmur heard. Pulmonary:      Effort: Pulmonary effort is normal.      Breath sounds: Normal breath sounds. No wheezing or rales. Abdominal:      General: Bowel sounds are normal.      Palpations: Abdomen is soft. Musculoskeletal:      Right lower leg: No edema. Left lower leg: No edema. Lymphadenopathy:      Cervical: No cervical adenopathy. Neurological:      General: No focal deficit present. Mental Status: He is alert. Comments: Cranial nerves grossly intact   Psychiatric:         Mood and Affect: Mood normal.         Judgment: Judgment normal.       Assessment and Plan:  Pearl Young was seen today for uri. Diagnoses and all orders for this visit:    Sore throat  -     POCT COVID-19, Antigen  -     POCT Influenza A/B  -     amoxicillin-clavulanate (AUGMENTIN) 875-125 MG per tablet; Take 1 tablet by mouth 2 times daily for 10 days    Fatigue, unspecified type  -     POCT COVID-19, Antigen  -     POCT Influenza A/B    Concerns for possible strep pharyngitis. We will treat as noted. Patient advised on Cepacol lozenges and other over-the-counter modalities in regards to his ongoing symptoms. Return if symptoms worsen or fail to improve. The above treatment regimen was discussed at length and all questions in regards to such were answered. Antibiotics were reviewed including common side effects and rare side effects including but not limited to C. diff infection. Patient is to proceed to the emergency department with any worsening symptoms. Patient should follow-up with family doctor within the next 3-5 days if no improvement/resolution of symptoms. Patient to follow-up sooner with any worsening issues.     Seen By:   Chris Delacruz MD

## 2023-02-03 DIAGNOSIS — I25.10 CORONARY ATHEROSCLEROSIS DUE TO CALCIFIED CORONARY LESION: ICD-10-CM

## 2023-02-03 DIAGNOSIS — I25.84 CORONARY ATHEROSCLEROSIS DUE TO CALCIFIED CORONARY LESION: ICD-10-CM

## 2023-02-03 DIAGNOSIS — I10 ESSENTIAL HYPERTENSION: ICD-10-CM

## 2023-02-06 RX ORDER — METOPROLOL SUCCINATE 25 MG/1
TABLET, EXTENDED RELEASE ORAL
Qty: 30 TABLET | Refills: 2 | Status: SHIPPED | OUTPATIENT
Start: 2023-02-06

## 2023-02-06 RX ORDER — CLOPIDOGREL BISULFATE 75 MG/1
TABLET ORAL
Qty: 30 TABLET | Refills: 2 | Status: SHIPPED | OUTPATIENT
Start: 2023-02-06

## 2023-02-06 RX ORDER — ATORVASTATIN CALCIUM 40 MG/1
40 TABLET, FILM COATED ORAL NIGHTLY
Qty: 30 TABLET | Refills: 2 | Status: SHIPPED | OUTPATIENT
Start: 2023-02-06

## 2023-02-06 RX ORDER — LEVETIRACETAM 500 MG/1
TABLET ORAL
Qty: 180 TABLET | Refills: 11 | Status: SHIPPED | OUTPATIENT
Start: 2023-02-06

## 2023-02-17 ENCOUNTER — TELEPHONE (OUTPATIENT)
Dept: PRIMARY CARE CLINIC | Age: 53
End: 2023-02-17

## 2023-02-17 NOTE — TELEPHONE ENCOUNTER
----- Message from Suresh Finch sent at 2/17/2023  3:00 PM EST -----  Subject: Message to Provider    QUESTIONS  Information for Provider? Mrs. Jeff Bundy called today to get a medication   refill. She did not have the packaging the medications come in. The   medication is used for his Cholesterol. Please call her back. Thank you.   ---------------------------------------------------------------------------  --------------  Nicole Nath INFO  [phone]; OK to leave message on voicemail  ---------------------------------------------------------------------------  --------------  SCRIPT ANSWERS  Relationship to Patient? Other  Representative Name? Mrs. Jeff Bundy  Is the Representative on the appropriate HIPAA document in Epic?  Yes

## 2023-03-29 ENCOUNTER — HOSPITAL ENCOUNTER (EMERGENCY)
Age: 53
Discharge: HOME | End: 2023-03-29
Payer: COMMERCIAL

## 2023-03-29 ENCOUNTER — OFFICE VISIT (OUTPATIENT)
Dept: FAMILY MEDICINE CLINIC | Age: 53
End: 2023-03-29
Payer: COMMERCIAL

## 2023-03-29 ENCOUNTER — APPOINTMENT (OUTPATIENT)
Dept: CT IMAGING | Age: 53
End: 2023-03-29
Payer: COMMERCIAL

## 2023-03-29 VITALS
DIASTOLIC BLOOD PRESSURE: 78 MMHG | OXYGEN SATURATION: 99 % | HEART RATE: 63 BPM | HEIGHT: 73 IN | TEMPERATURE: 98.6 F | SYSTOLIC BLOOD PRESSURE: 110 MMHG | RESPIRATION RATE: 16 BRPM | BODY MASS INDEX: 22.26 KG/M2 | WEIGHT: 168 LBS

## 2023-03-29 VITALS
TEMPERATURE: 98.3 F | BODY MASS INDEX: 22.03 KG/M2 | OXYGEN SATURATION: 100 % | HEART RATE: 71 BPM | SYSTOLIC BLOOD PRESSURE: 104 MMHG | DIASTOLIC BLOOD PRESSURE: 68 MMHG | WEIGHT: 167 LBS

## 2023-03-29 DIAGNOSIS — R53.83 FATIGUE, UNSPECIFIED TYPE: ICD-10-CM

## 2023-03-29 DIAGNOSIS — E86.0 DEHYDRATION: ICD-10-CM

## 2023-03-29 DIAGNOSIS — R11.2 NAUSEA AND VOMITING, UNSPECIFIED VOMITING TYPE: Primary | ICD-10-CM

## 2023-03-29 DIAGNOSIS — R19.7 DIARRHEA, UNSPECIFIED TYPE: ICD-10-CM

## 2023-03-29 DIAGNOSIS — R10.84 GENERALIZED ABDOMINAL PAIN: ICD-10-CM

## 2023-03-29 DIAGNOSIS — R11.2 NAUSEA, VOMITING AND DIARRHEA: Primary | ICD-10-CM

## 2023-03-29 DIAGNOSIS — R10.30 LOWER ABDOMINAL PAIN: ICD-10-CM

## 2023-03-29 DIAGNOSIS — R19.7 NAUSEA, VOMITING AND DIARRHEA: Primary | ICD-10-CM

## 2023-03-29 LAB
ALBUMIN SERPL-MCNC: 4.8 G/DL (ref 3.5–5.2)
ALP SERPL-CCNC: 72 U/L (ref 40–129)
ALT SERPL-CCNC: 16 U/L (ref 0–40)
ANION GAP SERPL CALCULATED.3IONS-SCNC: 8 MMOL/L (ref 7–16)
AST SERPL-CCNC: 22 U/L (ref 0–39)
BACTERIA URNS QL MICRO: NORMAL /HPF
BASOPHILS # BLD: 0.09 E9/L (ref 0–0.2)
BASOPHILS NFR BLD: 1.6 % (ref 0–2)
BILIRUB SERPL-MCNC: 0.6 MG/DL (ref 0–1.2)
BILIRUB UR QL STRIP: NEGATIVE
BUN SERPL-MCNC: 17 MG/DL (ref 6–20)
CALCIUM SERPL-MCNC: 9.9 MG/DL (ref 8.6–10.2)
CHLORIDE SERPL-SCNC: 106 MMOL/L (ref 98–107)
CLARITY UR: ABNORMAL
CO2 SERPL-SCNC: 29 MMOL/L (ref 22–29)
COLOR UR: YELLOW
CREAT SERPL-MCNC: 0.9 MG/DL (ref 0.7–1.2)
EOSINOPHIL # BLD: 0.19 E9/L (ref 0.05–0.5)
EOSINOPHIL NFR BLD: 3.4 % (ref 0–6)
ERYTHROCYTE [DISTWIDTH] IN BLOOD BY AUTOMATED COUNT: 13.1 FL (ref 11.5–15)
GLUCOSE SERPL-MCNC: 104 MG/DL (ref 74–99)
GLUCOSE UR STRIP-MCNC: NEGATIVE MG/DL
HCT VFR BLD AUTO: 44.9 % (ref 37–54)
HGB BLD-MCNC: 15.5 G/DL (ref 12.5–16.5)
HGB UR QL STRIP: NEGATIVE
IMM GRANULOCYTES # BLD: 0.05 E9/L
IMM GRANULOCYTES NFR BLD: 0.9 % (ref 0–5)
INFLUENZA A ANTIBODY: NEGATIVE
INFLUENZA B ANTIBODY: NEGATIVE
KETONES UR STRIP-MCNC: NEGATIVE MG/DL
LEUKOCYTE ESTERASE UR QL STRIP: ABNORMAL
LYMPHOCYTES # BLD: 1.59 E9/L (ref 1.5–4)
LYMPHOCYTES NFR BLD: 28.8 % (ref 20–42)
Lab: NORMAL
MCH RBC QN AUTO: 38.4 PG (ref 26–35)
MCHC RBC AUTO-ENTMCNC: 34.5 % (ref 32–34.5)
MCV RBC AUTO: 111.1 FL (ref 80–99.9)
MONOCYTES # BLD: 0.36 E9/L (ref 0.1–0.95)
MONOCYTES NFR BLD: 6.5 % (ref 2–12)
NEUTROPHILS # BLD: 3.25 E9/L (ref 1.8–7.3)
NEUTS SEG NFR BLD: 58.8 % (ref 43–80)
NITRITE UR QL STRIP: NEGATIVE
PERFORMING INSTRUMENT: NORMAL
PH UR STRIP: 8.5 [PH] (ref 5–9)
PLATELET # BLD AUTO: 425 E9/L (ref 130–450)
PMV BLD AUTO: 9 FL (ref 7–12)
POTASSIUM SERPL-SCNC: 5.1 MMOL/L (ref 3.5–5)
PROT SERPL-MCNC: 7.4 G/DL (ref 6.4–8.3)
PROT UR STRIP-MCNC: NEGATIVE MG/DL
QC PASS/FAIL: NORMAL
RBC # BLD AUTO: 4.04 E12/L (ref 3.8–5.8)
RBC #/AREA URNS HPF: NORMAL /HPF (ref 0–2)
RBC MORPH BLD: NORMAL
SARS-COV-2, POC: NORMAL
SODIUM SERPL-SCNC: 143 MMOL/L (ref 132–146)
SP GR UR STRIP: 1.01 (ref 1–1.03)
UROBILINOGEN UR STRIP-ACNC: 0.2 E.U./DL
WBC # BLD: 5.5 E9/L (ref 4.5–11.5)
WBC #/AREA URNS HPF: NORMAL /HPF (ref 0–5)

## 2023-03-29 PROCEDURE — 99285 EMERGENCY DEPT VISIT HI MDM: CPT

## 2023-03-29 PROCEDURE — 96374 THER/PROPH/DIAG INJ IV PUSH: CPT

## 2023-03-29 PROCEDURE — 74177 CT ABD & PELVIS W/CONTRAST: CPT

## 2023-03-29 PROCEDURE — 99215 OFFICE O/P EST HI 40 MIN: CPT | Performed by: PHYSICIAN ASSISTANT

## 2023-03-29 PROCEDURE — 80053 COMPREHEN METABOLIC PANEL: CPT

## 2023-03-29 PROCEDURE — 3074F SYST BP LT 130 MM HG: CPT | Performed by: PHYSICIAN ASSISTANT

## 2023-03-29 PROCEDURE — G8420 CALC BMI NORM PARAMETERS: HCPCS | Performed by: PHYSICIAN ASSISTANT

## 2023-03-29 PROCEDURE — 3078F DIAST BP <80 MM HG: CPT | Performed by: PHYSICIAN ASSISTANT

## 2023-03-29 PROCEDURE — 87426 SARSCOV CORONAVIRUS AG IA: CPT | Performed by: PHYSICIAN ASSISTANT

## 2023-03-29 PROCEDURE — 81001 URINALYSIS AUTO W/SCOPE: CPT

## 2023-03-29 PROCEDURE — 6360000002 HC RX W HCPCS: Performed by: PHYSICIAN ASSISTANT

## 2023-03-29 PROCEDURE — 6360000004 HC RX CONTRAST MEDICATION: Performed by: RADIOLOGY

## 2023-03-29 PROCEDURE — 87804 INFLUENZA ASSAY W/OPTIC: CPT | Performed by: PHYSICIAN ASSISTANT

## 2023-03-29 PROCEDURE — 3017F COLORECTAL CA SCREEN DOC REV: CPT | Performed by: PHYSICIAN ASSISTANT

## 2023-03-29 PROCEDURE — 4004F PT TOBACCO SCREEN RCVD TLK: CPT | Performed by: PHYSICIAN ASSISTANT

## 2023-03-29 PROCEDURE — G8427 DOCREV CUR MEDS BY ELIG CLIN: HCPCS | Performed by: PHYSICIAN ASSISTANT

## 2023-03-29 PROCEDURE — 85025 COMPLETE CBC W/AUTO DIFF WBC: CPT

## 2023-03-29 PROCEDURE — G8484 FLU IMMUNIZE NO ADMIN: HCPCS | Performed by: PHYSICIAN ASSISTANT

## 2023-03-29 PROCEDURE — 2580000003 HC RX 258: Performed by: PHYSICIAN ASSISTANT

## 2023-03-29 RX ORDER — ONDANSETRON 4 MG/1
4 TABLET, FILM COATED ORAL EVERY 8 HOURS PRN
Qty: 12 TABLET | Refills: 0 | Status: SHIPPED | OUTPATIENT
Start: 2023-03-29 | End: 2023-04-06 | Stop reason: SDUPTHER

## 2023-03-29 RX ORDER — ONDANSETRON 2 MG/ML
4 INJECTION INTRAMUSCULAR; INTRAVENOUS ONCE
Status: COMPLETED | OUTPATIENT
Start: 2023-03-29 | End: 2023-03-29

## 2023-03-29 RX ORDER — 0.9 % SODIUM CHLORIDE 0.9 %
1000 INTRAVENOUS SOLUTION INTRAVENOUS ONCE
Status: COMPLETED | OUTPATIENT
Start: 2023-03-29 | End: 2023-03-29

## 2023-03-29 RX ADMIN — ONDANSETRON HYDROCHLORIDE 4 MG: 2 SOLUTION INTRAMUSCULAR; INTRAVENOUS at 11:19

## 2023-03-29 RX ADMIN — SODIUM CHLORIDE 1000 ML: 9 INJECTION, SOLUTION INTRAVENOUS at 11:18

## 2023-03-29 RX ADMIN — IOPAMIDOL 75 ML: 755 INJECTION, SOLUTION INTRAVENOUS at 12:40

## 2023-03-29 NOTE — Clinical Note
Funmilayo Kinney was seen and treated in our emergency department on 3/29/2023. He may return to work on 03/30/2023. If you have any questions or concerns, please don't hesitate to call.       Jovanny Castro PA-C

## 2023-03-29 NOTE — PROGRESS NOTES
3/29/23  Munira Krishna : 1970 Sex: male  Age 47 y.o. Subjective:  Chief Complaint   Patient presents with    Fatigue     X2 days    Emesis    Dizziness         HPI:   Munira Krishna , 47 y.o. male presents to express care for evaluation of nausea, vomiting, diarrhea, dizziness    HPI  52-year-old male presents to express care for evaluation of nausea, vomiting, diarrhea, dizziness. The patient is having some generalized abdominal pain. Patient states with the symptoms on  and have progressed quite a bit. The patient is having multiple episodes of emesis. The patient is taking Phenergan at home that his wife's prescription does not seem to be helping. He was able to drink some fluids just before he got here but otherwise has not really been consuming much fluids or food. The patient is feeling increasingly lightheaded and dizzy. ROS:   Unless otherwise stated in this report the patient's positive and negative responses for review of systems for constitutional, eyes, ENT, cardiovascular, respiratory, gastrointestinal, neurological, , musculoskeletal, and integument systems and related systems to the presenting problem are either stated in the history of present illness or were not pertinent or were negative for the symptoms and/or complaints related to the presenting medical problem. Positives and pertinent negatives as per HPI. All others reviewed and are negative.       PMH:     Past Medical History:   Diagnosis Date    Anxiety and depression     Atheroembolism of left lower extremity ([address] 2014    Atrial flutter (HCC)     Coronary atherosclerosis due to calcified coronary lesion(414.4)     ED (erectile dysfunction)     Epilepsy ([address]     Hyperlipidemia     Hypertension     IBS (irritable bowel syndrome)     Ischemic toe 2014    MI, old     Thrombocytosis     Tinea     Tobacco dependence 2014       Past Surgical History:   Procedure Laterality Date

## 2023-03-29 NOTE — Clinical Note
Franci Bear was seen and treated in our emergency department on 3/29/2023. He may return to work on 03/30/2023. If you have any questions or concerns, please don't hesitate to call.       Nona Cushing, PA-C

## 2023-03-29 NOTE — ED PROVIDER NOTES
Independent DAILY Visit. [facility]  ED  Encounter Note  Admit Date/RoomTime: 3/29/2023 10:20 AM  ED Room: BRINA/BRINA  NAME: Rudy Orantes  : 1970  MRN: [id number]  PCP: Omar Parekh MD    CHIEF COMPLAINT     Emesis (N/V/D x24 hours, send by [facility], light headedness since , per urgent care needs bloodwork and fluids)    HISTORY OF PRESENT ILLNESS        Rudy Orantes is a 47 y.o. male who presents to the ED by private vehicle for N/V and concern for , beginning 3 day(s) ago. The complaint has been intermittent and are mild in severity. The patient states he was sent in by a local urgent care concerned about the possibility of dehydration. The patient was reporting several days of nausea, vomiting and diarrhea. He has had some intermittent dizziness as well. The patient states he has been able to keep some things down. He denies any abdominal pain. No reported fever or chills. Denies hematuria, dysuria or polyuria. The patient states they did swab him there for COVID and influenza and these were both negative. He states they were worried about dehydration and advised the patient to come here to get some fluids. He does have history of laparotomy last May after a bowel perforation. He believes he still has his appendix. REVIEW OF SYSTEMS     Pertinent positives and negatives are stated within HPI, all other systems reviewed and are negative. Past Medical History:  has a past medical history of Anxiety and depression, Atheroembolism of left lower extremity ([address] Atrial flutter ([address] Coronary atherosclerosis due to calcified coronary lesion(414.4), ED (erectile dysfunction), Epilepsy ([address] Hyperlipidemia, Hypertension, IBS (irritable bowel syndrome), Ischemic toe, MI, old, Thrombocytosis, Tinea, and Tobacco dependence.   Surgical History:  has a past surgical history that includes Upper gastrointestinal meds for nausea. Labs are normal including CBC CMP and urine. No evidence of UTI. CAT scan demonstrates some nonspecific findings in the small bowel probably related to his prior surgery. The appendix was normal.  The patient was reassured. I did give him a note for work and we will discharge him home with some Zofran. Follow-up with his surgeon advised. Certainly if he has any recurrent symptoms or worsening he can return here to be seen. The patient is aware and agreeable to this plan    Plan of Care/Counseling:  Luis Krueger PA-C reviewed today's visit with the patient in addition to providing specific details for the plan of care and counseling regarding the diagnosis and prognosis. Questions are answered at this time and are agreeable with the plan. ASSESSMENT     1. Nausea and vomiting, unspecified vomiting type    2. Lower abdominal pain    3. Diarrhea, unspecified type        DISPOSITION   Discharged home. Patient condition is stable    Discharge Instructions:   [facility] [address] [facility]  [phone]        [facility]     Discharge Medication List as of 3/29/2023  1:32 PM        START taking these medications    Details   ondansetron (ZOFRAN) 4 MG tablet Take 1 tablet by mouth every 8 hours as needed for Nausea or Vomiting, Disp-12 tablet, R-0Normal           Electronically signed by Luis Krueger PA-C   DD: 3/29/23  **This report was transcribed using voice recognition software. Every effort was made to ensure accuracy; however, inadvertent computerized transcription errors may be present.   END OF ED PROVIDER NOTE      Luis Krueger PA-C  03/29/23 [id number]

## 2023-04-03 ENCOUNTER — HOSPITAL ENCOUNTER (OUTPATIENT)
Dept: GENERAL RADIOLOGY | Age: 53
Discharge: HOME | End: 2023-04-05
Payer: COMMERCIAL

## 2023-04-03 ENCOUNTER — HOSPITAL ENCOUNTER (OUTPATIENT)
Age: 53
Discharge: HOME | End: 2023-04-05
Payer: COMMERCIAL

## 2023-04-03 DIAGNOSIS — K59.09 OTHER CONSTIPATION: ICD-10-CM

## 2023-04-03 PROCEDURE — 74018 RADEX ABDOMEN 1 VIEW: CPT

## 2023-04-03 SDOH — ECONOMIC STABILITY: HOUSING INSECURITY
IN THE LAST 12 MONTHS, WAS THERE A TIME WHEN YOU DID NOT HAVE A STEADY PLACE TO SLEEP OR SLEPT IN A SHELTER (INCLUDING NOW)?: PATIENT REFUSED

## 2023-04-03 SDOH — ECONOMIC STABILITY: FOOD INSECURITY: WITHIN THE PAST 12 MONTHS, YOU WORRIED THAT YOUR FOOD WOULD RUN OUT BEFORE YOU GOT MONEY TO BUY MORE.: PATIENT DECLINED

## 2023-04-03 SDOH — ECONOMIC STABILITY: FOOD INSECURITY: WITHIN THE PAST 12 MONTHS, THE FOOD YOU BOUGHT JUST DIDN'T LAST AND YOU DIDN'T HAVE MONEY TO GET MORE.: PATIENT DECLINED

## 2023-04-03 SDOH — ECONOMIC STABILITY: INCOME INSECURITY: HOW HARD IS IT FOR YOU TO PAY FOR THE VERY BASICS LIKE FOOD, HOUSING, MEDICAL CARE, AND HEATING?: PATIENT DECLINED

## 2023-04-03 SDOH — ECONOMIC STABILITY: TRANSPORTATION INSECURITY
IN THE PAST 12 MONTHS, HAS LACK OF TRANSPORTATION KEPT YOU FROM MEETINGS, WORK, OR FROM GETTING THINGS NEEDED FOR DAILY LIVING?: PATIENT DECLINED

## 2023-04-06 ENCOUNTER — OFFICE VISIT (OUTPATIENT)
Dept: PRIMARY CARE CLINIC | Age: 53
End: 2023-04-06
Payer: COMMERCIAL

## 2023-04-06 VITALS
HEART RATE: 56 BPM | BODY MASS INDEX: 21.47 KG/M2 | RESPIRATION RATE: 16 BRPM | SYSTOLIC BLOOD PRESSURE: 104 MMHG | HEIGHT: 73 IN | DIASTOLIC BLOOD PRESSURE: 66 MMHG | TEMPERATURE: 97.7 F | WEIGHT: 162 LBS | OXYGEN SATURATION: 98 %

## 2023-04-06 DIAGNOSIS — K21.9 GASTROESOPHAGEAL REFLUX DISEASE, UNSPECIFIED WHETHER ESOPHAGITIS PRESENT: Primary | ICD-10-CM

## 2023-04-06 DIAGNOSIS — D47.3 ESSENTIAL THROMBOCYTHEMIA (HCC): ICD-10-CM

## 2023-04-06 DIAGNOSIS — G40.909 SEIZURE DISORDER (HCC): ICD-10-CM

## 2023-04-06 DIAGNOSIS — R11.0 NAUSEA: ICD-10-CM

## 2023-04-06 PROCEDURE — 3017F COLORECTAL CA SCREEN DOC REV: CPT | Performed by: FAMILY MEDICINE

## 2023-04-06 PROCEDURE — 3078F DIAST BP <80 MM HG: CPT | Performed by: FAMILY MEDICINE

## 2023-04-06 PROCEDURE — 4004F PT TOBACCO SCREEN RCVD TLK: CPT | Performed by: FAMILY MEDICINE

## 2023-04-06 PROCEDURE — 99213 OFFICE O/P EST LOW 20 MIN: CPT | Performed by: FAMILY MEDICINE

## 2023-04-06 PROCEDURE — 3074F SYST BP LT 130 MM HG: CPT | Performed by: FAMILY MEDICINE

## 2023-04-06 PROCEDURE — G8427 DOCREV CUR MEDS BY ELIG CLIN: HCPCS | Performed by: FAMILY MEDICINE

## 2023-04-06 PROCEDURE — G8420 CALC BMI NORM PARAMETERS: HCPCS | Performed by: FAMILY MEDICINE

## 2023-04-06 RX ORDER — ONDANSETRON 4 MG/1
4 TABLET, FILM COATED ORAL EVERY 8 HOURS PRN
Qty: 12 TABLET | Refills: 0 | Status: SHIPPED | OUTPATIENT
Start: 2023-04-06 | End: 2023-04-11

## 2023-04-06 ASSESSMENT — PATIENT HEALTH QUESTIONNAIRE - PHQ9
6. FEELING BAD ABOUT YOURSELF - OR THAT YOU ARE A FAILURE OR HAVE LET YOURSELF OR YOUR FAMILY DOWN: 0
1. LITTLE INTEREST OR PLEASURE IN DOING THINGS: 0
SUM OF ALL RESPONSES TO PHQ QUESTIONS 1-9: 2
SUM OF ALL RESPONSES TO PHQ QUESTIONS 1-9: 2
5. POOR APPETITE OR OVEREATING: 0
SUM OF ALL RESPONSES TO PHQ9 QUESTIONS 1 & 2: 2
7. TROUBLE CONCENTRATING ON THINGS, SUCH AS READING THE NEWSPAPER OR WATCHING TELEVISION: 0
9. THOUGHTS THAT YOU WOULD BE BETTER OFF DEAD, OR OF HURTING YOURSELF: 0
3. TROUBLE FALLING OR STAYING ASLEEP: 0
4. FEELING TIRED OR HAVING LITTLE ENERGY: 0
SUM OF ALL RESPONSES TO PHQ QUESTIONS 1-9: 2
2. FEELING DOWN, DEPRESSED OR HOPELESS: 2
SUM OF ALL RESPONSES TO PHQ QUESTIONS 1-9: 2
10. IF YOU CHECKED OFF ANY PROBLEMS, HOW DIFFICULT HAVE THESE PROBLEMS MADE IT FOR YOU TO DO YOUR WORK, TAKE CARE OF THINGS AT HOME, OR GET ALONG WITH OTHER PEOPLE: 1
8. MOVING OR SPEAKING SO SLOWLY THAT OTHER PEOPLE COULD HAVE NOTICED. OR THE OPPOSITE, BEING SO FIGETY OR RESTLESS THAT YOU HAVE BEEN MOVING AROUND A LOT MORE THAN USUAL: 0

## 2023-04-06 ASSESSMENT — ENCOUNTER SYMPTOMS
WHEEZING: 0
NAUSEA: 1
CONSTIPATION: 0
SORE THROAT: 0
ABDOMINAL PAIN: 1
DIARRHEA: 1
VOMITING: 0
RHINORRHEA: 0
SHORTNESS OF BREATH: 0

## 2023-04-06 NOTE — PROGRESS NOTES
2023     No chief complaint on file. CLAY Jennings (:  1970) is a 47 y.o. male, here for evaluation of the following medical concerns:    Patient is a 52-year-old male with a past medical history of anxiety/depression, family history of luekemia w thrombocytopenia, DDD, arthritis, ED, low vit d, GERD, hyperlipidemia, CAD w/ stent placement in , HTN, HLD, Seizure disorder who presents for a follow-up of his chronic medical issues. Patient also recently had COVID. Follows with cardiology, neurology, gastroenterology, hematology/oncology, and psychiatry. Recent labs: CBC essentially within normal limits besides elevated MCV. CMP essentially normal besides slightly elevated glucose. Patient reports that he has been following with his specialist regularly. Most recently patient was in the ER in regards to nausea, vomiting, diarrhea. Patient was discharged home on Zofran. Patient then reached out to his gastroenterologist who advised him that his main issue was primarily constipation. Patient was advised on taking MiraLAX daily. Patient has been passing a significant amount of stool which is mostly loose or diarrhea-like. Patient has an appointment scheduled with GI within the next week or 2. Patient is not in need of any medication refills. Review of Systems   Constitutional:  Negative for chills and fever. HENT:  Negative for congestion, rhinorrhea and sore throat. Respiratory:  Negative for shortness of breath and wheezing. Cardiovascular:  Negative for chest pain and leg swelling. Gastrointestinal:  Positive for abdominal pain, diarrhea and nausea. Negative for constipation and vomiting. Musculoskeletal:  Positive for arthralgias. Skin:  Negative for rash. Neurological:  Negative for light-headedness and headaches.      Past Medical History:   Diagnosis Date    Anxiety and depression     Atheroembolism of left lower extremity ([facility] [address] 2014    Atrial

## 2023-05-05 ENCOUNTER — OFFICE VISIT (OUTPATIENT)
Dept: CARDIOLOGY CLINIC | Age: 53
End: 2023-05-05
Payer: COMMERCIAL

## 2023-05-05 VITALS
DIASTOLIC BLOOD PRESSURE: 54 MMHG | HEART RATE: 65 BPM | BODY MASS INDEX: 22.26 KG/M2 | WEIGHT: 168 LBS | HEIGHT: 73 IN | RESPIRATION RATE: 12 BRPM | SYSTOLIC BLOOD PRESSURE: 90 MMHG

## 2023-05-05 DIAGNOSIS — I25.10 CORONARY ATHEROSCLEROSIS DUE TO CALCIFIED CORONARY LESION: Primary | ICD-10-CM

## 2023-05-05 DIAGNOSIS — Z95.5 STENTED CORONARY ARTERY: ICD-10-CM

## 2023-05-05 DIAGNOSIS — I25.2 MYOCARDIAL INFARCTION, OLD: ICD-10-CM

## 2023-05-05 DIAGNOSIS — E78.00 PURE HYPERCHOLESTEROLEMIA: ICD-10-CM

## 2023-05-05 DIAGNOSIS — I25.84 CORONARY ATHEROSCLEROSIS DUE TO CALCIFIED CORONARY LESION: Primary | ICD-10-CM

## 2023-05-05 PROCEDURE — 3074F SYST BP LT 130 MM HG: CPT | Performed by: INTERNAL MEDICINE

## 2023-05-05 PROCEDURE — 4004F PT TOBACCO SCREEN RCVD TLK: CPT | Performed by: INTERNAL MEDICINE

## 2023-05-05 PROCEDURE — 3078F DIAST BP <80 MM HG: CPT | Performed by: INTERNAL MEDICINE

## 2023-05-05 PROCEDURE — G8427 DOCREV CUR MEDS BY ELIG CLIN: HCPCS | Performed by: INTERNAL MEDICINE

## 2023-05-05 PROCEDURE — 93000 ELECTROCARDIOGRAM COMPLETE: CPT | Performed by: INTERNAL MEDICINE

## 2023-05-05 PROCEDURE — 99213 OFFICE O/P EST LOW 20 MIN: CPT | Performed by: INTERNAL MEDICINE

## 2023-05-05 PROCEDURE — G8420 CALC BMI NORM PARAMETERS: HCPCS | Performed by: INTERNAL MEDICINE

## 2023-05-05 PROCEDURE — 3017F COLORECTAL CA SCREEN DOC REV: CPT | Performed by: INTERNAL MEDICINE

## 2023-05-05 NOTE — PROGRESS NOTES
Drug use: Not Currently     Types: Marijuana Pearly Sabrina)     Comment: Daily for pain. Sexual activity: Yes     Partners: Female   Other Topics Concern    Not on file   Social History Narrative    Not on file     Social Determinants of Health     Financial Resource Strain: Unknown    Difficulty of Paying Living Expenses: Patient refused   Food Insecurity: Unknown    Worried About Running Out of Food in the Last Year: Patient refused    [address] in the Last Year: Patient refused   Transportation Needs: Unknown    Lack of Transportation (Medical): Not on file    Lack of Transportation (Non-Medical): Patient refused   Physical Activity: Not on file   Stress: Not on file   Social Connections: Not on file   Intimate Partner Violence: Not on file   Housing Stability: Unknown    Unable to Pay for Housing in the Last Year: Not on file    Number of [place] in the Last Year: Not on file    Unstable Housing in the Last Year: Patient refused       Family History   Problem Relation Age of Onset    Coronary Art Dis Mother     COPD Mother     Seizures Father     Alcohol Abuse Father     Liver Disease Father     Diabetes Brother     Cancer Brother 64        leukemia    Diabetes Brother     Heart Disease Maternal Uncle     Heart Disease Maternal Uncle        Past Medical History:   Diagnosis Date    Anxiety and depression     Atheroembolism of left lower extremity ([address] 09/11/2014    Atrial flutter ([address]     Coronary atherosclerosis due to calcified coronary lesion(414.4)     ED (erectile dysfunction)     Epilepsy ([address]     Hyperlipidemia     Hypertension     IBS (irritable bowel syndrome)     Ischemic toe 09/11/2014    MI, old     Thrombocytosis     Tinea     Tobacco dependence 09/16/2014       PHYSICAL EXAM:  CONSTITUTIONAL:  Well developed, well nourished    Vitals:    05/05/23 0754   BP: (!) 90/54   Pulse: 65   Resp: 12   Weight: 168 lb (76.2 kg)   Height: 6' 1\" (1.854 m)     HEAD & FACE: Normocephalic.

## 2023-06-01 DIAGNOSIS — I25.10 CORONARY ATHEROSCLEROSIS DUE TO CALCIFIED CORONARY LESION: ICD-10-CM

## 2023-06-01 DIAGNOSIS — I10 ESSENTIAL HYPERTENSION: ICD-10-CM

## 2023-06-01 DIAGNOSIS — I25.84 CORONARY ATHEROSCLEROSIS DUE TO CALCIFIED CORONARY LESION: ICD-10-CM

## 2023-06-01 RX ORDER — ATORVASTATIN CALCIUM 40 MG/1
40 TABLET, FILM COATED ORAL NIGHTLY
Qty: 30 TABLET | Refills: 2 | Status: SHIPPED | OUTPATIENT
Start: 2023-06-01

## 2023-06-01 RX ORDER — CLOPIDOGREL BISULFATE 75 MG/1
75 TABLET ORAL DAILY
Qty: 30 TABLET | Refills: 2 | Status: SHIPPED | OUTPATIENT
Start: 2023-06-01

## 2023-06-01 RX ORDER — METOPROLOL SUCCINATE 25 MG/1
25 TABLET, EXTENDED RELEASE ORAL DAILY
Qty: 30 TABLET | Refills: 2 | Status: SHIPPED | OUTPATIENT
Start: 2023-06-01

## 2023-06-01 NOTE — TELEPHONE ENCOUNTER
----- Message from Seda Gr sent at 6/1/2023  3:33 PM EDT -----  Subject: Refill Request    QUESTIONS  Name of Medication? clopidogrel (PLAVIX) 75 MG tablet  Patient-reported dosage and instructions? 75 mg once daily  How many days do you have left? 0  Preferred Pharmacy? [address] phone number (if available)? [phone]  ---------------------------------------------------------------------------  --------------,  Name of Medication? metoprolol succinate (TOPROL XL) 25 MG extended   release tablet  Patient-reported dosage and instructions? 25 mg once daily  How many days do you have left? 0  Preferred Pharmacy? [address] phone number (if available)? [phone]  ---------------------------------------------------------------------------  --------------,  Name of Medication? atorvastatin (LIPITOR) 40 MG tablet  Patient-reported dosage and instructions? 40 mg 1x daily  How many days do you have left? 0  Preferred Pharmacy? [address] phone number (if available)? [phone]  ---------------------------------------------------------------------------  --------------  CALL BACK INFO  What is the best way for the office to contact you? OK to leave message on   voicemail  Preferred Call Back Phone Number? [phone]  ---------------------------------------------------------------------------  --------------  SCRIPT ANSWERS  Relationship to Patient? Spouse/Partner  Representative Name? Elda Saavedra   Is the representative on the Communication Release of Information (TERRI)   form in Epic?  Yes

## 2023-06-25 ENCOUNTER — APPOINTMENT (OUTPATIENT)
Dept: CT IMAGING | Age: 53
End: 2023-06-25
Payer: COMMERCIAL

## 2023-06-25 ENCOUNTER — HOSPITAL ENCOUNTER (EMERGENCY)
Age: 53
Discharge: HOME | End: 2023-06-25
Attending: EMERGENCY MEDICINE
Payer: COMMERCIAL

## 2023-06-25 VITALS
TEMPERATURE: 98.2 F | BODY MASS INDEX: 22.26 KG/M2 | HEIGHT: 73 IN | WEIGHT: 168 LBS | HEART RATE: 48 BPM | SYSTOLIC BLOOD PRESSURE: 145 MMHG | DIASTOLIC BLOOD PRESSURE: 89 MMHG | OXYGEN SATURATION: 98 % | RESPIRATION RATE: 16 BRPM

## 2023-06-25 DIAGNOSIS — R11.2 NAUSEA AND VOMITING, UNSPECIFIED VOMITING TYPE: ICD-10-CM

## 2023-06-25 DIAGNOSIS — R10.84 GENERALIZED ABDOMINAL PAIN: Primary | ICD-10-CM

## 2023-06-25 LAB
ALBUMIN SERPL-MCNC: 4.3 G/DL (ref 3.5–5.2)
ALP SERPL-CCNC: 61 U/L (ref 40–129)
ALT SERPL-CCNC: 17 U/L (ref 0–40)
ANION GAP SERPL CALCULATED.3IONS-SCNC: 9 MMOL/L (ref 7–16)
AST SERPL-CCNC: 18 U/L (ref 0–39)
BACTERIA URNS QL MICRO: NORMAL /HPF
BASOPHILS # BLD: 0.08 E9/L (ref 0–0.2)
BASOPHILS NFR BLD: 1.2 % (ref 0–2)
BILIRUB SERPL-MCNC: 0.7 MG/DL (ref 0–1.2)
BILIRUB UR QL STRIP: NEGATIVE
BUN SERPL-MCNC: 15 MG/DL (ref 6–20)
CALCIUM SERPL-MCNC: 9.3 MG/DL (ref 8.6–10.2)
CHLORIDE SERPL-SCNC: 104 MMOL/L (ref 98–107)
CLARITY UR: CLEAR
CO2 SERPL-SCNC: 27 MMOL/L (ref 22–29)
COLOR UR: YELLOW
CREAT SERPL-MCNC: 0.9 MG/DL (ref 0.7–1.2)
EOSINOPHIL # BLD: 0.18 E9/L (ref 0.05–0.5)
EOSINOPHIL NFR BLD: 2.7 % (ref 0–6)
ERYTHROCYTE [DISTWIDTH] IN BLOOD BY AUTOMATED COUNT: 13 FL (ref 11.5–15)
GLUCOSE SERPL-MCNC: 111 MG/DL (ref 74–99)
GLUCOSE UR STRIP-MCNC: NEGATIVE MG/DL
HCT VFR BLD AUTO: 39.6 % (ref 37–54)
HGB BLD-MCNC: 14 G/DL (ref 12.5–16.5)
HGB UR QL STRIP: NORMAL
IMM GRANULOCYTES # BLD: 0.04 E9/L
IMM GRANULOCYTES NFR BLD: 0.6 % (ref 0–5)
KETONES UR STRIP-MCNC: NEGATIVE MG/DL
LEUKOCYTE ESTERASE UR QL STRIP: NEGATIVE
LIPASE: 25 U/L (ref 13–60)
LYMPHOCYTES # BLD: 1.49 E9/L (ref 1.5–4)
LYMPHOCYTES NFR BLD: 22.2 % (ref 20–42)
MCH RBC QN AUTO: 38.5 PG (ref 26–35)
MCHC RBC AUTO-ENTMCNC: 35.4 % (ref 32–34.5)
MCV RBC AUTO: 108.8 FL (ref 80–99.9)
MONOCYTES # BLD: 0.46 E9/L (ref 0.1–0.95)
MONOCYTES NFR BLD: 6.9 % (ref 2–12)
NEUTROPHILS # BLD: 4.46 E9/L (ref 1.8–7.3)
NEUTS SEG NFR BLD: 66.4 % (ref 43–80)
NITRITE UR QL STRIP: NEGATIVE
PH UR STRIP: 8 [PH] (ref 5–9)
PLATELET # BLD AUTO: 392 E9/L (ref 130–450)
PMV BLD AUTO: 9.2 FL (ref 7–12)
POTASSIUM SERPL-SCNC: 4.5 MMOL/L (ref 3.5–5)
PROT SERPL-MCNC: 6.5 G/DL (ref 6.4–8.3)
PROT UR STRIP-MCNC: NEGATIVE MG/DL
RBC # BLD AUTO: 3.64 E12/L (ref 3.8–5.8)
RBC #/AREA URNS HPF: NORMAL /HPF (ref 0–2)
SODIUM SERPL-SCNC: 140 MMOL/L (ref 132–146)
SP GR UR STRIP: 1.01 (ref 1–1.03)
UROBILINOGEN UR STRIP-ACNC: 0.2 E.U./DL
WBC # BLD: 6.7 E9/L (ref 4.5–11.5)
WBC #/AREA URNS HPF: NORMAL /HPF (ref 0–5)

## 2023-06-25 PROCEDURE — 6360000004 HC RX CONTRAST MEDICATION: Performed by: RADIOLOGY

## 2023-06-25 PROCEDURE — 81001 URINALYSIS AUTO W/SCOPE: CPT

## 2023-06-25 PROCEDURE — 85025 COMPLETE CBC W/AUTO DIFF WBC: CPT

## 2023-06-25 PROCEDURE — 96374 THER/PROPH/DIAG INJ IV PUSH: CPT

## 2023-06-25 PROCEDURE — 83690 ASSAY OF LIPASE: CPT

## 2023-06-25 PROCEDURE — 96375 TX/PRO/DX INJ NEW DRUG ADDON: CPT

## 2023-06-25 PROCEDURE — 80053 COMPREHEN METABOLIC PANEL: CPT

## 2023-06-25 PROCEDURE — 6360000002 HC RX W HCPCS

## 2023-06-25 PROCEDURE — 2580000003 HC RX 258

## 2023-06-25 PROCEDURE — 99285 EMERGENCY DEPT VISIT HI MDM: CPT

## 2023-06-25 PROCEDURE — 74177 CT ABD & PELVIS W/CONTRAST: CPT

## 2023-06-25 RX ORDER — METOCLOPRAMIDE HYDROCHLORIDE 5 MG/ML
10 INJECTION INTRAMUSCULAR; INTRAVENOUS ONCE
Status: COMPLETED | OUTPATIENT
Start: 2023-06-25 | End: 2023-06-25

## 2023-06-25 RX ORDER — 0.9 % SODIUM CHLORIDE 0.9 %
1000 INTRAVENOUS SOLUTION INTRAVENOUS ONCE
Status: COMPLETED | OUTPATIENT
Start: 2023-06-25 | End: 2023-06-25

## 2023-06-25 RX ORDER — DIPHENHYDRAMINE HYDROCHLORIDE 50 MG/ML
25 INJECTION INTRAMUSCULAR; INTRAVENOUS ONCE
Status: COMPLETED | OUTPATIENT
Start: 2023-06-25 | End: 2023-06-25

## 2023-06-25 RX ORDER — ONDANSETRON 4 MG/1
4 TABLET, FILM COATED ORAL 3 TIMES DAILY PRN
Qty: 15 TABLET | Refills: 0 | Status: SHIPPED | OUTPATIENT
Start: 2023-06-25

## 2023-06-25 RX ADMIN — SODIUM CHLORIDE 1000 ML: 9 INJECTION, SOLUTION INTRAVENOUS at 07:40

## 2023-06-25 RX ADMIN — METOCLOPRAMIDE 10 MG: 5 INJECTION, SOLUTION INTRAMUSCULAR; INTRAVENOUS at 07:40

## 2023-06-25 RX ADMIN — IOPAMIDOL 75 ML: 755 INJECTION, SOLUTION INTRAVENOUS at 09:12

## 2023-06-25 RX ADMIN — DIPHENHYDRAMINE HYDROCHLORIDE 25 MG: 50 INJECTION, SOLUTION INTRAMUSCULAR; INTRAVENOUS at 07:41

## 2023-06-25 ASSESSMENT — PAIN - FUNCTIONAL ASSESSMENT: PAIN_FUNCTIONAL_ASSESSMENT: 0-10

## 2023-06-25 ASSESSMENT — LIFESTYLE VARIABLES
HOW MANY STANDARD DRINKS CONTAINING ALCOHOL DO YOU HAVE ON A TYPICAL DAY: PATIENT DOES NOT DRINK
HOW OFTEN DO YOU HAVE A DRINK CONTAINING ALCOHOL: NEVER

## 2023-06-25 ASSESSMENT — PAIN DESCRIPTION - DESCRIPTORS: DESCRIPTORS: SORE

## 2023-06-25 ASSESSMENT — PAIN DESCRIPTION - LOCATION: LOCATION: ABDOMEN

## 2023-06-25 ASSESSMENT — PAIN SCALES - GENERAL: PAINLEVEL_OUTOF10: 5

## 2023-07-31 ENCOUNTER — OFFICE VISIT (OUTPATIENT)
Dept: PRIMARY CARE CLINIC | Age: 53
End: 2023-07-31
Payer: COMMERCIAL

## 2023-07-31 VITALS
DIASTOLIC BLOOD PRESSURE: 80 MMHG | WEIGHT: 163 LBS | BODY MASS INDEX: 21.6 KG/M2 | TEMPERATURE: 97.3 F | HEIGHT: 73 IN | HEART RATE: 74 BPM | SYSTOLIC BLOOD PRESSURE: 134 MMHG | OXYGEN SATURATION: 98 %

## 2023-07-31 DIAGNOSIS — F17.200 TOBACCO DEPENDENCE: ICD-10-CM

## 2023-07-31 DIAGNOSIS — D47.1 MYELOPROLIFERATIVE DISEASE (HCC): ICD-10-CM

## 2023-07-31 DIAGNOSIS — L98.9 SKIN LESION: ICD-10-CM

## 2023-07-31 DIAGNOSIS — I10 ESSENTIAL HYPERTENSION: ICD-10-CM

## 2023-07-31 DIAGNOSIS — G40.909 SEIZURE DISORDER (HCC): ICD-10-CM

## 2023-07-31 DIAGNOSIS — E55.9 VITAMIN D DEFICIENCY: ICD-10-CM

## 2023-07-31 DIAGNOSIS — D47.3 ESSENTIAL THROMBOCYTHEMIA (HCC): Primary | ICD-10-CM

## 2023-07-31 DIAGNOSIS — E78.5 HYPERLIPIDEMIA, UNSPECIFIED HYPERLIPIDEMIA TYPE: ICD-10-CM

## 2023-07-31 PROBLEM — M79.7 FIBROMYALGIA: Status: ACTIVE | Noted: 2023-07-31

## 2023-07-31 PROBLEM — M54.16 LUMBAR RADICULOPATHY: Status: ACTIVE | Noted: 2023-07-31

## 2023-07-31 PROBLEM — K63.1 BOWEL PERFORATION (HCC): Status: RESOLVED | Noted: 2022-05-06 | Resolved: 2023-07-31

## 2023-07-31 PROBLEM — M72.2 PLANTAR FASCIITIS: Status: ACTIVE | Noted: 2023-06-20

## 2023-07-31 PROBLEM — K63.1 SMALL BOWEL PERFORATION (HCC): Status: RESOLVED | Noted: 2022-05-06 | Resolved: 2023-07-31

## 2023-07-31 PROBLEM — K50.00 CROHN'S DISEASE OF SMALL INTESTINE WITHOUT COMPLICATION (HCC): Status: ACTIVE | Noted: 2023-07-31

## 2023-07-31 PROCEDURE — G8427 DOCREV CUR MEDS BY ELIG CLIN: HCPCS | Performed by: FAMILY MEDICINE

## 2023-07-31 PROCEDURE — 3017F COLORECTAL CA SCREEN DOC REV: CPT | Performed by: FAMILY MEDICINE

## 2023-07-31 PROCEDURE — 3079F DIAST BP 80-89 MM HG: CPT | Performed by: FAMILY MEDICINE

## 2023-07-31 PROCEDURE — 3075F SYST BP GE 130 - 139MM HG: CPT | Performed by: FAMILY MEDICINE

## 2023-07-31 PROCEDURE — 99214 OFFICE O/P EST MOD 30 MIN: CPT | Performed by: FAMILY MEDICINE

## 2023-07-31 PROCEDURE — G8420 CALC BMI NORM PARAMETERS: HCPCS | Performed by: FAMILY MEDICINE

## 2023-07-31 PROCEDURE — 4004F PT TOBACCO SCREEN RCVD TLK: CPT | Performed by: FAMILY MEDICINE

## 2023-07-31 RX ORDER — VARENICLINE TARTRATE 0.5 MG/1
.5-1 TABLET, FILM COATED ORAL SEE ADMIN INSTRUCTIONS
Qty: 57 TABLET | Refills: 0 | Status: SHIPPED | OUTPATIENT
Start: 2023-07-31

## 2023-07-31 RX ORDER — RISPERIDONE 0.5 MG/1
TABLET ORAL
COMMUNITY
Start: 2023-04-04

## 2023-07-31 ASSESSMENT — ENCOUNTER SYMPTOMS
BACK PAIN: 0
COUGH: 0
NAUSEA: 0
SORE THROAT: 0
DIARRHEA: 0
CONSTIPATION: 0
SHORTNESS OF BREATH: 0
COLOR CHANGE: 1
ABDOMINAL PAIN: 0
BLOOD IN STOOL: 0
PHOTOPHOBIA: 0
VOMITING: 0

## 2023-07-31 NOTE — PROGRESS NOTES
Acid; Future  -     Vitamin B12 & Folate; Future  -     TSH; Future  -     Hepatic Function Panel; Future  -     Basic Metabolic Panel; Future  -     Lipid Panel; Future  -     Hemoglobin A1C; Future  -     Urinalysis with Microscopic; Future  -     Microalbumin, Ur; Future  -     Path Review, Smear; Future  -     Iron and TIBC; Future  -     Reticulocytes; Future  -     RPR; Future  -     Electrophoresis Protein, Serum; Future  -     Protein Electrophoresis, Urine; Future  -     C-Reactive Protein; Future  -     Levetiracetam Level; Future  -     varenicline (CHANTIX) 0.5 MG tablet; Take 1-2 tablets by mouth See Admin Instructions 0.5mg DAILY for 3 days followed by 0.5mg TWICE DAILY for 4 days followed by 1mg TWICE DAILY, Disp-57 tablet, R-0Normal  -     Lyme Disease Acute Reflexive Panel; Future  7. Vitamin D deficiency  -     CBC with Auto Differential; Future  -     T4, Free; Future  -     Uric Acid; Future  -     Vitamin B12 & Folate; Future  -     TSH; Future  -     Hepatic Function Panel; Future  -     Basic Metabolic Panel; Future  -     Lipid Panel; Future  -     Hemoglobin A1C; Future  -     Urinalysis with Microscopic; Future  -     Microalbumin, Ur; Future  -     Path Review, Smear; Future  -     Iron and TIBC; Future  -     Reticulocytes; Future  -     RPR; Future  -     Electrophoresis Protein, Serum; Future  -     Protein Electrophoresis, Urine; Future  -     C-Reactive Protein; Future  -     Levetiracetam Level; Future  -     Lyme Disease Acute Reflexive Panel; Future  8. Skin lesion  -     Lavenia Kussmaul, DO, Dermatology, [place] ([facility])  -     CBC with Auto Differential; Future  -     T4, Free; Future  -     Uric Acid; Future  -     Vitamin B12 & Folate; Future  -     TSH; Future  -     Hepatic Function Panel; Future  -     Basic Metabolic Panel; Future  -     Lipid Panel; Future  -     Hemoglobin A1C; Future  -     Urinalysis with Microscopic;  Future  -     Microalbumin, Ur; Future  -     Path

## 2023-08-07 ENCOUNTER — TELEPHONE (OUTPATIENT)
Dept: PRIMARY CARE CLINIC | Age: 53
End: 2023-08-07

## 2023-08-07 NOTE — TELEPHONE ENCOUNTER
----- Message from Juani Dillard sent at 8/7/2023  9:29 AM EDT -----  Subject: Medication Problem    Medication: varenicline (CHANTIX) 0.5 MG tablet  Dosage: 0.5MG   Ordering Provider: Ceferino Manzo    Question/Problem: Patient was prescribed this to help quit smoking but the   pharmacy advised the patient to call his pcp to confirm this is okay to   take due to having Epilepsy. Please advise.        Pharmacy: [address], [address] [employer] [phone]    ---------------------------------------------------------------------------  --------------  Ashvin Villalobos INFO  [phone]; OK to leave message on voicemail  ---------------------------------------------------------------------------  --------------    SCRIPT ANSWERS  Relationship to Patient: Spouse/Partner  Representative Name: Soila Jolly  Is the representative on the Communication Release of Information (TERRI)   form in Epic: Yes

## 2023-08-21 DIAGNOSIS — I25.84 CORONARY ATHEROSCLEROSIS DUE TO CALCIFIED CORONARY LESION: ICD-10-CM

## 2023-08-21 DIAGNOSIS — I10 ESSENTIAL HYPERTENSION: ICD-10-CM

## 2023-08-21 DIAGNOSIS — I25.10 CORONARY ATHEROSCLEROSIS DUE TO CALCIFIED CORONARY LESION: ICD-10-CM

## 2023-08-22 ENCOUNTER — APPOINTMENT (OUTPATIENT)
Dept: CT IMAGING | Age: 53
End: 2023-08-22
Payer: COMMERCIAL

## 2023-08-22 ENCOUNTER — HOSPITAL ENCOUNTER (EMERGENCY)
Age: 53
Discharge: HOME | End: 2023-08-22
Payer: COMMERCIAL

## 2023-08-22 VITALS
BODY MASS INDEX: 21.51 KG/M2 | WEIGHT: 163 LBS | RESPIRATION RATE: 16 BRPM | DIASTOLIC BLOOD PRESSURE: 83 MMHG | HEART RATE: 99 BPM | TEMPERATURE: 98.3 F | SYSTOLIC BLOOD PRESSURE: 125 MMHG | OXYGEN SATURATION: 100 %

## 2023-08-22 DIAGNOSIS — R10.84 GENERALIZED ABDOMINAL PAIN: ICD-10-CM

## 2023-08-22 DIAGNOSIS — R11.2 NAUSEA AND VOMITING, UNSPECIFIED VOMITING TYPE: Primary | ICD-10-CM

## 2023-08-22 LAB
ALBUMIN SERPL-MCNC: 4.8 G/DL (ref 3.5–5.2)
ALP SERPL-CCNC: 72 U/L (ref 40–129)
ALT SERPL-CCNC: 20 U/L (ref 0–40)
ANION GAP SERPL CALCULATED.3IONS-SCNC: 15 MMOL/L (ref 7–16)
AST SERPL-CCNC: 22 U/L (ref 0–39)
BASOPHILS # BLD: 0.12 K/UL (ref 0–0.2)
BASOPHILS NFR BLD: 2 % (ref 0–2)
BILIRUB SERPL-MCNC: 0.9 MG/DL (ref 0–1.2)
BUN SERPL-MCNC: 13 MG/DL (ref 6–20)
CALCIUM SERPL-MCNC: 10.3 MG/DL (ref 8.6–10.2)
CHLORIDE SERPL-SCNC: 102 MMOL/L (ref 98–107)
CO2 SERPL-SCNC: 21 MMOL/L (ref 22–29)
CREAT SERPL-MCNC: 1 MG/DL (ref 0.7–1.2)
EOSINOPHIL # BLD: 0.16 K/UL (ref 0.05–0.5)
EOSINOPHILS RELATIVE PERCENT: 2 % (ref 0–6)
ERYTHROCYTE [DISTWIDTH] IN BLOOD BY AUTOMATED COUNT: 13.2 % (ref 11.5–15)
GFR SERPL CREATININE-BSD FRML MDRD: >60 ML/MIN/1.73M2
GLUCOSE SERPL-MCNC: 140 MG/DL (ref 74–99)
HCT VFR BLD AUTO: 42.9 % (ref 37–54)
HGB BLD-MCNC: 15.3 G/DL (ref 12.5–16.5)
IMM GRANULOCYTES # BLD AUTO: 0.03 K/UL (ref 0–0.58)
IMM GRANULOCYTES NFR BLD: 0 % (ref 0–5)
LACTATE BLDV-SCNC: 3.1 MMOL/L (ref 0.5–2.2)
LACTATE BLDV-SCNC: 4.2 MMOL/L (ref 0.5–2.2)
LIPASE SERPL-CCNC: 20 U/L (ref 13–60)
LYMPHOCYTES NFR BLD: 1.99 K/UL (ref 1.5–4)
LYMPHOCYTES RELATIVE PERCENT: 28 % (ref 20–42)
MCH RBC QN AUTO: 38.1 PG (ref 26–35)
MCHC RBC AUTO-ENTMCNC: 35.7 G/DL (ref 32–34.5)
MCV RBC AUTO: 106.7 FL (ref 80–99.9)
MONOCYTES NFR BLD: 0.63 K/UL (ref 0.1–0.95)
MONOCYTES NFR BLD: 9 % (ref 2–12)
NEUTROPHILS NFR BLD: 59 % (ref 43–80)
NEUTS SEG NFR BLD: 4.19 K/UL (ref 1.8–7.3)
PLATELET # BLD AUTO: 533 K/UL (ref 130–450)
PMV BLD AUTO: 8.9 FL (ref 7–12)
POTASSIUM SERPL-SCNC: 4 MMOL/L (ref 3.5–5)
PROT SERPL-MCNC: 7.2 G/DL (ref 6.4–8.3)
RBC # BLD AUTO: 4.02 M/UL (ref 3.8–5.8)
SODIUM SERPL-SCNC: 138 MMOL/L (ref 132–146)
WBC OTHER # BLD: 7.1 K/UL (ref 4.5–11.5)

## 2023-08-22 PROCEDURE — 96375 TX/PRO/DX INJ NEW DRUG ADDON: CPT

## 2023-08-22 PROCEDURE — 83690 ASSAY OF LIPASE: CPT

## 2023-08-22 PROCEDURE — 96374 THER/PROPH/DIAG INJ IV PUSH: CPT

## 2023-08-22 PROCEDURE — 80053 COMPREHEN METABOLIC PANEL: CPT

## 2023-08-22 PROCEDURE — 83605 ASSAY OF LACTIC ACID: CPT

## 2023-08-22 PROCEDURE — 96361 HYDRATE IV INFUSION ADD-ON: CPT

## 2023-08-22 PROCEDURE — 99285 EMERGENCY DEPT VISIT HI MDM: CPT

## 2023-08-22 PROCEDURE — 74177 CT ABD & PELVIS W/CONTRAST: CPT

## 2023-08-22 PROCEDURE — 2580000003 HC RX 258: Performed by: PHYSICIAN ASSISTANT

## 2023-08-22 PROCEDURE — 85025 COMPLETE CBC W/AUTO DIFF WBC: CPT

## 2023-08-22 PROCEDURE — 6360000002 HC RX W HCPCS: Performed by: PHYSICIAN ASSISTANT

## 2023-08-22 PROCEDURE — 6360000004 HC RX CONTRAST MEDICATION: Performed by: RADIOLOGY

## 2023-08-22 RX ORDER — METOPROLOL SUCCINATE 25 MG/1
TABLET, EXTENDED RELEASE ORAL
Qty: 30 TABLET | Refills: 0 | Status: SHIPPED | OUTPATIENT
Start: 2023-08-22

## 2023-08-22 RX ORDER — MORPHINE SULFATE 8 MG/ML
5 INJECTION, SOLUTION INTRAMUSCULAR; INTRAVENOUS ONCE
Status: COMPLETED | OUTPATIENT
Start: 2023-08-22 | End: 2023-08-22

## 2023-08-22 RX ORDER — CLOPIDOGREL BISULFATE 75 MG/1
TABLET ORAL
Qty: 30 TABLET | Refills: 0 | Status: SHIPPED | OUTPATIENT
Start: 2023-08-22

## 2023-08-22 RX ORDER — ONDANSETRON 2 MG/ML
4 INJECTION INTRAMUSCULAR; INTRAVENOUS ONCE
Status: COMPLETED | OUTPATIENT
Start: 2023-08-22 | End: 2023-08-22

## 2023-08-22 RX ORDER — 0.9 % SODIUM CHLORIDE 0.9 %
1000 INTRAVENOUS SOLUTION INTRAVENOUS ONCE
Status: COMPLETED | OUTPATIENT
Start: 2023-08-22 | End: 2023-08-22

## 2023-08-22 RX ORDER — ONDANSETRON 4 MG/1
4 TABLET, FILM COATED ORAL EVERY 8 HOURS PRN
Qty: 12 TABLET | Refills: 0 | Status: SHIPPED | OUTPATIENT
Start: 2023-08-22 | End: 2023-08-27

## 2023-08-22 RX ORDER — ATORVASTATIN CALCIUM 40 MG/1
TABLET, FILM COATED ORAL
Qty: 30 TABLET | Refills: 0 | Status: SHIPPED | OUTPATIENT
Start: 2023-08-22

## 2023-08-22 RX ORDER — DICYCLOMINE HYDROCHLORIDE 10 MG/1
10 CAPSULE ORAL 4 TIMES DAILY
Qty: 120 CAPSULE | Refills: 0 | Status: SHIPPED | OUTPATIENT
Start: 2023-08-22

## 2023-08-22 RX ORDER — METOCLOPRAMIDE HYDROCHLORIDE 5 MG/ML
10 INJECTION INTRAMUSCULAR; INTRAVENOUS ONCE
Status: COMPLETED | OUTPATIENT
Start: 2023-08-22 | End: 2023-08-22

## 2023-08-22 RX ADMIN — SODIUM CHLORIDE 1000 ML: 9 INJECTION, SOLUTION INTRAVENOUS at 11:37

## 2023-08-22 RX ADMIN — MORPHINE SULFATE 5 MG: 8 INJECTION, SOLUTION INTRAMUSCULAR; INTRAVENOUS at 14:24

## 2023-08-22 RX ADMIN — IOPAMIDOL 75 ML: 755 INJECTION, SOLUTION INTRAVENOUS at 12:42

## 2023-08-22 RX ADMIN — SODIUM CHLORIDE 1000 ML: 9 INJECTION, SOLUTION INTRAVENOUS at 14:23

## 2023-08-22 RX ADMIN — ONDANSETRON 4 MG: 2 INJECTION INTRAMUSCULAR; INTRAVENOUS at 11:37

## 2023-08-22 RX ADMIN — SODIUM CHLORIDE 1000 ML: 9 INJECTION, SOLUTION INTRAVENOUS at 14:31

## 2023-08-22 RX ADMIN — METOCLOPRAMIDE 10 MG: 5 INJECTION, SOLUTION INTRAMUSCULAR; INTRAVENOUS at 14:24

## 2023-08-22 ASSESSMENT — PAIN SCALES - GENERAL
PAINLEVEL_OUTOF10: 7
PAINLEVEL_OUTOF10: 10

## 2023-08-22 ASSESSMENT — PAIN - FUNCTIONAL ASSESSMENT: PAIN_FUNCTIONAL_ASSESSMENT: 0-10

## 2023-08-22 NOTE — ED NOTES
Discharge instructions given. Patient verbalizes understanding. No other noted or stated problems at this time.  Patient will follow up with primary care and BARBRA Hernández RN  08/22/23 2432

## 2023-08-22 NOTE — ED NOTES
Critical lab call received of Lactic acid 4.2, provider notified.  No further orders at this time     Gerardo Solorio RN  08/22/23 5020

## 2023-10-13 DIAGNOSIS — I25.10 CORONARY ATHEROSCLEROSIS DUE TO CALCIFIED CORONARY LESION: ICD-10-CM

## 2023-10-13 DIAGNOSIS — I10 ESSENTIAL HYPERTENSION: ICD-10-CM

## 2023-10-13 DIAGNOSIS — I25.84 CORONARY ATHEROSCLEROSIS DUE TO CALCIFIED CORONARY LESION: ICD-10-CM

## 2023-10-13 RX ORDER — CLOPIDOGREL BISULFATE 75 MG/1
TABLET ORAL
Qty: 30 TABLET | Refills: 0 | Status: SHIPPED | OUTPATIENT
Start: 2023-10-13

## 2023-10-13 RX ORDER — METOPROLOL SUCCINATE 25 MG/1
TABLET, EXTENDED RELEASE ORAL
Qty: 30 TABLET | Refills: 0 | Status: SHIPPED | OUTPATIENT
Start: 2023-10-13

## 2023-10-13 RX ORDER — ATORVASTATIN CALCIUM 40 MG/1
TABLET, FILM COATED ORAL
Qty: 30 TABLET | Refills: 0 | Status: SHIPPED | OUTPATIENT
Start: 2023-10-13

## 2023-10-31 ENCOUNTER — OFFICE VISIT (OUTPATIENT)
Dept: PRIMARY CARE CLINIC | Age: 53
End: 2023-10-31
Payer: COMMERCIAL

## 2023-10-31 VITALS
OXYGEN SATURATION: 97 % | HEART RATE: 57 BPM | WEIGHT: 170 LBS | DIASTOLIC BLOOD PRESSURE: 64 MMHG | HEIGHT: 73 IN | SYSTOLIC BLOOD PRESSURE: 112 MMHG | BODY MASS INDEX: 22.53 KG/M2 | TEMPERATURE: 97.7 F

## 2023-10-31 DIAGNOSIS — K58.2 IRRITABLE BOWEL SYNDROME WITH BOTH CONSTIPATION AND DIARRHEA: ICD-10-CM

## 2023-10-31 DIAGNOSIS — D47.3 ESSENTIAL THROMBOCYTHEMIA (HCC): ICD-10-CM

## 2023-10-31 DIAGNOSIS — I10 ESSENTIAL HYPERTENSION: ICD-10-CM

## 2023-10-31 DIAGNOSIS — D47.1 MYELOPROLIFERATIVE DISEASE (HCC): ICD-10-CM

## 2023-10-31 DIAGNOSIS — K50.00 CROHN'S DISEASE OF SMALL INTESTINE WITHOUT COMPLICATION (HCC): Primary | ICD-10-CM

## 2023-10-31 DIAGNOSIS — M54.16 LUMBAR RADICULOPATHY: ICD-10-CM

## 2023-10-31 DIAGNOSIS — G40.909 SEIZURE DISORDER (HCC): ICD-10-CM

## 2023-10-31 DIAGNOSIS — F17.200 TOBACCO DEPENDENCE: ICD-10-CM

## 2023-10-31 PROBLEM — I51.9 CHRONIC HEART DISEASE: Status: ACTIVE | Noted: 2021-01-17

## 2023-10-31 PROCEDURE — 4004F PT TOBACCO SCREEN RCVD TLK: CPT | Performed by: FAMILY MEDICINE

## 2023-10-31 PROCEDURE — 3074F SYST BP LT 130 MM HG: CPT | Performed by: FAMILY MEDICINE

## 2023-10-31 PROCEDURE — 99214 OFFICE O/P EST MOD 30 MIN: CPT | Performed by: FAMILY MEDICINE

## 2023-10-31 PROCEDURE — 3078F DIAST BP <80 MM HG: CPT | Performed by: FAMILY MEDICINE

## 2023-10-31 PROCEDURE — G8484 FLU IMMUNIZE NO ADMIN: HCPCS | Performed by: FAMILY MEDICINE

## 2023-10-31 PROCEDURE — G8427 DOCREV CUR MEDS BY ELIG CLIN: HCPCS | Performed by: FAMILY MEDICINE

## 2023-10-31 PROCEDURE — 3017F COLORECTAL CA SCREEN DOC REV: CPT | Performed by: FAMILY MEDICINE

## 2023-10-31 PROCEDURE — G8420 CALC BMI NORM PARAMETERS: HCPCS | Performed by: FAMILY MEDICINE

## 2023-10-31 RX ORDER — BUPROPION HYDROCHLORIDE 150 MG/1
150 TABLET, EXTENDED RELEASE ORAL 2 TIMES DAILY
Qty: 60 TABLET | Refills: 3 | Status: SHIPPED | OUTPATIENT
Start: 2023-10-31

## 2023-10-31 ASSESSMENT — ENCOUNTER SYMPTOMS
BLOOD IN STOOL: 0
ABDOMINAL PAIN: 0
CONSTIPATION: 0
BACK PAIN: 1
DIARRHEA: 0
NAUSEA: 0
SORE THROAT: 0
SHORTNESS OF BREATH: 0
VOMITING: 0
COLOR CHANGE: 1
COUGH: 0
PHOTOPHOBIA: 0

## 2023-10-31 NOTE — PROGRESS NOTES
years: 0.00     Total pack years: 30.00     Types: Cigarettes    Smokeless tobacco: Current     Types: Chew    Tobacco comments:     cutting down   Vaping Use    Vaping Use: Never used   Substance and Sexual Activity    Alcohol use: No     Comment: Quit 2019. Drug use: Not Currently     Types: Marijuana Gerhardt Salle)     Comment: Daily for pain. Sexual activity: Yes     Partners: Female   Other Topics Concern    Not on file   Social History Narrative    Not on file     Social Determinants of Health     Financial Resource Strain: Unknown (4/3/2023)    Overall Financial Resource Strain (CARDIA)     Difficulty of Paying Living Expenses: Patient refused   Food Insecurity: Unknown (4/3/2023)    Hunger Vital Sign     Worried About Running Out of Food in the Last Year: Patient refused     [address] in the Last Year: Patient refused   Transportation Needs: Unknown (4/3/2023)    PRAPARE - Transportation     Lack of Transportation (Medical): Not on file     Lack of Transportation (Non-Medical): Patient refused   Physical Activity: Not on file   Stress: Not on file   Social Connections: Not on file   Intimate Partner Violence: Not on file   Housing Stability: Unknown (4/3/2023)    Housing Stability Vital Sign     Unable to Pay for Housing in the Last Year: Not on file     Number of [address] in the Last Year: Not on file     Unstable Housing in the Last Year: Patient refused     Family History   Problem Relation Age of Onset    Coronary Art Dis Mother     COPD Mother     Seizures Father     Alcohol Abuse Father     Liver Disease Father     Diabetes Brother     Cancer Brother 64        leukemia    Diabetes Brother     Heart Disease Maternal Uncle     Heart Attack Maternal Uncle     High Blood Pressure Maternal Uncle     Heart Disease Maternal Uncle       There are no preventive care reminders to display for this patient. There are no preventive care reminders to display for this patient.    Diabetes Management   Topic

## 2023-12-02 DIAGNOSIS — I10 ESSENTIAL HYPERTENSION: ICD-10-CM

## 2023-12-02 DIAGNOSIS — I25.84 CORONARY ATHEROSCLEROSIS DUE TO CALCIFIED CORONARY LESION: ICD-10-CM

## 2023-12-02 DIAGNOSIS — I25.10 CORONARY ATHEROSCLEROSIS DUE TO CALCIFIED CORONARY LESION: ICD-10-CM

## 2023-12-04 RX ORDER — ATORVASTATIN CALCIUM 40 MG/1
TABLET, FILM COATED ORAL
Qty: 30 TABLET | Refills: 0 | Status: SHIPPED | OUTPATIENT
Start: 2023-12-04

## 2023-12-04 RX ORDER — METOPROLOL SUCCINATE 25 MG/1
TABLET, EXTENDED RELEASE ORAL
Qty: 30 TABLET | Refills: 0 | Status: SHIPPED | OUTPATIENT
Start: 2023-12-04

## 2023-12-04 RX ORDER — CLOPIDOGREL BISULFATE 75 MG/1
TABLET ORAL
Qty: 30 TABLET | Refills: 0 | Status: SHIPPED | OUTPATIENT
Start: 2023-12-04

## 2024-01-17 ENCOUNTER — TELEPHONE (OUTPATIENT)
Dept: PRIMARY CARE CLINIC | Age: 54
End: 2024-01-17

## 2024-01-17 NOTE — TELEPHONE ENCOUNTER
Continue to quarantine per CDC guidelines and contact the office or go directly to the emergency department if symptoms worsen.  Okay to give note.

## 2024-01-17 NOTE — TELEPHONE ENCOUNTER
Covid Positive as of Monday, he c/o nausea, fatigue,chest and sinus congestion, a little sob: not much.  Do you want to see him? Will need work excuse to be off.

## 2024-01-18 ENCOUNTER — OFFICE VISIT (OUTPATIENT)
Dept: FAMILY MEDICINE CLINIC | Age: 54
End: 2024-01-18
Payer: COMMERCIAL

## 2024-01-18 VITALS
DIASTOLIC BLOOD PRESSURE: 72 MMHG | TEMPERATURE: 97.5 F | SYSTOLIC BLOOD PRESSURE: 110 MMHG | BODY MASS INDEX: 21.56 KG/M2 | OXYGEN SATURATION: 98 % | WEIGHT: 163.4 LBS | HEART RATE: 101 BPM

## 2024-01-18 DIAGNOSIS — U07.1 COVID-19: Primary | ICD-10-CM

## 2024-01-18 DIAGNOSIS — R05.9 COUGH, UNSPECIFIED TYPE: ICD-10-CM

## 2024-01-18 LAB
Lab: NORMAL
PERFORMING INSTRUMENT: NORMAL
QC PASS/FAIL: NORMAL
SARS-COV-2, POC: NORMAL

## 2024-01-18 PROCEDURE — G8420 CALC BMI NORM PARAMETERS: HCPCS | Performed by: PHYSICIAN ASSISTANT

## 2024-01-18 PROCEDURE — 87426 SARSCOV CORONAVIRUS AG IA: CPT | Performed by: PHYSICIAN ASSISTANT

## 2024-01-18 PROCEDURE — 3074F SYST BP LT 130 MM HG: CPT | Performed by: PHYSICIAN ASSISTANT

## 2024-01-18 PROCEDURE — 3017F COLORECTAL CA SCREEN DOC REV: CPT | Performed by: PHYSICIAN ASSISTANT

## 2024-01-18 PROCEDURE — G8484 FLU IMMUNIZE NO ADMIN: HCPCS | Performed by: PHYSICIAN ASSISTANT

## 2024-01-18 PROCEDURE — 99213 OFFICE O/P EST LOW 20 MIN: CPT | Performed by: PHYSICIAN ASSISTANT

## 2024-01-18 PROCEDURE — G8427 DOCREV CUR MEDS BY ELIG CLIN: HCPCS | Performed by: PHYSICIAN ASSISTANT

## 2024-01-18 PROCEDURE — 3078F DIAST BP <80 MM HG: CPT | Performed by: PHYSICIAN ASSISTANT

## 2024-01-18 PROCEDURE — 4004F PT TOBACCO SCREEN RCVD TLK: CPT | Performed by: PHYSICIAN ASSISTANT

## 2024-01-18 RX ORDER — GUAIFENESIN 600 MG/1
600 TABLET, EXTENDED RELEASE ORAL 2 TIMES DAILY
Qty: 30 TABLET | Refills: 0 | Status: SHIPPED | OUTPATIENT
Start: 2024-01-18 | End: 2024-02-02

## 2024-01-18 RX ORDER — CHLORPHENIRAMINE MALEATE 4 MG/1
4 TABLET ORAL EVERY 6 HOURS PRN
Qty: 20 TABLET | Refills: 0 | Status: SHIPPED | OUTPATIENT
Start: 2024-01-18

## 2024-01-18 RX ORDER — ALBUTEROL SULFATE 90 UG/1
2 AEROSOL, METERED RESPIRATORY (INHALATION) 4 TIMES DAILY PRN
Qty: 18 G | Refills: 0 | Status: SHIPPED | OUTPATIENT
Start: 2024-01-18

## 2024-01-18 RX ORDER — BENZONATATE 100 MG/1
100 CAPSULE ORAL 3 TIMES DAILY PRN
Qty: 21 CAPSULE | Refills: 0 | Status: SHIPPED | OUTPATIENT
Start: 2024-01-18 | End: 2024-01-25

## 2024-01-18 NOTE — PROGRESS NOTES
24  Cameron Fraire : 1970 Sex: male  Age 53 y.o.      Subjective:  Chief Complaint   Patient presents with    Congestion     Started 4 days ago, tested + for covid    Cough    Pharyngitis         HPI:   HPI  Cameron Fraire , 53 y.o. male presents to express care for evaluation of cough, congestion, drainage, sore throat.  The patient is had the symptoms ongoing for the last 4 days.  The patient had tested positive for COVID earlier in the week.  The patient started with the symptoms on Saturday and .  The patient did have a fever initially.  The patient has not had a fever over the last couple of days.  He did have COVID back in .  He did get the COVID-vaccine and he has not had the most recent booster.  The patient's family has all tested positive for COVID.  The patient is not currently on any antibiotics.      ROS:   Unless otherwise stated in this report the patient's positive and negative responses for review of systems for constitutional, eyes, ENT, cardiovascular, respiratory, gastrointestinal, neurological, , musculoskeletal, and integument systems and related systems to the presenting problem are either stated in the history of present illness or were not pertinent or were negative for the symptoms and/or complaints related to the presenting medical problem.  Positives and pertinent negatives as per HPI.  All others reviewed and are negative.      PMH:     Past Medical History:   Diagnosis Date    Anxiety and depression     Atheroembolism of left lower extremity (HCC) 2014    Atrial flutter (HCC)     Bowel perforation (HCC) 2022    Coronary atherosclerosis due to calcified coronary lesion(414.4)     ED (erectile dysfunction)     Epilepsy (HCC)     Hyperlipidemia     Hypertension     IBS (irritable bowel syndrome)     Ischemic toe 2014    MI, old     Small bowel perforation (HCC) 2022    Thrombocytosis     Tinea     Tobacco dependence 2014

## 2024-02-01 ENCOUNTER — OFFICE VISIT (OUTPATIENT)
Dept: PRIMARY CARE CLINIC | Age: 54
End: 2024-02-01
Payer: COMMERCIAL

## 2024-02-01 VITALS
SYSTOLIC BLOOD PRESSURE: 104 MMHG | WEIGHT: 165 LBS | HEIGHT: 73 IN | HEART RATE: 78 BPM | OXYGEN SATURATION: 96 % | DIASTOLIC BLOOD PRESSURE: 62 MMHG | BODY MASS INDEX: 21.87 KG/M2

## 2024-02-01 DIAGNOSIS — I10 ESSENTIAL HYPERTENSION: ICD-10-CM

## 2024-02-01 DIAGNOSIS — J02.9 PHARYNGITIS, UNSPECIFIED ETIOLOGY: ICD-10-CM

## 2024-02-01 DIAGNOSIS — E55.9 VITAMIN D DEFICIENCY: ICD-10-CM

## 2024-02-01 DIAGNOSIS — G40.909 SEIZURE DISORDER (HCC): ICD-10-CM

## 2024-02-01 DIAGNOSIS — L98.9 SKIN LESION: ICD-10-CM

## 2024-02-01 DIAGNOSIS — F17.200 TOBACCO DEPENDENCE: ICD-10-CM

## 2024-02-01 DIAGNOSIS — D47.3 ESSENTIAL THROMBOCYTHEMIA (HCC): ICD-10-CM

## 2024-02-01 DIAGNOSIS — I51.9 CHRONIC HEART DISEASE: ICD-10-CM

## 2024-02-01 DIAGNOSIS — D47.1 MYELOPROLIFERATIVE DISEASE (HCC): ICD-10-CM

## 2024-02-01 DIAGNOSIS — E78.5 HYPERLIPIDEMIA, UNSPECIFIED HYPERLIPIDEMIA TYPE: ICD-10-CM

## 2024-02-01 DIAGNOSIS — K50.00 CROHN'S DISEASE OF SMALL INTESTINE WITHOUT COMPLICATION (HCC): Primary | ICD-10-CM

## 2024-02-01 DIAGNOSIS — J01.90 ACUTE BACTERIAL SINUSITIS: ICD-10-CM

## 2024-02-01 DIAGNOSIS — Z86.16 HISTORY OF COVID-19: ICD-10-CM

## 2024-02-01 DIAGNOSIS — B96.89 ACUTE BACTERIAL SINUSITIS: ICD-10-CM

## 2024-02-01 PROBLEM — Z90.49 HISTORY OF RESECTION OF SMALL BOWEL: Status: ACTIVE | Noted: 2024-02-01

## 2024-02-01 LAB
ABSOLUTE RETIC #: 0.09 M/UL
ALBUMIN SERPL-MCNC: 4.3 G/DL (ref 3.5–5.2)
ALP BLD-CCNC: 72 U/L (ref 40–129)
ALT SERPL-CCNC: 9 U/L (ref 0–40)
ANION GAP SERPL CALCULATED.3IONS-SCNC: 13 MMOL/L (ref 7–16)
AST SERPL-CCNC: 17 U/L (ref 0–39)
BASOPHILS ABSOLUTE: 0.08 K/UL (ref 0–0.2)
BASOPHILS RELATIVE PERCENT: 1 % (ref 0–2)
BILIRUB SERPL-MCNC: 0.4 MG/DL (ref 0–1.2)
BILIRUBIN DIRECT: <0.2 MG/DL (ref 0–0.3)
BILIRUBIN, INDIRECT: NORMAL MG/DL (ref 0–1)
BUN BLDV-MCNC: 17 MG/DL (ref 6–20)
C-REACTIVE PROTEIN: 8 MG/L (ref 0–5)
CALCIUM SERPL-MCNC: 9.2 MG/DL (ref 8.6–10.2)
CHLORIDE BLD-SCNC: 105 MMOL/L (ref 98–107)
CHOLESTEROL: 116 MG/DL
CO2: 24 MMOL/L (ref 22–29)
CREAT SERPL-MCNC: 0.9 MG/DL (ref 0.7–1.2)
EOSINOPHILS ABSOLUTE: 0.33 K/UL (ref 0.05–0.5)
EOSINOPHILS RELATIVE PERCENT: 4 % (ref 0–6)
FOLATE: >20 NG/ML (ref 4.8–24.2)
GFR SERPL CREATININE-BSD FRML MDRD: >60 ML/MIN/1.73M2
GLUCOSE BLD-MCNC: 109 MG/DL (ref 74–99)
HBA1C MFR BLD: 5.2 % (ref 4–5.6)
HCT VFR BLD CALC: 38.6 % (ref 37–54)
HDLC SERPL-MCNC: 34 MG/DL
HEMOGLOBIN: 13.2 G/DL (ref 12.5–16.5)
IMMATURE RETIC FRACT: 21.9 % (ref 2.3–13.4)
IRON % SATURATION: 16 % (ref 20–55)
IRON: 47 UG/DL (ref 59–158)
LDL CHOLESTEROL: 68 MG/DL
LYMPHOCYTES ABSOLUTE: 0.9 K/UL (ref 1.5–4)
LYMPHOCYTES RELATIVE PERCENT: 10 % (ref 20–42)
MCH RBC QN AUTO: 38.3 PG (ref 26–35)
MCHC RBC AUTO-ENTMCNC: 34.2 G/DL (ref 32–34.5)
MCV RBC AUTO: 111.9 FL (ref 80–99.9)
MONOCYTES ABSOLUTE: 0.16 K/UL (ref 0.1–0.95)
MONOCYTES RELATIVE PERCENT: 2 % (ref 2–12)
NEUTROPHILS ABSOLUTE: 7.83 K/UL (ref 1.8–7.3)
NEUTROPHILS RELATIVE PERCENT: 84 % (ref 43–80)
PDW BLD-RTO: 13.5 % (ref 11.5–15)
PLATELET, FLUORESCENCE: 429 K/UL (ref 130–450)
PMV BLD AUTO: 9.4 FL (ref 7–12)
POTASSIUM SERPL-SCNC: 4.7 MMOL/L (ref 3.5–5)
RBC # BLD: 3.45 M/UL (ref 3.8–5.8)
RBC # BLD: ABNORMAL 10*6/UL
RETIC %: 2.5 % (ref 0.4–1.9)
RETIC HEMOGLOBIN: 41.7 PG (ref 28.2–36.6)
SODIUM BLD-SCNC: 142 MMOL/L (ref 132–146)
T4 FREE: 1.2 NG/DL (ref 0.9–1.7)
TOTAL IRON BINDING CAPACITY: 287 UG/DL (ref 250–450)
TOTAL PROTEIN: 6.8 G/DL (ref 6.4–8.3)
TRIGL SERPL-MCNC: 71 MG/DL
TSH SERPL DL<=0.05 MIU/L-ACNC: 1.62 UIU/ML (ref 0.27–4.2)
URIC ACID: 3.6 MG/DL (ref 3.4–7)
VITAMIN B-12: 666 PG/ML (ref 211–946)
VLDLC SERPL CALC-MCNC: 14 MG/DL
WBC # BLD: 9.3 K/UL (ref 4.5–11.5)

## 2024-02-01 PROCEDURE — 3074F SYST BP LT 130 MM HG: CPT | Performed by: FAMILY MEDICINE

## 2024-02-01 PROCEDURE — G8427 DOCREV CUR MEDS BY ELIG CLIN: HCPCS | Performed by: FAMILY MEDICINE

## 2024-02-01 PROCEDURE — G8484 FLU IMMUNIZE NO ADMIN: HCPCS | Performed by: FAMILY MEDICINE

## 2024-02-01 PROCEDURE — G8420 CALC BMI NORM PARAMETERS: HCPCS | Performed by: FAMILY MEDICINE

## 2024-02-01 PROCEDURE — 99214 OFFICE O/P EST MOD 30 MIN: CPT | Performed by: FAMILY MEDICINE

## 2024-02-01 PROCEDURE — 4004F PT TOBACCO SCREEN RCVD TLK: CPT | Performed by: FAMILY MEDICINE

## 2024-02-01 PROCEDURE — 3078F DIAST BP <80 MM HG: CPT | Performed by: FAMILY MEDICINE

## 2024-02-01 PROCEDURE — 3017F COLORECTAL CA SCREEN DOC REV: CPT | Performed by: FAMILY MEDICINE

## 2024-02-01 RX ORDER — CEFDINIR 300 MG/1
300 CAPSULE ORAL 2 TIMES DAILY
Qty: 14 CAPSULE | Refills: 0 | Status: SHIPPED | OUTPATIENT
Start: 2024-02-01 | End: 2024-02-08

## 2024-02-01 RX ORDER — PLECANATIDE 3 MG/1
TABLET ORAL
COMMUNITY

## 2024-02-01 RX ORDER — METHYLPREDNISOLONE 4 MG/1
TABLET ORAL
Qty: 1 KIT | Refills: 0 | Status: SHIPPED | OUTPATIENT
Start: 2024-02-01

## 2024-02-01 ASSESSMENT — ENCOUNTER SYMPTOMS
ABDOMINAL PAIN: 0
DIARRHEA: 0
PHOTOPHOBIA: 0
SORE THROAT: 0
VOMITING: 0
CONSTIPATION: 0
SHORTNESS OF BREATH: 0
BACK PAIN: 1
BLOOD IN STOOL: 0
NAUSEA: 0
SINUS PAIN: 1
COUGH: 1
COLOR CHANGE: 1
SINUS PRESSURE: 1

## 2024-02-01 ASSESSMENT — PATIENT HEALTH QUESTIONNAIRE - PHQ9
9. THOUGHTS THAT YOU WOULD BE BETTER OFF DEAD, OR OF HURTING YOURSELF: 0
SUM OF ALL RESPONSES TO PHQ QUESTIONS 1-9: 2
10. IF YOU CHECKED OFF ANY PROBLEMS, HOW DIFFICULT HAVE THESE PROBLEMS MADE IT FOR YOU TO DO YOUR WORK, TAKE CARE OF THINGS AT HOME, OR GET ALONG WITH OTHER PEOPLE: 1
8. MOVING OR SPEAKING SO SLOWLY THAT OTHER PEOPLE COULD HAVE NOTICED. OR THE OPPOSITE, BEING SO FIGETY OR RESTLESS THAT YOU HAVE BEEN MOVING AROUND A LOT MORE THAN USUAL: 0
SUM OF ALL RESPONSES TO PHQ QUESTIONS 1-9: 2
4. FEELING TIRED OR HAVING LITTLE ENERGY: 0
7. TROUBLE CONCENTRATING ON THINGS, SUCH AS READING THE NEWSPAPER OR WATCHING TELEVISION: 0
1. LITTLE INTEREST OR PLEASURE IN DOING THINGS: 1
2. FEELING DOWN, DEPRESSED OR HOPELESS: 1
3. TROUBLE FALLING OR STAYING ASLEEP: 0
SUM OF ALL RESPONSES TO PHQ QUESTIONS 1-9: 2
6. FEELING BAD ABOUT YOURSELF - OR THAT YOU ARE A FAILURE OR HAVE LET YOURSELF OR YOUR FAMILY DOWN: 0
5. POOR APPETITE OR OVEREATING: 0
SUM OF ALL RESPONSES TO PHQ QUESTIONS 1-9: 2
SUM OF ALL RESPONSES TO PHQ9 QUESTIONS 1 & 2: 2

## 2024-02-01 NOTE — PROGRESS NOTES
Uncle     High Blood Pressure Maternal Uncle     Heart Disease Maternal Uncle       There are no preventive care reminders to display for this patient.  There are no preventive care reminders to display for this patient.   Diabetes Management   Topic Date Due    Lipids  01/19/2023      Health Maintenance Due   Topic    Shingles vaccine (1 of 2)      Health Maintenance   Topic Date Due    Hepatitis B vaccine (1 of 3 - 3-dose series) Never done    Shingles vaccine (1 of 2) Never done    Low dose CT lung screening &/or counseling  Never done    Pneumococcal 0-64 years Vaccine (2 - PCV) 11/10/2022    Lipids  01/19/2023    Flu vaccine (1) 08/01/2023    COVID-19 Vaccine (6 - 2023-24 season) 09/01/2023    Depression Monitoring  02/01/2025    DTaP/Tdap/Td vaccine (2 - Td or Tdap) 03/18/2026    Colorectal Cancer Screen  06/13/2029    Hepatitis C screen  Completed    HIV screen  Completed    Hepatitis A vaccine  Aged Out    Hib vaccine  Aged Out    Polio vaccine  Aged Out    Meningococcal (ACWY) vaccine  Aged Out    Diabetes screen  Discontinued      There are no preventive care reminders to display for this patient.   There are no preventive care reminders to display for this patient.     /62   Pulse 78   Ht 1.854 m (6' 1\")   Wt 74.8 kg (165 lb)   SpO2 96%   BMI 21.77 kg/m²     Objective   Physical Exam  Vitals reviewed.   HENT:      Head: Normocephalic and atraumatic.      Mouth/Throat:      Dentition: Abnormal dentition.   Eyes:      General: No scleral icterus.     Extraocular Movements: Extraocular movements intact.      Conjunctiva/sclera: Conjunctivae normal.      Pupils: Pupils are equal, round, and reactive to light.   Neck:      Thyroid: No thyromegaly.   Cardiovascular:      Rate and Rhythm: Normal rate and regular rhythm.      Heart sounds: Normal heart sounds. No murmur heard.  Pulmonary:      Effort: Pulmonary effort is normal.      Breath sounds: Normal breath sounds. No rales.   Abdominal:

## 2024-02-02 LAB
ALBUMIN (CALCULATED): 3.7 G/DL (ref 3.5–4.7)
ALPHA-1-GLOBULIN: 0.3 G/DL (ref 0.2–0.4)
ALPHA-2-GLOBULIN: 0.9 G/DL (ref 0.5–1)
BETA GLOBULIN: 0.9 G/DL (ref 0.8–1.3)
GAMMA GLOBULIN: 0.8 G/DL (ref 0.7–1.6)
KEPPRA: 43 UG/ML
PATHOLOGIST REVIEW: NORMAL
PATHOLOGIST: NORMAL
PROTEIN ELECTROPHORESIS, SERUM: NORMAL
RPR: NONREACTIVE
TOTAL PROTEIN: 6.5 G/DL (ref 6.4–8.3)

## 2024-02-05 DIAGNOSIS — I10 ESSENTIAL HYPERTENSION: ICD-10-CM

## 2024-02-05 DIAGNOSIS — E55.9 VITAMIN D DEFICIENCY: ICD-10-CM

## 2024-02-05 DIAGNOSIS — D47.1 MYELOPROLIFERATIVE DISEASE (HCC): ICD-10-CM

## 2024-02-05 DIAGNOSIS — G40.909 SEIZURE DISORDER (HCC): ICD-10-CM

## 2024-02-05 DIAGNOSIS — E78.5 HYPERLIPIDEMIA, UNSPECIFIED HYPERLIPIDEMIA TYPE: ICD-10-CM

## 2024-02-05 DIAGNOSIS — L98.9 SKIN LESION: ICD-10-CM

## 2024-02-05 DIAGNOSIS — F17.200 TOBACCO DEPENDENCE: ICD-10-CM

## 2024-02-05 DIAGNOSIS — D47.3 ESSENTIAL THROMBOCYTHEMIA (HCC): ICD-10-CM

## 2024-02-05 LAB
BILIRUBIN URINE: NEGATIVE
BORRELIA BURGDORFERI ABS TOTAL: 0.2 IV
COLOR: YELLOW
CREATININE URINE: 136.6 MG/DL (ref 40–278)
GLUCOSE URINE: NEGATIVE MG/DL
KETONES, URINE: NEGATIVE MG/DL
LEUKOCYTE ESTERASE, URINE: NEGATIVE
MICROALBUMIN/CREAT 24H UR: <12 MG/L (ref 0–19)
MICROALBUMIN/CREAT UR-RTO: NORMAL MCG/MG CREAT (ref 0–30)
NITRITE, URINE: NEGATIVE
PH UA: 6.5 (ref 5–9)
PROTEIN UA: NEGATIVE MG/DL
RBC UA: NORMAL /HPF
SPECIFIC GRAVITY UA: 1.01 (ref 1–1.03)
TURBIDITY: CLEAR
URINE HGB: NEGATIVE
UROBILINOGEN, URINE: 0.2 EU/DL (ref 0–1)
WBC UA: NORMAL /HPF

## 2024-02-07 LAB
P E INTERPRETATION, U: NORMAL
PATHOLOGIST: NORMAL
SPECIMEN TYPE: NORMAL

## 2024-02-08 DIAGNOSIS — I25.10 CORONARY ATHEROSCLEROSIS DUE TO CALCIFIED CORONARY LESION: ICD-10-CM

## 2024-02-08 DIAGNOSIS — I25.84 CORONARY ATHEROSCLEROSIS DUE TO CALCIFIED CORONARY LESION: ICD-10-CM

## 2024-02-08 DIAGNOSIS — I10 ESSENTIAL HYPERTENSION: ICD-10-CM

## 2024-02-09 RX ORDER — CLOPIDOGREL BISULFATE 75 MG/1
TABLET ORAL
Qty: 30 TABLET | Refills: 0 | Status: SHIPPED | OUTPATIENT
Start: 2024-02-09

## 2024-02-09 RX ORDER — ATORVASTATIN CALCIUM 40 MG/1
TABLET, FILM COATED ORAL
Qty: 30 TABLET | Refills: 0 | Status: SHIPPED | OUTPATIENT
Start: 2024-02-09

## 2024-02-09 RX ORDER — METOPROLOL SUCCINATE 25 MG/1
TABLET, EXTENDED RELEASE ORAL
Qty: 30 TABLET | Refills: 0 | Status: SHIPPED | OUTPATIENT
Start: 2024-02-09

## 2024-02-15 RX ORDER — LEVETIRACETAM 500 MG/1
1500 TABLET ORAL 2 TIMES DAILY
Qty: 180 TABLET | Refills: 2 | Status: SHIPPED | OUTPATIENT
Start: 2024-02-15

## 2024-02-15 NOTE — TELEPHONE ENCOUNTER
Refill request      Last Appointment:  2/1/2024    Future appts:  Future Appointments   Date Time Provider Department Center   5/2/2024 10:15 AM Vijay Frederick DO [facility]   5/7/2024 10:00 AM Scott Bolton MD [facility]

## 2024-03-11 ENCOUNTER — HOSPITAL ENCOUNTER (EMERGENCY)
Age: 54
Discharge: HOME | End: 2024-03-11
Attending: EMERGENCY MEDICINE
Payer: COMMERCIAL

## 2024-03-11 ENCOUNTER — APPOINTMENT (OUTPATIENT)
Dept: CT IMAGING | Age: 54
End: 2024-03-11
Payer: COMMERCIAL

## 2024-03-11 VITALS
TEMPERATURE: 97.8 F | OXYGEN SATURATION: 100 % | HEART RATE: 52 BPM | RESPIRATION RATE: 16 BRPM | HEIGHT: 73 IN | DIASTOLIC BLOOD PRESSURE: 80 MMHG | WEIGHT: 166 LBS | SYSTOLIC BLOOD PRESSURE: 138 MMHG | BODY MASS INDEX: 22 KG/M2

## 2024-03-11 DIAGNOSIS — R11.2 NAUSEA AND VOMITING, UNSPECIFIED VOMITING TYPE: ICD-10-CM

## 2024-03-11 DIAGNOSIS — R10.13 ABDOMINAL PAIN, EPIGASTRIC: Primary | ICD-10-CM

## 2024-03-11 LAB
ALBUMIN SERPL-MCNC: 4.2 G/DL (ref 3.5–5.2)
ALP SERPL-CCNC: 64 U/L (ref 40–129)
ALT SERPL-CCNC: 13 U/L (ref 0–40)
ANION GAP SERPL CALCULATED.3IONS-SCNC: 10 MMOL/L (ref 7–16)
AST SERPL-CCNC: 15 U/L (ref 0–39)
BASOPHILS # BLD: 0.08 K/UL (ref 0–0.2)
BASOPHILS NFR BLD: 1 % (ref 0–2)
BILIRUB SERPL-MCNC: 0.7 MG/DL (ref 0–1.2)
BILIRUB UR QL STRIP: NEGATIVE
BUN SERPL-MCNC: 14 MG/DL (ref 6–20)
CALCIUM SERPL-MCNC: 8.8 MG/DL (ref 8.6–10.2)
CHLORIDE SERPL-SCNC: 104 MMOL/L (ref 98–107)
CLARITY UR: CLEAR
CO2 SERPL-SCNC: 25 MMOL/L (ref 22–29)
COLOR UR: YELLOW
CREAT SERPL-MCNC: 0.9 MG/DL (ref 0.7–1.2)
EOSINOPHIL # BLD: 0.12 K/UL (ref 0.05–0.5)
EOSINOPHILS RELATIVE PERCENT: 2 % (ref 0–6)
ERYTHROCYTE [DISTWIDTH] IN BLOOD BY AUTOMATED COUNT: 13.2 % (ref 11.5–15)
GFR SERPL CREATININE-BSD FRML MDRD: >60 ML/MIN/1.73M2
GLUCOSE SERPL-MCNC: 153 MG/DL (ref 74–99)
GLUCOSE UR STRIP-MCNC: NEGATIVE MG/DL
HCT VFR BLD AUTO: 39 % (ref 37–54)
HGB BLD-MCNC: 13.5 G/DL (ref 12.5–16.5)
HGB UR QL STRIP.AUTO: ABNORMAL
IMM GRANULOCYTES # BLD AUTO: 0.05 K/UL (ref 0–0.58)
IMM GRANULOCYTES NFR BLD: 1 % (ref 0–5)
KETONES UR STRIP-MCNC: 40 MG/DL
LACTATE BLDV-SCNC: 1.3 MMOL/L (ref 0.5–2.2)
LEUKOCYTE ESTERASE UR QL STRIP: NEGATIVE
LIPASE SERPL-CCNC: 19 U/L (ref 13–60)
LYMPHOCYTES NFR BLD: 1.15 K/UL (ref 1.5–4)
LYMPHOCYTES RELATIVE PERCENT: 18 % (ref 20–42)
MCH RBC QN AUTO: 37.9 PG (ref 26–35)
MCHC RBC AUTO-ENTMCNC: 34.6 G/DL (ref 32–34.5)
MCV RBC AUTO: 109.6 FL (ref 80–99.9)
MONOCYTES NFR BLD: 0.33 K/UL (ref 0.1–0.95)
MONOCYTES NFR BLD: 5 % (ref 2–12)
NEUTROPHILS NFR BLD: 73 % (ref 43–80)
NEUTS SEG NFR BLD: 4.78 K/UL (ref 1.8–7.3)
NITRITE UR QL STRIP: NEGATIVE
PH UR STRIP: 7 [PH] (ref 5–9)
PLATELET # BLD AUTO: 444 K/UL (ref 130–450)
PMV BLD AUTO: 9.3 FL (ref 7–12)
POTASSIUM SERPL-SCNC: 3.7 MMOL/L (ref 3.5–5)
PROT SERPL-MCNC: 6.4 G/DL (ref 6.4–8.3)
PROT UR STRIP-MCNC: NEGATIVE MG/DL
RBC # BLD AUTO: 3.56 M/UL (ref 3.8–5.8)
RBC #/AREA URNS HPF: ABNORMAL /HPF
SODIUM SERPL-SCNC: 139 MMOL/L (ref 132–146)
SP GR UR STRIP: 1.02 (ref 1–1.03)
UROBILINOGEN UR STRIP-ACNC: 0.2 EU/DL (ref 0–1)
WBC #/AREA URNS HPF: ABNORMAL /HPF
WBC OTHER # BLD: 6.5 K/UL (ref 4.5–11.5)

## 2024-03-11 PROCEDURE — 2580000003 HC RX 258: Performed by: STUDENT IN AN ORGANIZED HEALTH CARE EDUCATION/TRAINING PROGRAM

## 2024-03-11 PROCEDURE — 83690 ASSAY OF LIPASE: CPT

## 2024-03-11 PROCEDURE — 99285 EMERGENCY DEPT VISIT HI MDM: CPT

## 2024-03-11 PROCEDURE — 6360000002 HC RX W HCPCS: Performed by: STUDENT IN AN ORGANIZED HEALTH CARE EDUCATION/TRAINING PROGRAM

## 2024-03-11 PROCEDURE — 81001 URINALYSIS AUTO W/SCOPE: CPT

## 2024-03-11 PROCEDURE — 74177 CT ABD & PELVIS W/CONTRAST: CPT

## 2024-03-11 PROCEDURE — A4216 STERILE WATER/SALINE, 10 ML: HCPCS | Performed by: STUDENT IN AN ORGANIZED HEALTH CARE EDUCATION/TRAINING PROGRAM

## 2024-03-11 PROCEDURE — 6360000004 HC RX CONTRAST MEDICATION: Performed by: RADIOLOGY

## 2024-03-11 PROCEDURE — 80053 COMPREHEN METABOLIC PANEL: CPT

## 2024-03-11 PROCEDURE — 85025 COMPLETE CBC W/AUTO DIFF WBC: CPT

## 2024-03-11 PROCEDURE — 96375 TX/PRO/DX INJ NEW DRUG ADDON: CPT

## 2024-03-11 PROCEDURE — 2500000003 HC RX 250 WO HCPCS: Performed by: STUDENT IN AN ORGANIZED HEALTH CARE EDUCATION/TRAINING PROGRAM

## 2024-03-11 PROCEDURE — 96374 THER/PROPH/DIAG INJ IV PUSH: CPT

## 2024-03-11 PROCEDURE — 83605 ASSAY OF LACTIC ACID: CPT

## 2024-03-11 RX ORDER — ONDANSETRON 4 MG/1
4 TABLET, ORALLY DISINTEGRATING ORAL 3 TIMES DAILY PRN
Qty: 15 TABLET | Refills: 0 | Status: SHIPPED | OUTPATIENT
Start: 2024-03-11 | End: 2024-03-16

## 2024-03-11 RX ORDER — FENTANYL CITRATE 50 UG/ML
50 INJECTION, SOLUTION INTRAMUSCULAR; INTRAVENOUS ONCE
Status: COMPLETED | OUTPATIENT
Start: 2024-03-11 | End: 2024-03-11

## 2024-03-11 RX ORDER — ONDANSETRON 2 MG/ML
4 INJECTION INTRAMUSCULAR; INTRAVENOUS ONCE
Status: COMPLETED | OUTPATIENT
Start: 2024-03-11 | End: 2024-03-11

## 2024-03-11 RX ORDER — MORPHINE SULFATE 4 MG/ML
4 INJECTION, SOLUTION INTRAMUSCULAR; INTRAVENOUS ONCE
Status: COMPLETED | OUTPATIENT
Start: 2024-03-11 | End: 2024-03-11

## 2024-03-11 RX ORDER — 0.9 % SODIUM CHLORIDE 0.9 %
1000 INTRAVENOUS SOLUTION INTRAVENOUS ONCE
Status: COMPLETED | OUTPATIENT
Start: 2024-03-11 | End: 2024-03-11

## 2024-03-11 RX ADMIN — FAMOTIDINE 20 MG: 10 INJECTION, SOLUTION INTRAVENOUS at 08:37

## 2024-03-11 RX ADMIN — IOPAMIDOL 75 ML: 755 INJECTION, SOLUTION INTRAVENOUS at 11:12

## 2024-03-11 RX ADMIN — ONDANSETRON 4 MG: 2 INJECTION INTRAMUSCULAR; INTRAVENOUS at 08:39

## 2024-03-11 RX ADMIN — MORPHINE SULFATE 4 MG: 4 INJECTION, SOLUTION INTRAMUSCULAR; INTRAVENOUS at 10:03

## 2024-03-11 RX ADMIN — SODIUM CHLORIDE 1000 ML: 9 INJECTION, SOLUTION INTRAVENOUS at 08:36

## 2024-03-11 RX ADMIN — FENTANYL CITRATE 50 MCG: 50 INJECTION INTRAMUSCULAR; INTRAVENOUS at 08:40

## 2024-03-11 ASSESSMENT — PAIN - FUNCTIONAL ASSESSMENT
PAIN_FUNCTIONAL_ASSESSMENT: INTOLERABLE, UNABLE TO DO ANY ACTIVE OR PASSIVE ACTIVITIES
PAIN_FUNCTIONAL_ASSESSMENT: PREVENTS OR INTERFERES WITH ALL ACTIVE AND SOME PASSIVE ACTIVITIES
PAIN_FUNCTIONAL_ASSESSMENT: 0-10

## 2024-03-11 ASSESSMENT — PAIN DESCRIPTION - ORIENTATION
ORIENTATION: MID;UPPER
ORIENTATION: UPPER;MID

## 2024-03-11 ASSESSMENT — PAIN SCALES - GENERAL
PAINLEVEL_OUTOF10: 8
PAINLEVEL_OUTOF10: 7

## 2024-03-11 ASSESSMENT — PAIN DESCRIPTION - DESCRIPTORS
DESCRIPTORS: SQUEEZING
DESCRIPTORS: SQUEEZING

## 2024-03-11 ASSESSMENT — PAIN DESCRIPTION - LOCATION
LOCATION: ABDOMEN
LOCATION: ABDOMEN

## 2024-03-11 ASSESSMENT — PAIN DESCRIPTION - PAIN TYPE: TYPE: ACUTE PAIN

## 2024-03-11 ASSESSMENT — PAIN DESCRIPTION - ONSET: ONSET: ON-GOING

## 2024-03-11 ASSESSMENT — PAIN DESCRIPTION - FREQUENCY: FREQUENCY: CONTINUOUS

## 2024-03-11 NOTE — ED PROVIDER NOTES
[facility] EMERGENCY DEPARTMENT  EMERGENCY DEPARTMENT ENCOUNTER      Pt Name: Cameron Fraire  MRN: [id number]  Birthdate 1970  Date of evaluation: 3/11/2024  Provider: Yousuf Thompson DO  PCP: Vijay Frederick DO  Note Started: 8:06 AM EDT 3/11/24    CHIEF COMPLAINT       Chief Complaint   Patient presents with    Abdominal Pain    Emesis     Abd pain starting this AM, several emesis. Denies diarrhea, fever.       HISTORY OF PRESENT ILLNESS: 1 or more Elements   History From: Patient  Limitations to history : None    Cameron Fraire is a 53 y.o. male who presents to the ED due to abdominal pain.  Patient states that this morning he started to have epigastric abdominal pain with subsequent nausea and vomiting.  He says that he has had multiple episodes of vomiting this morning without blood.  He denies any dysuria, urinary urgency, frequency or hematuria.  He denies any diarrhea or constipation associated with the symptoms.  Patient denies any recent fever or chills.  He states that he was feeling fine prior to his symptoms beginning this morning.    Nursing Notes were all reviewed and agreed with or any disagreements were addressed in the HPI.    REVIEW OF SYSTEMS :    Positives and Pertinent negatives as per HPI.     PAST MEDICAL HISTORY/Chronic Conditions Affecting Care    has a past medical history of Anxiety and depression, Atheroembolism of left lower extremity (HCC) (09/11/2014), Atrial flutter (HCC), Bowel perforation (HCC) (5/6/2022), Coronary atherosclerosis due to calcified coronary lesion(414.4), ED (erectile dysfunction), Epilepsy (HCC), Hyperlipidemia, Hypertension, IBS (irritable bowel syndrome), Ischemic toe (09/11/2014), MI, old, Small bowel perforation (HCC) (5/6/2022), Thrombocytosis, Tinea, and Tobacco dependence (09/16/2014).     SURGICAL HISTORY     Past Surgical History:   Procedure Laterality Date    COLONOSCOPY  08/2014    CORONARY ANGIOPLASTY WITH

## 2024-03-22 ENCOUNTER — OFFICE VISIT (OUTPATIENT)
Dept: FAMILY MEDICINE CLINIC | Age: 54
End: 2024-03-22
Payer: COMMERCIAL

## 2024-03-22 VITALS
SYSTOLIC BLOOD PRESSURE: 100 MMHG | DIASTOLIC BLOOD PRESSURE: 72 MMHG | TEMPERATURE: 98.1 F | WEIGHT: 158.6 LBS | OXYGEN SATURATION: 98 % | HEART RATE: 92 BPM | BODY MASS INDEX: 20.92 KG/M2

## 2024-03-22 DIAGNOSIS — R11.0 NAUSEA: Primary | ICD-10-CM

## 2024-03-22 DIAGNOSIS — J11.1 INFLUENZA: ICD-10-CM

## 2024-03-22 LAB
INFLUENZA A ANTIBODY: NORMAL
INFLUENZA B ANTIBODY: NORMAL
Lab: NORMAL
PERFORMING INSTRUMENT: NORMAL
QC PASS/FAIL: NORMAL
SARS-COV-2, POC: NORMAL

## 2024-03-22 PROCEDURE — 3017F COLORECTAL CA SCREEN DOC REV: CPT | Performed by: FAMILY MEDICINE

## 2024-03-22 PROCEDURE — 3074F SYST BP LT 130 MM HG: CPT | Performed by: FAMILY MEDICINE

## 2024-03-22 PROCEDURE — 87804 INFLUENZA ASSAY W/OPTIC: CPT | Performed by: FAMILY MEDICINE

## 2024-03-22 PROCEDURE — G8427 DOCREV CUR MEDS BY ELIG CLIN: HCPCS | Performed by: FAMILY MEDICINE

## 2024-03-22 PROCEDURE — 99213 OFFICE O/P EST LOW 20 MIN: CPT | Performed by: FAMILY MEDICINE

## 2024-03-22 PROCEDURE — 87426 SARSCOV CORONAVIRUS AG IA: CPT | Performed by: FAMILY MEDICINE

## 2024-03-22 PROCEDURE — G8420 CALC BMI NORM PARAMETERS: HCPCS | Performed by: FAMILY MEDICINE

## 2024-03-22 PROCEDURE — 4004F PT TOBACCO SCREEN RCVD TLK: CPT | Performed by: FAMILY MEDICINE

## 2024-03-22 PROCEDURE — G8484 FLU IMMUNIZE NO ADMIN: HCPCS | Performed by: FAMILY MEDICINE

## 2024-03-22 PROCEDURE — 3078F DIAST BP <80 MM HG: CPT | Performed by: FAMILY MEDICINE

## 2024-03-22 RX ORDER — OSELTAMIVIR PHOSPHATE 75 MG/1
75 CAPSULE ORAL 2 TIMES DAILY
Qty: 10 CAPSULE | Refills: 0 | Status: SHIPPED | OUTPATIENT
Start: 2024-03-22 | End: 2024-03-27

## 2024-03-22 RX ORDER — AMOXICILLIN 875 MG/1
875 TABLET, COATED ORAL 2 TIMES DAILY
Qty: 14 TABLET | Refills: 0 | Status: SHIPPED | OUTPATIENT
Start: 2024-03-22 | End: 2024-03-29

## 2024-03-22 RX ORDER — ONDANSETRON 4 MG/1
4 TABLET, FILM COATED ORAL 3 TIMES DAILY PRN
Qty: 30 TABLET | Refills: 0 | Status: SHIPPED | OUTPATIENT
Start: 2024-03-22

## 2024-03-22 ASSESSMENT — ENCOUNTER SYMPTOMS
ABDOMINAL PAIN: 1
NAUSEA: 1
VOMITING: 1

## 2024-03-22 NOTE — PROGRESS NOTES
Combinations (B-12 + FOLIC ACID) 2500-400 MCG TBDP, Take 1 tablet by mouth daily (Patient not taking: Reported on 7/31/2023), Disp: 90 tablet, Rfl: 1    allopurinol (ZYLOPRIM) 100 MG tablet, Take 1 tablet by mouth daily, Disp: 30 tablet, Rfl: 3    traZODone (DESYREL) 100 MG tablet, Take 1.5 tablets by mouth nightly (Patient taking differently: Take 2 tablets by mouth nightly), Disp: 30 tablet, Rfl: 0    lansoprazole (PREVACID) 30 MG delayed release capsule, take 1 capsule by mouth every morning, Disp: , Rfl:     escitalopram (LEXAPRO) 20 MG tablet, take 2 tablets by mouth once daily, Disp: 60 tablet, Rfl: 5    hydroxyurea (HYDREA) 500 MG chemo capsule, Take 1 capsule by mouth 2 times daily (Patient taking differently: Take 1 capsule by mouth 2 times daily 1 tablet by mouth every other day alternating with 1 tablet BID every other day), Disp: 60 capsule, Rfl: 1   Patient Active Problem List   Diagnosis    Tobacco dependence    Hyperlipidemia    Coronary atherosclerosis due to calcified coronary lesion    IBS (irritable bowel syndrome)    Seizure disorder (HCC)    Essential thrombocythemia (HCC)    Myeloproliferative disease (HCC)    Stented coronary artery    Anxiety and depression    Gastroesophageal reflux disease    Essential hypertension    Vitamin D deficiency    Insomnia    Lumbar radiculopathy    Crohn's disease of small intestine without complication (HCC)    Fibromyalgia    Plantar fasciitis    Chronic heart disease    History of resection of small bowel     Past Medical History:   Diagnosis Date    Anxiety and depression     Atheroembolism of left lower extremity (HCC) 09/11/2014    Atrial flutter (HCC)     Bowel perforation (HCC) 5/6/2022    Coronary atherosclerosis due to calcified coronary lesion(414.4)     ED (erectile dysfunction)     Epilepsy (HCC)     Hyperlipidemia     Hypertension     IBS (irritable bowel syndrome)     Ischemic toe 09/11/2014    MI, old     Small bowel perforation (HCC) 5/6/2022

## 2024-03-27 DIAGNOSIS — I25.10 CORONARY ATHEROSCLEROSIS DUE TO CALCIFIED CORONARY LESION: ICD-10-CM

## 2024-03-27 DIAGNOSIS — I25.84 CORONARY ATHEROSCLEROSIS DUE TO CALCIFIED CORONARY LESION: ICD-10-CM

## 2024-03-27 DIAGNOSIS — I10 ESSENTIAL HYPERTENSION: ICD-10-CM

## 2024-03-28 RX ORDER — ATORVASTATIN CALCIUM 40 MG/1
TABLET, FILM COATED ORAL
Qty: 30 TABLET | Refills: 0 | Status: SHIPPED | OUTPATIENT
Start: 2024-03-28

## 2024-03-28 RX ORDER — LEVETIRACETAM 500 MG/1
1500 TABLET ORAL 2 TIMES DAILY
Qty: 180 TABLET | Refills: 0 | Status: SHIPPED | OUTPATIENT
Start: 2024-03-28

## 2024-03-28 RX ORDER — METOPROLOL SUCCINATE 25 MG/1
TABLET, EXTENDED RELEASE ORAL
Qty: 30 TABLET | Refills: 0 | Status: SHIPPED | OUTPATIENT
Start: 2024-03-28

## 2024-03-28 RX ORDER — CLOPIDOGREL BISULFATE 75 MG/1
TABLET ORAL
Qty: 30 TABLET | Refills: 0 | Status: SHIPPED | OUTPATIENT
Start: 2024-03-28

## 2024-04-25 DIAGNOSIS — F17.200 TOBACCO DEPENDENCE: ICD-10-CM

## 2024-04-25 RX ORDER — BUPROPION HYDROCHLORIDE 150 MG/1
TABLET, EXTENDED RELEASE ORAL
Qty: 60 TABLET | Refills: 0 | Status: SHIPPED | OUTPATIENT
Start: 2024-04-25

## 2024-05-01 SDOH — ECONOMIC STABILITY: FOOD INSECURITY: WITHIN THE PAST 12 MONTHS, YOU WORRIED THAT YOUR FOOD WOULD RUN OUT BEFORE YOU GOT MONEY TO BUY MORE.: NEVER TRUE

## 2024-05-01 SDOH — ECONOMIC STABILITY: HOUSING INSECURITY
IN THE LAST 12 MONTHS, WAS THERE A TIME WHEN YOU DID NOT HAVE A STEADY PLACE TO SLEEP OR SLEPT IN A SHELTER (INCLUDING NOW)?: NO

## 2024-05-01 SDOH — ECONOMIC STABILITY: FOOD INSECURITY: WITHIN THE PAST 12 MONTHS, THE FOOD YOU BOUGHT JUST DIDN'T LAST AND YOU DIDN'T HAVE MONEY TO GET MORE.: OFTEN TRUE

## 2024-05-01 SDOH — ECONOMIC STABILITY: INCOME INSECURITY: HOW HARD IS IT FOR YOU TO PAY FOR THE VERY BASICS LIKE FOOD, HOUSING, MEDICAL CARE, AND HEATING?: NOT VERY HARD

## 2024-05-01 SDOH — ECONOMIC STABILITY: TRANSPORTATION INSECURITY
IN THE PAST 12 MONTHS, HAS LACK OF TRANSPORTATION KEPT YOU FROM MEETINGS, WORK, OR FROM GETTING THINGS NEEDED FOR DAILY LIVING?: NO

## 2024-05-02 ENCOUNTER — OFFICE VISIT (OUTPATIENT)
Dept: PRIMARY CARE CLINIC | Age: 54
End: 2024-05-02
Payer: COMMERCIAL

## 2024-05-02 VITALS
HEART RATE: 61 BPM | WEIGHT: 157 LBS | HEIGHT: 73 IN | OXYGEN SATURATION: 98 % | SYSTOLIC BLOOD PRESSURE: 96 MMHG | TEMPERATURE: 97.2 F | BODY MASS INDEX: 20.81 KG/M2 | DIASTOLIC BLOOD PRESSURE: 60 MMHG

## 2024-05-02 DIAGNOSIS — G40.909 SEIZURE DISORDER (HCC): ICD-10-CM

## 2024-05-02 DIAGNOSIS — G47.00 INSOMNIA, UNSPECIFIED TYPE: Primary | ICD-10-CM

## 2024-05-02 DIAGNOSIS — K50.00 CROHN'S DISEASE OF SMALL INTESTINE WITHOUT COMPLICATION (HCC): ICD-10-CM

## 2024-05-02 DIAGNOSIS — D47.3 ESSENTIAL THROMBOCYTHEMIA (HCC): ICD-10-CM

## 2024-05-02 PROCEDURE — 4004F PT TOBACCO SCREEN RCVD TLK: CPT | Performed by: FAMILY MEDICINE

## 2024-05-02 PROCEDURE — 3017F COLORECTAL CA SCREEN DOC REV: CPT | Performed by: FAMILY MEDICINE

## 2024-05-02 PROCEDURE — G8420 CALC BMI NORM PARAMETERS: HCPCS | Performed by: FAMILY MEDICINE

## 2024-05-02 PROCEDURE — G8427 DOCREV CUR MEDS BY ELIG CLIN: HCPCS | Performed by: FAMILY MEDICINE

## 2024-05-02 PROCEDURE — 3078F DIAST BP <80 MM HG: CPT | Performed by: FAMILY MEDICINE

## 2024-05-02 PROCEDURE — 3074F SYST BP LT 130 MM HG: CPT | Performed by: FAMILY MEDICINE

## 2024-05-02 PROCEDURE — 99213 OFFICE O/P EST LOW 20 MIN: CPT | Performed by: FAMILY MEDICINE

## 2024-05-02 RX ORDER — ZOLPIDEM TARTRATE 5 MG/1
5 TABLET ORAL NIGHTLY PRN
Qty: 30 TABLET | Refills: 0 | Status: SHIPPED | OUTPATIENT
Start: 2024-05-02 | End: 2024-06-01

## 2024-05-02 RX ORDER — DICYCLOMINE HCL 20 MG
TABLET ORAL
COMMUNITY
Start: 2024-04-01

## 2024-05-02 ASSESSMENT — ENCOUNTER SYMPTOMS
NAUSEA: 0
BACK PAIN: 1
ABDOMINAL PAIN: 0
COUGH: 0
COLOR CHANGE: 1
PHOTOPHOBIA: 0
SORE THROAT: 0
CONSTIPATION: 0
BLOOD IN STOOL: 0
SHORTNESS OF BREATH: 0
SINUS PRESSURE: 0
DIARRHEA: 0
SINUS PAIN: 0
VOMITING: 0

## 2024-05-02 NOTE — PROGRESS NOTES
Cameron Fraire (:  1970) is a 53 y.o. male,Established patient, here for evaluation of the following chief complaint(s):  Crohn's Disease      Assessment & Plan   ASSESSMENT/PLAN:  1. Insomnia, unspecified type  -     Wojciech De Leon MD, Sleep Medicine, [place]  -     zolpidem (AMBIEN) 5 MG tablet; Take 1 tablet by mouth nightly as needed for Sleep for up to 30 days. Max Daily Amount: 5 mg, Disp-30 tablet, R-0Normal  At this time we will treat symptomatically for his sinus infection.  Baseline labs ordered today.  See him back in 4 months or sooner if needed.  Has follow-up with cardiology in the next several months.  Asymptomatic at this time and completely under JNC 8 guidelines.    Return in about 6 months (around 2024).         Subjective   SUBJECTIVE/OBJECTIVE:  HPI  Update 2024  Patient presents today for follow-up on chronic issues medication refills.  Patient states he is taking all medication as prescribed without any side effects or adverse reactions.  Continues to follow with specialist as indicated.  Recently had COVID several weeks ago and is currently dealing with sinus infection type issues.  No flareup of his Crohn's disease since last office visit.  No other current concerns or complaints today.    Update 2024  Patient presents today for follow-up on chronic issue medication refills.  No recent decompensation of his chronic issues since last office visit.  Main concern today has been worsening insomnia.  No recent changes in medications.  Patient states he does have more issues with stress at work.  No other concerns or complaints at this time.    Review of Systems   Constitutional:  Positive for fatigue. Negative for chills and fever.   HENT:  Negative for congestion, hearing loss, nosebleeds, sinus pressure, sinus pain and sore throat.    Eyes:  Negative for photophobia.   Respiratory:  Negative for cough and shortness of breath.    Cardiovascular:  Negative for

## 2024-05-07 ENCOUNTER — OFFICE VISIT (OUTPATIENT)
Dept: CARDIOLOGY CLINIC | Age: 54
End: 2024-05-07
Payer: COMMERCIAL

## 2024-05-07 VITALS
BODY MASS INDEX: 20.83 KG/M2 | HEART RATE: 60 BPM | WEIGHT: 153.8 LBS | HEIGHT: 72 IN | DIASTOLIC BLOOD PRESSURE: 60 MMHG | RESPIRATION RATE: 16 BRPM | SYSTOLIC BLOOD PRESSURE: 90 MMHG

## 2024-05-07 DIAGNOSIS — I25.2 MYOCARDIAL INFARCTION, OLD: ICD-10-CM

## 2024-05-07 DIAGNOSIS — I25.10 CORONARY ATHEROSCLEROSIS DUE TO CALCIFIED CORONARY LESION: Primary | ICD-10-CM

## 2024-05-07 DIAGNOSIS — I25.84 CORONARY ATHEROSCLEROSIS DUE TO CALCIFIED CORONARY LESION: Primary | ICD-10-CM

## 2024-05-07 DIAGNOSIS — E78.00 PURE HYPERCHOLESTEROLEMIA: ICD-10-CM

## 2024-05-07 DIAGNOSIS — I10 ESSENTIAL HYPERTENSION: ICD-10-CM

## 2024-05-07 DIAGNOSIS — Z95.5 STENTED CORONARY ARTERY: ICD-10-CM

## 2024-05-07 PROCEDURE — 3074F SYST BP LT 130 MM HG: CPT | Performed by: INTERNAL MEDICINE

## 2024-05-07 PROCEDURE — 93000 ELECTROCARDIOGRAM COMPLETE: CPT | Performed by: INTERNAL MEDICINE

## 2024-05-07 PROCEDURE — G8427 DOCREV CUR MEDS BY ELIG CLIN: HCPCS | Performed by: INTERNAL MEDICINE

## 2024-05-07 PROCEDURE — 99214 OFFICE O/P EST MOD 30 MIN: CPT | Performed by: INTERNAL MEDICINE

## 2024-05-07 PROCEDURE — 4004F PT TOBACCO SCREEN RCVD TLK: CPT | Performed by: INTERNAL MEDICINE

## 2024-05-07 PROCEDURE — 3017F COLORECTAL CA SCREEN DOC REV: CPT | Performed by: INTERNAL MEDICINE

## 2024-05-07 PROCEDURE — 3078F DIAST BP <80 MM HG: CPT | Performed by: INTERNAL MEDICINE

## 2024-05-07 PROCEDURE — G8420 CALC BMI NORM PARAMETERS: HCPCS | Performed by: INTERNAL MEDICINE

## 2024-05-07 NOTE — PROGRESS NOTES
Patient Active Problem List   Diagnosis    Tobacco dependence    Hyperlipidemia    Coronary atherosclerosis due to calcified coronary lesion    IBS (irritable bowel syndrome)    Seizure disorder (HCC)    Essential thrombocythemia (HCC)    Stented coronary artery    Anxiety and depression    Gastroesophageal reflux disease    Essential hypertension    Vitamin D deficiency    Insomnia    Lumbar radiculopathy    Crohn's disease of small intestine without complication (HCC)    Fibromyalgia    Plantar fasciitis    Chronic heart disease    History of resection of small bowel       Current Outpatient Medications   Medication Sig Dispense Refill    dicyclomine (BENTYL) 20 MG tablet       zolpidem (AMBIEN) 5 MG tablet Take 1 tablet by mouth nightly as needed for Sleep for up to 30 days. Max Daily Amount: 5 mg 30 tablet 0    buPROPion (WELLBUTRIN SR) 150 MG extended release tablet Take1 tablet by mouth 2 times daily 60 tablet 0    atorvastatin (LIPITOR) 40 MG tablet TAKE ONE TABLET EVERY EVENING 30 tablet 0    metoprolol succinate (TOPROL XL) 25 MG extended release tablet TAKE ONE TABLET BY MOUTH EVERY DAY 30 tablet 0    clopidogrel (PLAVIX) 75 MG tablet TAKE ONE TABLET EVERY DAY 30 tablet 0    levETIRAcetam (KEPPRA) 500 MG tablet Take 3 tablets by mouth 2 times daily 180 tablet 0    ondansetron (ZOFRAN) 4 MG tablet Take 1 tablet by mouth 3 times daily as needed for Nausea or Vomiting 30 tablet 0    chlorpheniramine (ALLER-CHLOR) 4 MG tablet Take 1 tablet by mouth every 6 hours as needed for Allergies 20 tablet 0    albuterol sulfate HFA (PROVENTIL;VENTOLIN;PROAIR) 108 (90 Base) MCG/ACT inhaler Inhale 2 puffs into the lungs 4 times daily as needed for Wheezing 18 g 0    risperiDONE (RISPERDAL) 0.5 MG tablet Take 1 tablet by mouth 2 times daily      vitamin D (ERGOCALCIFEROL) 1.25 MG (12895 UT) CAPS capsule TAKE 1 CAPSULE BY MOUTH ONE TIME PER WEEK 5 capsule 4    Multiple Vitamins-Minerals (CENTRUM SILVER PO) Take by mouth

## 2024-05-16 ENCOUNTER — HOSPITAL ENCOUNTER (OUTPATIENT)
Age: 54
Discharge: HOME | End: 2024-05-16
Payer: COMMERCIAL

## 2024-05-16 LAB — PSA SERPL-MCNC: 1.66 NG/ML (ref 0–4)

## 2024-05-16 PROCEDURE — 36415 COLL VENOUS BLD VENIPUNCTURE: CPT

## 2024-05-16 PROCEDURE — G0103 PSA SCREENING: HCPCS

## 2024-05-17 RX ORDER — LEVETIRACETAM 500 MG/1
1500 TABLET ORAL 2 TIMES DAILY
Qty: 180 TABLET | Refills: 0 | Status: SHIPPED | OUTPATIENT
Start: 2024-05-17

## 2024-06-12 DIAGNOSIS — I25.10 CORONARY ATHEROSCLEROSIS DUE TO CALCIFIED CORONARY LESION: ICD-10-CM

## 2024-06-12 DIAGNOSIS — F17.200 TOBACCO DEPENDENCE: ICD-10-CM

## 2024-06-12 DIAGNOSIS — I10 ESSENTIAL HYPERTENSION: ICD-10-CM

## 2024-06-12 DIAGNOSIS — I25.84 CORONARY ATHEROSCLEROSIS DUE TO CALCIFIED CORONARY LESION: ICD-10-CM

## 2024-06-12 RX ORDER — CLOPIDOGREL BISULFATE 75 MG/1
TABLET ORAL
Qty: 30 TABLET | Refills: 0 | Status: SHIPPED | OUTPATIENT
Start: 2024-06-12

## 2024-06-12 RX ORDER — BUPROPION HYDROCHLORIDE 150 MG/1
150 TABLET, EXTENDED RELEASE ORAL 2 TIMES DAILY
Qty: 60 TABLET | Refills: 0 | Status: SHIPPED | OUTPATIENT
Start: 2024-06-12

## 2024-06-12 RX ORDER — ATORVASTATIN CALCIUM 40 MG/1
TABLET, FILM COATED ORAL
Qty: 30 TABLET | Refills: 0 | Status: SHIPPED | OUTPATIENT
Start: 2024-06-12

## 2024-06-12 RX ORDER — METOPROLOL SUCCINATE 25 MG/1
TABLET, EXTENDED RELEASE ORAL
Qty: 30 TABLET | Refills: 0 | Status: SHIPPED | OUTPATIENT
Start: 2024-06-12

## 2024-06-13 DIAGNOSIS — G47.00 INSOMNIA, UNSPECIFIED TYPE: Primary | ICD-10-CM

## 2024-06-13 RX ORDER — ZOLPIDEM TARTRATE 5 MG/1
5 TABLET ORAL NIGHTLY PRN
Qty: 30 TABLET | Refills: 0 | Status: SHIPPED | OUTPATIENT
Start: 2024-06-13 | End: 2024-07-13

## 2024-06-13 RX ORDER — ZOLPIDEM TARTRATE 5 MG/1
TABLET ORAL
Qty: 30 TABLET | Refills: 0 | OUTPATIENT
Start: 2024-06-13

## 2024-06-13 RX ORDER — ZOLPIDEM TARTRATE 5 MG/1
5 TABLET ORAL NIGHTLY PRN
COMMUNITY
End: 2024-06-13 | Stop reason: SDUPTHER

## 2024-07-23 DIAGNOSIS — G47.00 INSOMNIA, UNSPECIFIED TYPE: Primary | ICD-10-CM

## 2024-07-23 RX ORDER — LEVETIRACETAM 500 MG/1
1500 TABLET ORAL 2 TIMES DAILY
Qty: 180 TABLET | Refills: 0 | Status: SHIPPED | OUTPATIENT
Start: 2024-07-23

## 2024-07-23 RX ORDER — ZOLPIDEM TARTRATE 5 MG/1
5 TABLET ORAL NIGHTLY PRN
Qty: 30 TABLET | Refills: 0 | Status: SHIPPED | OUTPATIENT
Start: 2024-07-23 | End: 2024-08-22

## 2024-07-23 RX ORDER — ZOLPIDEM TARTRATE 5 MG/1
5 TABLET ORAL NIGHTLY PRN
COMMUNITY
End: 2024-07-23 | Stop reason: SDUPTHER

## 2024-08-10 DIAGNOSIS — I25.10 CORONARY ATHEROSCLEROSIS DUE TO CALCIFIED CORONARY LESION: ICD-10-CM

## 2024-08-10 DIAGNOSIS — F17.200 TOBACCO DEPENDENCE: ICD-10-CM

## 2024-08-10 DIAGNOSIS — I10 ESSENTIAL HYPERTENSION: ICD-10-CM

## 2024-08-10 DIAGNOSIS — I25.84 CORONARY ATHEROSCLEROSIS DUE TO CALCIFIED CORONARY LESION: ICD-10-CM

## 2024-08-10 RX ORDER — CLOPIDOGREL BISULFATE 75 MG/1
TABLET ORAL
Qty: 30 TABLET | Refills: 0 | Status: SHIPPED | OUTPATIENT
Start: 2024-08-10

## 2024-08-10 RX ORDER — METOPROLOL SUCCINATE 25 MG/1
TABLET, EXTENDED RELEASE ORAL
Qty: 30 TABLET | Refills: 0 | Status: SHIPPED | OUTPATIENT
Start: 2024-08-10

## 2024-08-10 RX ORDER — BUPROPION HYDROCHLORIDE 150 MG/1
150 TABLET, EXTENDED RELEASE ORAL 2 TIMES DAILY
Qty: 60 TABLET | Refills: 0 | Status: SHIPPED | OUTPATIENT
Start: 2024-08-10

## 2024-08-10 RX ORDER — ATORVASTATIN CALCIUM 40 MG/1
TABLET, FILM COATED ORAL
Qty: 30 TABLET | Refills: 0 | Status: SHIPPED | OUTPATIENT
Start: 2024-08-10

## 2024-08-25 ENCOUNTER — PATIENT MESSAGE (OUTPATIENT)
Dept: PRIMARY CARE CLINIC | Age: 54
End: 2024-08-25

## 2024-08-25 DIAGNOSIS — G47.00 INSOMNIA, UNSPECIFIED TYPE: ICD-10-CM

## 2024-08-26 RX ORDER — ZOLPIDEM TARTRATE 5 MG/1
5 TABLET ORAL NIGHTLY PRN
Qty: 30 TABLET | Refills: 0 | Status: SHIPPED | OUTPATIENT
Start: 2024-08-26 | End: 2024-09-25

## 2024-09-17 RX ORDER — LEVETIRACETAM 500 MG/1
1500 TABLET ORAL 2 TIMES DAILY
Qty: 180 TABLET | Refills: 0 | Status: SHIPPED | OUTPATIENT
Start: 2024-09-17

## 2024-09-30 DIAGNOSIS — G47.00 INSOMNIA, UNSPECIFIED TYPE: ICD-10-CM

## 2024-09-30 RX ORDER — ZOLPIDEM TARTRATE 5 MG/1
5 TABLET ORAL NIGHTLY PRN
Qty: 30 TABLET | Refills: 3 | Status: SHIPPED | OUTPATIENT
Start: 2024-09-30 | End: 2025-01-28

## 2024-10-14 DIAGNOSIS — I25.84 CORONARY ATHEROSCLEROSIS DUE TO CALCIFIED CORONARY LESION: ICD-10-CM

## 2024-10-14 DIAGNOSIS — I10 ESSENTIAL HYPERTENSION: ICD-10-CM

## 2024-10-14 DIAGNOSIS — I25.10 CORONARY ATHEROSCLEROSIS DUE TO CALCIFIED CORONARY LESION: ICD-10-CM

## 2024-10-15 DIAGNOSIS — F17.200 TOBACCO DEPENDENCE: ICD-10-CM

## 2024-10-15 RX ORDER — BUPROPION HYDROCHLORIDE 150 MG/1
150 TABLET, EXTENDED RELEASE ORAL 2 TIMES DAILY
Qty: 60 TABLET | Refills: 0 | Status: SHIPPED | OUTPATIENT
Start: 2024-10-15

## 2024-10-15 RX ORDER — CLOPIDOGREL BISULFATE 75 MG/1
TABLET ORAL
Qty: 30 TABLET | Refills: 0 | Status: SHIPPED | OUTPATIENT
Start: 2024-10-15

## 2024-10-15 RX ORDER — METOPROLOL SUCCINATE 25 MG/1
TABLET, EXTENDED RELEASE ORAL
Qty: 30 TABLET | Refills: 0 | Status: SHIPPED | OUTPATIENT
Start: 2024-10-15

## 2024-10-15 RX ORDER — ATORVASTATIN CALCIUM 40 MG/1
TABLET, FILM COATED ORAL
Qty: 30 TABLET | Refills: 0 | Status: SHIPPED | OUTPATIENT
Start: 2024-10-15

## 2024-10-31 DIAGNOSIS — G47.00 INSOMNIA, UNSPECIFIED TYPE: ICD-10-CM

## 2024-11-01 RX ORDER — ZOLPIDEM TARTRATE 5 MG/1
5 TABLET ORAL NIGHTLY PRN
Qty: 30 TABLET | Refills: 3 | Status: SHIPPED | OUTPATIENT
Start: 2024-11-01 | End: 2025-03-01

## 2024-11-04 ENCOUNTER — OFFICE VISIT (OUTPATIENT)
Dept: PRIMARY CARE CLINIC | Age: 54
End: 2024-11-04
Payer: COMMERCIAL

## 2024-11-04 VITALS
OXYGEN SATURATION: 98 % | TEMPERATURE: 97.1 F | HEIGHT: 72 IN | DIASTOLIC BLOOD PRESSURE: 60 MMHG | SYSTOLIC BLOOD PRESSURE: 94 MMHG | HEART RATE: 63 BPM | WEIGHT: 161.6 LBS | BODY MASS INDEX: 21.89 KG/M2

## 2024-11-04 DIAGNOSIS — D47.1 MYELOPROLIFERATIVE DISEASE (HCC): ICD-10-CM

## 2024-11-04 DIAGNOSIS — G40.909 SEIZURE DISORDER (HCC): ICD-10-CM

## 2024-11-04 DIAGNOSIS — E55.9 VITAMIN D DEFICIENCY: ICD-10-CM

## 2024-11-04 DIAGNOSIS — M54.12 CERVICAL RADICULOPATHY: Primary | ICD-10-CM

## 2024-11-04 DIAGNOSIS — D47.3 ESSENTIAL THROMBOCYTHEMIA (HCC): ICD-10-CM

## 2024-11-04 PROCEDURE — 3078F DIAST BP <80 MM HG: CPT | Performed by: FAMILY MEDICINE

## 2024-11-04 PROCEDURE — 3074F SYST BP LT 130 MM HG: CPT | Performed by: FAMILY MEDICINE

## 2024-11-04 PROCEDURE — 4004F PT TOBACCO SCREEN RCVD TLK: CPT | Performed by: FAMILY MEDICINE

## 2024-11-04 PROCEDURE — 99214 OFFICE O/P EST MOD 30 MIN: CPT | Performed by: FAMILY MEDICINE

## 2024-11-04 PROCEDURE — G8427 DOCREV CUR MEDS BY ELIG CLIN: HCPCS | Performed by: FAMILY MEDICINE

## 2024-11-04 PROCEDURE — G8420 CALC BMI NORM PARAMETERS: HCPCS | Performed by: FAMILY MEDICINE

## 2024-11-04 PROCEDURE — 3017F COLORECTAL CA SCREEN DOC REV: CPT | Performed by: FAMILY MEDICINE

## 2024-11-04 PROCEDURE — G8484 FLU IMMUNIZE NO ADMIN: HCPCS | Performed by: FAMILY MEDICINE

## 2024-11-04 RX ORDER — QUETIAPINE FUMARATE 100 MG/1
TABLET, FILM COATED ORAL
COMMUNITY
Start: 2024-09-18

## 2024-11-04 RX ORDER — SENNOSIDES 8.6 MG/1
TABLET, COATED ORAL
COMMUNITY
Start: 2024-09-18

## 2024-11-04 RX ORDER — PLECANATIDE 3 MG/1
TABLET ORAL
COMMUNITY
Start: 2024-09-18

## 2024-11-04 ASSESSMENT — ENCOUNTER SYMPTOMS
PHOTOPHOBIA: 0
SINUS PAIN: 0
ABDOMINAL PAIN: 0
NAUSEA: 0
COUGH: 0
DIARRHEA: 0
SHORTNESS OF BREATH: 0
BLOOD IN STOOL: 0
VOMITING: 0
SORE THROAT: 0
SINUS PRESSURE: 0
BACK PAIN: 1
CONSTIPATION: 0

## 2024-11-04 NOTE — PROGRESS NOTES
Financial Resource Strain: Patient Declined (4/3/2023)    Overall Financial Resource Strain (CARDIA)     Difficulty of Paying Living Expenses: Patient declined   Food Insecurity: Not on file (5/1/2024)   Transportation Needs: Unknown (4/3/2023)    PRAPARE - Transportation     Lack of Transportation (Medical): Not on file     Lack of Transportation (Non-Medical): Patient declined   Physical Activity: Not on file   Stress: Not on file   Social Connections: Not on file   Intimate Partner Violence: Not on file   Housing Stability: Unknown (4/3/2023)    Housing Stability Vital Sign     Unable to Pay for Housing in the Last Year: Not on file     Number of Places Lived in the Last Year: Not on file     Unstable Housing in the Last Year: Patient refused     Family History   Problem Relation Age of Onset    Coronary Art Dis Mother     COPD Mother     Seizures Father     Alcohol Abuse Father     Liver Disease Father     Diabetes Brother     Cancer Brother 56        leukemia    Diabetes Brother     Heart Disease Maternal Uncle     Heart Attack Maternal Uncle     High Blood Pressure Maternal Uncle     Heart Disease Maternal Uncle       There are no preventive care reminders to display for this patient.  There are no preventive care reminders to display for this patient.   There are no preventive care reminders to display for this patient.   Health Maintenance Due   Topic    Shingles vaccine (1 of 2)      Health Maintenance   Topic Date Due    Hepatitis B vaccine (1 of 3 - 19+ 3-dose series) Never done    Shingles vaccine (1 of 2) Never done    Lung Cancer Screening &/or Counseling  06/12/2021    Pneumococcal 0-64 years Vaccine (2 of 2 - PCV) 11/10/2022    Flu vaccine (1) 08/01/2024    COVID-19 Vaccine (8 - 2023-24 season) 09/01/2024    Lipids  02/01/2025    Depression Monitoring  02/01/2025    DTaP/Tdap/Td vaccine (2 - Td or Tdap) 03/18/2026    Colorectal Cancer Screen  06/13/2029    Hepatitis C screen  Completed    HIV

## 2024-11-04 NOTE — ASSESSMENT & PLAN NOTE
Orders:    CBC with Auto Differential; Future    T4, Free; Future    Uric Acid; Future    PSA Screening; Future    Vitamin B12 & Folate; Future    TSH; Future    Hepatic Function Panel; Future    Basic Metabolic Panel; Future    Lipid Panel; Future    Hemoglobin A1C; Future    Urinalysis with Microscopic; Future    Levetiracetam Level; Future    Vitamin D 25 Hydroxy; Future    Iron and TIBC; Future    Reticulocytes; Future    C-Reactive Protein; Future

## 2024-11-14 RX ORDER — LEVETIRACETAM 500 MG/1
1500 TABLET ORAL 2 TIMES DAILY
Qty: 180 TABLET | Refills: 0 | Status: SHIPPED | OUTPATIENT
Start: 2024-11-14

## 2024-12-07 DIAGNOSIS — I10 ESSENTIAL HYPERTENSION: ICD-10-CM

## 2024-12-09 RX ORDER — METOPROLOL SUCCINATE 25 MG/1
TABLET, EXTENDED RELEASE ORAL
Qty: 30 TABLET | Refills: 0 | Status: SHIPPED | OUTPATIENT
Start: 2024-12-09

## 2024-12-09 RX ORDER — ATORVASTATIN CALCIUM 40 MG/1
TABLET, FILM COATED ORAL
Qty: 30 TABLET | Refills: 0 | Status: SHIPPED | OUTPATIENT
Start: 2024-12-09

## 2024-12-14 ENCOUNTER — HOSPITAL ENCOUNTER (OUTPATIENT)
Age: 54
Discharge: HOME | End: 2024-12-14
Payer: COMMERCIAL

## 2024-12-14 DIAGNOSIS — E55.9 VITAMIN D DEFICIENCY: ICD-10-CM

## 2024-12-14 DIAGNOSIS — G40.909 SEIZURE DISORDER (HCC): ICD-10-CM

## 2024-12-14 DIAGNOSIS — M54.12 CERVICAL RADICULOPATHY: ICD-10-CM

## 2024-12-14 DIAGNOSIS — D47.1 MYELOPROLIFERATIVE DISEASE (HCC): ICD-10-CM

## 2024-12-14 DIAGNOSIS — D47.3 ESSENTIAL THROMBOCYTHEMIA (HCC): ICD-10-CM

## 2024-12-14 LAB
25(OH)D3 SERPL-MCNC: 66.8 NG/ML (ref 30–100)
ALBUMIN SERPL-MCNC: 4.4 G/DL (ref 3.5–5.2)
ALP SERPL-CCNC: 68 U/L (ref 40–129)
ALT SERPL-CCNC: 14 U/L (ref 0–40)
ANION GAP SERPL CALCULATED.3IONS-SCNC: 7 MMOL/L (ref 7–16)
AST SERPL-CCNC: 18 U/L (ref 0–39)
ATYPICAL LYMPHOCYTE ABSOLUTE COUNT: 0.04 K/UL (ref 0–0.46)
ATYPICAL LYMPHOCYTES: 1 % (ref 0–4)
BASOPHILS # BLD: 0.17 K/UL (ref 0–0.2)
BASOPHILS NFR BLD: 3 % (ref 0–2)
BILIRUB DIRECT SERPL-MCNC: <0.2 MG/DL (ref 0–0.3)
BILIRUB INDIRECT SERPL-MCNC: NORMAL MG/DL (ref 0–1)
BILIRUB SERPL-MCNC: 0.4 MG/DL (ref 0–1.2)
BILIRUB UR QL STRIP: NEGATIVE
BUN SERPL-MCNC: 12 MG/DL (ref 6–20)
CALCIUM SERPL-MCNC: 9.4 MG/DL (ref 8.6–10.2)
CHLORIDE SERPL-SCNC: 104 MMOL/L (ref 98–107)
CHOLEST SERPL-MCNC: 106 MG/DL
CLARITY UR: CLEAR
CO2 SERPL-SCNC: 29 MMOL/L (ref 22–29)
COLOR UR: YELLOW
CREAT SERPL-MCNC: 1 MG/DL (ref 0.7–1.2)
CRP SERPL HS-MCNC: <3 MG/L (ref 0–5)
EOSINOPHIL # BLD: 0.21 K/UL (ref 0.05–0.5)
EOSINOPHILS RELATIVE PERCENT: 4 % (ref 0–6)
ERYTHROCYTE [DISTWIDTH] IN BLOOD BY AUTOMATED COUNT: 14 % (ref 11.5–15)
FOLATE SERPL-MCNC: 18.1 NG/ML (ref 4.8–24.2)
GFR, ESTIMATED: 87 ML/MIN/1.73M2
GLUCOSE SERPL-MCNC: 93 MG/DL (ref 74–99)
GLUCOSE UR STRIP-MCNC: NEGATIVE MG/DL
HBA1C MFR BLD: 5.1 % (ref 4–5.6)
HCT VFR BLD AUTO: 38.7 % (ref 37–54)
HDLC SERPL-MCNC: 32 MG/DL
HGB BLD-MCNC: 13 G/DL (ref 12.5–16.5)
HGB UR QL STRIP.AUTO: NEGATIVE
IMM RETICS NFR: 12.5 % (ref 2.3–13.4)
IRON SATN MFR SERPL: 31 % (ref 20–55)
IRON SERPL-MCNC: 88 UG/DL (ref 59–158)
KETONES UR STRIP-MCNC: NEGATIVE MG/DL
LDLC SERPL CALC-MCNC: 55 MG/DL
LEUKOCYTE ESTERASE UR QL STRIP: NEGATIVE
LYMPHOCYTES NFR BLD: 1.37 K/UL (ref 1.5–4)
LYMPHOCYTES RELATIVE PERCENT: 28 % (ref 20–42)
MCH RBC QN AUTO: 37.8 PG (ref 26–35)
MCHC RBC AUTO-ENTMCNC: 33.6 G/DL (ref 32–34.5)
MCV RBC AUTO: 112.5 FL (ref 80–99.9)
MONOCYTES NFR BLD: 0.08 K/UL (ref 0.1–0.95)
MONOCYTES NFR BLD: 2 % (ref 2–12)
NEUTROPHILS NFR BLD: 62 % (ref 43–80)
NEUTS SEG NFR BLD: 3.03 K/UL (ref 1.8–7.3)
NITRITE UR QL STRIP: NEGATIVE
PH UR STRIP: 5.5 [PH] (ref 5–9)
PLATELET # BLD AUTO: 389 K/UL (ref 130–450)
PMV BLD AUTO: 9.3 FL (ref 7–12)
POTASSIUM SERPL-SCNC: 4.3 MMOL/L (ref 3.5–5)
PROT SERPL-MCNC: 6.7 G/DL (ref 6.4–8.3)
PROT UR STRIP-MCNC: NEGATIVE MG/DL
PSA SERPL-MCNC: 1.43 NG/ML (ref 0–4)
RBC # BLD AUTO: 3.44 M/UL (ref 3.8–5.8)
RBC # BLD: NORMAL 10*6/UL
RBC #/AREA URNS HPF: ABNORMAL /HPF
RETIC HEMOGLOBIN: 40.4 PG (ref 28.2–36.6)
RETICS # AUTO: 0.06 M/UL
RETICS/RBC NFR AUTO: 1.7 % (ref 0.4–1.9)
SODIUM SERPL-SCNC: 140 MMOL/L (ref 132–146)
SP GR UR STRIP: >1.03 (ref 1–1.03)
T4 FREE SERPL-MCNC: 1.3 NG/DL (ref 0.9–1.7)
TIBC SERPL-MCNC: 287 UG/DL (ref 250–450)
TRIGL SERPL-MCNC: 97 MG/DL
TSH SERPL DL<=0.05 MIU/L-ACNC: 0.91 UIU/ML (ref 0.27–4.2)
URATE SERPL-MCNC: 3.9 MG/DL (ref 3.4–7)
UROBILINOGEN UR STRIP-ACNC: 0.2 EU/DL (ref 0–1)
VIT B12 SERPL-MCNC: 658 PG/ML (ref 211–946)
VLDLC SERPL CALC-MCNC: 19 MG/DL
WBC #/AREA URNS HPF: ABNORMAL /HPF
WBC OTHER # BLD: 4.9 K/UL (ref 4.5–11.5)

## 2024-12-14 PROCEDURE — 82746 ASSAY OF FOLIC ACID SERUM: CPT

## 2024-12-14 PROCEDURE — 83540 ASSAY OF IRON: CPT

## 2024-12-14 PROCEDURE — 80061 LIPID PANEL: CPT

## 2024-12-14 PROCEDURE — 85025 COMPLETE CBC W/AUTO DIFF WBC: CPT

## 2024-12-14 PROCEDURE — 84443 ASSAY THYROID STIM HORMONE: CPT

## 2024-12-14 PROCEDURE — 82306 VITAMIN D 25 HYDROXY: CPT

## 2024-12-14 PROCEDURE — 83036 HEMOGLOBIN GLYCOSYLATED A1C: CPT

## 2024-12-14 PROCEDURE — 81001 URINALYSIS AUTO W/SCOPE: CPT

## 2024-12-14 PROCEDURE — 86140 C-REACTIVE PROTEIN: CPT

## 2024-12-14 PROCEDURE — 36415 COLL VENOUS BLD VENIPUNCTURE: CPT

## 2024-12-14 PROCEDURE — G0103 PSA SCREENING: HCPCS

## 2024-12-14 PROCEDURE — 80177 DRUG SCRN QUAN LEVETIRACETAM: CPT

## 2024-12-14 PROCEDURE — 84550 ASSAY OF BLOOD/URIC ACID: CPT

## 2024-12-14 PROCEDURE — 84439 ASSAY OF FREE THYROXINE: CPT

## 2024-12-14 PROCEDURE — 83550 IRON BINDING TEST: CPT

## 2024-12-14 PROCEDURE — 82248 BILIRUBIN DIRECT: CPT

## 2024-12-14 PROCEDURE — 82607 VITAMIN B-12: CPT

## 2024-12-14 PROCEDURE — 80053 COMPREHEN METABOLIC PANEL: CPT

## 2024-12-14 PROCEDURE — 85045 AUTOMATED RETICULOCYTE COUNT: CPT

## 2024-12-17 LAB — LEVETIRACETAM SERPL-MCNC: 22 UG/ML

## 2024-12-19 ENCOUNTER — PROCEDURE VISIT (OUTPATIENT)
Dept: PHYSICAL MEDICINE AND REHAB | Age: 54
End: 2024-12-19
Payer: COMMERCIAL

## 2024-12-19 VITALS — BODY MASS INDEX: 21.67 KG/M2 | HEIGHT: 72 IN | WEIGHT: 160 LBS

## 2024-12-19 DIAGNOSIS — M54.12 CERVICAL RADICULOPATHY: ICD-10-CM

## 2024-12-19 PROCEDURE — 95886 MUSC TEST DONE W/N TEST COMP: CPT | Performed by: STUDENT IN AN ORGANIZED HEALTH CARE EDUCATION/TRAINING PROGRAM

## 2024-12-19 PROCEDURE — 95912 NRV CNDJ TEST 11-12 STUDIES: CPT | Performed by: STUDENT IN AN ORGANIZED HEALTH CARE EDUCATION/TRAINING PROGRAM

## 2024-12-19 NOTE — PATIENT INSTRUCTIONS
Today you had an electrodiagnostic exam which included nerve conduction studies (NCS) and electromyography (EMG). This test evaluated the electrical activity of your nerves and muscles to help determine if you have a nerve or muscle disease.  This test can help determine the location and type of a nerve or muscle problem. This will help your referring doctor diagnose your condition and determine the appropriate next step in your treatment plan.      After your test:     1. There are no long lasting side effects of the test.      2. You may resume your normal activities without restrictions.      3.  Resume any medications that were stopped for the test.     4  If you have sore areas or bruising in your muscles where the needle was placed, apply a cold pack to the sore area for 15-20 minutes three to four times a day as needed for pain.  The soreness should go away in about 1-2 days.      5. Your results were provided  Briefly at the end of your test and the final detailed report will be provided to your referring physician, and/or primary care physician and any other parties you requested within 1-2 days of the examination. You may wish to contact your referring provider after a few days to determine what they would like you to do next.      6.  Please call [phone] with any questions or concerns and if you develop increased body temperature/fever, swelling, tenderness, increased pain and/or drainage from the sites where the needle was placed.      Thank you for choosing us for your health care needs.

## 2024-12-19 NOTE — PROGRESS NOTES
[facility]  *Accredited by the [facility] with exemplary status  [address] [place] [address] [address]  Phone: [phone]  Fax: [phone]    Referring Provider: Vijay Frederick DO  Primary Care Physician: Vijay Frederick DO  Patient Name: Cameron Fraire  Patient YOB: 1970  Gender: male  BMI: Body mass index is 21.7 kg/m².  Height 1.829 m (6'), weight 72.6 kg (160 lb).    12/19/2024    Description of clinical problem:   Chief Complaint   Patient presents with    Neck Pain     Neck pain and shoulder pain.  Left side only.  Pain described as burning and pins and needles.  At times it feels as if it is going to pop out of the socked.  Pain radiates to the left bicep. Symptoms for 6 months.     Extremity Weakness     Limited use due to pain.       Pain Yes   ; Numbness/tingling  Yes; Weakness  Yes       Brief physical exam:   Sensory deficit No; Weakness Yes - left shoulder external rotation and abduction; +Empty Can sign; Atrophy  No; Reflex abnormality No    Study Limitations:  Patient is on plavix so paraspinal muscle testing was deferred    Sensory NCS      Nerve / Sites Rec. Site Peak Lat PP Amp Segments Distance Velocity Temp.     ms µV  cm m/s °C   L Median - Digit II (Antidromic)      Palm Dig II 1.77 21.8 Palm - Dig II 7 58 32      Wrist Dig II 3.59 20.0 Wrist - Dig II 14 52 32   L Ulnar - Digit V (Antidromic)      Wrist Dig V 3.07 19.2 Wrist - Dig V 14 61 32   L Radial - Anatomical snuff box (Forearm)      Forearm Wrist 2.08 22.3 Forearm - Wrist 10 66 32   L Medial antebrachial cutaneous - Forearm (Elbow)      Elbow Forearm 1.93 5.6 Elbow - Forearm 10 60 32   R Medial antebrachial cutaneous - Forearm (Elbow)      Elbow Forearm 1.98 4.1 Elbow - Forearm 10 66 32   L Lateral antebrachial cutaneous - Forearm (Elbow)      Elbow Forearm 2.19 8.5 Elbow - Forearm 10 64 32   R Lateral antebrachial cutaneous - Forearm (Elbow)      Elbow Forearm

## 2025-01-03 DIAGNOSIS — F17.200 TOBACCO DEPENDENCE: ICD-10-CM

## 2025-01-06 RX ORDER — BUPROPION HYDROCHLORIDE 150 MG/1
150 TABLET, EXTENDED RELEASE ORAL 2 TIMES DAILY
Qty: 60 TABLET | Refills: 0 | Status: SHIPPED | OUTPATIENT
Start: 2025-01-06

## 2025-01-06 RX ORDER — LEVETIRACETAM 500 MG/1
1500 TABLET ORAL 2 TIMES DAILY
Qty: 180 TABLET | Refills: 0 | Status: SHIPPED | OUTPATIENT
Start: 2025-01-06

## 2025-01-30 RX ORDER — CLOPIDOGREL BISULFATE 75 MG/1
75 TABLET ORAL DAILY
Qty: 30 TABLET | Refills: 0 | Status: SHIPPED | OUTPATIENT
Start: 2025-01-30

## 2025-02-06 ENCOUNTER — HOSPITAL ENCOUNTER (OUTPATIENT)
Age: 55
Discharge: HOME | End: 2025-02-06
Payer: COMMERCIAL

## 2025-02-06 LAB
CHOLEST SERPL-MCNC: 127 MG/DL
GLUCOSE SERPL-MCNC: 102 MG/DL (ref 74–99)
HBA1C MFR BLD: 4.8 % (ref 4–5.6)
HDLC SERPL-MCNC: 30 MG/DL
LDLC SERPL CALC-MCNC: 41 MG/DL
TRIGL SERPL-MCNC: 281 MG/DL
VLDLC SERPL CALC-MCNC: 56 MG/DL

## 2025-02-06 PROCEDURE — 83036 HEMOGLOBIN GLYCOSYLATED A1C: CPT

## 2025-02-06 PROCEDURE — 80061 LIPID PANEL: CPT

## 2025-02-06 PROCEDURE — 82947 ASSAY GLUCOSE BLOOD QUANT: CPT

## 2025-02-06 PROCEDURE — 36415 COLL VENOUS BLD VENIPUNCTURE: CPT

## 2025-02-22 DIAGNOSIS — I10 ESSENTIAL HYPERTENSION: ICD-10-CM

## 2025-02-24 RX ORDER — ATORVASTATIN CALCIUM 40 MG/1
TABLET, FILM COATED ORAL
Qty: 30 TABLET | Refills: 0 | Status: SHIPPED | OUTPATIENT
Start: 2025-02-24

## 2025-02-24 RX ORDER — METOPROLOL SUCCINATE 25 MG/1
TABLET, EXTENDED RELEASE ORAL
Qty: 30 TABLET | Refills: 0 | Status: SHIPPED | OUTPATIENT
Start: 2025-02-24

## 2025-03-22 DIAGNOSIS — F17.200 TOBACCO DEPENDENCE: ICD-10-CM

## 2025-03-24 RX ORDER — BUPROPION HYDROCHLORIDE 150 MG/1
150 TABLET, EXTENDED RELEASE ORAL 2 TIMES DAILY
Qty: 60 TABLET | Refills: 0 | Status: SHIPPED | OUTPATIENT
Start: 2025-03-24

## 2025-03-24 RX ORDER — LEVETIRACETAM 500 MG/1
1500 TABLET ORAL 2 TIMES DAILY
Qty: 180 TABLET | Refills: 0 | Status: SHIPPED | OUTPATIENT
Start: 2025-03-24

## 2025-03-24 RX ORDER — CLOPIDOGREL BISULFATE 75 MG/1
75 TABLET ORAL DAILY
Qty: 30 TABLET | Refills: 0 | Status: SHIPPED | OUTPATIENT
Start: 2025-03-24

## 2025-03-27 ENCOUNTER — PATIENT MESSAGE (OUTPATIENT)
Dept: PRIMARY CARE CLINIC | Age: 55
End: 2025-03-27

## 2025-03-27 DIAGNOSIS — G47.00 INSOMNIA, UNSPECIFIED TYPE: ICD-10-CM

## 2025-03-27 RX ORDER — ZOLPIDEM TARTRATE 5 MG/1
5 TABLET ORAL NIGHTLY PRN
Qty: 30 TABLET | Refills: 3 | Status: SHIPPED | OUTPATIENT
Start: 2025-03-27 | End: 2025-07-25

## 2025-04-16 DIAGNOSIS — I10 ESSENTIAL HYPERTENSION: ICD-10-CM

## 2025-04-17 ENCOUNTER — TRANSCRIBE ORDERS (OUTPATIENT)
Dept: ADMINISTRATIVE | Age: 55
End: 2025-04-17

## 2025-04-17 DIAGNOSIS — R11.2 NAUSEA AND VOMITING, UNSPECIFIED VOMITING TYPE: ICD-10-CM

## 2025-04-17 DIAGNOSIS — R63.4 LOSS OF WEIGHT: Primary | ICD-10-CM

## 2025-04-17 DIAGNOSIS — R10.84 ABDOMINAL PAIN, GENERALIZED: ICD-10-CM

## 2025-04-17 RX ORDER — METOPROLOL SUCCINATE 25 MG/1
25 TABLET, EXTENDED RELEASE ORAL DAILY
Qty: 30 TABLET | Refills: 0 | Status: SHIPPED | OUTPATIENT
Start: 2025-04-17

## 2025-04-17 RX ORDER — ATORVASTATIN CALCIUM 40 MG/1
TABLET, FILM COATED ORAL
Qty: 30 TABLET | Refills: 0 | Status: SHIPPED | OUTPATIENT
Start: 2025-04-17

## 2025-05-05 ENCOUNTER — OFFICE VISIT (OUTPATIENT)
Dept: PRIMARY CARE CLINIC | Age: 55
End: 2025-05-05
Payer: COMMERCIAL

## 2025-05-05 VITALS
SYSTOLIC BLOOD PRESSURE: 94 MMHG | WEIGHT: 157 LBS | HEART RATE: 60 BPM | BODY MASS INDEX: 21.26 KG/M2 | OXYGEN SATURATION: 99 % | DIASTOLIC BLOOD PRESSURE: 68 MMHG | HEIGHT: 72 IN | TEMPERATURE: 97.3 F

## 2025-05-05 DIAGNOSIS — G47.00 INSOMNIA, UNSPECIFIED TYPE: ICD-10-CM

## 2025-05-05 DIAGNOSIS — E55.9 VITAMIN D DEFICIENCY: ICD-10-CM

## 2025-05-05 DIAGNOSIS — G40.909 SEIZURE DISORDER (HCC): ICD-10-CM

## 2025-05-05 DIAGNOSIS — Z87.891 PERSONAL HISTORY OF TOBACCO USE: ICD-10-CM

## 2025-05-05 DIAGNOSIS — E78.5 HYPERLIPIDEMIA, UNSPECIFIED HYPERLIPIDEMIA TYPE: ICD-10-CM

## 2025-05-05 DIAGNOSIS — Z00.01 ENCOUNTER FOR WELL ADULT EXAM WITH ABNORMAL FINDINGS: Primary | ICD-10-CM

## 2025-05-05 DIAGNOSIS — D47.3 ESSENTIAL THROMBOCYTHEMIA (HCC): ICD-10-CM

## 2025-05-05 DIAGNOSIS — F17.200 TOBACCO DEPENDENCE: ICD-10-CM

## 2025-05-05 DIAGNOSIS — I51.9 CHRONIC HEART DISEASE: ICD-10-CM

## 2025-05-05 DIAGNOSIS — I10 ESSENTIAL HYPERTENSION: ICD-10-CM

## 2025-05-05 DIAGNOSIS — F41.9 ANXIETY AND DEPRESSION: ICD-10-CM

## 2025-05-05 DIAGNOSIS — K50.00 CROHN'S DISEASE OF SMALL INTESTINE WITHOUT COMPLICATION (HCC): ICD-10-CM

## 2025-05-05 DIAGNOSIS — F32.A ANXIETY AND DEPRESSION: ICD-10-CM

## 2025-05-05 PROBLEM — N40.0 BENIGN PROSTATIC HYPERPLASIA: Status: ACTIVE | Noted: 2025-05-05

## 2025-05-05 PROBLEM — E29.1 HYPOGONADISM IN MALE: Status: ACTIVE | Noted: 2025-05-05

## 2025-05-05 PROCEDURE — 3074F SYST BP LT 130 MM HG: CPT | Performed by: FAMILY MEDICINE

## 2025-05-05 PROCEDURE — 99396 PREV VISIT EST AGE 40-64: CPT | Performed by: FAMILY MEDICINE

## 2025-05-05 PROCEDURE — G8420 CALC BMI NORM PARAMETERS: HCPCS | Performed by: FAMILY MEDICINE

## 2025-05-05 PROCEDURE — 3078F DIAST BP <80 MM HG: CPT | Performed by: FAMILY MEDICINE

## 2025-05-05 PROCEDURE — G0296 VISIT TO DETERM LDCT ELIG: HCPCS | Performed by: FAMILY MEDICINE

## 2025-05-05 PROCEDURE — 4004F PT TOBACCO SCREEN RCVD TLK: CPT | Performed by: FAMILY MEDICINE

## 2025-05-05 PROCEDURE — 3017F COLORECTAL CA SCREEN DOC REV: CPT | Performed by: FAMILY MEDICINE

## 2025-05-05 PROCEDURE — G8427 DOCREV CUR MEDS BY ELIG CLIN: HCPCS | Performed by: FAMILY MEDICINE

## 2025-05-05 PROCEDURE — 99212 OFFICE O/P EST SF 10 MIN: CPT | Performed by: FAMILY MEDICINE

## 2025-05-05 SDOH — ECONOMIC STABILITY: FOOD INSECURITY: WITHIN THE PAST 12 MONTHS, THE FOOD YOU BOUGHT JUST DIDN'T LAST AND YOU DIDN'T HAVE MONEY TO GET MORE.: NEVER TRUE

## 2025-05-05 SDOH — ECONOMIC STABILITY: FOOD INSECURITY: WITHIN THE PAST 12 MONTHS, YOU WORRIED THAT YOUR FOOD WOULD RUN OUT BEFORE YOU GOT MONEY TO BUY MORE.: NEVER TRUE

## 2025-05-05 ASSESSMENT — ENCOUNTER SYMPTOMS
SORE THROAT: 0
BLOOD IN STOOL: 0
SINUS PAIN: 0
DIARRHEA: 0
BACK PAIN: 1
VOMITING: 0
COUGH: 0
SINUS PRESSURE: 0
SHORTNESS OF BREATH: 0
ABDOMINAL PAIN: 0
PHOTOPHOBIA: 0
NAUSEA: 0
CONSTIPATION: 0

## 2025-05-05 ASSESSMENT — PATIENT HEALTH QUESTIONNAIRE - PHQ9
5. POOR APPETITE OR OVEREATING: SEVERAL DAYS
10. IF YOU CHECKED OFF ANY PROBLEMS, HOW DIFFICULT HAVE THESE PROBLEMS MADE IT FOR YOU TO DO YOUR WORK, TAKE CARE OF THINGS AT HOME, OR GET ALONG WITH OTHER PEOPLE: NOT DIFFICULT AT ALL
SUM OF ALL RESPONSES TO PHQ QUESTIONS 1-9: 6
9. THOUGHTS THAT YOU WOULD BE BETTER OFF DEAD, OR OF HURTING YOURSELF: NOT AT ALL
SUM OF ALL RESPONSES TO PHQ QUESTIONS 1-9: 6
4. FEELING TIRED OR HAVING LITTLE ENERGY: SEVERAL DAYS
3. TROUBLE FALLING OR STAYING ASLEEP: SEVERAL DAYS
1. LITTLE INTEREST OR PLEASURE IN DOING THINGS: SEVERAL DAYS
SUM OF ALL RESPONSES TO PHQ QUESTIONS 1-9: 6
8. MOVING OR SPEAKING SO SLOWLY THAT OTHER PEOPLE COULD HAVE NOTICED. OR THE OPPOSITE, BEING SO FIGETY OR RESTLESS THAT YOU HAVE BEEN MOVING AROUND A LOT MORE THAN USUAL: NOT AT ALL
2. FEELING DOWN, DEPRESSED OR HOPELESS: SEVERAL DAYS
SUM OF ALL RESPONSES TO PHQ QUESTIONS 1-9: 6
6. FEELING BAD ABOUT YOURSELF - OR THAT YOU ARE A FAILURE OR HAVE LET YOURSELF OR YOUR FAMILY DOWN: NOT AT ALL
7. TROUBLE CONCENTRATING ON THINGS, SUCH AS READING THE NEWSPAPER OR WATCHING TELEVISION: SEVERAL DAYS

## 2025-05-05 NOTE — PROGRESS NOTES
Vijay STONE DO   TRULANCE 3 MG TABS   Anne-Marie Curran MD   QUEtiapine (SEROQUEL) 100 MG tablet   Anne-Marie Curran MD   SENNA-TIME 8.6 MG tablet   Anne-Marie Curran MD   clopidogrel (PLAVIX) 75 MG tablet TAKE ONE TABLET EVERY DAY  Vijay Frederick DO   clopidogrel (PLAVIX) 75 MG tablet TAKE ONE TABLET EVERY DAY  Vijay Frederick DO   ondansetron (ZOFRAN) 4 MG tablet Take 1 tablet by mouth 3 times daily as needed for Nausea or Vomiting  Vijay Frederick DO   chlorpheniramine (ALLER-CHLOR) 4 MG tablet Take 1 tablet by mouth every 6 hours as needed for Allergies  Garret Doll III, PA   albuterol sulfate HFA (PROVENTIL;VENTOLIN;PROAIR) 108 (90 Base) MCG/ACT inhaler Inhale 2 puffs into the lungs 4 times daily as needed for Wheezing  Garret Doll III, PA   risperiDONE (RISPERDAL) 0.5 MG tablet Take 1 tablet by mouth 2 times daily  Anne-Marie Curran MD   vitamin D (ERGOCALCIFEROL) 1.25 MG (53435 UT) CAPS capsule TAKE 1 CAPSULE BY MOUTH ONE TIME PER WEEK  Jacinto Coreas MD   Multiple Vitamins-Minerals (CENTRUM SILVER PO) Take by mouth  Anne-Marie Curran MD   gabapentin (NEURONTIN) 600 MG tablet Take 1 tablet by mouth at bedtime.  Anne-Marie Curran MD   mirtazapine (REMERON) 45 MG tablet Take 1 tablet by mouth at bedtime  Anne-Marie Curran MD   hydrOXYzine (ATARAX) 50 MG tablet Take 1 tablet by mouth in the morning and at bedtime  Anne-Marie Curran MD   tadalafil (CIALIS) 5 MG tablet Take 1 tablet by mouth daily  Anne-Marie Curran MD   Cobalamin Combinations (B-12 + FOLIC ACID) 2500-400 MCG TBDP Take 1 tablet by mouth daily  Jacinto Coreas MD   allopurinol (ZYLOPRIM) 100 MG tablet Take 1 tablet by mouth daily  Jacinto Coreas MD   traZODone (DESYREL) 100 MG tablet Take 1.5 tablets by mouth nightly  Patient taking differently: Take 3 tablets by mouth nightly  Jacinto Coreas MD   lansoprazole (PREVACID) 30 MG delayed release capsule take 1 capsule by mouth every morning

## 2025-05-08 DIAGNOSIS — F17.200 TOBACCO DEPENDENCE: ICD-10-CM

## 2025-05-08 DIAGNOSIS — I10 ESSENTIAL HYPERTENSION: ICD-10-CM

## 2025-05-09 RX ORDER — CLOPIDOGREL BISULFATE 75 MG/1
75 TABLET ORAL DAILY
Qty: 30 TABLET | Refills: 0 | Status: SHIPPED | OUTPATIENT
Start: 2025-05-09

## 2025-05-09 RX ORDER — METOPROLOL SUCCINATE 25 MG/1
25 TABLET, EXTENDED RELEASE ORAL DAILY
Qty: 30 TABLET | Refills: 0 | Status: SHIPPED | OUTPATIENT
Start: 2025-05-09

## 2025-05-09 RX ORDER — BUPROPION HYDROCHLORIDE 150 MG/1
150 TABLET, EXTENDED RELEASE ORAL 2 TIMES DAILY
Qty: 60 TABLET | Refills: 0 | Status: SHIPPED | OUTPATIENT
Start: 2025-05-09

## 2025-05-09 RX ORDER — ATORVASTATIN CALCIUM 40 MG/1
TABLET, FILM COATED ORAL
Qty: 30 TABLET | Refills: 0 | Status: SHIPPED | OUTPATIENT
Start: 2025-05-09

## 2025-05-09 RX ORDER — LEVETIRACETAM 500 MG/1
1500 TABLET ORAL 2 TIMES DAILY
Qty: 180 TABLET | Refills: 0 | Status: SHIPPED | OUTPATIENT
Start: 2025-05-09

## 2025-05-15 ENCOUNTER — OFFICE VISIT (OUTPATIENT)
Dept: FAMILY MEDICINE CLINIC | Age: 55
End: 2025-05-15

## 2025-05-15 VITALS
WEIGHT: 149 LBS | TEMPERATURE: 98.8 F | BODY MASS INDEX: 20.18 KG/M2 | RESPIRATION RATE: 16 BRPM | HEIGHT: 72 IN | HEART RATE: 89 BPM | SYSTOLIC BLOOD PRESSURE: 98 MMHG | OXYGEN SATURATION: 96 % | DIASTOLIC BLOOD PRESSURE: 58 MMHG

## 2025-05-15 DIAGNOSIS — J20.9 ACUTE BRONCHITIS, UNSPECIFIED ORGANISM: Primary | ICD-10-CM

## 2025-05-15 DIAGNOSIS — Z72.0 TOBACCO ABUSE: ICD-10-CM

## 2025-05-15 RX ORDER — BENZONATATE 100 MG/1
100 CAPSULE ORAL 3 TIMES DAILY PRN
Qty: 21 CAPSULE | Refills: 0 | Status: SHIPPED | OUTPATIENT
Start: 2025-05-15 | End: 2025-05-22

## 2025-05-15 RX ORDER — PREDNISONE 10 MG/1
TABLET ORAL
Qty: 18 TABLET | Refills: 0 | Status: SHIPPED | OUTPATIENT
Start: 2025-05-15

## 2025-05-15 RX ORDER — ALBUTEROL SULFATE 90 UG/1
2 INHALANT RESPIRATORY (INHALATION) 4 TIMES DAILY PRN
Qty: 18 G | Refills: 0 | Status: SHIPPED | OUTPATIENT
Start: 2025-05-15

## 2025-05-15 NOTE — PROGRESS NOTES
5/15/25  Cameron Fraire : 1970 Sex: male  Age 54 y.o.      Subjective:  Chief Complaint   Patient presents with    Congestion    Cough    Fatigue         HPI:     History of Present Illness  The patient is a 54-year-old male who presents for evaluation of a cough.    He reports feeling generally unwell over the past two days, with a significant increase in symptoms yesterday. He has been experiencing weakness and a persistent cough, which he describes as productive, yielding white phlegm. There have been no episodes of fever. He suspects his condition may be due to bronchitis. He does not possess an inhaler. He admits to occasional smoking, with his last cigarette consumed approximately five days ago.    SOCIAL HISTORY  The patient smokes occasionally and has had a pack in the last five days.            ROS:   Unless otherwise stated in this report the patient's positive and negative responses for review of systems for constitutional, eyes, ENT, cardiovascular, respiratory, gastrointestinal, neurological, , musculoskeletal, and integument systems and related systems to the presenting problem are either stated in the history of present illness or were not pertinent or were negative for the symptoms and/or complaints related to the presenting medical problem.  Positives and pertinent negatives as per HPI.  All others reviewed and are negative.      PMH:     Past Medical History:   Diagnosis Date    Anxiety and depression     Atheroembolism of left lower extremity (HCC) 2014    Atrial flutter (HCC)     Bowel perforation (HCC) 2022    Coronary atherosclerosis due to calcified coronary lesion(414.4)     ED (erectile dysfunction)     Epilepsy (HCC)     Hyperlipidemia     Hypertension     IBS (irritable bowel syndrome)     Ischemic toe 2014    MI, old     Myeloproliferative disease (HCC) 2016    Small bowel perforation (HCC) 2022    Thrombocytosis     Tinea     Tobacco

## 2025-06-07 ENCOUNTER — HOSPITAL ENCOUNTER (EMERGENCY)
Age: 55
Discharge: HOME | End: 2025-06-07
Attending: EMERGENCY MEDICINE
Payer: COMMERCIAL

## 2025-06-07 ENCOUNTER — APPOINTMENT (OUTPATIENT)
Dept: CT IMAGING | Age: 55
End: 2025-06-07
Payer: COMMERCIAL

## 2025-06-07 VITALS
SYSTOLIC BLOOD PRESSURE: 105 MMHG | TEMPERATURE: 98.4 F | RESPIRATION RATE: 16 BRPM | HEART RATE: 65 BPM | DIASTOLIC BLOOD PRESSURE: 75 MMHG | OXYGEN SATURATION: 97 %

## 2025-06-07 DIAGNOSIS — G51.0 BELL'S PALSY: Primary | ICD-10-CM

## 2025-06-07 LAB
AMPHET UR QL SCN: NEGATIVE
ANION GAP SERPL CALCULATED.3IONS-SCNC: 9 MMOL/L (ref 7–16)
APAP SERPL-MCNC: <5 UG/ML (ref 10–30)
BARBITURATES UR QL SCN: NEGATIVE
BASOPHILS # BLD: 0.1 K/UL (ref 0–0.2)
BASOPHILS NFR BLD: 2 % (ref 0–2)
BENZODIAZ UR QL: NEGATIVE
BUN SERPL-MCNC: 18 MG/DL (ref 6–20)
BUPRENORPHINE UR QL: NEGATIVE
CALCIUM SERPL-MCNC: 9.2 MG/DL (ref 8.6–10.2)
CANNABINOIDS UR QL SCN: POSITIVE
CHLORIDE SERPL-SCNC: 103 MMOL/L (ref 98–107)
CO2 SERPL-SCNC: 27 MMOL/L (ref 22–29)
COCAINE UR QL SCN: NEGATIVE
CREAT SERPL-MCNC: 0.9 MG/DL (ref 0.7–1.2)
EOSINOPHIL # BLD: 0.25 K/UL (ref 0.05–0.5)
EOSINOPHILS RELATIVE PERCENT: 4 % (ref 0–6)
ERYTHROCYTE [DISTWIDTH] IN BLOOD BY AUTOMATED COUNT: 14 % (ref 11.5–15)
ETHANOLAMINE SERPL-MCNC: <10 MG/DL (ref 0–0.08)
FENTANYL UR QL: NEGATIVE
GFR, ESTIMATED: >90 ML/MIN/1.73M2
GLUCOSE SERPL-MCNC: 92 MG/DL (ref 74–99)
HCT VFR BLD AUTO: 36.8 % (ref 37–54)
HGB BLD-MCNC: 12.8 G/DL (ref 12.5–16.5)
IMM GRANULOCYTES # BLD AUTO: 0.04 K/UL (ref 0–0.58)
IMM GRANULOCYTES NFR BLD: 1 % (ref 0–5)
LYMPHOCYTES NFR BLD: 2.01 K/UL (ref 1.5–4)
LYMPHOCYTES RELATIVE PERCENT: 31 % (ref 20–42)
MAGNESIUM SERPL-MCNC: 1.9 MG/DL (ref 1.6–2.6)
MCH RBC QN AUTO: 38 PG (ref 26–35)
MCHC RBC AUTO-ENTMCNC: 34.8 G/DL (ref 32–34.5)
MCV RBC AUTO: 109.2 FL (ref 80–99.9)
METHADONE UR QL: NEGATIVE
MONOCYTES NFR BLD: 0.43 K/UL (ref 0.1–0.95)
MONOCYTES NFR BLD: 7 % (ref 2–12)
NEUTROPHILS NFR BLD: 56 % (ref 43–80)
NEUTS SEG NFR BLD: 3.58 K/UL (ref 1.8–7.3)
OPIATES UR QL SCN: NEGATIVE
OXYCODONE UR QL SCN: NEGATIVE
PCP UR QL SCN: NEGATIVE
PLATELET # BLD AUTO: 419 K/UL (ref 130–450)
PMV BLD AUTO: 9 FL (ref 7–12)
POTASSIUM SERPL-SCNC: 4.6 MMOL/L (ref 3.5–5)
RBC # BLD AUTO: 3.37 M/UL (ref 3.8–5.8)
SALICYLATES SERPL-MCNC: <0.3 MG/DL (ref 0–30)
SODIUM SERPL-SCNC: 139 MMOL/L (ref 132–146)
TEST INFORMATION: ABNORMAL
TOXIC TRICYCLIC SC,BLOOD: NEGATIVE
WBC OTHER # BLD: 6.4 K/UL (ref 4.5–11.5)

## 2025-06-07 PROCEDURE — 80177 DRUG SCRN QUAN LEVETIRACETAM: CPT

## 2025-06-07 PROCEDURE — 99284 EMERGENCY DEPT VISIT MOD MDM: CPT

## 2025-06-07 PROCEDURE — 80143 DRUG ASSAY ACETAMINOPHEN: CPT

## 2025-06-07 PROCEDURE — 80179 DRUG ASSAY SALICYLATE: CPT

## 2025-06-07 PROCEDURE — 70450 CT HEAD/BRAIN W/O DYE: CPT

## 2025-06-07 PROCEDURE — 80307 DRUG TEST PRSMV CHEM ANLYZR: CPT

## 2025-06-07 PROCEDURE — 83735 ASSAY OF MAGNESIUM: CPT

## 2025-06-07 PROCEDURE — 86618 LYME DISEASE ANTIBODY: CPT

## 2025-06-07 PROCEDURE — 93005 ELECTROCARDIOGRAM TRACING: CPT | Performed by: EMERGENCY MEDICINE

## 2025-06-07 PROCEDURE — 85025 COMPLETE CBC W/AUTO DIFF WBC: CPT

## 2025-06-07 PROCEDURE — G0480 DRUG TEST DEF 1-7 CLASSES: HCPCS

## 2025-06-07 PROCEDURE — 80048 BASIC METABOLIC PNL TOTAL CA: CPT

## 2025-06-07 RX ORDER — DIPHENHYDRAMINE HCL 25 MG
1 CAPSULE ORAL EVERY 4 HOURS PRN
Qty: 30 ML | Refills: 0 | Status: SHIPPED | OUTPATIENT
Start: 2025-06-07

## 2025-06-07 RX ORDER — PREDNISONE 20 MG/1
60 TABLET ORAL DAILY
Qty: 21 TABLET | Refills: 0 | Status: SHIPPED | OUTPATIENT
Start: 2025-06-07 | End: 2025-06-14

## 2025-06-07 NOTE — DISCHARGE INSTRUCTIONS
Please return to the ER for any new or worsening symptoms  If prescribed, please be sure to  your prescriptions from the pharmacy  Please follow-up with Primary care provider as instructed    CT HEAD WO CONTRAST   Final Result   No acute intracranial abnormality.

## 2025-06-07 NOTE — ED PROVIDER NOTES
auscultation bilaterally, no wheezes, rales, or rhonchi. Not in respiratory distress  Cardiovascular:  Regular rate and rhythm, no murmurs, gallops, or rubs. 2+ distal pulses  Abdomen: Soft, non tender, non distended,   Extremities: Moves all extremities x 4. Warm and well perfused  Skin: warm and dry without rash  Neurologic: GCS 15, left-sided facial droop that does not spare the forehead.  No cerebellar signs, ambulates out difficulty, 5 out of 5 motor strength upper lower extremities bilaterally.  Psych: Normal Affect    ------------------------------ ED COURSE/MEDICAL DECISION MAKING----------------------  Medications - No data to display          Medical Decision Making:    NIH Stroke Scale/Score at time of initial evaluation:  1A: Level of Consciousness 0 - alert; keenly responsive   1B: Ask Month and Age 0 - answers both questions correctly   1C: Tell Patient To Open and Close Eyes, then Hand  Squeeze 0 - performs both tasks correctly   2: Test Horizontal Extraocular Movements 0 - normal   3: Test Visual Fields 0 - no visual loss   4: Test Facial Palsy 3 - complete paralysis of one or both sides (absence of facial movement in the upper and lower face)   5A: Test Left Arm Motor Drift 0 - no drift, limb holds 90 (or 45) degrees for full 10 seconds   5B: Test Right Arm Motor Drift 0 - no drift, limb holds 90 (or 45) degrees for full 10 seconds   6A: Test Left Leg Motor Drift 0 - no drift; leg holds 30 degree position for full 5 seconds   6B: Test Right Leg Motor Drift 0 - no drift; leg holds 30 degree position for full 5 seconds   7: Test Limb Ataxia   (FNF/Heel-Shin) 0 - absent   8: Test Sensation 0 - normal; no sensory loss   9: Test Language/Aphasia 0 - no aphasia, normal   10: Test Dysarthria 0 - normal   11: Test Extinction/Inattention 0 - no abnormality   Total Score: 3   6/7/25 at 1:32 PM EDT.      Acute CVA Core Measures:      - t-PA Eligibility: IV t-PA was considered and not given due to        Counseling:   The emergency provider has spoken with the patient and discussed today’s results, in addition to providing specific details for the plan of care and counseling regarding the diagnosis and prognosis.  Questions are answered at this time and they are agreeable with the plan.      --------------------------------- IMPRESSION AND DISPOSITION ---------------------------------    IMPRESSION  1. Bell's palsy        DISPOSITION  Disposition: Discharge to home  Patient condition is stable      NOTE: This report was transcribed using voice recognition software. Every effort was made to ensure accuracy; however, inadvertent computerized transcription errors may be present       Wojciech Oates DO  06/08/25 0531

## 2025-06-09 LAB
EKG ATRIAL RATE: 60 BPM
EKG P AXIS: 68 DEGREES
EKG P-R INTERVAL: 156 MS
EKG Q-T INTERVAL: 416 MS
EKG QRS DURATION: 88 MS
EKG QTC CALCULATION (BAZETT): 416 MS
EKG R AXIS: 60 DEGREES
EKG T AXIS: 61 DEGREES
EKG VENTRICULAR RATE: 60 BPM

## 2025-06-09 PROCEDURE — 93010 ELECTROCARDIOGRAM REPORT: CPT | Performed by: INTERNAL MEDICINE

## 2025-06-10 LAB
B BURGDOR AB SER IA-ACNC: 0.25 IV
LEVETIRACETAM SERPL-MCNC: 27 UG/ML

## 2025-06-11 ENCOUNTER — RESULTS FOLLOW-UP (OUTPATIENT)
Dept: FAMILY MEDICINE CLINIC | Age: 55
End: 2025-06-11

## 2025-06-11 ENCOUNTER — HOSPITAL ENCOUNTER (OUTPATIENT)
Dept: CT IMAGING | Age: 55
Discharge: HOME | End: 2025-06-13
Attending: FAMILY MEDICINE
Payer: COMMERCIAL

## 2025-06-11 DIAGNOSIS — Z87.891 PERSONAL HISTORY OF TOBACCO USE: ICD-10-CM

## 2025-06-11 PROCEDURE — 71271 CT THORAX LUNG CANCER SCR C-: CPT

## 2025-06-12 ENCOUNTER — OFFICE VISIT (OUTPATIENT)
Dept: CARDIOLOGY CLINIC | Age: 55
End: 2025-06-12

## 2025-06-12 VITALS
RESPIRATION RATE: 18 BRPM | DIASTOLIC BLOOD PRESSURE: 62 MMHG | TEMPERATURE: 97.5 F | SYSTOLIC BLOOD PRESSURE: 112 MMHG | HEIGHT: 73 IN | WEIGHT: 151.1 LBS | HEART RATE: 58 BPM | BODY MASS INDEX: 20.03 KG/M2 | OXYGEN SATURATION: 97 %

## 2025-06-12 DIAGNOSIS — I10 ESSENTIAL HYPERTENSION: ICD-10-CM

## 2025-06-12 DIAGNOSIS — I25.2 MYOCARDIAL INFARCTION, OLD: ICD-10-CM

## 2025-06-12 DIAGNOSIS — I25.10 CORONARY ATHEROSCLEROSIS DUE TO CALCIFIED CORONARY LESION: Primary | ICD-10-CM

## 2025-06-12 DIAGNOSIS — I25.84 CORONARY ATHEROSCLEROSIS DUE TO CALCIFIED CORONARY LESION: Primary | ICD-10-CM

## 2025-06-12 DIAGNOSIS — E78.00 PURE HYPERCHOLESTEROLEMIA: ICD-10-CM

## 2025-06-12 DIAGNOSIS — Z95.5 STENTED CORONARY ARTERY: ICD-10-CM

## 2025-06-12 NOTE — PROGRESS NOTES
Patient Active Problem List   Diagnosis    Tobacco dependence    Hyperlipidemia    Coronary atherosclerosis due to calcified coronary lesion    IBS (irritable bowel syndrome)    Seizure disorder (HCC)    Essential thrombocythemia (HCC)    Stented coronary artery    Anxiety and depression    Gastroesophageal reflux disease    Essential hypertension    Vitamin D deficiency    Insomnia    Lumbar radiculopathy    Crohn's disease of small intestine without complication (HCC)    Fibromyalgia    Plantar fasciitis    Chronic heart disease    History of resection of small bowel    Benign prostatic hyperplasia    Hypogonadism in male       Current Outpatient Medications   Medication Sig Dispense Refill    predniSONE (DELTASONE) 20 MG tablet Take 3 tablets by mouth daily for 7 days 21 tablet 0    dextran 70-hypromellose (ARTIFICIAL TEARS) 0.1-0.3 % SOLN opthalmic solution Place 1 drop into the left eye every 4 hours as needed (dry eye/irritation) 30 mL 0    albuterol sulfate HFA (PROVENTIL;VENTOLIN;PROAIR) 108 (90 Base) MCG/ACT inhaler Inhale 2 puffs into the lungs 4 times daily as needed for Wheezing 18 g 0    atorvastatin (LIPITOR) 40 MG tablet TAKE ONE TABLET EVERY EVENING 30 tablet 0    metoprolol succinate (TOPROL XL) 25 MG extended release tablet TAKE ONE TABLET BY MOUTH EVERY DAY 30 tablet 0    buPROPion (WELLBUTRIN SR) 150 MG extended release tablet Take 1 tablet by mouth 2 times daily 60 tablet 0    levETIRAcetam (KEPPRA) 500 MG tablet Take 3 tablets by mouth 2 times daily 180 tablet 0    zolpidem (AMBIEN) 5 MG tablet Take 1 tablet by mouth nightly as needed for Sleep for up to 120 days. Max Daily Amount: 5 mg 30 tablet 3    QUEtiapine (SEROQUEL) 100 MG tablet       SENNA-TIME 8.6 MG tablet       clopidogrel (PLAVIX) 75 MG tablet TAKE ONE TABLET EVERY DAY 30 tablet 0    ondansetron (ZOFRAN) 4 MG tablet Take 1 tablet by mouth 3 times daily as needed for Nausea or Vomiting 30 tablet 0    chlorpheniramine (ALLER-CHLOR) 4

## 2025-06-26 DIAGNOSIS — F17.200 TOBACCO DEPENDENCE: ICD-10-CM

## 2025-06-26 DIAGNOSIS — I25.10 CORONARY ATHEROSCLEROSIS DUE TO CALCIFIED CORONARY LESION: ICD-10-CM

## 2025-06-26 DIAGNOSIS — I25.84 CORONARY ATHEROSCLEROSIS DUE TO CALCIFIED CORONARY LESION: ICD-10-CM

## 2025-06-26 RX ORDER — BUPROPION HYDROCHLORIDE 150 MG/1
150 TABLET, EXTENDED RELEASE ORAL 2 TIMES DAILY
Qty: 180 TABLET | Refills: 3 | Status: SHIPPED | OUTPATIENT
Start: 2025-06-26

## 2025-06-26 RX ORDER — CLOPIDOGREL BISULFATE 75 MG/1
75 TABLET ORAL DAILY
Qty: 90 TABLET | Refills: 3 | Status: SHIPPED | OUTPATIENT
Start: 2025-06-26

## 2025-06-26 RX ORDER — LEVETIRACETAM 500 MG/1
1500 TABLET ORAL 2 TIMES DAILY
Qty: 180 TABLET | Refills: 0 | Status: SHIPPED | OUTPATIENT
Start: 2025-06-26

## 2025-06-26 NOTE — TELEPHONE ENCOUNTER
Name of Medication(s) Requested:  Requested Prescriptions      No prescriptions requested or ordered in this encounter       Medication is on current medication list Yes    Dosage and directions were verified? Yes    Quantity verified: 90 day supply     Pharmacy Verified?  Yes    Last Appointment:  5/5/2025    Future appts:  Future Appointments   Date Time Provider Department Center   11/5/2025 10:00 AM Vijay Frederick DO [facility] [place]   6/16/2026 10:20 AM Scott Bolton MD [facility]        (If no appt send self scheduling link. .[email or web address])  Scheduling request sent?     [] Yes  [] No    Does patient need updated?  [] Yes  [] No

## 2025-07-24 RX ORDER — LEVETIRACETAM 500 MG/1
1500 TABLET ORAL 2 TIMES DAILY
Qty: 180 TABLET | Refills: 0 | Status: SHIPPED | OUTPATIENT
Start: 2025-07-24

## 2025-08-05 ENCOUNTER — HOSPITAL ENCOUNTER (EMERGENCY)
Age: 55
Discharge: HOME | End: 2025-08-05
Attending: EMERGENCY MEDICINE
Payer: COMMERCIAL

## 2025-08-05 VITALS
HEART RATE: 72 BPM | DIASTOLIC BLOOD PRESSURE: 74 MMHG | RESPIRATION RATE: 16 BRPM | HEIGHT: 73 IN | TEMPERATURE: 98.3 F | BODY MASS INDEX: 20.28 KG/M2 | SYSTOLIC BLOOD PRESSURE: 111 MMHG | OXYGEN SATURATION: 97 % | WEIGHT: 153 LBS

## 2025-08-05 DIAGNOSIS — G40.919 BREAKTHROUGH SEIZURE (HCC): Primary | ICD-10-CM

## 2025-08-05 LAB
ANION GAP SERPL CALCULATED.3IONS-SCNC: 15 MMOL/L (ref 7–16)
BASOPHILS # BLD: 0.19 K/UL (ref 0–0.2)
BASOPHILS NFR BLD: 2 % (ref 0–2)
BUN SERPL-MCNC: 12 MG/DL (ref 6–20)
CALCIUM SERPL-MCNC: 9.4 MG/DL (ref 8.6–10)
CHLORIDE SERPL-SCNC: 101 MMOL/L (ref 98–107)
CO2 SERPL-SCNC: 22 MMOL/L (ref 22–29)
CREAT SERPL-MCNC: 1 MG/DL (ref 0.7–1.2)
EOSINOPHIL # BLD: 0.4 K/UL (ref 0.05–0.5)
EOSINOPHILS RELATIVE PERCENT: 4 % (ref 0–6)
ERYTHROCYTE [DISTWIDTH] IN BLOOD BY AUTOMATED COUNT: 13.3 % (ref 11.5–15)
GFR, ESTIMATED: >90 ML/MIN/1.73M2
GLUCOSE SERPL-MCNC: 131 MG/DL (ref 74–99)
HCT VFR BLD AUTO: 43.4 % (ref 37–54)
HGB BLD-MCNC: 15 G/DL (ref 12.5–16.5)
IMM GRANULOCYTES # BLD AUTO: 0.08 K/UL (ref 0–0.58)
IMM GRANULOCYTES NFR BLD: 1 % (ref 0–5)
LYMPHOCYTES NFR BLD: 3.03 K/UL (ref 1.5–4)
LYMPHOCYTES RELATIVE PERCENT: 30 % (ref 20–42)
MAGNESIUM SERPL-MCNC: 1.9 MG/DL (ref 1.6–2.6)
MCH RBC QN AUTO: 36 PG (ref 26–35)
MCHC RBC AUTO-ENTMCNC: 34.6 G/DL (ref 32–34.5)
MCV RBC AUTO: 104.1 FL (ref 80–99.9)
MONOCYTES NFR BLD: 0.73 K/UL (ref 0.1–0.95)
MONOCYTES NFR BLD: 7 % (ref 2–12)
NEUTROPHILS NFR BLD: 56 % (ref 43–80)
NEUTS SEG NFR BLD: 5.68 K/UL (ref 1.8–7.3)
PLATELET # BLD AUTO: 838 K/UL (ref 130–450)
PMV BLD AUTO: 9 FL (ref 7–12)
POTASSIUM SERPL-SCNC: 4 MMOL/L (ref 3.5–5.1)
RBC # BLD AUTO: 4.17 M/UL (ref 3.8–5.8)
SODIUM SERPL-SCNC: 138 MMOL/L (ref 136–145)
WBC OTHER # BLD: 10.1 K/UL (ref 4.5–11.5)

## 2025-08-05 PROCEDURE — 96374 THER/PROPH/DIAG INJ IV PUSH: CPT

## 2025-08-05 PROCEDURE — 83735 ASSAY OF MAGNESIUM: CPT

## 2025-08-05 PROCEDURE — 80048 BASIC METABOLIC PNL TOTAL CA: CPT

## 2025-08-05 PROCEDURE — 99284 EMERGENCY DEPT VISIT MOD MDM: CPT

## 2025-08-05 PROCEDURE — 85025 COMPLETE CBC W/AUTO DIFF WBC: CPT

## 2025-08-05 PROCEDURE — 93005 ELECTROCARDIOGRAM TRACING: CPT | Performed by: EMERGENCY MEDICINE

## 2025-08-05 PROCEDURE — 6360000002 HC RX W HCPCS: Performed by: EMERGENCY MEDICINE

## 2025-08-05 RX ORDER — LEVETIRACETAM 500 MG/5ML
1500 INJECTION, SOLUTION, CONCENTRATE INTRAVENOUS ONCE
Status: COMPLETED | OUTPATIENT
Start: 2025-08-05 | End: 2025-08-05

## 2025-08-05 RX ADMIN — LEVETIRACETAM 1500 MG: 100 INJECTION INTRAVENOUS at 20:47

## 2025-08-05 ASSESSMENT — LIFESTYLE VARIABLES
HOW OFTEN DO YOU HAVE A DRINK CONTAINING ALCOHOL: NEVER
HOW MANY STANDARD DRINKS CONTAINING ALCOHOL DO YOU HAVE ON A TYPICAL DAY: PATIENT DOES NOT DRINK

## 2025-08-06 ENCOUNTER — OFFICE VISIT (OUTPATIENT)
Dept: PRIMARY CARE CLINIC | Age: 55
End: 2025-08-06
Payer: COMMERCIAL

## 2025-08-06 VITALS
DIASTOLIC BLOOD PRESSURE: 62 MMHG | SYSTOLIC BLOOD PRESSURE: 118 MMHG | BODY MASS INDEX: 18.82 KG/M2 | HEART RATE: 73 BPM | WEIGHT: 142 LBS | HEIGHT: 73 IN | OXYGEN SATURATION: 97 % | TEMPERATURE: 98 F

## 2025-08-06 DIAGNOSIS — Z09 HOSPITAL DISCHARGE FOLLOW-UP: Primary | ICD-10-CM

## 2025-08-06 DIAGNOSIS — G40.909 SEIZURE DISORDER (HCC): ICD-10-CM

## 2025-08-06 PROCEDURE — 3074F SYST BP LT 130 MM HG: CPT | Performed by: FAMILY MEDICINE

## 2025-08-06 PROCEDURE — 99213 OFFICE O/P EST LOW 20 MIN: CPT | Performed by: FAMILY MEDICINE

## 2025-08-06 PROCEDURE — 1111F DSCHRG MED/CURRENT MED MERGE: CPT | Performed by: FAMILY MEDICINE

## 2025-08-06 PROCEDURE — G8420 CALC BMI NORM PARAMETERS: HCPCS | Performed by: FAMILY MEDICINE

## 2025-08-06 PROCEDURE — 3078F DIAST BP <80 MM HG: CPT | Performed by: FAMILY MEDICINE

## 2025-08-06 PROCEDURE — 4004F PT TOBACCO SCREEN RCVD TLK: CPT | Performed by: FAMILY MEDICINE

## 2025-08-06 PROCEDURE — 3017F COLORECTAL CA SCREEN DOC REV: CPT | Performed by: FAMILY MEDICINE

## 2025-08-06 PROCEDURE — G8427 DOCREV CUR MEDS BY ELIG CLIN: HCPCS | Performed by: FAMILY MEDICINE

## 2025-08-06 RX ORDER — PROMETHAZINE HYDROCHLORIDE 12.5 MG/1
12.5 TABLET ORAL 3 TIMES DAILY PRN
Qty: 90 TABLET | Refills: 5 | Status: SHIPPED | OUTPATIENT
Start: 2025-08-06

## 2025-08-07 LAB
EKG ATRIAL RATE: 75 BPM
EKG P AXIS: 79 DEGREES
EKG P-R INTERVAL: 132 MS
EKG Q-T INTERVAL: 384 MS
EKG QRS DURATION: 94 MS
EKG QTC CALCULATION (BAZETT): 428 MS
EKG R AXIS: 59 DEGREES
EKG T AXIS: 65 DEGREES
EKG VENTRICULAR RATE: 75 BPM

## 2025-08-07 PROCEDURE — 93010 ELECTROCARDIOGRAM REPORT: CPT | Performed by: INTERNAL MEDICINE

## 2025-08-29 ENCOUNTER — TELEPHONE (OUTPATIENT)
Dept: PRIMARY CARE CLINIC | Age: 55
End: 2025-08-29

## 2025-08-29 DIAGNOSIS — G40.909 SEIZURE DISORDER (HCC): Primary | ICD-10-CM

## 2025-09-04 DIAGNOSIS — I10 ESSENTIAL HYPERTENSION: ICD-10-CM

## 2025-09-04 RX ORDER — METOPROLOL SUCCINATE 25 MG/1
25 TABLET, EXTENDED RELEASE ORAL DAILY
Qty: 30 TABLET | Refills: 0 | Status: SHIPPED | OUTPATIENT
Start: 2025-09-04

## 2025-09-04 RX ORDER — ATORVASTATIN CALCIUM 40 MG/1
TABLET, FILM COATED ORAL
Qty: 30 TABLET | Refills: 0 | Status: SHIPPED | OUTPATIENT
Start: 2025-09-04

## (undated) DEVICE — APPLICATOR PREP 26ML 0.7% IOD POVACRYLEX 74% ISO ALC ST

## (undated) DEVICE — DOUBLE BASIN SET: Brand: MEDLINE INDUSTRIES, INC.

## (undated) DEVICE — DRAPE,LAPAROTOMY,PCH,STERILE: Brand: MEDLINE

## (undated) DEVICE — DRESSING BORDERED ADH GZ UNIV GEN USE 8INX4IN AND 6INX2IN

## (undated) DEVICE — SOLUTION IV IRRIG POUR BRL 0.9% SODIUM CHL 2F7124

## (undated) DEVICE — ELECTRODE PT RET AD L9FT HI MOIST COND ADH HYDRGEL CORDED

## (undated) DEVICE — STAPLER INT L60MM REG TISS BLU B FRM 8 FIRING 2 ROW AUTO

## (undated) DEVICE — CONTAINER VACUTAINER ANAER CULTURE SWAB

## (undated) DEVICE — RELOAD STPL L75MM OPN H3.8MM CLS 1.5MM WIRE DIA0.2MM REG

## (undated) DEVICE — STAPLER INT L75MM CUT LN L73MM STPL LN L77MM BLU B FRM 8

## (undated) DEVICE — PAD GEN USE BORDERED ADH 14IN 2IN AND 12IN 4IN GZ UNIV ST

## (undated) DEVICE — COVER HNDL LT DISP

## (undated) DEVICE — SET INSTRUMENT LAP II

## (undated) DEVICE — BLADE CLIPPER GEN PURP NS

## (undated) DEVICE — INTENDED FOR TISSUE SEPARATION, AND OTHER PROCEDURES THAT REQUIRE A SHARP SURGICAL BLADE TO PUNCTURE OR CUT.: Brand: BARD-PARKER ® STAINLESS STEEL BLADES

## (undated) DEVICE — SWAB SPEC COLL SHFT L5.25IN POLYUR FOAM TIP SFT DBL MEDIA

## (undated) DEVICE — Device

## (undated) DEVICE — SHEET,DRAPE,40X58,STERILE: Brand: MEDLINE

## (undated) DEVICE — TOTAL TRAY, 16FR 10ML SIL FOLEY, URN: Brand: MEDLINE

## (undated) DEVICE — SPONGE LAP W18XL18IN WHT COT 4 PLY FLD STRUNG RADPQ DISP ST

## (undated) DEVICE — BINDER ABD UNISX 4 PNL PREM 6INX6INX12IN L XL 4

## (undated) DEVICE — YANKAUER,POOLE TIP,STERILE,50/CS: Brand: MEDLINE

## (undated) DEVICE — SET INST GENERAL LONG EXTRAS

## (undated) DEVICE — PACK PROCEDURE SURG GEN CUST

## (undated) DEVICE — SET INSTRUMENT LAP I

## (undated) DEVICE — SEALER ENDOSCP NANO COAT OPN DIV CRV L JAW LIGASURE IMPACT

## (undated) DEVICE — TOWEL,OR,DSP,ST,GREEN,DLX,XR,4/PK,20PK/C: Brand: MEDLINE

## (undated) DEVICE — TOWEL,OR,DSP,ST,BLUE,STD,6/PK,12PK/CS: Brand: MEDLINE

## (undated) DEVICE — 4-PORT MANIFOLD: Brand: NEPTUNE 2

## (undated) DEVICE — GLOVE ORANGE PI 8   MSG9080

## (undated) DEVICE — GOWN,SIRUS,FABRNF,XL,20/CS: Brand: MEDLINE